# Patient Record
Sex: FEMALE | Race: WHITE | NOT HISPANIC OR LATINO | Employment: OTHER | URBAN - METROPOLITAN AREA
[De-identification: names, ages, dates, MRNs, and addresses within clinical notes are randomized per-mention and may not be internally consistent; named-entity substitution may affect disease eponyms.]

---

## 2020-06-01 ENCOUNTER — HOSPITAL ENCOUNTER (OUTPATIENT)
Facility: MEDICAL CENTER | Age: 60
End: 2020-06-01
Payer: MEDICARE

## 2020-06-01 ENCOUNTER — HOSPITAL ENCOUNTER (INPATIENT)
Facility: MEDICAL CENTER | Age: 60
LOS: 14 days | DRG: 871 | End: 2020-06-15
Attending: EMERGENCY MEDICINE | Admitting: HOSPITALIST
Payer: MEDICARE

## 2020-06-01 ENCOUNTER — APPOINTMENT (OUTPATIENT)
Dept: RADIOLOGY | Facility: MEDICAL CENTER | Age: 60
DRG: 871 | End: 2020-06-01
Attending: EMERGENCY MEDICINE
Payer: MEDICARE

## 2020-06-01 DIAGNOSIS — R65.21 SEPTIC SHOCK (HCC): ICD-10-CM

## 2020-06-01 DIAGNOSIS — N17.9 ACUTE RENAL FAILURE, UNSPECIFIED ACUTE RENAL FAILURE TYPE (HCC): ICD-10-CM

## 2020-06-01 DIAGNOSIS — A41.9 SEPTIC SHOCK (HCC): ICD-10-CM

## 2020-06-01 DIAGNOSIS — R19.7 DIARRHEA OF PRESUMED INFECTIOUS ORIGIN: ICD-10-CM

## 2020-06-01 DIAGNOSIS — E86.0 DEHYDRATION: ICD-10-CM

## 2020-06-01 PROBLEM — E87.20 METABOLIC ACIDOSIS: Status: ACTIVE | Noted: 2020-06-01

## 2020-06-01 PROBLEM — E87.1 HYPONATREMIA: Status: ACTIVE | Noted: 2020-06-01

## 2020-06-01 PROBLEM — N17.0 ACUTE RENAL FAILURE WITH TUBULAR NECROSIS (HCC): Status: ACTIVE | Noted: 2020-06-01

## 2020-06-01 PROBLEM — J47.9 BRONCHIECTASIS (HCC): Status: ACTIVE | Noted: 2020-06-01

## 2020-06-01 PROBLEM — N10 ACUTE PYELONEPHRITIS: Status: ACTIVE | Noted: 2020-06-01

## 2020-06-01 PROBLEM — E27.40 ADRENAL INSUFFICIENCY (HCC): Status: ACTIVE | Noted: 2020-06-01

## 2020-06-01 PROBLEM — E11.10 DIABETIC KETOACIDOSIS WITHOUT COMA ASSOCIATED WITH TYPE 2 DIABETES MELLITUS (HCC): Status: ACTIVE | Noted: 2020-06-01

## 2020-06-01 LAB
ALBUMIN SERPL BCP-MCNC: 2.5 G/DL (ref 3.2–4.9)
ALBUMIN SERPL BCP-MCNC: 2.7 G/DL (ref 3.2–4.9)
ALBUMIN/GLOB SERPL: 0.9 G/DL
ALBUMIN/GLOB SERPL: 0.9 G/DL
ALP SERPL-CCNC: 62 U/L (ref 30–99)
ALP SERPL-CCNC: 66 U/L (ref 30–99)
ALT SERPL-CCNC: 25 U/L (ref 2–50)
ALT SERPL-CCNC: 28 U/L (ref 2–50)
ANION GAP SERPL CALC-SCNC: 20 MMOL/L (ref 7–16)
ANION GAP SERPL CALC-SCNC: 20 MMOL/L (ref 7–16)
ANION GAP SERPL CALC-SCNC: 22 MMOL/L (ref 7–16)
ANION GAP SERPL CALC-SCNC: 23 MMOL/L (ref 7–16)
ANISOCYTOSIS BLD QL SMEAR: ABNORMAL
ANISOCYTOSIS BLD QL SMEAR: ABNORMAL
APPEARANCE UR: CLEAR
AST SERPL-CCNC: 32 U/L (ref 12–45)
AST SERPL-CCNC: 37 U/L (ref 12–45)
B-OH-BUTYR SERPL-MCNC: 1.94 MMOL/L (ref 0.02–0.27)
BACTERIA #/AREA URNS HPF: NEGATIVE /HPF
BASOPHILS # BLD AUTO: 0 % (ref 0–1.8)
BASOPHILS # BLD AUTO: 0.9 % (ref 0–1.8)
BASOPHILS # BLD: 0 K/UL (ref 0–0.12)
BASOPHILS # BLD: 0.18 K/UL (ref 0–0.12)
BILIRUB SERPL-MCNC: 0.2 MG/DL (ref 0.1–1.5)
BILIRUB SERPL-MCNC: 0.3 MG/DL (ref 0.1–1.5)
BILIRUB UR QL STRIP.AUTO: NEGATIVE
BLOOD CULTURE HOLD CXBCH: NORMAL
BUN SERPL-MCNC: 64 MG/DL (ref 8–22)
BUN SERPL-MCNC: 65 MG/DL (ref 8–22)
BUN SERPL-MCNC: 65 MG/DL (ref 8–22)
BUN SERPL-MCNC: 66 MG/DL (ref 8–22)
BURR CELLS BLD QL SMEAR: NORMAL
BURR CELLS BLD QL SMEAR: NORMAL
C DIFF DNA SPEC QL NAA+PROBE: NEGATIVE
C DIFF TOX GENS STL QL NAA+PROBE: NEGATIVE
CALCIUM SERPL-MCNC: 6.6 MG/DL (ref 8.5–10.5)
CALCIUM SERPL-MCNC: 6.7 MG/DL (ref 8.5–10.5)
CALCIUM SERPL-MCNC: 7.1 MG/DL (ref 8.5–10.5)
CALCIUM SERPL-MCNC: 7.3 MG/DL (ref 8.5–10.5)
CHLORIDE SERPL-SCNC: 93 MMOL/L (ref 96–112)
CHLORIDE SERPL-SCNC: 93 MMOL/L (ref 96–112)
CHLORIDE SERPL-SCNC: 95 MMOL/L (ref 96–112)
CHLORIDE SERPL-SCNC: 95 MMOL/L (ref 96–112)
CO2 SERPL-SCNC: 13 MMOL/L (ref 20–33)
CO2 SERPL-SCNC: 13 MMOL/L (ref 20–33)
CO2 SERPL-SCNC: 15 MMOL/L (ref 20–33)
CO2 SERPL-SCNC: 16 MMOL/L (ref 20–33)
COLOR UR: YELLOW
COVID ORDER STATUS COVID19: NORMAL
CREAT SERPL-MCNC: 5.14 MG/DL (ref 0.5–1.4)
CREAT SERPL-MCNC: 5.17 MG/DL (ref 0.5–1.4)
CREAT SERPL-MCNC: 5.35 MG/DL (ref 0.5–1.4)
CREAT SERPL-MCNC: 5.54 MG/DL (ref 0.5–1.4)
CREAT UR-MCNC: 135.6 MG/DL
DOHLE BOD BLD QL SMEAR: NORMAL
DOHLE BOD BLD QL SMEAR: NORMAL
EKG IMPRESSION: NORMAL
EOSINOPHIL # BLD AUTO: 0 K/UL (ref 0–0.51)
EOSINOPHIL # BLD AUTO: 0 K/UL (ref 0–0.51)
EOSINOPHIL NFR BLD: 0 % (ref 0–6.9)
EOSINOPHIL NFR BLD: 0 % (ref 0–6.9)
EOSINOPHIL URNS QL MICRO: ABNORMAL
EPI CELLS #/AREA URNS HPF: NEGATIVE /HPF
ERYTHROCYTE [DISTWIDTH] IN BLOOD BY AUTOMATED COUNT: 46.2 FL (ref 35.9–50)
ERYTHROCYTE [DISTWIDTH] IN BLOOD BY AUTOMATED COUNT: 47.4 FL (ref 35.9–50)
GIANT PLATELETS BLD QL SMEAR: NORMAL
GLOBULIN SER CALC-MCNC: 2.9 G/DL (ref 1.9–3.5)
GLOBULIN SER CALC-MCNC: 3.1 G/DL (ref 1.9–3.5)
GLUCOSE BLD-MCNC: 252 MG/DL (ref 65–99)
GLUCOSE BLD-MCNC: 311 MG/DL (ref 65–99)
GLUCOSE BLD-MCNC: 319 MG/DL (ref 65–99)
GLUCOSE BLD-MCNC: 349 MG/DL (ref 65–99)
GLUCOSE SERPL-MCNC: 171 MG/DL (ref 65–99)
GLUCOSE SERPL-MCNC: 365 MG/DL (ref 65–99)
GLUCOSE SERPL-MCNC: 370 MG/DL (ref 65–99)
GLUCOSE SERPL-MCNC: 415 MG/DL (ref 65–99)
GLUCOSE UR STRIP.AUTO-MCNC: NEGATIVE MG/DL
HCT VFR BLD AUTO: 26.6 % (ref 37–47)
HCT VFR BLD AUTO: 28.5 % (ref 37–47)
HGB BLD-MCNC: 8.4 G/DL (ref 12–16)
HGB BLD-MCNC: 9.1 G/DL (ref 12–16)
HYALINE CASTS #/AREA URNS LPF: ABNORMAL /LPF
KETONES UR STRIP.AUTO-MCNC: NEGATIVE MG/DL
LACTATE BLD-SCNC: 1.8 MMOL/L (ref 0.5–2)
LACTATE BLD-SCNC: 2.5 MMOL/L (ref 0.5–2)
LEUKOCYTE ESTERASE UR QL STRIP.AUTO: ABNORMAL
LYMPHOCYTES # BLD AUTO: 0.32 K/UL (ref 1–4.8)
LYMPHOCYTES # BLD AUTO: 0.33 K/UL (ref 1–4.8)
LYMPHOCYTES NFR BLD: 1.7 % (ref 22–41)
LYMPHOCYTES NFR BLD: 2.7 % (ref 22–41)
MACROCYTES BLD QL SMEAR: ABNORMAL
MAGNESIUM SERPL-MCNC: 1.1 MG/DL (ref 1.5–2.5)
MAGNESIUM SERPL-MCNC: 1.4 MG/DL (ref 1.5–2.5)
MAGNESIUM SERPL-MCNC: 2.2 MG/DL (ref 1.5–2.5)
MANUAL DIFF BLD: NORMAL
MANUAL DIFF BLD: NORMAL
MCH RBC QN AUTO: 26.8 PG (ref 27–33)
MCH RBC QN AUTO: 27 PG (ref 27–33)
MCHC RBC AUTO-ENTMCNC: 31.6 G/DL (ref 33.6–35)
MCHC RBC AUTO-ENTMCNC: 31.9 G/DL (ref 33.6–35)
MCV RBC AUTO: 84.1 FL (ref 81.4–97.8)
MCV RBC AUTO: 85.5 FL (ref 81.4–97.8)
MICRO URNS: ABNORMAL
MICROCYTES BLD QL SMEAR: ABNORMAL
MONOCYTES # BLD AUTO: 0.18 K/UL (ref 0–0.85)
MONOCYTES # BLD AUTO: 0.53 K/UL (ref 0–0.85)
MONOCYTES NFR BLD AUTO: 0.9 % (ref 0–13.4)
MONOCYTES NFR BLD AUTO: 4.4 % (ref 0–13.4)
MORPHOLOGY BLD-IMP: NORMAL
MORPHOLOGY BLD-IMP: NORMAL
NEUTROPHILS # BLD AUTO: 11.16 K/UL (ref 2–7.15)
NEUTROPHILS # BLD AUTO: 19.03 K/UL (ref 2–7.15)
NEUTROPHILS NFR BLD: 91.2 % (ref 44–72)
NEUTROPHILS NFR BLD: 95.7 % (ref 44–72)
NEUTS BAND NFR BLD MANUAL: 0.9 % (ref 0–10)
NEUTS BAND NFR BLD MANUAL: 1.8 % (ref 0–10)
NITRITE UR QL STRIP.AUTO: NEGATIVE
NRBC # BLD AUTO: 0 K/UL
NRBC # BLD AUTO: 0 K/UL
NRBC BLD-RTO: 0 /100 WBC
NRBC BLD-RTO: 0 /100 WBC
OSMOLALITY SERPL: 300 MOSM/KG H2O (ref 278–298)
OVALOCYTES BLD QL SMEAR: NORMAL
OVALOCYTES BLD QL SMEAR: NORMAL
PH UR STRIP.AUTO: 5 [PH] (ref 5–8)
PHOSPHATE SERPL-MCNC: 4.7 MG/DL (ref 2.5–4.5)
PHOSPHATE SERPL-MCNC: 5.5 MG/DL (ref 2.5–4.5)
PHOSPHATE SERPL-MCNC: 5.8 MG/DL (ref 2.5–4.5)
PLATELET # BLD AUTO: 135 K/UL (ref 164–446)
PLATELET # BLD AUTO: 188 K/UL (ref 164–446)
PLATELET BLD QL SMEAR: NORMAL
PLATELET BLD QL SMEAR: NORMAL
PMV BLD AUTO: 11.2 FL (ref 9–12.9)
PMV BLD AUTO: 11.7 FL (ref 9–12.9)
POIKILOCYTOSIS BLD QL SMEAR: NORMAL
POIKILOCYTOSIS BLD QL SMEAR: NORMAL
POLYCHROMASIA BLD QL SMEAR: NORMAL
POLYCHROMASIA BLD QL SMEAR: NORMAL
POTASSIUM SERPL-SCNC: 3.3 MMOL/L (ref 3.6–5.5)
POTASSIUM SERPL-SCNC: 3.7 MMOL/L (ref 3.6–5.5)
POTASSIUM SERPL-SCNC: 3.7 MMOL/L (ref 3.6–5.5)
POTASSIUM SERPL-SCNC: 3.8 MMOL/L (ref 3.6–5.5)
PROT SERPL-MCNC: 5.4 G/DL (ref 6–8.2)
PROT SERPL-MCNC: 5.8 G/DL (ref 6–8.2)
PROT UR QL STRIP: 100 MG/DL
RBC # BLD AUTO: 3.11 M/UL (ref 4.2–5.4)
RBC # BLD AUTO: 3.39 M/UL (ref 4.2–5.4)
RBC # URNS HPF: ABNORMAL /HPF
RBC BLD AUTO: PRESENT
RBC BLD AUTO: PRESENT
RBC UR QL AUTO: ABNORMAL
SARS-COV-2 RNA RESP QL NAA+PROBE: NOTDETECTED
SODIUM SERPL-SCNC: 126 MMOL/L (ref 135–145)
SODIUM SERPL-SCNC: 130 MMOL/L (ref 135–145)
SODIUM SERPL-SCNC: 131 MMOL/L (ref 135–145)
SODIUM SERPL-SCNC: 131 MMOL/L (ref 135–145)
SODIUM UR-SCNC: 43 MMOL/L
SP GR UR STRIP.AUTO: 1.02
SPECIMEN SOURCE: NORMAL
TOXIC GRANULES BLD QL SMEAR: SLIGHT
TOXIC GRANULES BLD QL SMEAR: SLIGHT
TROPONIN T SERPL-MCNC: 44 NG/L (ref 6–19)
TSH SERPL DL<=0.005 MIU/L-ACNC: 1.79 UIU/ML (ref 0.38–5.33)
UROBILINOGEN UR STRIP.AUTO-MCNC: 0.2 MG/DL
WBC # BLD AUTO: 12 K/UL (ref 4.8–10.8)
WBC # BLD AUTO: 19.7 K/UL (ref 4.8–10.8)
WBC #/AREA URNS HPF: ABNORMAL /HPF
WBC STL QL MICRO: ABNORMAL

## 2020-06-01 PROCEDURE — U0004 COV-19 TEST NON-CDC HGH THRU: HCPCS

## 2020-06-01 PROCEDURE — 83735 ASSAY OF MAGNESIUM: CPT

## 2020-06-01 PROCEDURE — 84484 ASSAY OF TROPONIN QUANT: CPT

## 2020-06-01 PROCEDURE — A9270 NON-COVERED ITEM OR SERVICE: HCPCS | Performed by: HOSPITALIST

## 2020-06-01 PROCEDURE — 85007 BL SMEAR W/DIFF WBC COUNT: CPT

## 2020-06-01 PROCEDURE — 85027 COMPLETE CBC AUTOMATED: CPT

## 2020-06-01 PROCEDURE — A9270 NON-COVERED ITEM OR SERVICE: HCPCS | Performed by: INTERNAL MEDICINE

## 2020-06-01 PROCEDURE — 770022 HCHG ROOM/CARE - ICU (200)

## 2020-06-01 PROCEDURE — 700111 HCHG RX REV CODE 636 W/ 250 OVERRIDE (IP): Performed by: HOSPITALIST

## 2020-06-01 PROCEDURE — 84443 ASSAY THYROID STIM HORMONE: CPT

## 2020-06-01 PROCEDURE — 83930 ASSAY OF BLOOD OSMOLALITY: CPT

## 2020-06-01 PROCEDURE — 700105 HCHG RX REV CODE 258: Performed by: HOSPITALIST

## 2020-06-01 PROCEDURE — 99291 CRITICAL CARE FIRST HOUR: CPT | Performed by: INTERNAL MEDICINE

## 2020-06-01 PROCEDURE — 82010 KETONE BODYS QUAN: CPT

## 2020-06-01 PROCEDURE — 99358 PROLONG SERVICE W/O CONTACT: CPT | Performed by: HOSPITALIST

## 2020-06-01 PROCEDURE — 96366 THER/PROPH/DIAG IV INF ADDON: CPT

## 2020-06-01 PROCEDURE — 96365 THER/PROPH/DIAG IV INF INIT: CPT

## 2020-06-01 PROCEDURE — 83605 ASSAY OF LACTIC ACID: CPT | Mod: 91

## 2020-06-01 PROCEDURE — 84100 ASSAY OF PHOSPHORUS: CPT | Mod: 91

## 2020-06-01 PROCEDURE — 80053 COMPREHEN METABOLIC PANEL: CPT

## 2020-06-01 PROCEDURE — 99291 CRITICAL CARE FIRST HOUR: CPT | Performed by: HOSPITALIST

## 2020-06-01 PROCEDURE — 71250 CT THORAX DX C-: CPT

## 2020-06-01 PROCEDURE — A4357 BEDSIDE DRAINAGE BAG: HCPCS | Performed by: EMERGENCY MEDICINE

## 2020-06-01 PROCEDURE — 700102 HCHG RX REV CODE 250 W/ 637 OVERRIDE(OP): Performed by: HOSPITALIST

## 2020-06-01 PROCEDURE — 99223 1ST HOSP IP/OBS HIGH 75: CPT | Performed by: INTERNAL MEDICINE

## 2020-06-01 PROCEDURE — 87045 FECES CULTURE AEROBIC BACT: CPT

## 2020-06-01 PROCEDURE — 700111 HCHG RX REV CODE 636 W/ 250 OVERRIDE (IP): Performed by: INTERNAL MEDICINE

## 2020-06-01 PROCEDURE — 99291 CRITICAL CARE FIRST HOUR: CPT

## 2020-06-01 PROCEDURE — 96375 TX/PRO/DX INJ NEW DRUG ADDON: CPT

## 2020-06-01 PROCEDURE — 87086 URINE CULTURE/COLONY COUNT: CPT

## 2020-06-01 PROCEDURE — 81001 URINALYSIS AUTO W/SCOPE: CPT

## 2020-06-01 PROCEDURE — 80048 BASIC METABOLIC PNL TOTAL CA: CPT | Mod: 91

## 2020-06-01 PROCEDURE — 87798 DETECT AGENT NOS DNA AMP: CPT

## 2020-06-01 PROCEDURE — 82570 ASSAY OF URINE CREATININE: CPT

## 2020-06-01 PROCEDURE — 96372 THER/PROPH/DIAG INJ SC/IM: CPT

## 2020-06-01 PROCEDURE — 89055 LEUKOCYTE ASSESSMENT FECAL: CPT

## 2020-06-01 PROCEDURE — 96367 TX/PROPH/DG ADDL SEQ IV INF: CPT

## 2020-06-01 PROCEDURE — 93005 ELECTROCARDIOGRAM TRACING: CPT | Performed by: EMERGENCY MEDICINE

## 2020-06-01 PROCEDURE — 700102 HCHG RX REV CODE 250 W/ 637 OVERRIDE(OP): Performed by: INTERNAL MEDICINE

## 2020-06-01 PROCEDURE — 96368 THER/DIAG CONCURRENT INF: CPT

## 2020-06-01 PROCEDURE — 84300 ASSAY OF URINE SODIUM: CPT

## 2020-06-01 PROCEDURE — 82962 GLUCOSE BLOOD TEST: CPT | Mod: 91

## 2020-06-01 PROCEDURE — C9803 HOPD COVID-19 SPEC COLLECT: HCPCS | Performed by: HOSPITALIST

## 2020-06-01 PROCEDURE — 89190 NASAL SMEAR FOR EOSINOPHILS: CPT

## 2020-06-01 PROCEDURE — 700101 HCHG RX REV CODE 250: Performed by: INTERNAL MEDICINE

## 2020-06-01 PROCEDURE — 700101 HCHG RX REV CODE 250: Performed by: EMERGENCY MEDICINE

## 2020-06-01 PROCEDURE — 87046 STOOL CULTR AEROBIC BACT EA: CPT

## 2020-06-01 PROCEDURE — 87899 AGENT NOS ASSAY W/OPTIC: CPT

## 2020-06-01 PROCEDURE — 87493 C DIFF AMPLIFIED PROBE: CPT

## 2020-06-01 RX ORDER — SODIUM CHLORIDE 9 MG/ML
2000 INJECTION, SOLUTION INTRAVENOUS ONCE
Status: DISCONTINUED | OUTPATIENT
Start: 2020-06-01 | End: 2020-06-01

## 2020-06-01 RX ORDER — MAGNESIUM SULFATE HEPTAHYDRATE 40 MG/ML
2 INJECTION, SOLUTION INTRAVENOUS
Status: DISCONTINUED | OUTPATIENT
Start: 2020-06-01 | End: 2020-06-01

## 2020-06-01 RX ORDER — ALBUTEROL SULFATE 90 UG/1
2 AEROSOL, METERED RESPIRATORY (INHALATION) EVERY 4 HOURS PRN
Status: DISCONTINUED | OUTPATIENT
Start: 2020-06-01 | End: 2020-06-15 | Stop reason: HOSPADM

## 2020-06-01 RX ORDER — LATANOPROST 50 UG/ML
1 SOLUTION/ DROPS OPHTHALMIC
COMMUNITY

## 2020-06-01 RX ORDER — LOVASTATIN 20 MG/1
20 TABLET ORAL DAILY
Status: ON HOLD | COMMUNITY
End: 2020-07-01

## 2020-06-01 RX ORDER — FLUTICASONE PROPIONATE 50 MCG
2 SPRAY, SUSPENSION (ML) NASAL EVERY EVENING
COMMUNITY

## 2020-06-01 RX ORDER — MAGNESIUM SULFATE HEPTAHYDRATE 40 MG/ML
4 INJECTION, SOLUTION INTRAVENOUS
Status: DISCONTINUED | OUTPATIENT
Start: 2020-06-01 | End: 2020-06-01

## 2020-06-01 RX ORDER — ONDANSETRON 4 MG/1
4 TABLET, ORALLY DISINTEGRATING ORAL EVERY 4 HOURS PRN
Status: DISCONTINUED | OUTPATIENT
Start: 2020-06-01 | End: 2020-06-15 | Stop reason: HOSPADM

## 2020-06-01 RX ORDER — MAGNESIUM SULFATE 1 G/100ML
1 INJECTION INTRAVENOUS ONCE
Status: ACTIVE | OUTPATIENT
Start: 2020-06-01 | End: 2020-06-02

## 2020-06-01 RX ORDER — SODIUM CHLORIDE, SODIUM LACTATE, POTASSIUM CHLORIDE, CALCIUM CHLORIDE 600; 310; 30; 20 MG/100ML; MG/100ML; MG/100ML; MG/100ML
INJECTION, SOLUTION INTRAVENOUS CONTINUOUS
Status: DISCONTINUED | OUTPATIENT
Start: 2020-06-01 | End: 2020-06-01

## 2020-06-01 RX ORDER — PHENOL 1.4 %
10 AEROSOL, SPRAY (ML) MUCOUS MEMBRANE
COMMUNITY

## 2020-06-01 RX ORDER — HYDROCODONE BITARTRATE AND ACETAMINOPHEN 5; 325 MG/1; MG/1
1 TABLET ORAL EVERY 6 HOURS PRN
COMMUNITY
Start: 2020-04-20

## 2020-06-01 RX ORDER — ONDANSETRON 2 MG/ML
4 INJECTION INTRAMUSCULAR; INTRAVENOUS EVERY 4 HOURS PRN
Status: DISCONTINUED | OUTPATIENT
Start: 2020-06-01 | End: 2020-06-15 | Stop reason: HOSPADM

## 2020-06-01 RX ORDER — DEXTROSE MONOHYDRATE 25 G/50ML
25-50 INJECTION, SOLUTION INTRAVENOUS PRN
Status: DISCONTINUED | OUTPATIENT
Start: 2020-06-01 | End: 2020-06-01

## 2020-06-01 RX ORDER — PREDNISONE 5 MG/1
5 TABLET ORAL DAILY
COMMUNITY

## 2020-06-01 RX ORDER — DEXTROSE, SODIUM CHLORIDE, SODIUM LACTATE, POTASSIUM CHLORIDE, AND CALCIUM CHLORIDE 5; .6; .31; .03; .02 G/100ML; G/100ML; G/100ML; G/100ML; G/100ML
INJECTION, SOLUTION INTRAVENOUS CONTINUOUS
Status: DISCONTINUED | OUTPATIENT
Start: 2020-06-01 | End: 2020-06-02

## 2020-06-01 RX ORDER — MAGNESIUM SULFATE HEPTAHYDRATE 40 MG/ML
2 INJECTION, SOLUTION INTRAVENOUS ONCE
Status: COMPLETED | OUTPATIENT
Start: 2020-06-01 | End: 2020-06-01

## 2020-06-01 RX ORDER — NOREPINEPHRINE BITARTRATE 0.03 MG/ML
0-30 INJECTION, SOLUTION INTRAVENOUS CONTINUOUS
Status: DISCONTINUED | OUTPATIENT
Start: 2020-06-01 | End: 2020-06-03

## 2020-06-01 RX ORDER — TIMOLOL MALEATE 5 MG/ML
1 SOLUTION/ DROPS OPHTHALMIC DAILY
Status: DISCONTINUED | OUTPATIENT
Start: 2020-06-02 | End: 2020-06-15 | Stop reason: HOSPADM

## 2020-06-01 RX ORDER — DEXTROSE AND SODIUM CHLORIDE 10; .45 G/100ML; G/100ML
INJECTION, SOLUTION INTRAVENOUS CONTINUOUS
Status: DISCONTINUED | OUTPATIENT
Start: 2020-06-01 | End: 2020-06-02

## 2020-06-01 RX ORDER — ACETAMINOPHEN 325 MG/1
650 TABLET ORAL EVERY 6 HOURS PRN
Status: DISCONTINUED | OUTPATIENT
Start: 2020-06-01 | End: 2020-06-15 | Stop reason: HOSPADM

## 2020-06-01 RX ORDER — TIZANIDINE 4 MG/1
4 TABLET ORAL EVERY EVENING
COMMUNITY
Start: 2020-03-18

## 2020-06-01 RX ORDER — DEXTROSE MONOHYDRATE 25 G/50ML
50 INJECTION, SOLUTION INTRAVENOUS
Status: DISCONTINUED | OUTPATIENT
Start: 2020-06-01 | End: 2020-06-01

## 2020-06-01 RX ORDER — PROMETHAZINE HYDROCHLORIDE 25 MG/1
12.5-25 TABLET ORAL EVERY 4 HOURS PRN
Status: DISCONTINUED | OUTPATIENT
Start: 2020-06-01 | End: 2020-06-15 | Stop reason: HOSPADM

## 2020-06-01 RX ORDER — SODIUM CHLORIDE, SODIUM LACTATE, POTASSIUM CHLORIDE, AND CALCIUM CHLORIDE .6; .31; .03; .02 G/100ML; G/100ML; G/100ML; G/100ML
2000 INJECTION, SOLUTION INTRAVENOUS ONCE
Status: DISCONTINUED | OUTPATIENT
Start: 2020-06-01 | End: 2020-06-01

## 2020-06-01 RX ORDER — PROCHLORPERAZINE EDISYLATE 5 MG/ML
5-10 INJECTION INTRAMUSCULAR; INTRAVENOUS EVERY 4 HOURS PRN
Status: DISCONTINUED | OUTPATIENT
Start: 2020-06-01 | End: 2020-06-15 | Stop reason: HOSPADM

## 2020-06-01 RX ORDER — LATANOPROST 50 UG/ML
1 SOLUTION/ DROPS OPHTHALMIC
Status: DISCONTINUED | OUTPATIENT
Start: 2020-06-01 | End: 2020-06-15 | Stop reason: HOSPADM

## 2020-06-01 RX ORDER — PANTOPRAZOLE SODIUM 40 MG/1
40 TABLET, DELAYED RELEASE ORAL
Status: ON HOLD | COMMUNITY
End: 2024-01-24

## 2020-06-01 RX ORDER — LISINOPRIL 5 MG/1
2.5 TABLET ORAL 2 TIMES DAILY
COMMUNITY

## 2020-06-01 RX ORDER — POTASSIUM CHLORIDE 7.45 MG/ML
10 INJECTION INTRAVENOUS
Status: COMPLETED | OUTPATIENT
Start: 2020-06-01 | End: 2020-06-01

## 2020-06-01 RX ORDER — LEVOFLOXACIN 5 MG/ML
500 INJECTION, SOLUTION INTRAVENOUS EVERY 24 HOURS
Status: DISCONTINUED | OUTPATIENT
Start: 2020-06-01 | End: 2020-06-01

## 2020-06-01 RX ORDER — ESCITALOPRAM OXALATE 20 MG/1
20 TABLET ORAL DAILY
COMMUNITY

## 2020-06-01 RX ORDER — TIMOLOL MALEATE 5 MG/ML
1 SOLUTION/ DROPS OPHTHALMIC EVERY MORNING
COMMUNITY
Start: 2020-04-14

## 2020-06-01 RX ORDER — AMOXICILLIN AND CLAVULANATE POTASSIUM 500; 125 MG/1; MG/1
1 TABLET, FILM COATED ORAL 3 TIMES DAILY
Status: ON HOLD | COMMUNITY
Start: 2020-05-02 | End: 2020-06-15

## 2020-06-01 RX ORDER — PROMETHAZINE HYDROCHLORIDE 25 MG/1
12.5-25 SUPPOSITORY RECTAL EVERY 4 HOURS PRN
Status: DISCONTINUED | OUTPATIENT
Start: 2020-06-01 | End: 2020-06-15 | Stop reason: HOSPADM

## 2020-06-01 RX ADMIN — SODIUM CHLORIDE, POTASSIUM CHLORIDE, SODIUM LACTATE AND CALCIUM CHLORIDE: 600; 310; 30; 20 INJECTION, SOLUTION INTRAVENOUS at 16:41

## 2020-06-01 RX ADMIN — SODIUM BICARBONATE: 84 INJECTION, SOLUTION INTRAVENOUS at 13:56

## 2020-06-01 RX ADMIN — CALCIUM GLUCONATE 1 G: 98 INJECTION, SOLUTION INTRAVENOUS at 18:02

## 2020-06-01 RX ADMIN — VANCOMYCIN HYDROCHLORIDE 500 MG: KIT ORAL at 17:42

## 2020-06-01 RX ADMIN — VANCOMYCIN HYDROCHLORIDE 500 MG: KIT ORAL at 23:48

## 2020-06-01 RX ADMIN — METRONIDAZOLE 500 MG: 500 INJECTION, SOLUTION INTRAVENOUS at 22:33

## 2020-06-01 RX ADMIN — LATANOPROST 1 DROP: 50 SOLUTION OPHTHALMIC at 20:58

## 2020-06-01 RX ADMIN — NOREPINEPHRINE BITARTRATE 6 MCG/MIN: 1 INJECTION INTRAVENOUS at 11:10

## 2020-06-01 RX ADMIN — SODIUM CHLORIDE 2.5 UNITS/HR: 9 INJECTION, SOLUTION INTRAVENOUS at 16:40

## 2020-06-01 RX ADMIN — ACETAMINOPHEN 650 MG: 325 TABLET, FILM COATED ORAL at 19:35

## 2020-06-01 RX ADMIN — POTASSIUM CHLORIDE 10 MEQ: 7.46 INJECTION, SOLUTION INTRAVENOUS at 20:53

## 2020-06-01 RX ADMIN — METRONIDAZOLE 500 MG: 500 INJECTION, SOLUTION INTRAVENOUS at 12:01

## 2020-06-01 RX ADMIN — INSULIN HUMAN 10 UNITS: 100 INJECTION, SOLUTION PARENTERAL at 14:04

## 2020-06-01 RX ADMIN — SODIUM CHLORIDE, SODIUM LACTATE, POTASSIUM CHLORIDE, CALCIUM CHLORIDE AND DEXTROSE MONOHYDRATE: 5; 600; 310; 30; 20 INJECTION, SOLUTION INTRAVENOUS at 19:41

## 2020-06-01 RX ADMIN — DEXTROSE AND SODIUM CHLORIDE: 10; .45 INJECTION, SOLUTION INTRAVENOUS at 22:41

## 2020-06-01 RX ADMIN — HYDROCORTISONE SODIUM SUCCINATE 50 MG: 100 INJECTION, POWDER, FOR SOLUTION INTRAMUSCULAR; INTRAVENOUS at 23:48

## 2020-06-01 RX ADMIN — CEFTRIAXONE SODIUM 1 G: 1 INJECTION, POWDER, FOR SOLUTION INTRAMUSCULAR; INTRAVENOUS at 14:52

## 2020-06-01 RX ADMIN — MAGNESIUM SULFATE IN WATER 2 G: 40 INJECTION, SOLUTION INTRAVENOUS at 17:31

## 2020-06-01 RX ADMIN — HYDROCORTISONE SODIUM SUCCINATE 50 MG: 100 INJECTION, POWDER, FOR SOLUTION INTRAMUSCULAR; INTRAVENOUS at 13:53

## 2020-06-01 RX ADMIN — NOREPINEPHRINE BITARTRATE 6 MCG/MIN: 1 INJECTION INTRAVENOUS at 12:02

## 2020-06-01 RX ADMIN — HYDROCORTISONE SODIUM SUCCINATE 50 MG: 100 INJECTION, POWDER, FOR SOLUTION INTRAMUSCULAR; INTRAVENOUS at 16:33

## 2020-06-01 RX ADMIN — POTASSIUM CHLORIDE 10 MEQ: 7.46 INJECTION, SOLUTION INTRAVENOUS at 22:28

## 2020-06-01 ASSESSMENT — PATIENT HEALTH QUESTIONNAIRE - PHQ9
1. LITTLE INTEREST OR PLEASURE IN DOING THINGS: NOT AT ALL
SUM OF ALL RESPONSES TO PHQ9 QUESTIONS 1 AND 2: 0
2. FEELING DOWN, DEPRESSED, IRRITABLE, OR HOPELESS: NOT AT ALL

## 2020-06-01 ASSESSMENT — ENCOUNTER SYMPTOMS
PND: 0
SORE THROAT: 0
FALLS: 0
HEMOPTYSIS: 0
ORTHOPNEA: 0
DEPRESSION: 0
BRUISES/BLEEDS EASILY: 0
NECK PAIN: 0
CLAUDICATION: 0
CONSTIPATION: 0
HALLUCINATIONS: 0
FEVER: 0
SHORTNESS OF BREATH: 0
SINUS PAIN: 0
STRIDOR: 0
HEARTBURN: 0
HEADACHES: 0
COUGH: 0
VOMITING: 1
WEAKNESS: 0
PALPITATIONS: 0
ABDOMINAL PAIN: 1
TINGLING: 0
DIZZINESS: 0
BLOOD IN STOOL: 0
SPUTUM PRODUCTION: 0
BACK PAIN: 0
EYE PAIN: 0
BLURRED VISION: 0
PHOTOPHOBIA: 0
DIARRHEA: 1
FLANK PAIN: 0
TREMORS: 0
DOUBLE VISION: 0
CHILLS: 1
SENSORY CHANGE: 0
NAUSEA: 1
MYALGIAS: 0
FOCAL WEAKNESS: 0
POLYDIPSIA: 0
DIAPHORESIS: 0
WHEEZING: 0

## 2020-06-01 ASSESSMENT — LIFESTYLE VARIABLES: SUBSTANCE_ABUSE: 0

## 2020-06-01 ASSESSMENT — FIBROSIS 4 INDEX: FIB4 SCORE: 2.19

## 2020-06-01 NOTE — ED NOTES
As per Theresa, pharmacist, hold on giving Ceftriaxone at this time. Page put out to Dr. Ruiz regarding antibiotic treatment.

## 2020-06-01 NOTE — ED PROVIDER NOTES
ED Provider Note    CHIEF COMPLAINT  Chief Complaint   Patient presents with   • N/V     x 3 days   • Diarrhea       HPI  Savita Mcclain is a 60 y.o. female who presents as a transfer from [facility] after being found to have renal failure, septic shock and diarrhea.  She states that for the last 3 days she has had nausea vomiting and diarrhea.  States that recently she is been taking Augmentin for a urinary tract infection.  She then started having diarrhea that had some mucus and blood in it.  She also had nausea and vomiting.  Over the last 3 days she has not eaten or drank much in the way of food or fluids.  She has some generalized abdominal pain.  She denies any fevers or chills cough or cold symptoms chest pains or shortness of breath.  The patient does have a history of rheumatoid arthritis for which she is on chronic steroids but she has not kept that medication down on the last couple of days.  She is also diabetic.  Upon arrival to [facility] she was found to be hypotensive.  She was treated with cortisol IV fluids and then finally Levophed.  Her labs showed her to have new onset kidney failure.  She was transferred here for higher level of care.    REVIEW OF SYSTEMS  HEENT:  No ear pain, congestion or sore throat   EYES: no discharge redness or vision changes  CARDIAC: no chest pain, palpitations    PULMONARY: no dyspnea, cough or congestion   GI: Positive nausea vomiting and diarrhea  : no dysuria, back pain or hematuria   Neuro: Generalized weakness, numbness aphasia or headache  Musculoskeletal: no swelling deformity or pain no joint swelling  Endocrine: no fevers, sweating, weight loss   SKIN: no rash, erythema or contusions     See history of present illness all other systems are negative    PAST MEDICAL HISTORY  Past Medical History:   Diagnosis Date   • Diabetes    • Psychiatric disorder     depression   • Rheumatoid arthritis(714.0)        FAMILY HISTORY  No family history on file.    SOCIAL  HISTORY  Social History     Socioeconomic History   • Marital status:      Spouse name: Not on file   • Number of children: Not on file   • Years of education: Not on file   • Highest education level: Not on file   Occupational History   • Not on file   Social Needs   • Financial resource strain: Not on file   • Food insecurity     Worry: Not on file     Inability: Not on file   • Transportation needs     Medical: Not on file     Non-medical: Not on file   Tobacco Use   • Smoking status: Never Smoker   • Smokeless tobacco: Never Used   Substance and Sexual Activity   • Alcohol use: Yes   • Drug use: Never   • Sexual activity: Not on file   Lifestyle   • Physical activity     Days per week: Not on file     Minutes per session: Not on file   • Stress: Not on file   Relationships   • Social connections     Talks on phone: Not on file     Gets together: Not on file     Attends Taoist service: Not on file     Active member of club or organization: Not on file     Attends meetings of clubs or organizations: Not on file     Relationship status: Not on file   • Intimate partner violence     Fear of current or ex partner: Not on file     Emotionally abused: Not on file     Physically abused: Not on file     Forced sexual activity: Not on file   Other Topics Concern   • Not on file   Social History Narrative   • Not on file       SURGICAL HISTORY  Past Surgical History:   Procedure Laterality Date   • OTHER ORTHOPEDIC SURGERY      amputation of left toes       CURRENT MEDICATIONS  Home Medications     Reviewed by Ben Venegas (Pharmacy Tech) on 06/01/20 at 1214  Med List Status: Complete   Medication Last Dose Status   amoxicillin-clavulanate (AUGMENTIN) 500-125 MG Tab COMPLETED Active   escitalopram (LEXAPRO) 20 MG tablet UNK Active   fluticasone (FLONASE) 50 MCG/ACT nasal spray UNK Active   HYDROcodone-acetaminophen (NORCO) 5-325 MG Tab per tablet UNK Active   latanoprost (XALATAN) 0.005 % Solution UNK  Active   lisinopril (PRINIVIL) 5 MG Tab UNK Active   lovastatin (MEVACOR) 20 MG Tab UNK Active   Melatonin 5 MG Tab UNK Active   metformin (GLUCOPHAGE) 1000 MG tablet UNK Active   pantoprazole (PROTONIX) 40 MG Tablet Delayed Response UNK Active   predniSONE (DELTASONE) 10 MG Tab UNK Active   timolol (TIMOPTIC) 0.5 % Solution UNK Active   tizanidine (ZANAFLEX) 4 MG Tab UNK Active                ALLERGIES  Allergies   Allergen Reactions   • Cephalexin Unspecified and Vomiting     n/v  n/v         PHYSICAL EXAM  VITAL SIGNS: /62   Pulse 74   Temp 37.2 °C (99 °F) (Temporal)   Resp (!) 23   Wt 51.7 kg (114 lb)   SpO2 100%   BMI 19.57 kg/m²   Constitutional: Well developed, Well nourished, No acute distress, Non-toxic appearance.   HEENT: Normocephalic, Atraumatic,  external ears normal, pharynx pink,  Mucous  Membranes dry, No rhinorrhea or mucosal edema  Eyes: PERRL, EOMI, Conjunctiva normal, No discharge.   Neck: Normal range of motion, No tenderness, Supple, No stridor.   Lymphatic: No lymphadenopathy    Cardiovascular: Regular Rate and Rhythm, No murmurs,  rubs, or gallops.   Thorax & Lungs: Lungs clear to auscultation bilaterally, No respiratory distress, No wheezes, rhales or rhonchi, No chest wall tenderness.   Abdomen: Bowel sounds normal, Soft, non tender, non distended,  No pulsatile masses., no rebound guarding or peritoneal signs.   Skin: Warm, Dry, No erythema, No rash,   Back:  No CVA tenderness,  No spinal tenderness, bony crepitance step offs or instability.   Extremities: Equal, intact distal pulses, No cyanosis, clubbing or edema,  No tenderness.   Musculoskeletal: Good range of motion in all major joints. No tenderness to palpation or major deformities noted.   Neurologic: Alert & oriented x 3,  No focal deficits noted.   Psychiatric: Affect normal, Judgment normal, Mood normal.      RADIOLOGY/PROCEDURES  CT-CHEST,ABDOMEN,PELVIS W/O   Final Result         1.  Asymmetric right perinephric  fat stranding suspicious for infection/pyelonephritis. Correlate with urinalysis. No significant hydronephrosis. No obstructing stone is seen.   2.  Hepatomegaly and hepatic steatosis.   3.  Rectosigmoid colon is decompressed, limiting evaluation for wall thickening. Correlate for evidence of colitis.   4.  Right lower lobe bronchiectasis and peribronchial opacities likely postinflammatory with atelectasis/scarring. Correlate clinically for evidence of infection.   5.  Atherosclerosis.                     COURSE & MEDICAL DECISION MAKING  Pertinent Labs & Imaging studies reviewed. (See chart for details)  Differential diagnosis: Septic shock, renal failure, C. difficile colitis, pyelonephritis    Results for orders placed or performed during the hospital encounter of 06/01/20   CBC WITH DIFFERENTIAL   Result Value Ref Range    WBC 19.7 (H) 4.8 - 10.8 K/uL    RBC 3.39 (L) 4.20 - 5.40 M/uL    Hemoglobin 9.1 (L) 12.0 - 16.0 g/dL    Hematocrit 28.5 (L) 37.0 - 47.0 %    MCV 84.1 81.4 - 97.8 fL    MCH 26.8 (L) 27.0 - 33.0 pg    MCHC 31.9 (L) 33.6 - 35.0 g/dL    RDW 46.2 35.9 - 50.0 fL    Platelet Count 188 164 - 446 K/uL    MPV 11.7 9.0 - 12.9 fL    Neutrophils-Polys 95.70 (H) 44.00 - 72.00 %    Lymphocytes 1.70 (L) 22.00 - 41.00 %    Monocytes 0.90 0.00 - 13.40 %    Eosinophils 0.00 0.00 - 6.90 %    Basophils 0.90 0.00 - 1.80 %    Nucleated RBC 0.00 /100 WBC    Neutrophils (Absolute) 19.03 (H) 2.00 - 7.15 K/uL    Lymphs (Absolute) 0.33 (L) 1.00 - 4.80 K/uL    Monos (Absolute) 0.18 0.00 - 0.85 K/uL    Eos (Absolute) 0.00 0.00 - 0.51 K/uL    Baso (Absolute) 0.18 (H) 0.00 - 0.12 K/uL    NRBC (Absolute) 0.00 K/uL    Anisocytosis 1+    Comp Metabolic Panel   Result Value Ref Range    Sodium 130 (L) 135 - 145 mmol/L    Potassium 3.7 3.6 - 5.5 mmol/L    Chloride 93 (L) 96 - 112 mmol/L    Co2 15 (L) 20 - 33 mmol/L    Anion Gap 22.0 (H) 7.0 - 16.0    Glucose 365 (H) 65 - 99 mg/dL    Bun 64 (H) 8 - 22 mg/dL    Creatinine 5.54 (HH)  0.50 - 1.40 mg/dL    Calcium 7.1 (L) 8.5 - 10.5 mg/dL    AST(SGOT) 32 12 - 45 U/L    ALT(SGPT) 25 2 - 50 U/L    Alkaline Phosphatase 66 30 - 99 U/L    Total Bilirubin 0.3 0.1 - 1.5 mg/dL    Albumin 2.7 (L) 3.2 - 4.9 g/dL    Total Protein 5.8 (L) 6.0 - 8.2 g/dL    Globulin 3.1 1.9 - 3.5 g/dL    A-G Ratio 0.9 g/dL   TROPONIN   Result Value Ref Range    Troponin T 44 (H) 6 - 19 ng/L   PHOSPHORUS   Result Value Ref Range    Phosphorus 5.5 (H) 2.5 - 4.5 mg/dL   MAGNESIUM   Result Value Ref Range    Magnesium 1.1 (L) 1.5 - 2.5 mg/dL   Urine Sodium Random   Result Value Ref Range    Sodium, Urine -per volume 43 mmol/L   Urine Creatinine Random   Result Value Ref Range    Creatinine, Random Urine 135.60 mg/dL   Blood Culture,Hold   Result Value Ref Range    Blood Culture Hold Collected    ESTIMATED GFR   Result Value Ref Range    GFR If African American 10 (A) >60 mL/min/1.73 m 2    GFR If Non  8 (A) >60 mL/min/1.73 m 2   DIFFERENTIAL MANUAL   Result Value Ref Range    Bands-Stabs 0.90 0.00 - 10.00 %    Manual Diff Status PERFORMED    PERIPHERAL SMEAR REVIEW   Result Value Ref Range    Peripheral Smear Review see below    PLATELET ESTIMATE   Result Value Ref Range    Plt Estimation Normal    MORPHOLOGY   Result Value Ref Range    RBC Morphology Present     Giant Platelets 1+     Polychromia 1+     Poikilocytosis 2+     Ovalocytes 1+     Echinocytes 2+     Toxic Gran Slight     Dohle Bodies Few    BETA-HYDROXYBUTYRIC ACID   Result Value Ref Range    beta-Hydroxybutyric Acid 1.94 (H) 0.02 - 0.27 mmol/L   COVID/SARS CoV-2    Specimen: Nasopharyngeal; Respirate   Result Value Ref Range    COVID Order Status Received    SARS-CoV-2, PCR (In-House)   Result Value Ref Range    SARS-CoV-2 Source NP Swab    EKG   Result Value Ref Range    Report       [facility]    Test Date:  2020-06-01  Pt Name:    CAROLYNE SELLERS                Department: ER  MRN:        [id number]                       Room:       [facility]  Gender:     Female                       Technician: [id number]  :        1960                   Requested By:HEIDY BHARDWAJ  Order #:    [id number]                    Reading MD: HEIDY BHARDWAJ MD    Measurements  Intervals                                Axis  Rate:       72                           P:          77  MN:         131                          QRS:        83  QRSD:       84                           T:          71  QT:         428  QTc:        469    Interpretive Statements  SINUS RHYTHM  NONSPECIFIC T ABNORMALITIES, ANTERIOR LEADS  Compared to ECG 2013 22:02:35  Right ventricular hypertrophy no longer present  T-wave abnormality still present  Electronically Signed On 2020 12:10:24 PDT by HEIDY BHARDWAJ MD          HYDRATION: Based on the patient's presentation of Acute [place] Injury the patient was given IV fluids. IV Hydration was used because oral hydration was not adequate alone. Upon recheck following hydration, the patient was improved.    Reviewed the patient's lab work from [facility] which showed a potassium of 3.6 BUN of 10 creatinine 6.3 glucose level was 233.  ESR was 64.  Calcium level 10.1.  Sodium was 129.  White  blood cell count is 14.5.    Spoke with nephrology Dr. Tarango who will consult on the patient's case.  I spoke with the intensivist Dr. Nina who will participate in the patient's admission to the intensive care unit because she is on pressures.    I also spoke with Dr. Ruiz who will admit the patient for further care.      Critical Care  Due to the real possibility of a deterioration of this patient's condition required the highest level of my preparedness for sudden emergent intervention. I provided critical care services which included medication orders, frequent reevaluations of the patient's condition and response to treatment, ordering and reviewing test results and discussing the case with various consultants. The critical care time  associated with the care of the patient was 45 minutes. Review chart for interventions. This time is exclusive of any other billable procedures.     FINAL IMPRESSION  1. Diarrhea of presumed infectious origin    2. Dehydration    3. Acute renal failure, unspecified acute renal failure type (HCC)    4. Septic shock (HCC)           PLAN/DISPOSITION  Admitted in serious condition          Electronically signed by: Bárbara Khoury M.D., 6/1/2020 11:05 AM

## 2020-06-01 NOTE — H&P
Hospital Medicine History & Physical Note    Date of Service  6/1/2020    Primary Care Physician  Pcp Unknown    Code Status  Full Code    Chief Complaint  Chief Complaint   Patient presents with   • N/V     x 3 days   • Diarrhea       History of Presenting Illness  60 y.o. female who presented 6/1/2020 with past medical history of rheumatoid arthritis on chronic prednisone therapy, type 2 diabetes on metformin, CKD stage III who comes into the emergency room with complaints of nausea, vomiting and diarrhea for the past 4 days.  Associated with an epigastric abdominal pain.  The pain radiates to the lower abdomen and feels like a burning sensation.  The pain is nonexertional, non-positional.  Patient states that she has not gone to the bathroom to urinate in 4 days.  She would have a bowel movement every 30 minutes.  She denies any blood in her stools.  Patient denies any fever, cough, shortness of breath, chest pain.    Patient came from outlying facility from [facility] that I have reviewed the records which includes  Blood pressure 86/50, respiratory rate 20, saturating 90% on 2 L oxygen, temperature 98.4  Sodium 129, potassium 3.6, chloride 89, CO2 21, AST 46, ALT 30, BUN 67, glucose 399, creatinine 6.4, calcium 8, bili 1.5, anion gap 22, lactic acid 2.5, lipase 163, , CK-MB 1.1, troponin 0.062, WBC 14.5, hemoglobin 10.9, platelets 175, d-dimer 3 8378, TSH 8.67  Patient was started on Unasyn and Levophed    Patient arrived to the hospital hypotensive  EKG interpreted by me found normal sinus rhythm, T wave inversions in anterior leads    Review of Systems  Review of Systems   Constitutional: Positive for chills. Negative for diaphoresis, fever and malaise/fatigue.   HENT: Negative for congestion, ear discharge, ear pain, hearing loss, nosebleeds, sinus pain, sore throat and tinnitus.    Eyes: Negative for blurred vision, double vision, photophobia and pain.   Respiratory: Negative for cough,  hemoptysis, sputum production, shortness of breath, wheezing and stridor.    Cardiovascular: Negative for chest pain, palpitations, orthopnea, claudication, leg swelling and PND.   Gastrointestinal: Positive for abdominal pain, diarrhea, nausea and vomiting. Negative for blood in stool, constipation, heartburn and melena.   Genitourinary: Negative for dysuria, flank pain, frequency, hematuria and urgency.   Musculoskeletal: Negative for back pain, falls, joint pain, myalgias and neck pain.   Skin: Negative for itching and rash.   Neurological: Negative for dizziness, tingling, tremors, weakness and headaches.   Endo/Heme/Allergies: Negative for environmental allergies and polydipsia. Does not bruise/bleed easily.   Psychiatric/Behavioral: Negative for depression, hallucinations, substance abuse and suicidal ideas.       Past Medical History   has a past medical history of Diabetes, Psychiatric disorder, and Rheumatoid arthritis(714.0).    Surgical History   has a past surgical history that includes other orthopedic surgery.     Family History  Family history reviewed and not pertinent    Social History   reports that she has never smoked. She has never used smokeless tobacco. She reports current alcohol use. She reports that she does not use drugs.    Allergies  Allergies   Allergen Reactions   • Cephalexin Unspecified and Vomiting     n/v  n/v         Medications  Prior to Admission Medications   Prescriptions Last Dose Informant Patient Reported? Taking?   HYDROcodone-acetaminophen (NORCO) 5-325 MG Tab per tablet UNK at UNK Patient Yes Yes   Sig: Take 1 Tab by mouth 3 times a day as needed.   Melatonin 5 MG Tab UNK at UNK Patient Yes No   Sig: Take 5 mg by mouth every bedtime.   amoxicillin-clavulanate (AUGMENTIN) 500-125 MG Tab COMPLETED at COMPLETED Patient Yes Yes   Sig: Take 1 Tab by mouth 3 times a day. 13 day course   escitalopram (LEXAPRO) 20 MG tablet UNK at UNK Patient Yes Yes   Sig: Take 20 mg by mouth  every day.   fluticasone (FLONASE) 50 MCG/ACT nasal spray UNK at UNK Patient Yes No   Sig: Spray 1 Spray in nose every day.   latanoprost (XALATAN) 0.005 % Solution UNK at UNK Patient Yes No   Sig: Place 1 Drop in both eyes every bedtime.   lisinopril (PRINIVIL) 5 MG Tab UNK at UNK Patient Yes No   Sig: Take 5 mg by mouth every day.   lovastatin (MEVACOR) 20 MG Tab UNK at UNK Patient Yes No   Sig: Take 20 mg by mouth every day.   metformin (GLUCOPHAGE) 1000 MG tablet UNK at UNK Patient Yes No   Sig: Take 1,000 mg by mouth 2 Times a Day.   pantoprazole (PROTONIX) 40 MG Tablet Delayed Response UNK at UNK Patient Yes No   Sig: Take 40 mg by mouth every day.   predniSONE (DELTASONE) 10 MG Tab UNK at UNK Patient Yes Yes   Sig: Take 10 mg by mouth every day.   timolol (TIMOPTIC) 0.5 % Solution UNK at UNK Patient Yes Yes   Sig: instill 1 drop into both eyes every morning   tizanidine (ZANAFLEX) 4 MG Tab UNK at UNK Patient Yes Yes   Sig: Take 4 mg by mouth 2 times a day as needed.      Facility-Administered Medications: None       Physical Exam  Temp:  [36.4 °C (97.6 °F)-37.2 °C (99 °F)] 36.4 °C (97.6 °F)  Pulse:  [67-78] 71  Resp:  [15-33] 26  BP: ()/(52-77) 96/59  SpO2:  [98 %-100 %] 100 %    Physical Exam  Vitals signs and nursing note reviewed.   Constitutional:       General: She is not in acute distress.     Appearance: Normal appearance. She is not ill-appearing, toxic-appearing or diaphoretic.   HENT:      Head: Normocephalic and atraumatic.      Nose: No congestion or rhinorrhea.      Mouth/Throat:      Pharynx: No oropharyngeal exudate or posterior oropharyngeal erythema.   Eyes:      General: No scleral icterus.  Neck:      Musculoskeletal: No neck rigidity or muscular tenderness.      Vascular: No carotid bruit.   Cardiovascular:      Rate and Rhythm: Normal rate and regular rhythm.      Pulses: Normal pulses.      Heart sounds: Normal heart sounds. No murmur. No friction rub. No gallop.    Pulmonary:       Effort: Pulmonary effort is normal. No respiratory distress.      Breath sounds: Normal breath sounds. No stridor. No wheezing or rhonchi.   Abdominal:      General: Abdomen is flat. There is no distension.      Palpations: There is no mass.      Tenderness: There is no abdominal tenderness. There is no left CVA tenderness, guarding or rebound.      Hernia: No hernia is present.   Musculoskeletal: Normal range of motion.         General: No swelling.      Right lower leg: No edema.      Left lower leg: No edema.   Lymphadenopathy:      Cervical: No cervical adenopathy.   Skin:     General: Skin is warm and dry.      Capillary Refill: Capillary refill takes more than 3 seconds.      Coloration: Skin is not jaundiced or pale.      Findings: No bruising or erythema.   Neurological:      Mental Status: She is alert.         Laboratory:  Recent Labs     06/01/20  1108 06/01/20  1620   WBC 19.7* 12.0*   RBC 3.39* 3.11*   HEMOGLOBIN 9.1* 8.4*   HEMATOCRIT 28.5* 26.6*   MCV 84.1 85.5   MCH 26.8* 27.0   MCHC 31.9* 31.6*   RDW 46.2 47.4   PLATELETCT 188 135*   MPV 11.7 11.2     Recent Labs     06/01/20  1108 06/01/20  1449   SODIUM 130* 126*   POTASSIUM 3.7 3.8   CHLORIDE 93* 93*   CO2 15* 13*   GLUCOSE 365* 415*   BUN 64* 66*   CREATININE 5.54* 5.14*   CALCIUM 7.1* 6.6*     Recent Labs     06/01/20  1108 06/01/20  1449   ALTSGPT 25 28   ASTSGOT 32 37   ALKPHOSPHAT 66 62   TBILIRUBIN 0.3 0.2   GLUCOSE 365* 415*         No results for input(s): NTPROBNP in the last 72 hours.      Recent Labs     06/01/20  1108   TROPONINT 44*       Imaging:  CT-CHEST,ABDOMEN,PELVIS W/O   Final Result         1.  Asymmetric right perinephric fat stranding suspicious for infection/pyelonephritis. Correlate with urinalysis. No significant hydronephrosis. No obstructing stone is seen.   2.  Hepatomegaly and hepatic steatosis.   3.  Rectosigmoid colon is decompressed, limiting evaluation for wall thickening. Correlate for evidence of colitis.   4.   Right lower lobe bronchiectasis and peribronchial opacities likely postinflammatory with atelectasis/scarring. Correlate clinically for evidence of infection.   5.  Atherosclerosis.                     Assessment/Plan:    * Acute pyelonephritis  Assessment & Plan  Patient states that she stopped producing urine 4 days ago, there is no sign obstruction on CT scan, she does have evidence of hematuria on urinalysis  CT scan shows evidence of pyelonephritis without evidence of abscess  F/u urine cultures  Started pt on ceftriaxone        Diabetic ketoacidosis without coma associated with type 2 diabetes mellitus (HCC)  Assessment & Plan  With renal failure, elevated but hydroxybutyrate  Patient has been started on IV fluids and IV insulin per DKA protocol  Every hour Accu-Cheks with titration of insulin drip  Monitor BMP every 4 hours  Switch to subcutaneous insulin once anion gap is closed  Monitor potassium and phosphorus which will be replaced aggressively  Replace potassium if below 5.5  Hemoglobin A1c ordered   Diabetes education ordered  Transfer to ICU      Acute renal failure with tubular necrosis (HCC)  Assessment & Plan  Likely prerenal-combination of sepsis, dehydration from diarrhea  IV fluid hydration with lactated Ringer's  Monitor BMP and assess response  Avoid IV contrast/nephrotoxins/NSAIDs  Dose adjust meds for decreased GFR  strict ins and outs        Metabolic acidosis  Assessment & Plan  Secondary to DKA and renal failure  Sodium bicarb started by nephrology    Adrenal insufficiency ([facility])  Assessment & Plan  Patient is on chronic prednisone therapy for rheumatoid arthritis.  She presents with shock.  She is alert and oriented.  IV hydrocortisone has been ordered.    Diarrhea  Assessment & Plan  Rule out c diff- start empiric treatment- vancomycin and flagyl   contact precautions   Stool cultures, norwalk virus have been ordered    Septic shock ([facility])  Assessment & Plan  This is Septic shock Present  on admission  SIRS criteria identified on my evaluation include: Tachycardia, with heart rate greater than 90 BPM and Leukocytosis, with WBC greater than 12,000  Source is pyelonephritis, possible C. difficile  Presentation includes: Severe sepsis present and persistent hypotension after 30 ml/kg completed.   Despite appropriate fluid resuscitation with crystalloid given per sepsis guidelines, the patient remains hypotensive with systolic blood pressure less than 90 or MAP less than 65  Hemodynamic support with additional fluids and IV levophed as needed to maintain a SBP of 90 or MAP of 65  IV antibiotics as appropriate for source of sepsis  Reassessment: I have reassessed the patient's hemodynamic status      Hyponatremia  Assessment & Plan  Secondary to hyperglycemia   Continue to trend q4hrs    Bronchiectasis (HCC)  Assessment & Plan  Chest CT findings - with no complains of cough or dyspnea   Patient had a previous pneumonia  Albuterol prn      Patient remains critically ill in the ICU with life threatening DKA, septic shock, renal failiure requiring management of insulin drip that is titrated base on serial lab value results to prevent renal collapse.  Patient is on IV Levophed to maintain a map above 65.  Total of 40 minutes of critical care time spent with patient, discussed on rounds with RN, pharmacy, and RT. This time is exclusive of any procedures performed and does not overlap with other physician's time.    I spent a total of 35 minutes of non face to face time performing additional research, reviewing medical records from transferring facility, discussing plan of care with other healthcare providers. Start time: 12 00 pm. End time: 1:20 pm

## 2020-06-01 NOTE — CONSULTS
Critical Care Consultation    Date of consult: 6/1/2020    Referring Physician  Bárbara Khoury M.D.    Reason for Consultation  shock    History of Presenting Illness  60 y.o. female with past medical history of diabetes, hypertension, hyperlipidemia, stage III chronic kidney disease, rheumatoid arthritis on daily prednisone who presented 6/1/2020 as a transfer from [facility] where she presented today for dizziness and epigastric pain.  She has had watery diarrhea for the past 4 days following a course of Augmentin for urinary tract infection, she reports an associated epigastric burning which radiates to bilateral lower quadrants and is not improved or worsened by anything.  She states she has had watery, nonbloody bowel movements approximately every 30 minutes, she has noted additional nausea and non-bloody emesis.  At [facility] she was evaluated and found to have a creatinine of 6.4, glucose 309 9, CO2 21, lipase 163, , troponin 0.062.  She was given a dose of Unasyn and started on levophed, no imaging was obtained.  On arrival to emergency department she was noted to have continued hypotension, has been given an additional 1 L bolus (total of 3 L).  CT scan of her abdomen has been obtained which shows likely pyelonephritis with possible rectosigmoid wall thickening and right lower lobe bronchiectasis.    Code Status  Full Code    Review of Systems  Review of Systems   Constitutional: Positive for chills and malaise/fatigue. Negative for fever.   Eyes: Negative for blurred vision.   Respiratory: Negative for cough, sputum production, shortness of breath and stridor.    Cardiovascular: Negative for chest pain.   Gastrointestinal: Positive for abdominal pain, diarrhea, nausea and vomiting.   Genitourinary: Positive for dysuria.   Musculoskeletal: Negative for myalgias.   Skin: Negative for rash.   Neurological: Negative for dizziness, sensory change and focal weakness.       Past Medical  History   has a past medical history of Diabetes, Psychiatric disorder, and Rheumatoid arthritis(714.0).    Surgical History   has a past surgical history that includes other orthopedic surgery.    Family History  family history is not on file.    Social History   reports that she has never smoked. She has never used smokeless tobacco. She reports current alcohol use. She reports that she does not use drugs.    Medications  Home Medications    **Home medications have not yet been reviewed for this encounter**       Current Facility-Administered Medications   Medication Dose Route Frequency Provider Last Rate Last Dose   • Pharmacy Consult Request  1 Each Other PHARMACY TO DOSE Bárbara Khoury M.D.       • norepinephrine (Levophed) infusion 8 mg/250 mL (premix)  0-30 mcg/min Intravenous Continuous Bárbara Khoury M.D. 11.3 mL/hr at 06/01/20 1110 6 mcg/min at 06/01/20 1110   • metroNIDAZOLE (FLAGYL) IVPB 500 mg  500 mg Intravenous Once Bárbara Khoury M.D.         Current Outpatient Medications   Medication Sig Dispense Refill   • amoxicillin-clavulanate (AUGMENTIN) 875-125 MG TABS Take 1 Tab by mouth every 12 hours. 10 Tab 0   • hydrocortisone (ANUSOL-HC) 25 MG SUPP Insert 1 Suppository in rectum every 12 hours. 20 Suppository 0   • predniSONE (DELTASONE) 10 MG TABS Take 1 Tab by mouth every day. Take with food 10 Tab 0   • thiamine (THIAMINE) 100 MG tablet Take 1 Tab by mouth every day. 10 Each 0   • albuterol (VENTOLIN OR PROVENTIL) 108 (90 BASE) MCG/ACT AERS Inhale 2 Puffs by mouth every four hours as needed for Shortness of Breath. 1 Inhaler 0   • escitalopram (LEXAPRO) 10 MG TABS Take 10 mg by mouth every day.           Allergies  Allergies   Allergen Reactions   • Cephalexin Unspecified and Vomiting     n/v  n/v         Vital Signs last 24 hours  Temp:  [37.2 °C (99 °F)] 37.2 °C (99 °F)  Pulse:  [74] 74  Resp:  [18-23] 23  BP: (112-119)/(58) 112/58  SpO2:  [99 %-100 %] 100 %    Physical Exam  Physical  Exam  Vitals signs and nursing note reviewed.   Constitutional:       Appearance: She is ill-appearing.   HENT:      Head: Normocephalic and atraumatic.      Right Ear: External ear normal.      Left Ear: External ear normal.      Nose: Nose normal.      Mouth/Throat:      Mouth: Mucous membranes are dry.      Pharynx: Oropharynx is clear.   Eyes:      Extraocular Movements: Extraocular movements intact.      Conjunctiva/sclera: Conjunctivae normal.   Neck:      Musculoskeletal: Normal range of motion and neck supple.   Cardiovascular:      Rate and Rhythm: Normal rate and regular rhythm.      Pulses: Normal pulses.   Pulmonary:      Effort: Pulmonary effort is normal.      Breath sounds: No rhonchi or rales.   Abdominal:      Tenderness: There is abdominal tenderness (epigastric). There is no guarding or rebound.      Hernia: No hernia is present.   Musculoskeletal: Normal range of motion.         General: No deformity or signs of injury.   Skin:     General: Skin is warm and dry.      Capillary Refill: Capillary refill takes less than 2 seconds.      Coloration: Skin is not pale.      Findings: No erythema.   Neurological:      General: No focal deficit present.      Mental Status: She is alert and oriented to person, place, and time.      Cranial Nerves: No cranial nerve deficit.      Sensory: No sensory deficit.      Motor: No weakness.   Psychiatric:         Mood and Affect: Mood normal.         Behavior: Behavior normal.         Fluids  No intake or output data in the 24 hours ending 20 1135    Laboratory  Recent Results (from the past 48 hour(s))   EKG    Collection Time: 20 10:54 AM   Result Value Ref Range    Report       [facility] Emergency Dept.    Test Date:  2020  Pt Name:    CAROLYNE SELLERS                Department: ER  MRN:        [id number]                      Room:       [facility]  Gender:     Female                       Technician: [id number]  :        [id number]                    Requested By:HEIDY BHARDWAJ  Order #:    [id number]                    Reading MD:    Measurements  Intervals                                Axis  Rate:       72                           P:          77  WY:         131                          QRS:        83  QRSD:       84                           T:          71  QT:         428  QTc:        469    Interpretive Statements  SINUS RHYTHM  NONSPECIFIC T ABNORMALITIES, ANTERIOR LEADS  Compared to ECG 02/11/2013 22:02:35  Right ventricular hypertrophy no longer present  T-wave abnormality still present         Imaging  CT-CHEST,ABDOMEN,PELVIS W/O   Final Result         1.  Asymmetric right perinephric fat stranding suspicious for infection/pyelonephritis. Correlate with urinalysis. No significant hydronephrosis. No obstructing stone is seen.   2.  Hepatomegaly and hepatic steatosis.   3.  Rectosigmoid colon is decompressed, limiting evaluation for wall thickening. Correlate for evidence of colitis.   4.  Right lower lobe bronchiectasis and peribronchial opacities likely postinflammatory with atelectasis/scarring. Correlate clinically for evidence of infection.   5.  Atherosclerosis.                   Assessment/Plan  * Acute pyelonephritis- (present on admission)  Assessment & Plan  CT concerning for pyelonephritis  Follow-up urine cultures  Started on ceftriaxone    Diabetic ketoacidosis without coma associated with type 2 diabetes mellitus (HCC)- (present on admission)  Assessment & Plan  Mildly elevated hydroxybutyric acid with hyperglycemia and CO2 of 15  cardiac monitoring  optimize intravascular volume with additional IVF boluses  DKA protocol with insulin drip at 0.05 units/kg/hr  Every hour Accu-Cheks, every 4 hour BMP, mag, phos  Goal Magnesium: >2, Phosphorus: 2, K >4  Will transition to sliding scale insulin when anion gap <12, CO2 > 17    Acute renal failure with tubular necrosis (HCC)- (present on admission)  Assessment & Plan  Acute on chronic  kidney disease likely due to dehydration  IVF  Renal dose meds, avoid nephrotoxins  Strict I/Os  Follow renal function  Nephrology consulted in ER    Metabolic acidosis- (present on admission)  Assessment & Plan  Due to uremia and ketosis  Bicarbonate infusion initiated    Adrenal insufficiency (HCC)- (present on admission)  Assessment & Plan  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 Difficile  C. difficile PCR toxin ordered  PO Vanco and IV Flagyl -discontinue if PCR negative    Septic shock (HCC)- (present on admission)  Assessment & Plan  This is Septic shock Present on admission  SIRS criteria identified on my evaluation include: Tachycardia, with heart rate greater than 90 BPM, Tachypnea, with respirations greater than 20 per minute and Leukocytosis, with WBC greater than 12,000  Source is urinary versus GI  Presentation includes: Severe sepsis present and persistent hypotension after 30 ml/kg completed.   Despite appropriate fluid resuscitation with crystalloid given per sepsis guidelines, the patient remains hypotensive with systolic blood pressure less than 90 or MAP less than 65  Hemodynamic support with additional fluids and IV vasopressors as needed to maintain a SBP of 90 or MAP of 65  IV antibiotics as appropriate for source of sepsis  Reassessment: I have reassessed the patient's hemodynamic status    Hyponatremia- (present on admission)  Assessment & Plan  Pseudohyponatremia, corrected is 136  Monitor while correction of acidosis    Bronchiectasis (HCC)- (present on admission)  Assessment & Plan  no respiratory symptoms at this time   Noted on CT scan      Discussed patient condition and risk of morbidity and/or mortality with RN, RT, Pharmacy, Code status disscussed, Patient and ERP.      The patient remains critically ill.  Critical care time =  37 minutes in directly providing and coordinating critical care and extensive data review.  No time overlap and excludes procedures.

## 2020-06-01 NOTE — ED TRIAGE NOTES
Pt transferred from [facility] for N/V/D x 3 days. Pt noted to have renal injury with hypotension. Pt received 2L NS prior to arrival, and currently on levophed running at 6 mcg/min. Pt currently A&O x 4 and denies any chest pain.

## 2020-06-01 NOTE — ED NOTES
Med rec completed per[facility]   Allergies reviewed    Pt recently completed a 13 day course of Augmentin     Per pt's mom pt has not taken her meds in a few days

## 2020-06-01 NOTE — ED NOTES
Keri from Lab called with critical result of calcium 6.6 and creatine 5.14 at 1600. Critical lab result read back to Keri.   Dr. Ruiz notified of critical lab result at 1600.  Critical lab result read back by Dr. Ruiz.

## 2020-06-02 LAB
ALBUMIN SERPL BCP-MCNC: 2.4 G/DL (ref 3.2–4.9)
ALBUMIN/GLOB SERPL: 0.8 G/DL
ALP SERPL-CCNC: 63 U/L (ref 30–99)
ALT SERPL-CCNC: 49 U/L (ref 2–50)
ANION GAP SERPL CALC-SCNC: 17 MMOL/L (ref 7–16)
ANION GAP SERPL CALC-SCNC: 17 MMOL/L (ref 7–16)
ANION GAP SERPL CALC-SCNC: 19 MMOL/L (ref 7–16)
ANION GAP SERPL CALC-SCNC: 19 MMOL/L (ref 7–16)
ANION GAP SERPL CALC-SCNC: 20 MMOL/L (ref 7–16)
ANISOCYTOSIS BLD QL SMEAR: ABNORMAL
AST SERPL-CCNC: 83 U/L (ref 12–45)
B-OH-BUTYR SERPL-MCNC: <0.2 MMOL/L (ref 0.02–0.27)
BASOPHILS # BLD AUTO: 0 % (ref 0–1.8)
BASOPHILS # BLD: 0 K/UL (ref 0–0.12)
BILIRUB SERPL-MCNC: <0.2 MG/DL (ref 0.1–1.5)
BUN SERPL-MCNC: 54 MG/DL (ref 8–22)
BUN SERPL-MCNC: 57 MG/DL (ref 8–22)
BUN SERPL-MCNC: 58 MG/DL (ref 8–22)
BUN SERPL-MCNC: 63 MG/DL (ref 8–22)
BUN SERPL-MCNC: 64 MG/DL (ref 8–22)
BURR CELLS BLD QL SMEAR: NORMAL
C DIFF DNA SPEC QL NAA+PROBE: NEGATIVE
C DIFF TOX GENS STL QL NAA+PROBE: NEGATIVE
CA-I SERPL-SCNC: 1 MMOL/L (ref 1.1–1.3)
CALCIUM SERPL-MCNC: 6.4 MG/DL (ref 8.5–10.5)
CALCIUM SERPL-MCNC: 7 MG/DL (ref 8.5–10.5)
CALCIUM SERPL-MCNC: 7.1 MG/DL (ref 8.5–10.5)
CALCIUM SERPL-MCNC: 7.2 MG/DL (ref 8.5–10.5)
CALCIUM SERPL-MCNC: 7.5 MG/DL (ref 8.5–10.5)
CHLORIDE SERPL-SCNC: 101 MMOL/L (ref 96–112)
CHLORIDE SERPL-SCNC: 95 MMOL/L (ref 96–112)
CHLORIDE SERPL-SCNC: 99 MMOL/L (ref 96–112)
CO2 SERPL-SCNC: 14 MMOL/L (ref 20–33)
CO2 SERPL-SCNC: 14 MMOL/L (ref 20–33)
CO2 SERPL-SCNC: 15 MMOL/L (ref 20–33)
CO2 SERPL-SCNC: 15 MMOL/L (ref 20–33)
CO2 SERPL-SCNC: 16 MMOL/L (ref 20–33)
CORTIS SERPL-MCNC: 84.8 UG/DL (ref 0–23)
CREAT SERPL-MCNC: 4.17 MG/DL (ref 0.5–1.4)
CREAT SERPL-MCNC: 4.19 MG/DL (ref 0.5–1.4)
CREAT SERPL-MCNC: 4.39 MG/DL (ref 0.5–1.4)
CREAT SERPL-MCNC: 4.73 MG/DL (ref 0.5–1.4)
CREAT SERPL-MCNC: 4.95 MG/DL (ref 0.5–1.4)
E COLI SXT1+2 STL IA: NORMAL
EOSINOPHIL # BLD AUTO: 0 K/UL (ref 0–0.51)
EOSINOPHIL NFR BLD: 0 % (ref 0–6.9)
ERYTHROCYTE [DISTWIDTH] IN BLOOD BY AUTOMATED COUNT: 47.1 FL (ref 35.9–50)
GLOBULIN SER CALC-MCNC: 2.9 G/DL (ref 1.9–3.5)
GLUCOSE BLD-MCNC: 107 MG/DL (ref 65–99)
GLUCOSE BLD-MCNC: 113 MG/DL (ref 65–99)
GLUCOSE BLD-MCNC: 119 MG/DL (ref 65–99)
GLUCOSE BLD-MCNC: 143 MG/DL (ref 65–99)
GLUCOSE BLD-MCNC: 145 MG/DL (ref 65–99)
GLUCOSE BLD-MCNC: 146 MG/DL (ref 65–99)
GLUCOSE BLD-MCNC: 151 MG/DL (ref 65–99)
GLUCOSE BLD-MCNC: 151 MG/DL (ref 65–99)
GLUCOSE BLD-MCNC: 160 MG/DL (ref 65–99)
GLUCOSE BLD-MCNC: 164 MG/DL (ref 65–99)
GLUCOSE BLD-MCNC: 164 MG/DL (ref 65–99)
GLUCOSE BLD-MCNC: 167 MG/DL (ref 65–99)
GLUCOSE BLD-MCNC: 174 MG/DL (ref 65–99)
GLUCOSE BLD-MCNC: 174 MG/DL (ref 65–99)
GLUCOSE BLD-MCNC: 179 MG/DL (ref 65–99)
GLUCOSE BLD-MCNC: 181 MG/DL (ref 65–99)
GLUCOSE BLD-MCNC: 182 MG/DL (ref 65–99)
GLUCOSE BLD-MCNC: 185 MG/DL (ref 65–99)
GLUCOSE BLD-MCNC: 198 MG/DL (ref 65–99)
GLUCOSE BLD-MCNC: 208 MG/DL (ref 65–99)
GLUCOSE BLD-MCNC: 213 MG/DL (ref 65–99)
GLUCOSE BLD-MCNC: 226 MG/DL (ref 65–99)
GLUCOSE BLD-MCNC: 97 MG/DL (ref 65–99)
GLUCOSE SERPL-MCNC: 119 MG/DL (ref 65–99)
GLUCOSE SERPL-MCNC: 151 MG/DL (ref 65–99)
GLUCOSE SERPL-MCNC: 166 MG/DL (ref 65–99)
GLUCOSE SERPL-MCNC: 166 MG/DL (ref 65–99)
GLUCOSE SERPL-MCNC: 235 MG/DL (ref 65–99)
HCT VFR BLD AUTO: 26.8 % (ref 37–47)
HGB BLD-MCNC: 8.4 G/DL (ref 12–16)
LACTATE BLD-SCNC: 2.7 MMOL/L (ref 0.5–2)
LYMPHOCYTES # BLD AUTO: 0.68 K/UL (ref 1–4.8)
LYMPHOCYTES NFR BLD: 5.2 % (ref 22–41)
MAGNESIUM SERPL-MCNC: 2 MG/DL (ref 1.5–2.5)
MAGNESIUM SERPL-MCNC: 2.2 MG/DL (ref 1.5–2.5)
MANUAL DIFF BLD: NORMAL
MCH RBC QN AUTO: 27 PG (ref 27–33)
MCHC RBC AUTO-ENTMCNC: 31.3 G/DL (ref 33.6–35)
MCV RBC AUTO: 86.2 FL (ref 81.4–97.8)
MICROCYTES BLD QL SMEAR: ABNORMAL
MONOCYTES # BLD AUTO: 0.34 K/UL (ref 0–0.85)
MONOCYTES NFR BLD AUTO: 2.6 % (ref 0–13.4)
MORPHOLOGY BLD-IMP: NORMAL
NEUTROPHILS # BLD AUTO: 11.99 K/UL (ref 2–7.15)
NEUTROPHILS NFR BLD: 85.2 % (ref 44–72)
NEUTS BAND NFR BLD MANUAL: 7 % (ref 0–10)
NRBC # BLD AUTO: 0 K/UL
NRBC BLD-RTO: 0 /100 WBC
PHOSPHATE SERPL-MCNC: 4.1 MG/DL (ref 2.5–4.5)
PHOSPHATE SERPL-MCNC: 4.4 MG/DL (ref 2.5–4.5)
PLATELET # BLD AUTO: 141 K/UL (ref 164–446)
PLATELET BLD QL SMEAR: NORMAL
PMV BLD AUTO: 12 FL (ref 9–12.9)
POIKILOCYTOSIS BLD QL SMEAR: NORMAL
POTASSIUM SERPL-SCNC: 3.5 MMOL/L (ref 3.6–5.5)
POTASSIUM SERPL-SCNC: 3.6 MMOL/L (ref 3.6–5.5)
POTASSIUM SERPL-SCNC: 4 MMOL/L (ref 3.6–5.5)
POTASSIUM SERPL-SCNC: 4.4 MMOL/L (ref 3.6–5.5)
POTASSIUM SERPL-SCNC: 4.4 MMOL/L (ref 3.6–5.5)
PROT SERPL-MCNC: 5.3 G/DL (ref 6–8.2)
PTH-INTACT SERPL-MCNC: 438 PG/ML (ref 14–72)
RBC # BLD AUTO: 3.11 M/UL (ref 4.2–5.4)
RBC BLD AUTO: PRESENT
SIGNIFICANT IND 70042: NORMAL
SITE SITE: NORMAL
SODIUM SERPL-SCNC: 130 MMOL/L (ref 135–145)
SODIUM SERPL-SCNC: 131 MMOL/L (ref 135–145)
SODIUM SERPL-SCNC: 132 MMOL/L (ref 135–145)
SODIUM SERPL-SCNC: 132 MMOL/L (ref 135–145)
SODIUM SERPL-SCNC: 134 MMOL/L (ref 135–145)
SOURCE SOURCE: NORMAL
WBC # BLD AUTO: 13 K/UL (ref 4.8–10.8)

## 2020-06-02 PROCEDURE — 83735 ASSAY OF MAGNESIUM: CPT | Mod: 91

## 2020-06-02 PROCEDURE — 700102 HCHG RX REV CODE 250 W/ 637 OVERRIDE(OP): Performed by: HOSPITALIST

## 2020-06-02 PROCEDURE — 87040 BLOOD CULTURE FOR BACTERIA: CPT | Mod: 91

## 2020-06-02 PROCEDURE — 83970 ASSAY OF PARATHORMONE: CPT

## 2020-06-02 PROCEDURE — 85007 BL SMEAR W/DIFF WBC COUNT: CPT

## 2020-06-02 PROCEDURE — 700105 HCHG RX REV CODE 258: Performed by: HOSPITALIST

## 2020-06-02 PROCEDURE — 700111 HCHG RX REV CODE 636 W/ 250 OVERRIDE (IP): Performed by: INTERNAL MEDICINE

## 2020-06-02 PROCEDURE — 82962 GLUCOSE BLOOD TEST: CPT | Mod: 91

## 2020-06-02 PROCEDURE — 99233 SBSQ HOSP IP/OBS HIGH 50: CPT | Performed by: PSYCHIATRY & NEUROLOGY

## 2020-06-02 PROCEDURE — 700101 HCHG RX REV CODE 250: Performed by: INTERNAL MEDICINE

## 2020-06-02 PROCEDURE — 80053 COMPREHEN METABOLIC PANEL: CPT

## 2020-06-02 PROCEDURE — 770022 HCHG ROOM/CARE - ICU (200)

## 2020-06-02 PROCEDURE — 87186 SC STD MICRODIL/AGAR DIL: CPT

## 2020-06-02 PROCEDURE — 82330 ASSAY OF CALCIUM: CPT

## 2020-06-02 PROCEDURE — 700102 HCHG RX REV CODE 250 W/ 637 OVERRIDE(OP): Performed by: PSYCHIATRY & NEUROLOGY

## 2020-06-02 PROCEDURE — 700111 HCHG RX REV CODE 636 W/ 250 OVERRIDE (IP): Performed by: PSYCHIATRY & NEUROLOGY

## 2020-06-02 PROCEDURE — A9270 NON-COVERED ITEM OR SERVICE: HCPCS | Performed by: PSYCHIATRY & NEUROLOGY

## 2020-06-02 PROCEDURE — 82533 TOTAL CORTISOL: CPT

## 2020-06-02 PROCEDURE — 85027 COMPLETE CBC AUTOMATED: CPT

## 2020-06-02 PROCEDURE — 99233 SBSQ HOSP IP/OBS HIGH 50: CPT | Performed by: INTERNAL MEDICINE

## 2020-06-02 PROCEDURE — 82010 KETONE BODYS QUAN: CPT

## 2020-06-02 PROCEDURE — A9270 NON-COVERED ITEM OR SERVICE: HCPCS | Performed by: INTERNAL MEDICINE

## 2020-06-02 PROCEDURE — 80048 BASIC METABOLIC PNL TOTAL CA: CPT | Mod: 91

## 2020-06-02 PROCEDURE — 700111 HCHG RX REV CODE 636 W/ 250 OVERRIDE (IP): Performed by: HOSPITALIST

## 2020-06-02 PROCEDURE — 700105 HCHG RX REV CODE 258: Performed by: PSYCHIATRY & NEUROLOGY

## 2020-06-02 PROCEDURE — 700101 HCHG RX REV CODE 250: Performed by: HOSPITALIST

## 2020-06-02 PROCEDURE — 700102 HCHG RX REV CODE 250 W/ 637 OVERRIDE(OP): Performed by: INTERNAL MEDICINE

## 2020-06-02 PROCEDURE — A9270 NON-COVERED ITEM OR SERVICE: HCPCS | Performed by: HOSPITALIST

## 2020-06-02 PROCEDURE — 87150 DNA/RNA AMPLIFIED PROBE: CPT

## 2020-06-02 PROCEDURE — 87077 CULTURE AEROBIC IDENTIFY: CPT | Mod: 91

## 2020-06-02 PROCEDURE — 84100 ASSAY OF PHOSPHORUS: CPT | Mod: 91

## 2020-06-02 PROCEDURE — 87493 C DIFF AMPLIFIED PROBE: CPT

## 2020-06-02 RX ORDER — SODIUM CHLORIDE 9 MG/ML
2000 INJECTION, SOLUTION INTRAVENOUS ONCE
Status: DISCONTINUED | OUTPATIENT
Start: 2020-06-02 | End: 2020-06-02

## 2020-06-02 RX ORDER — POTASSIUM CHLORIDE 7.45 MG/ML
10 INJECTION INTRAVENOUS
Status: COMPLETED | OUTPATIENT
Start: 2020-06-02 | End: 2020-06-02

## 2020-06-02 RX ORDER — LOPERAMIDE HYDROCHLORIDE 2 MG/1
2 CAPSULE ORAL 4 TIMES DAILY PRN
Status: DISCONTINUED | OUTPATIENT
Start: 2020-06-02 | End: 2020-06-15 | Stop reason: HOSPADM

## 2020-06-02 RX ORDER — DEXTROSE MONOHYDRATE 25 G/50ML
50 INJECTION, SOLUTION INTRAVENOUS
Status: DISCONTINUED | OUTPATIENT
Start: 2020-06-02 | End: 2020-06-02

## 2020-06-02 RX ORDER — SODIUM CHLORIDE, SODIUM LACTATE, POTASSIUM CHLORIDE, CALCIUM CHLORIDE 600; 310; 30; 20 MG/100ML; MG/100ML; MG/100ML; MG/100ML
INJECTION, SOLUTION INTRAVENOUS CONTINUOUS
Status: DISCONTINUED | OUTPATIENT
Start: 2020-06-02 | End: 2020-06-02

## 2020-06-02 RX ORDER — MAGNESIUM SULFATE HEPTAHYDRATE 40 MG/ML
2 INJECTION, SOLUTION INTRAVENOUS
Status: DISCONTINUED | OUTPATIENT
Start: 2020-06-02 | End: 2020-06-02

## 2020-06-02 RX ORDER — MAGNESIUM SULFATE HEPTAHYDRATE 40 MG/ML
4 INJECTION, SOLUTION INTRAVENOUS
Status: DISCONTINUED | OUTPATIENT
Start: 2020-06-02 | End: 2020-06-15 | Stop reason: HOSPADM

## 2020-06-02 RX ORDER — DEXTROSE AND SODIUM CHLORIDE 10; .45 G/100ML; G/100ML
INJECTION, SOLUTION INTRAVENOUS CONTINUOUS
Status: DISCONTINUED | OUTPATIENT
Start: 2020-06-02 | End: 2020-06-03

## 2020-06-02 RX ORDER — HEPARIN SODIUM 5000 [USP'U]/ML
5000 INJECTION, SOLUTION INTRAVENOUS; SUBCUTANEOUS EVERY 8 HOURS
Status: DISCONTINUED | OUTPATIENT
Start: 2020-06-02 | End: 2020-06-15 | Stop reason: HOSPADM

## 2020-06-02 RX ORDER — OXYCODONE HYDROCHLORIDE 5 MG/1
5 TABLET ORAL EVERY 6 HOURS PRN
Status: DISCONTINUED | OUTPATIENT
Start: 2020-06-02 | End: 2020-06-15 | Stop reason: HOSPADM

## 2020-06-02 RX ORDER — DEXTROSE, SODIUM CHLORIDE, SODIUM LACTATE, POTASSIUM CHLORIDE, AND CALCIUM CHLORIDE 5; .6; .31; .03; .02 G/100ML; G/100ML; G/100ML; G/100ML; G/100ML
INJECTION, SOLUTION INTRAVENOUS CONTINUOUS
Status: DISCONTINUED | OUTPATIENT
Start: 2020-06-02 | End: 2020-06-02

## 2020-06-02 RX ORDER — POTASSIUM CHLORIDE 7.45 MG/ML
10 INJECTION INTRAVENOUS
Status: COMPLETED | OUTPATIENT
Start: 2020-06-02 | End: 2020-06-03

## 2020-06-02 RX ORDER — SODIUM CHLORIDE, SODIUM LACTATE, POTASSIUM CHLORIDE, AND CALCIUM CHLORIDE .6; .31; .03; .02 G/100ML; G/100ML; G/100ML; G/100ML
2000 INJECTION, SOLUTION INTRAVENOUS ONCE
Status: DISCONTINUED | OUTPATIENT
Start: 2020-06-02 | End: 2020-06-02

## 2020-06-02 RX ORDER — INSULIN GLARGINE 100 [IU]/ML
10 INJECTION, SOLUTION SUBCUTANEOUS
Status: DISCONTINUED | OUTPATIENT
Start: 2020-06-02 | End: 2020-06-02

## 2020-06-02 RX ORDER — OMEPRAZOLE 20 MG/1
20 CAPSULE, DELAYED RELEASE ORAL DAILY
Status: DISCONTINUED | OUTPATIENT
Start: 2020-06-02 | End: 2020-06-06

## 2020-06-02 RX ADMIN — LATANOPROST 1 DROP: 50 SOLUTION OPHTHALMIC at 21:01

## 2020-06-02 RX ADMIN — POTASSIUM CHLORIDE 10 MEQ: 7.46 INJECTION, SOLUTION INTRAVENOUS at 17:43

## 2020-06-02 RX ADMIN — VANCOMYCIN HYDROCHLORIDE 1400 MG: 500 INJECTION, POWDER, LYOPHILIZED, FOR SOLUTION INTRAVENOUS at 12:36

## 2020-06-02 RX ADMIN — SODIUM BICARBONATE: 84 INJECTION, SOLUTION INTRAVENOUS at 15:05

## 2020-06-02 RX ADMIN — POTASSIUM CHLORIDE 10 MEQ: 7.46 INJECTION, SOLUTION INTRAVENOUS at 01:11

## 2020-06-02 RX ADMIN — SODIUM CHLORIDE 3 UNITS/HR: 9 INJECTION, SOLUTION INTRAVENOUS at 17:00

## 2020-06-02 RX ADMIN — HYDROCORTISONE SODIUM SUCCINATE 50 MG: 100 INJECTION, POWDER, FOR SOLUTION INTRAMUSCULAR; INTRAVENOUS at 11:34

## 2020-06-02 RX ADMIN — POTASSIUM CHLORIDE 10 MEQ: 7.46 INJECTION, SOLUTION INTRAVENOUS at 06:36

## 2020-06-02 RX ADMIN — CALCIUM GLUCONATE 1 G: 98 INJECTION, SOLUTION INTRAVENOUS at 15:53

## 2020-06-02 RX ADMIN — HYDROCORTISONE SODIUM SUCCINATE 50 MG: 100 INJECTION, POWDER, FOR SOLUTION INTRAMUSCULAR; INTRAVENOUS at 05:35

## 2020-06-02 RX ADMIN — POTASSIUM CHLORIDE 10 MEQ: 7.46 INJECTION, SOLUTION INTRAVENOUS at 10:05

## 2020-06-02 RX ADMIN — ACETAMINOPHEN 650 MG: 325 TABLET, FILM COATED ORAL at 02:42

## 2020-06-02 RX ADMIN — POTASSIUM CHLORIDE 10 MEQ: 7.46 INJECTION, SOLUTION INTRAVENOUS at 02:43

## 2020-06-02 RX ADMIN — HEPARIN SODIUM 5000 UNITS: 5000 INJECTION, SOLUTION INTRAVENOUS; SUBCUTANEOUS at 22:08

## 2020-06-02 RX ADMIN — HYDROCORTISONE SODIUM SUCCINATE 50 MG: 100 INJECTION, POWDER, FOR SOLUTION INTRAMUSCULAR; INTRAVENOUS at 17:43

## 2020-06-02 RX ADMIN — METRONIDAZOLE 500 MG: 500 INJECTION, SOLUTION INTRAVENOUS at 06:12

## 2020-06-02 RX ADMIN — DEXTROSE AND SODIUM CHLORIDE: 10; .45 INJECTION, SOLUTION INTRAVENOUS at 06:35

## 2020-06-02 RX ADMIN — DEXTROSE AND SODIUM CHLORIDE: 10; .45 INJECTION, SOLUTION INTRAVENOUS at 23:28

## 2020-06-02 RX ADMIN — HEPARIN SODIUM 5000 UNITS: 5000 INJECTION, SOLUTION INTRAVENOUS; SUBCUTANEOUS at 14:04

## 2020-06-02 RX ADMIN — POTASSIUM CHLORIDE 10 MEQ: 7.46 INJECTION, SOLUTION INTRAVENOUS at 05:34

## 2020-06-02 RX ADMIN — POTASSIUM CHLORIDE 10 MEQ: 7.46 INJECTION, SOLUTION INTRAVENOUS at 23:30

## 2020-06-02 RX ADMIN — TIMOLOL MALEATE 1 DROP: 5 SOLUTION/ DROPS OPHTHALMIC at 05:36

## 2020-06-02 RX ADMIN — SODIUM CHLORIDE, SODIUM LACTATE, POTASSIUM CHLORIDE, CALCIUM CHLORIDE AND DEXTROSE MONOHYDRATE: 5; 600; 310; 30; 20 INJECTION, SOLUTION INTRAVENOUS at 17:00

## 2020-06-02 RX ADMIN — OXYCODONE 5 MG: 5 TABLET ORAL at 13:59

## 2020-06-02 RX ADMIN — OMEPRAZOLE 20 MG: 20 CAPSULE, DELAYED RELEASE ORAL at 17:42

## 2020-06-02 RX ADMIN — LOPERAMIDE HYDROCHLORIDE 2 MG: 2 CAPSULE ORAL at 17:42

## 2020-06-02 RX ADMIN — POTASSIUM CHLORIDE 10 MEQ: 7.46 INJECTION, SOLUTION INTRAVENOUS at 19:07

## 2020-06-02 RX ADMIN — CEFTRIAXONE SODIUM 1 G: 1 INJECTION, POWDER, FOR SOLUTION INTRAMUSCULAR; INTRAVENOUS at 05:36

## 2020-06-02 RX ADMIN — METRONIDAZOLE 500 MG: 500 INJECTION, SOLUTION INTRAVENOUS at 14:04

## 2020-06-02 RX ADMIN — OXYCODONE 5 MG: 5 TABLET ORAL at 20:10

## 2020-06-02 RX ADMIN — POTASSIUM CHLORIDE 10 MEQ: 7.46 INJECTION, SOLUTION INTRAVENOUS at 11:10

## 2020-06-02 RX ADMIN — SODIUM CHLORIDE 4.5 UNITS/HR: 9 INJECTION, SOLUTION INTRAVENOUS at 07:02

## 2020-06-02 RX ADMIN — INSULIN GLARGINE 10 UNITS: 100 INJECTION, SOLUTION SUBCUTANEOUS at 11:31

## 2020-06-02 RX ADMIN — POTASSIUM CHLORIDE 10 MEQ: 7.46 INJECTION, SOLUTION INTRAVENOUS at 22:08

## 2020-06-02 RX ADMIN — VANCOMYCIN HYDROCHLORIDE 500 MG: KIT ORAL at 11:35

## 2020-06-02 RX ADMIN — VANCOMYCIN HYDROCHLORIDE 500 MG: KIT ORAL at 05:36

## 2020-06-02 ASSESSMENT — COPD QUESTIONNAIRES
HAVE YOU SMOKED AT LEAST 100 CIGARETTES IN YOUR ENTIRE LIFE: YES
DO YOU EVER COUGH UP ANY MUCUS OR PHLEGM?: NO/ONLY WITH OCCASIONAL COLDS OR INFECTIONS
DURING THE PAST 4 WEEKS HOW MUCH DID YOU FEEL SHORT OF BREATH: NONE/LITTLE OF THE TIME
COPD SCREENING SCORE: 3

## 2020-06-02 ASSESSMENT — ENCOUNTER SYMPTOMS
VOMITING: 0
PALPITATIONS: 0
ABDOMINAL PAIN: 1
VOMITING: 1
ORTHOPNEA: 0
WHEEZING: 0
DIARRHEA: 1
FOCAL WEAKNESS: 0
EYES NEGATIVE: 1
FEVER: 0
SHORTNESS OF BREATH: 0
PSYCHIATRIC NEGATIVE: 1
SINUS PAIN: 0
NAUSEA: 0
COUGH: 0
MUSCULOSKELETAL NEGATIVE: 1
FLANK PAIN: 0
CHILLS: 0
NEUROLOGICAL NEGATIVE: 1
NAUSEA: 1
SORE THROAT: 0
HEMOPTYSIS: 0
WEIGHT LOSS: 0
RESPIRATORY NEGATIVE: 1

## 2020-06-02 ASSESSMENT — LIFESTYLE VARIABLES: EVER_SMOKED: YES

## 2020-06-02 ASSESSMENT — PATIENT HEALTH QUESTIONNAIRE - PHQ9
SUM OF ALL RESPONSES TO PHQ9 QUESTIONS 1 AND 2: 0
2. FEELING DOWN, DEPRESSED, IRRITABLE, OR HOPELESS: NOT AT ALL
1. LITTLE INTEREST OR PLEASURE IN DOING THINGS: NOT AT ALL

## 2020-06-02 NOTE — PROGRESS NOTES
"Pharmacy Kinetics 60 y.o. female on vancomycin day # 1 6/2/2020    Vancomycin New Start  Provider specified end date: TBD    Indication for Treatment: Staph Bacteremia    Pertinent history per medical record: Admitted on 6/1/2020 from [facility] for sepsis, RAF, and DKA.  CT-chest/abdomen/pelvis concerning for pyelonephritis.  Patient also presented with diarrhea, CT reported possibility of colitis.  The patient was empirically started on vancomycin PO and metronidazole for r/o Cdiff colitis along with ceftriaxone for pyelonephritis.  Preliminary blood cultures from [facility] x 2 positive for GPC in clusters.  Vancomycin IV empirically added to antibiotic regimen with repeat PBC ordered at [facility].  Follow for Echo as well as indicated.    Other antibiotics: ceftriaxone 1 gram iv q24h, vancomycin 500 mg PO q6h, metronidazole 500 mg iv q8h    Allergies: Cephalexin     List concerns for renal function: RAF, SIRS with lactic acidosis, recent pressor support, low albumin    Pertinent cultures to date:   6/1 Cdiff by PCR: negative > repeat sample sent 6/2 due to previous sample being mucus    [facility]:  6/1 blood-peripheral x 2:  GPC in clusters x 2 (aerobic and anaerobic bottles in both sets)    MRSA nares swab if pneumonia is a concern: n-a    Recent Labs     06/01/20  1108 06/01/20  1620 06/02/20  0343   WBC 19.7* 12.0* 13.0*   NEUTSPOLYS 95.70* 91.20* 85.20*   BANDSSTABS 0.90 1.80 7.00     Recent Labs     06/01/20  1108 06/01/20  1449 06/01/20  1620 06/01/20  2103 06/01/20  2355 06/02/20  0343 06/02/20  0806   BUN 64* 66* 65* 65* 64* 63* 58*   CREATININE 5.54* 5.14* 5.35* 5.17* 4.95* 4.73* 4.19*   ALBUMIN 2.7* 2.5*  --   --   --  2.4*  --        Intake/Output Summary (Last 24 hours) at 6/2/2020 1429  Last data filed at 6/2/2020 1200  Gross per 24 hour   Intake 4729.33 ml   Output 1000 ml   Net 3729.33 ml      /79   Pulse 67   Temp 36.4 °C (97.5 °F) (Temporal)   Resp (!) 27   Ht 1.626 m (5' 4\")   Wt 54.5 kg (120 lb " 2.4 oz)   SpO2 100%  Temp (24hrs), Av.3 °C (97.4 °F), Min:36.2 °C (97.2 °F), Max:36.4 °C (97.6 °F)      A/P   1. Vancomycin dose change: vancomycin 1400 mg iv x 1  2. Next vancomycin level: 6/3 at 0800  3. Goal trough: 16-20 mcg/mL  4. Assessment: The patient has multiple renal function concerns and risk factors for accumulation.  UOP has improved throughout the day.    5. Plan:  Will initiate pulse dosing.  Ordered a loading dose followed by an 18-hr level to assess distribution and clearance.    Luanne Bravo, Pharm.D., BCPS

## 2020-06-02 NOTE — CARE PLAN
Problem: Bowel/Gastric:  Goal: Normal bowel function is maintained or improved  Note: Patient is r/o C-diff. Test running in lab. Vanco given per orders.      Problem: Skin Integrity  Goal: Risk for impaired skin integrity will decrease  Note: Patient turns self from side to side. Barrier cream applied to patient.

## 2020-06-02 NOTE — ASSESSMENT & PLAN NOTE
Gap closed BHB negative transitioned to subq insulin and SSI  cardiac monitoring  optimize intravascular volume with IVF  Follow lytes replete prn  Goal Magnesium: >2, Phosphorus: 2, K >4

## 2020-06-02 NOTE — PROGRESS NOTES
Nephrology Daily Progress Note    Date of Service  6/2/2020    Chief Complaint  60 y.o. female admitted 6/1/2020 with gastroenteritis, volume depletion, RAF, metabolic acidosis, DKA    Interval Problem Update  6/2 -still with diarrhea,   Nausea/vomiting better  Non oliguric  Creat level improving  On DKA protocol    Review of Systems  Review of Systems   Constitutional: Positive for malaise/fatigue. Negative for chills, fever and weight loss.   HENT: Negative for congestion, hearing loss, nosebleeds, sinus pain and sore throat.    Eyes: Negative.    Respiratory: Negative for cough, hemoptysis, shortness of breath and wheezing.    Cardiovascular: Negative for chest pain, palpitations, orthopnea and leg swelling.   Gastrointestinal: Positive for abdominal pain, diarrhea, nausea and vomiting.   Genitourinary: Negative for flank pain.        Araujo catheter   Musculoskeletal: Negative.    Skin: Negative.    Neurological: Negative.    All other systems reviewed and are negative.       Physical Exam  Temp:  [36.2 °C (97.2 °F)-37.2 °C (99 °F)] 36.2 °C (97.2 °F)  Pulse:  [61-78] 63  Resp:  [15-49] 23  BP: ()/(52-86) 127/66  SpO2:  [95 %-100 %] 100 %    Physical Exam  Constitutional:       General: She is not in acute distress.     Appearance: Normal appearance. She is well-developed and normal weight. She is not diaphoretic.   HENT:      Head: Normocephalic and atraumatic.      Nose: Nose normal.      Mouth/Throat:      Mouth: Mucous membranes are moist.      Pharynx: Oropharynx is clear.   Eyes:      General: No scleral icterus.     Extraocular Movements: Extraocular movements intact.      Conjunctiva/sclera: Conjunctivae normal.      Pupils: Pupils are equal, round, and reactive to light.   Neck:      Musculoskeletal: Normal range of motion and neck supple.   Cardiovascular:      Rate and Rhythm: Normal rate and regular rhythm.      Pulses: Normal pulses.      Heart sounds: Normal heart sounds. No friction rub. No  gallop.    Pulmonary:      Effort: Pulmonary effort is normal. No respiratory distress.      Breath sounds: Normal breath sounds. No wheezing, rhonchi or rales.   Abdominal:      General: Bowel sounds are normal. There is distension.      Palpations: Abdomen is soft. There is no mass.      Tenderness: There is abdominal tenderness. There is no right CVA tenderness, left CVA tenderness or guarding.   Musculoskeletal: Normal range of motion.         General: No swelling.      Right lower leg: No edema.      Left lower leg: No edema.   Skin:     General: Skin is warm.      Coloration: Skin is not pale.      Findings: No erythema or rash.   Neurological:      General: No focal deficit present.      Mental Status: She is alert and oriented to person, place, and time.      Cranial Nerves: No cranial nerve deficit.      Coordination: Coordination normal.   Psychiatric:         Mood and Affect: Mood normal.         Behavior: Behavior normal.         Thought Content: Thought content normal.         Judgment: Judgment normal.         Fluids    Intake/Output Summary (Last 24 hours) at 6/2/2020 1034  Last data filed at 6/2/2020 0700  Gross per 24 hour   Intake 3770.13 ml   Output 1200 ml   Net 2570.13 ml       Laboratory  Recent Labs     06/01/20  1108 06/01/20  1620 06/02/20  0343   WBC 19.7* 12.0* 13.0*   RBC 3.39* 3.11* 3.11*   HEMOGLOBIN 9.1* 8.4* 8.4*   HEMATOCRIT 28.5* 26.6* 26.8*   MCV 84.1 85.5 86.2   MCH 26.8* 27.0 27.0   MCHC 31.9* 31.6* 31.3*   RDW 46.2 47.4 47.1   PLATELETCT 188 135* 141*   MPV 11.7 11.2 12.0     Recent Labs     06/01/20  2355 06/02/20  0343 06/02/20  0806   SODIUM 134* 130* 131*   POTASSIUM 3.6 3.5* 4.4   CHLORIDE 99 95* 99   CO2 15* 16* 15*   GLUCOSE 166* 151* 119*   BUN 64* 63* 58*   CREATININE 4.95* 4.73* 4.19*   CALCIUM 7.0* 7.2* 6.4*         No results for input(s): NTPROBNP in the last 72 hours.        Imaging  reviewed    Assessment/Plan  1.RAF due to volume depletion =improving with IVF -to  monitor  2.Hypotension -on pressor  3.Electrolytes: hyponatremia: mild -to montior  4.Anemia:low Hb level stable  5.metabolic acidosis -continue bicarb gtt  6.Volume:hypovolemic -continue IVF    Recs: no need for emergent dialysis             Daily labs             Continue IVF             Avoid nephrotoxic agents             All meds to renal doses             Will follow

## 2020-06-02 NOTE — ASSESSMENT & PLAN NOTE
Likely prerenal-combination of sepsis, dehydration from diarrhea, likely component of acute tubular necrosis  Patient likely have underlying baseline chronic kidney disease considering her longstanding poorly controlled diabetes.  Stop IV fluids on 6/5  Cr 5.5 on admit and remains high at 2.8 though improving  Nephrology consulted   Follow urine output.  Check a BMP every other day for now.  Slowly improving.

## 2020-06-02 NOTE — ASSESSMENT & PLAN NOTE
This is Septic shock Present on admission  SIRS criteria identified on my evaluation include: Tachycardia, with heart rate greater than 90 BPM and Leukocytosis, with WBC greater than 12,000  Source is pyelonephritis with MRSA bacteremia  Presentation includes: Severe sepsis present and persistent hypotension after 30 ml/kg completed.   Despite appropriate fluid resuscitation with crystalloid given per sepsis guidelines, the patient remained hypotensive and required IV levophed needed to maintain a SBP of 90 or MAP of 65  Shock resolved.  6/10: Awaiting on VERONICA results to decide on 4 versus 6 weeks of IV antibiotics with placing the suitable access.  Called echo reading room and they will finalize the VERONICA report.  6/11: VERONICA was negative for any endocarditis or vegetations.  Midline has been ordered.  6/12: End date for antibiotics is 7/18/2020 per ID.

## 2020-06-02 NOTE — PROGRESS NOTES
Patient arrived to RICU 107 from ED. Patient connected to monitor and O2 by RN at bedside. VSS - Levo gtt currently off. Labs collected as ordered. Insulin gtt started; sodium bicarb infusing as ordered. Will give due medications on arrival to unit from pharmacy.  Patient oriented to room and call light. Education regarding need for ICU stay provided. Patient acknowledges. Assessment completed and documented per flowsheet. Will continue to monitor patient closely.

## 2020-06-02 NOTE — ASSESSMENT & PLAN NOTE
With renal failure, elevated but hydroxybutyrate  She was admitted to the ICU with an insulin drip and IV fluids  She had been transitioned to long-acting glargine and sliding scale subcutaneous insulin on 6/3--her glucose went down to 57 thus glargine 10 units stopped on 6/4  Hemoglobin A1c 11.2  Diabetes education ordered  She had been on metformin at home which was stopped due to renal failure  Started Januvia 25 mg daily on 6/7 and sliding scale and blood sugars have been in the 130's.

## 2020-06-02 NOTE — ASSESSMENT & PLAN NOTE
11/29/19 0140   C-SSRS (Frequent Screen)   2. Have you actually had any thoughts of killing yourself? No  (asleep)   Suicide Evaluation Negative screen= no ideation, behaviors or history         C-SSRS (Frequent Screen)   2. Have you actually had any thoughts of killing yourself? Asleep   6. Have you done anything, started to do anything, or prepared to do anything to end your life?        Nursing Suicide Assessment Note - Inpatient     Current assessment:     Current C-SSRS score: (P) Negative screen= no ideation, behaviors or history      Protective Factors / Reason for Living:       Interventions:   Â· Implemented the Safety / Recovery Plan     Other Interventions Implemented:  Â· Pt sleeping, maintain current plan of care no respiratory symptoms at this time   Noted on CT scan

## 2020-06-02 NOTE — ASSESSMENT & PLAN NOTE
Patient is on chronic prednisone therapy for rheumatoid arthritis.  She presented with shock thus was started on IV hydrocortisone  Now transitioned back to home dose of prednisone 10 mg daily.   General

## 2020-06-02 NOTE — PROGRESS NOTES
Patient's blood sugar was 97. Dr Ba notified, new orders to shut off insulin drip at this time and will transition patient off DKA protocol. Pharmacy notified, will transition patient off protocol.

## 2020-06-02 NOTE — PROGRESS NOTES
Critical Care Progress Note    Date of admission  6/1/2020    Chief Complaint  60 y.o. female admitted 6/1/2020 with shock    Hospital Course    60 y.o. female with past medical history of diabetes, hypertension, hyperlipidemia, stage III chronic kidney disease, rheumatoid arthritis on daily prednisone who presented 6/1/2020 as a transfer from [facility] where she presented today for dizziness and epigastric pain.  She has had watery diarrhea for the past 4 days following a course of Augmentin for urinary tract infection, she reports an associated epigastric burning which radiates to bilateral lower quadrants and is not improved or worsened by anything.  She states she has had watery, nonbloody bowel movements approximately every 30 minutes, she has noted additional nausea and non-bloody emesis.  At [facility] she was evaluated and found to have a creatinine of 6.4, glucose 309 9, CO2 21, lipase 163, , troponin 0.062.  She was given a dose of Unasyn and started on levophed, no imaging was obtained.  On arrival to emergency department she was noted to have continued hypotension, has been given an additional 1 L bolus (total of 3 L).  CT scan of her abdomen has been obtained which shows likely pyelonephritis with possible rectosigmoid wall thickening and right lower lobe bronchiectasis.      Interval Problem Update  Reviewed last 24 hour events:  - BHB normal transitioned to subq insulin   - Neuro: GCs 15   - HR: 60s   - SBP: 110-120   - GI: Advance diet as tolerated diabetic diet   - UOP: 1L/24hr   - Araujo: yes   - Tm: 37.2   - Lines: PIVs   - PPx: GI not indicated, DVT heparin   - Insulin gtt      Review of Systems  Review of Systems   Constitutional: Positive for malaise/fatigue. Negative for chills and fever.   HENT: Negative.    Eyes: Negative.    Respiratory: Negative.    Cardiovascular: Negative for chest pain.   Gastrointestinal: Positive for abdominal pain and diarrhea. Negative for nausea  and vomiting.   Genitourinary: Negative.    Musculoskeletal: Negative.    Skin: Negative.    Neurological: Negative for focal weakness.   Psychiatric/Behavioral: Negative.         Vital Signs for last 24 hours   Temp:  [36.2 °C (97.2 °F)-37.2 °C (99 °F)] 36.2 °C (97.2 °F)  Pulse:  [61-78] 65  Resp:  [15-49] 16  BP: ()/(52-86) 121/74  SpO2:  [95 %-100 %] 99 %    Hemodynamic parameters for last 24 hours       Respiratory Information for the last 24 hours       Physical Exam   Physical Exam  Constitutional:       General: She is not in acute distress.     Appearance: She is ill-appearing.   HENT:      Head: Normocephalic and atraumatic.      Right Ear: External ear normal.      Left Ear: External ear normal.      Nose: Nose normal.      Mouth/Throat:      Mouth: Mucous membranes are dry.      Pharynx: Oropharynx is clear. No oropharyngeal exudate.   Eyes:      Extraocular Movements: Extraocular movements intact.      Conjunctiva/sclera: Conjunctivae normal.      Pupils: Pupils are equal, round, and reactive to light.   Cardiovascular:      Rate and Rhythm: Normal rate and regular rhythm.      Pulses: Normal pulses.   Pulmonary:      Effort: Pulmonary effort is normal. No respiratory distress.   Abdominal:      General: Abdomen is flat. Bowel sounds are normal.      Palpations: Abdomen is soft. There is no mass.      Tenderness: There is no guarding or rebound.      Hernia: No hernia is present.      Comments: epigastric pain that is mildy tender to palpation   Musculoskeletal:         General: No swelling.   Skin:     General: Skin is warm and dry.      Capillary Refill: Capillary refill takes less than 2 seconds.   Neurological:      General: No focal deficit present.      Mental Status: She is alert and oriented to person, place, and time. Mental status is at baseline.      Cranial Nerves: No cranial nerve deficit.      Motor: No weakness.   Psychiatric:         Mood and Affect: Mood normal.         Behavior:  Behavior normal.         Medications  Current Facility-Administered Medications   Medication Dose Route Frequency Provider Last Rate Last Dose   • potassium chloride (KCL) ivpb 10 mEq  10 mEq Intravenous Q HOUR Chuck Quevedo M.D. 100 mL/hr at 06/02/20 0636 10 mEq at 06/02/20 0636   • Pharmacy Consult Request  1 Each Other PHARMACY TO DOSE Bárbara Khoury M.D.       • norepinephrine (Levophed) infusion 8 mg/250 mL (premix)  0-30 mcg/min Intravenous Continuous Bárbara Khoury M.D. 7.5 mL/hr at 06/01/20 1224 4 mcg/min at 06/01/20 1224   • acetaminophen (TYLENOL) tablet 650 mg  650 mg Oral Q6HRS PRN Oni Ruiz M.D.   650 mg at 06/02/20 0242   • ondansetron (ZOFRAN) syringe/vial injection 4 mg  4 mg Intravenous Q4HRS PRN Oni Ruiz M.D.       • ondansetron (ZOFRAN ODT) dispertab 4 mg  4 mg Oral Q4HRS PRN Oni Ruiz M.D.       • promethazine (PHENERGAN) tablet 12.5-25 mg  12.5-25 mg Oral Q4HRS PRN Oni Ruiz M.D.       • promethazine (PHENERGAN) suppository 12.5-25 mg  12.5-25 mg Rectal Q4HRS PRN Oni Ruiz M.D.       • prochlorperazine (COMPAZINE) injection 5-10 mg  5-10 mg Intravenous Q4HRS PRN Oni Ruiz M.D.       • cefTRIAXone (ROCEPHIN) 1 g in  mL IVPB  1 g Intravenous Q24HRS Oni Ruiz M.D.   Stopped at 06/02/20 0606   • sodium bicarbonate 8.4 % 75 mEq in 1/2 NS 1,000 mL infusion   Intravenous Continuous Oni Ruiz M.D. 42 mL/hr at 06/01/20 1640     • hydrocortisone sodium succinate PF (SOLU-CORTEF) 100 MG injection 50 mg  50 mg Intravenous Q6HRS Oni Ruiz M.D.   50 mg at 06/02/20 0535   • latanoprost (XALATAN) 0.005 % ophthalmic solution 1 Drop  1 Drop Both Eyes QHS Oni Ruiz M.D.   1 Drop at 06/01/20 2058   • timolol (TIMOPTIC) 0.5 % ophthalmic solution 1 Drop  1 Drop Both Eyes DAILY Oni Ruiz M.D.   1 Drop at 06/02/20 0536   • D10%-0.45% NaCl infusion   Intravenous Continuous Oni Ruiz M.D. 125 mL/hr at 06/02/20 0635     • MD ALERT-PHARMACY TO CONSULT FOR DKA  MONITORING 1 Each  1 Each Other PRN Oni Ruiz M.D.       • potassium phosphates 30 mmol in  mL ivpb  30 mmol Intravenous Once PRN Oni Ruiz M.D.        Or   • sodium phosphate 30 mmol in 1/2  mL ivpb  30 mmol Intravenous Once PRN Oni Ruiz M.D.       • Adult DKA potassium(K+) replacement scale  1 Each Intravenous Q4HRS Oni Ruiz M.D.   1 Each at 06/02/20 0400   • vancomycin 50 mg/mL oral soln 500 mg  500 mg Oral Q6HRS Raghav Nina Jr., D.O.   500 mg at 06/02/20 0536    And   • metroNIDAZOLE (FLAGYL) IVPB 500 mg  500 mg Intravenous Q8HRS Raghav Nina Jr., D.O.   Stopped at 06/02/20 0712   • insulin regular human (HUMULIN/NOVOLIN R) 62.5 Units in  mL Infusion for DKA  2.5 Units/hr Intravenous Continuous Oni Ruiz M.D. 18 mL/hr at 06/02/20 0627 4.5 Units/hr at 06/02/20 0627   • albuterol inhaler 2 Puff  2 Puff Inhalation Q4HRS PRN Oni Ruiz M.D.       • Magnesium Sulfate in D5W IVPB premix 1 g  1 g Intravenous Once Oni Ruiz M.D.   Stopped at 06/01/20 2315    Followed by   • Magnesium Sulfate in D5W IVPB premix 1 g  1 g Intravenous Once Oni Ruiz M.D.   Stopped at 06/01/20 2315       Fluids    Intake/Output Summary (Last 24 hours) at 6/2/2020 0700  Last data filed at 6/2/2020 0700  Gross per 24 hour   Intake 3545.13 ml   Output 1200 ml   Net 2345.13 ml       Laboratory          Recent Labs     06/01/20  1449 06/01/20  1620 06/01/20  2103 06/01/20  2355 06/02/20  0343   SODIUM 126* 131* 131* 134* 130*   POTASSIUM 3.8 3.7 3.3* 3.6 3.5*   CHLORIDE 93* 95* 95* 99 95*   CO2 13* 13* 16* 15* 16*   BUN 66* 65* 65* 64* 63*   CREATININE 5.14* 5.35* 5.17* 4.95* 4.73*   MAGNESIUM  --  1.4* 2.2  --  2.2   PHOSPHORUS 5.8*  --  4.7*  --  4.4   CALCIUM 6.6* 6.7* 7.3* 7.0* 7.2*     Recent Labs     06/01/20  1108 06/01/20  1449  06/01/20  2103 06/01/20  1895 06/02/20  0313   ALTSGPT 25 28  --   --   --  49   ASTSGOT 32 37  --   --   --  83*   ALKPHOSPHAT 66 62  --   --   --  63    TBILIRUBIN 0.3 0.2  --   --   --  <0.2   GLUCOSE 365* 415*   < > 171* 166* 151*    < > = values in this interval not displayed.     Recent Labs     06/01/20  1108 06/01/20  1449 06/01/20  1620 06/02/20  0343   WBC 19.7*  --  12.0* 13.0*   NEUTSPOLYS 95.70*  --  91.20* 85.20*   LYMPHOCYTES 1.70*  --  2.70* 5.20*   MONOCYTES 0.90  --  4.40 2.60   EOSINOPHILS 0.00  --  0.00 0.00   BASOPHILS 0.90  --  0.00 0.00   ASTSGOT 32 37  --  83*   ALTSGPT 25 28  --  49   ALKPHOSPHAT 66 62  --  63   TBILIRUBIN 0.3 0.2  --  <0.2     Recent Labs     06/01/20  1108 06/01/20  1620 06/02/20  0343   RBC 3.39* 3.11* 3.11*   HEMOGLOBIN 9.1* 8.4* 8.4*   HEMATOCRIT 28.5* 26.6* 26.8*   PLATELETCT 188 135* 141*       Imaging  X-Ray:  I have personally reviewed the images and compared with prior images.  EKG:  I have personally reviewed the images and compared with prior images.  CT:    Reviewed    Assessment/Plan  * Acute pyelonephritis- (present on admission)  Assessment & Plan  CT concerning for pyelonephritis  Follow-up urine cultures  Started on ceftriaxone    Diabetic ketoacidosis without coma associated with type 2 diabetes mellitus (HCC)- (present on admission)  Assessment & Plan  Mildly elevated hydroxybutyric acid with hyperglycemia and CO2 of 15  cardiac monitoring  optimize intravascular volume with additional IVF boluses  DKA protocol with insulin drip at 0.05 units/kg/hr  Every hour Accu-Cheks, every 4 hour BMP, mag, phos  Goal Magnesium: >2, Phosphorus: 2, K >4  Will transition to sliding scale insulin when anion gap <12, CO2 > 17    Acute renal failure with tubular necrosis (HCC)- (present on admission)  Assessment & Plan  Acute on chronic kidney disease likely due to dehydration  IVF  Renal dose meds, avoid nephrotoxins  Strict I/Os  Follow renal function  Nephrology consulted in ER    Metabolic acidosis- (present on admission)  Assessment & Plan  Due to uremia and ketosis  Bicarbonate infusion initiated    Adrenal  insufficiency (HCC)- (present on admission)  Assessment & Plan  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 Difficile  C. difficile PCR toxin ordered  PO Vanco and IV Flagyl -discontinue if PCR negative    Septic shock (HCC)- (present on admission)  Assessment & Plan  This is Septic shock Present on admission  SIRS criteria identified on my evaluation include: Tachycardia, with heart rate greater than 90 BPM, Tachypnea, with respirations greater than 20 per minute and Leukocytosis, with WBC greater than 12,000  Source is urinary versus GI  Presentation includes: Severe sepsis present and persistent hypotension after 30 ml/kg completed.   Despite appropriate fluid resuscitation with crystalloid given per sepsis guidelines, the patient remains hypotensive with systolic blood pressure less than 90 or MAP less than 65  Hemodynamic support with additional fluids and IV vasopressors as needed to maintain a SBP of 90 or MAP of 65  IV antibiotics as appropriate for source of sepsis  Reassessment: I have reassessed the patient's hemodynamic status    Hyponatremia- (present on admission)  Assessment & Plan  Pseudohyponatremia, corrected is 136  Monitor while correction of acidosis    Bronchiectasis (HCC)- (present on admission)  Assessment & Plan  no respiratory symptoms at this time   Noted on CT scan         VTE:  Heparin  Ulcer: Not Indicated  Lines: None    I have performed a physical exam and reviewed and updated ROS and Plan today (6/2/2020). In review of yesterday's note (6/1/2020), there are no changes except as documented above.     Discussed patient condition and risk of morbidity and/or mortality with RN, RT, Pharmacy, Code status disscussed, Charge nurse / hot rounds, Patient and nephrology

## 2020-06-02 NOTE — ASSESSMENT & PLAN NOTE
Presumably from Staph Aureus  Urine culture was not sent from [place] nor from here until after antibiotics were given

## 2020-06-02 NOTE — ASSESSMENT & PLAN NOTE
This is Septic shock Present on admission  SIRS criteria identified on my evaluation include: Tachycardia, with heart rate greater than 90 BPM, Tachypnea, with respirations greater than 20 per minute and Leukocytosis, with WBC greater than 12,000  Source is urinary versus GI  Presentation includes: Severe sepsis present and persistent hypotension after 30 ml/kg completed.   Despite appropriate fluid resuscitation with crystalloid given per sepsis guidelines, the patient remains hypotensive with systolic blood pressure less than 90 or MAP less than 65  Hemodynamic support with additional fluids and IV vasopressors as needed to maintain a SBP of 90 or MAP of 65  IV antibiotics as appropriate for source of sepsis  Reassessment: I have reassessed the patient's hemodynamic status    Resolved; likely 2/2 Urinary and severe volume depletion  Blood cx positive for ? Staph at OSH continue vanc until speciation returns  Continue ceftriaxone x 7 days for presumed pyelonephritis

## 2020-06-02 NOTE — ASSESSMENT & PLAN NOTE
Chest CT findings - with no complains of cough or dyspnea   Patient had a previous pneumonia  Albuterol prn

## 2020-06-02 NOTE — CONSULTS
DATE OF SERVICE:  06/01/2020    NEPHROLOGY CONSULTATION    CONSULT AT THE REQUEST OF:  Bárbara Khoury MD    REASON FOR CONSULTATION:  Evaluate patient with acute kidney injury, metabolic   acidosis.    HISTORY OF PRESENT ILLNESS:  The patient is a 60-year-old female with no known   past medical history for kidney disease.  Patient was transferred from   [facility] where she presented with septic shock, diarrhea,   hypovolemia and acute kidney injury.  Patient states that she developed   nausea, vomiting, diarrhea 3 days ago.  Recently, she was taking Augmentin for   urinary tract infection, does not recall any sick contact.  Upon evaluation   at [facility], her creatinine level was 6.4, improved with volume resuscitation to   5.54, also with metabolic acidosis with CO2 of 15.  Currently, patient still   complains of abdominal pain, nausea.  No vomiting, no diarrhea.  With poor   urinary output, placed Araujo catheter.  CAT scan revealed bilateral   perinephric fat stranding, possibly due to recent infection, no significant   hydronephrosis.    REVIEW OF SYSTEMS:  GENERAL:  Positive for fatigue, poor p.o. intake, poor appetite, low energy   level.  No fever or chills.  HENT:  No congestion, no sore throat, no nosebleeds.  EYES:  No double or blurry vision, no eye pain.  NECK:  No pain, no stiffness.  RESPIRATORY:  No dyspnea, no cough, no wheezes, no hemoptysis.  CARDIOVASCULAR:  No chest pain, palpitations, no edema.  GASTROINTESTINAL:  Positive for nausea, vomiting, diarrhea.  GENITOURINARY:  No dysuria, no flank pain or hematuria.  Poor urine output.  NEUROLOGICAL:  No focal weakness, no speech changes, no headache, no   dizziness.  MUSCULOSKELETAL:  No pain or joint swelling, no back pain.    All other systems reviewed, all negative.    PAST MEDICAL HISTORY:  Diabetes mellitus type 2, depression, rheumatoid   arthritis.    FAMILY HISTORY:  No history of kidney disease.    PAST SURGICAL HISTORY:  Amputation  of left toe.    SOCIAL HISTORY:  Patient is  and lives in [place].  No tobacco or   alcohol use.    ALLERGIES:  ALLERGIC TO CEPHALEXIN.    OUTPATIENT MEDICATIONS:  Reviewed.    PHYSICAL EXAMINATION:  VITAL SIGNS:  Blood pressure 110/62, pulse 74, temperature 37.2 Celsius.  GENERAL APPEARANCE:  Well-developed, well-nourished female in no acute   distress.  HEENT:  Normocephalic, atraumatic.  Pupils equal, round, reactive to light.    Extraocular movement intact.  Anicteric sclerae.  Nares patent.  Oropharynx,   slightly dry mucosa, no erythema or exudate.  NECK:  Supple, no lymphadenopathy, no thyromegaly appreciated.  LUNGS:  Clear to auscultation bilaterally.  No rales, wheezes, no rhonchi.  HEART:  Regular rate.  No rub or gallop.  ABDOMEN:  Soft.  Slightly distended, mild tenderness to palpation in   epigastric area.  No palpable masses, no hepatosplenomegaly.  SKIN:  Warm, dry.  No erythema, or rash, no ulcerations.  EXTREMITIES:  No cyanosis, no clubbing, no edema.  NEUROLOGIC:  Alert and oriented x3, no focal deficit.  Cranial nerves II-XII   grossly intact.    LABORATORY RESULTS:  White blood count 19.7, hemoglobin level 9.1.  Sodium   130, potassium 3.7, CO2 of 15, BUN 64, creatinine 5.5, lactic acid 1.8.  Urine   spot sodium 43.  Urinalysis:  Large blood, positive for protein, white blood   count 5200, red blood count 10-20.    DIAGNOSTIC DATA:  CT of abdomen, no hydronephrosis.    ASSESSMENT AND PLAN:  The patient is a 60-year-old female with history of   diabetes mellitus type 2, on metformin, admitted to the hospital with severe   nausea, vomiting, diarrhea, volume depletion resulting in acute kidney injury,   metabolic acidosis.  1.  Acute kidney injury, already improving with volume resuscitation.    Continue IV fluids, continue to monitor closely.  2.  Electrolytes, noticed mild hyponatremia, to monitor.  3.  Metabolic acidosis.  Continue bicarbonate drip.  4.  Volume.  Continue IV fluids.  5.   Anemia.   Drop in hemoglobin level, to monitor.  6.  Recent urinary tract infection.  To complete urine culture, antibiotic to   adjust to renal doses.    RECOMMENDATIONS:  1.  Continue bicarbonate drip, continue IV fluids, to complete urine culture,   blood culture.  2.  Adjust antibiotic and all medications to renal doses.  3.  Provide low-sodium diet.  4.  Prognosis is guarded.  5.  We will follow up patient closely.    Thank you for the consult.       ____________________________________     MD VICTORINO BUCKLEY / YULIET    DD:  06/01/2020 17:10:19  DT:  06/01/2020 22:01:30    D#:  [id number]  Job#:  [id number]

## 2020-06-02 NOTE — ASSESSMENT & PLAN NOTE
Acute on chronic kidney disease likely due to dehydration  IVF  Renal dose meds, avoid nephrotoxins  Strict I/Os  Follow renal function  Nephrology following

## 2020-06-03 LAB
ANION GAP SERPL CALC-SCNC: 13 MMOL/L (ref 7–16)
ANION GAP SERPL CALC-SCNC: 14 MMOL/L (ref 7–16)
B-OH-BUTYR SERPL-MCNC: <0.2 MMOL/L (ref 0.02–0.27)
BUN SERPL-MCNC: 53 MG/DL (ref 8–22)
BUN SERPL-MCNC: 53 MG/DL (ref 8–22)
CALCIUM SERPL-MCNC: 7.5 MG/DL (ref 8.5–10.5)
CALCIUM SERPL-MCNC: 7.8 MG/DL (ref 8.5–10.5)
CHLORIDE SERPL-SCNC: 100 MMOL/L (ref 96–112)
CHLORIDE SERPL-SCNC: 104 MMOL/L (ref 96–112)
CO2 SERPL-SCNC: 15 MMOL/L (ref 20–33)
CO2 SERPL-SCNC: 16 MMOL/L (ref 20–33)
CREAT SERPL-MCNC: 4.11 MG/DL (ref 0.5–1.4)
CREAT SERPL-MCNC: 4.14 MG/DL (ref 0.5–1.4)
FERRITIN SERPL-MCNC: 442 NG/ML (ref 10–291)
GLUCOSE BLD-MCNC: 106 MG/DL (ref 65–99)
GLUCOSE BLD-MCNC: 113 MG/DL (ref 65–99)
GLUCOSE BLD-MCNC: 113 MG/DL (ref 65–99)
GLUCOSE BLD-MCNC: 115 MG/DL (ref 65–99)
GLUCOSE BLD-MCNC: 116 MG/DL (ref 65–99)
GLUCOSE BLD-MCNC: 125 MG/DL (ref 65–99)
GLUCOSE BLD-MCNC: 128 MG/DL (ref 65–99)
GLUCOSE BLD-MCNC: 130 MG/DL (ref 65–99)
GLUCOSE BLD-MCNC: 139 MG/DL (ref 65–99)
GLUCOSE BLD-MCNC: 156 MG/DL (ref 65–99)
GLUCOSE BLD-MCNC: 158 MG/DL (ref 65–99)
GLUCOSE BLD-MCNC: 163 MG/DL (ref 65–99)
GLUCOSE BLD-MCNC: 163 MG/DL (ref 65–99)
GLUCOSE BLD-MCNC: 88 MG/DL (ref 65–99)
GLUCOSE BLD-MCNC: 95 MG/DL (ref 65–99)
GLUCOSE SERPL-MCNC: 103 MG/DL (ref 65–99)
GLUCOSE SERPL-MCNC: 120 MG/DL (ref 65–99)
IRON SATN MFR SERPL: 10 % (ref 15–55)
IRON SERPL-MCNC: 19 UG/DL (ref 40–170)
MAGNESIUM SERPL-MCNC: 1.9 MG/DL (ref 1.5–2.5)
PHOSPHATE SERPL-MCNC: 4.4 MG/DL (ref 2.5–4.5)
POTASSIUM SERPL-SCNC: 4.4 MMOL/L (ref 3.6–5.5)
POTASSIUM SERPL-SCNC: 4.6 MMOL/L (ref 3.6–5.5)
SODIUM SERPL-SCNC: 128 MMOL/L (ref 135–145)
SODIUM SERPL-SCNC: 134 MMOL/L (ref 135–145)
TIBC SERPL-MCNC: 193 UG/DL (ref 250–450)
UIBC SERPL-MCNC: 174 UG/DL (ref 110–370)
VANCOMYCIN SERPL-MCNC: 15.6 UG/ML

## 2020-06-03 PROCEDURE — 83735 ASSAY OF MAGNESIUM: CPT

## 2020-06-03 PROCEDURE — 700102 HCHG RX REV CODE 250 W/ 637 OVERRIDE(OP): Performed by: HOSPITALIST

## 2020-06-03 PROCEDURE — 700111 HCHG RX REV CODE 636 W/ 250 OVERRIDE (IP): Performed by: HOSPITALIST

## 2020-06-03 PROCEDURE — 770021 HCHG ROOM/CARE - ISO PRIVATE

## 2020-06-03 PROCEDURE — 770001 HCHG ROOM/CARE - MED/SURG/GYN PRIV*

## 2020-06-03 PROCEDURE — 700102 HCHG RX REV CODE 250 W/ 637 OVERRIDE(OP): Performed by: PSYCHIATRY & NEUROLOGY

## 2020-06-03 PROCEDURE — 700105 HCHG RX REV CODE 258: Performed by: HOSPITALIST

## 2020-06-03 PROCEDURE — A9270 NON-COVERED ITEM OR SERVICE: HCPCS | Performed by: HOSPITALIST

## 2020-06-03 PROCEDURE — 82010 KETONE BODYS QUAN: CPT

## 2020-06-03 PROCEDURE — 84100 ASSAY OF PHOSPHORUS: CPT

## 2020-06-03 PROCEDURE — 99233 SBSQ HOSP IP/OBS HIGH 50: CPT | Performed by: INTERNAL MEDICINE

## 2020-06-03 PROCEDURE — 700111 HCHG RX REV CODE 636 W/ 250 OVERRIDE (IP): Performed by: INTERNAL MEDICINE

## 2020-06-03 PROCEDURE — 83540 ASSAY OF IRON: CPT

## 2020-06-03 PROCEDURE — A9270 NON-COVERED ITEM OR SERVICE: HCPCS | Performed by: PSYCHIATRY & NEUROLOGY

## 2020-06-03 PROCEDURE — 80202 ASSAY OF VANCOMYCIN: CPT

## 2020-06-03 PROCEDURE — 700111 HCHG RX REV CODE 636 W/ 250 OVERRIDE (IP): Performed by: PSYCHIATRY & NEUROLOGY

## 2020-06-03 PROCEDURE — 99233 SBSQ HOSP IP/OBS HIGH 50: CPT | Performed by: HOSPITALIST

## 2020-06-03 PROCEDURE — 99233 SBSQ HOSP IP/OBS HIGH 50: CPT | Performed by: PSYCHIATRY & NEUROLOGY

## 2020-06-03 PROCEDURE — 83550 IRON BINDING TEST: CPT

## 2020-06-03 PROCEDURE — 82728 ASSAY OF FERRITIN: CPT

## 2020-06-03 PROCEDURE — 82962 GLUCOSE BLOOD TEST: CPT | Mod: 91

## 2020-06-03 PROCEDURE — 700105 HCHG RX REV CODE 258: Performed by: PSYCHIATRY & NEUROLOGY

## 2020-06-03 PROCEDURE — 80048 BASIC METABOLIC PNL TOTAL CA: CPT

## 2020-06-03 RX ORDER — PREDNISONE 10 MG/1
10 TABLET ORAL DAILY
Status: DISCONTINUED | OUTPATIENT
Start: 2020-06-03 | End: 2020-06-15 | Stop reason: HOSPADM

## 2020-06-03 RX ORDER — DEXTROSE MONOHYDRATE 25 G/50ML
50 INJECTION, SOLUTION INTRAVENOUS
Status: DISCONTINUED | OUTPATIENT
Start: 2020-06-03 | End: 2020-06-15 | Stop reason: HOSPADM

## 2020-06-03 RX ORDER — SODIUM CHLORIDE, SODIUM LACTATE, POTASSIUM CHLORIDE, CALCIUM CHLORIDE 600; 310; 30; 20 MG/100ML; MG/100ML; MG/100ML; MG/100ML
INJECTION, SOLUTION INTRAVENOUS CONTINUOUS
Status: DISCONTINUED | OUTPATIENT
Start: 2020-06-03 | End: 2020-06-05

## 2020-06-03 RX ORDER — INSULIN GLARGINE 100 [IU]/ML
10 INJECTION, SOLUTION SUBCUTANEOUS
Status: DISCONTINUED | OUTPATIENT
Start: 2020-06-03 | End: 2020-06-04

## 2020-06-03 RX ORDER — POTASSIUM CHLORIDE 7.45 MG/ML
10 INJECTION INTRAVENOUS ONCE
Status: COMPLETED | OUTPATIENT
Start: 2020-06-03 | End: 2020-06-03

## 2020-06-03 RX ORDER — POTASSIUM CHLORIDE 7.45 MG/ML
10 INJECTION INTRAVENOUS
Status: COMPLETED | OUTPATIENT
Start: 2020-06-03 | End: 2020-06-03

## 2020-06-03 RX ADMIN — POTASSIUM CHLORIDE 10 MEQ: 7.46 INJECTION, SOLUTION INTRAVENOUS at 06:12

## 2020-06-03 RX ADMIN — INSULIN GLARGINE 10 UNITS: 100 INJECTION, SOLUTION SUBCUTANEOUS at 09:07

## 2020-06-03 RX ADMIN — POTASSIUM CHLORIDE 10 MEQ: 7.46 INJECTION, SOLUTION INTRAVENOUS at 07:54

## 2020-06-03 RX ADMIN — HYDROCORTISONE SODIUM SUCCINATE 50 MG: 100 INJECTION, POWDER, FOR SOLUTION INTRAMUSCULAR; INTRAVENOUS at 13:21

## 2020-06-03 RX ADMIN — LATANOPROST 1 DROP: 50 SOLUTION OPHTHALMIC at 20:57

## 2020-06-03 RX ADMIN — OXYCODONE 5 MG: 5 TABLET ORAL at 14:56

## 2020-06-03 RX ADMIN — HEPARIN SODIUM 5000 UNITS: 5000 INJECTION, SOLUTION INTRAVENOUS; SUBCUTANEOUS at 20:57

## 2020-06-03 RX ADMIN — ACETAMINOPHEN 650 MG: 325 TABLET, FILM COATED ORAL at 13:21

## 2020-06-03 RX ADMIN — ONDANSETRON HYDROCHLORIDE 4 MG: 2 SOLUTION INTRAMUSCULAR; INTRAVENOUS at 19:32

## 2020-06-03 RX ADMIN — POTASSIUM CHLORIDE 10 MEQ: 7.46 INJECTION, SOLUTION INTRAVENOUS at 02:25

## 2020-06-03 RX ADMIN — ONDANSETRON HYDROCHLORIDE 4 MG: 2 SOLUTION INTRAMUSCULAR; INTRAVENOUS at 14:50

## 2020-06-03 RX ADMIN — OXYCODONE 5 MG: 5 TABLET ORAL at 20:57

## 2020-06-03 RX ADMIN — SODIUM CHLORIDE, POTASSIUM CHLORIDE, SODIUM LACTATE AND CALCIUM CHLORIDE: 600; 310; 30; 20 INJECTION, SOLUTION INTRAVENOUS at 09:50

## 2020-06-03 RX ADMIN — HYDROCORTISONE SODIUM SUCCINATE 50 MG: 100 INJECTION, POWDER, FOR SOLUTION INTRAMUSCULAR; INTRAVENOUS at 00:05

## 2020-06-03 RX ADMIN — OXYCODONE 5 MG: 5 TABLET ORAL at 08:53

## 2020-06-03 RX ADMIN — ACETAMINOPHEN 650 MG: 325 TABLET, FILM COATED ORAL at 19:32

## 2020-06-03 RX ADMIN — CEFTRIAXONE SODIUM 1 G: 1 INJECTION, POWDER, FOR SOLUTION INTRAMUSCULAR; INTRAVENOUS at 05:22

## 2020-06-03 RX ADMIN — OMEPRAZOLE 20 MG: 20 CAPSULE, DELAYED RELEASE ORAL at 05:19

## 2020-06-03 RX ADMIN — VANCOMYCIN HYDROCHLORIDE 800 MG: 500 INJECTION, POWDER, LYOPHILIZED, FOR SOLUTION INTRAVENOUS at 11:58

## 2020-06-03 RX ADMIN — TIMOLOL MALEATE 1 DROP: 5 SOLUTION/ DROPS OPHTHALMIC at 06:03

## 2020-06-03 RX ADMIN — HYDROCORTISONE SODIUM SUCCINATE 50 MG: 100 INJECTION, POWDER, FOR SOLUTION INTRAMUSCULAR; INTRAVENOUS at 05:18

## 2020-06-03 RX ADMIN — OXYCODONE 5 MG: 5 TABLET ORAL at 02:28

## 2020-06-03 RX ADMIN — HEPARIN SODIUM 5000 UNITS: 5000 INJECTION, SOLUTION INTRAVENOUS; SUBCUTANEOUS at 13:21

## 2020-06-03 RX ADMIN — HEPARIN SODIUM 5000 UNITS: 5000 INJECTION, SOLUTION INTRAVENOUS; SUBCUTANEOUS at 05:18

## 2020-06-03 RX ADMIN — INSULIN HUMAN 2 UNITS: 100 INJECTION, SOLUTION PARENTERAL at 16:48

## 2020-06-03 ASSESSMENT — LIFESTYLE VARIABLES
AVERAGE NUMBER OF DAYS PER WEEK YOU HAVE A DRINK CONTAINING ALCOHOL: 0
HOW MANY TIMES IN THE PAST YEAR HAVE YOU HAD 5 OR MORE DRINKS IN A DAY: 0
EVER FELT BAD OR GUILTY ABOUT YOUR DRINKING: NO
EVER_SMOKED: NEVER
CONSUMPTION TOTAL: NEGATIVE
ON A TYPICAL DAY WHEN YOU DRINK ALCOHOL HOW MANY DRINKS DO YOU HAVE: 0
HAVE YOU EVER FELT YOU SHOULD CUT DOWN ON YOUR DRINKING: NO
DOES PATIENT WANT TO STOP DRINKING: NO
EVER HAD A DRINK FIRST THING IN THE MORNING TO STEADY YOUR NERVES TO GET RID OF A HANGOVER: NO
TOTAL SCORE: 0
TOTAL SCORE: 0
HAVE PEOPLE ANNOYED YOU BY CRITICIZING YOUR DRINKING: NO
TOTAL SCORE: 0

## 2020-06-03 ASSESSMENT — ENCOUNTER SYMPTOMS
WHEEZING: 0
FEVER: 0
ABDOMINAL PAIN: 1
PSYCHIATRIC NEGATIVE: 1
MUSCULOSKELETAL NEGATIVE: 1
DIARRHEA: 1
PALPITATIONS: 0
SHORTNESS OF BREATH: 0
RESPIRATORY NEGATIVE: 1
DIARRHEA: 0
WEIGHT LOSS: 0
EYES NEGATIVE: 1
SINUS PAIN: 0
VOMITING: 0
ORTHOPNEA: 0
CHILLS: 0
FOCAL WEAKNESS: 0
HEMOPTYSIS: 0
NAUSEA: 0
COUGH: 0
FLANK PAIN: 0
ABDOMINAL PAIN: 0
WHEEZING: 1

## 2020-06-03 ASSESSMENT — COGNITIVE AND FUNCTIONAL STATUS - GENERAL
DAILY ACTIVITIY SCORE: 21
CLIMB 3 TO 5 STEPS WITH RAILING: A LITTLE
MOBILITY SCORE: 20
PERSONAL GROOMING: A LITTLE
STANDING UP FROM CHAIR USING ARMS: A LITTLE
MOVING FROM LYING ON BACK TO SITTING ON SIDE OF FLAT BED: A LITTLE
HELP NEEDED FOR BATHING: A LITTLE
SUGGESTED CMS G CODE MODIFIER DAILY ACTIVITY: CJ
TOILETING: A LITTLE
SUGGESTED CMS G CODE MODIFIER MOBILITY: CJ
WALKING IN HOSPITAL ROOM: A LITTLE

## 2020-06-03 NOTE — PROGRESS NOTES
Nephrology Daily Progress Note    Date of Service  6/3/2020    Chief Complaint  60 y.o. female admitted 6/1/2020 with gastroenteritis, volume depletion, RAF, metabolic acidosis, DKA    Interval Problem Update  6/2 -still with diarrhea,   Nausea/vomiting better  Non oliguric  Creat level improving  On DKA protocol  6/3 feels much better  No N/V/D  Good UOP  Review of Systems  Review of Systems   Constitutional: Positive for malaise/fatigue. Negative for chills, fever and weight loss.   HENT: Negative for congestion, hearing loss and sinus pain.    Eyes: Negative.    Respiratory: Negative for cough, hemoptysis, shortness of breath and wheezing.    Cardiovascular: Negative for chest pain, palpitations, orthopnea and leg swelling.   Gastrointestinal: Negative for abdominal pain, diarrhea, nausea and vomiting.   Genitourinary: Negative for flank pain.        Araujo catheter   Skin: Negative.    All other systems reviewed and are negative.       Physical Exam  Temp:  [35.9 °C (96.6 °F)-36.7 °C (98.1 °F)] 35.9 °C (96.6 °F)  Pulse:  [64-81] 76  Resp:  [12-43] 17  BP: ()/(65-89) 141/81  SpO2:  [95 %-99 %] 99 %    Physical Exam  Vitals signs and nursing note reviewed.   Constitutional:       General: She is not in acute distress.     Appearance: Normal appearance. She is well-developed. She is not diaphoretic.   HENT:      Head: Normocephalic and atraumatic.      Nose: Nose normal.      Mouth/Throat:      Mouth: Mucous membranes are moist.      Pharynx: Oropharynx is clear.   Eyes:      General: No scleral icterus.     Extraocular Movements: Extraocular movements intact.      Conjunctiva/sclera: Conjunctivae normal.      Pupils: Pupils are equal, round, and reactive to light.   Neck:      Musculoskeletal: Normal range of motion and neck supple.   Cardiovascular:      Rate and Rhythm: Normal rate and regular rhythm.      Pulses: Normal pulses.      Heart sounds: Normal heart sounds. No friction rub. No gallop.     Pulmonary:      Effort: Pulmonary effort is normal. No respiratory distress.      Breath sounds: Normal breath sounds. No wheezing, rhonchi or rales.   Abdominal:      General: Bowel sounds are normal. There is distension.      Palpations: Abdomen is soft. There is no mass.      Tenderness: There is no abdominal tenderness. There is no right CVA tenderness, left CVA tenderness or guarding.   Musculoskeletal:         General: No swelling.      Right lower leg: No edema.      Left lower leg: No edema.   Skin:     General: Skin is warm.      Coloration: Skin is not jaundiced.      Findings: No erythema or rash.   Neurological:      General: No focal deficit present.      Mental Status: She is alert and oriented to person, place, and time.      Cranial Nerves: No cranial nerve deficit.      Coordination: Coordination normal.   Psychiatric:         Mood and Affect: Mood normal.         Behavior: Behavior normal.         Thought Content: Thought content normal.         Judgment: Judgment normal.         Fluids    Intake/Output Summary (Last 24 hours) at 6/3/2020 1453  Last data filed at 6/3/2020 1000  Gross per 24 hour   Intake 4776.53 ml   Output 1600 ml   Net 3176.53 ml       Laboratory  Recent Labs     06/01/20  1108 06/01/20  1620 06/02/20  0343   WBC 19.7* 12.0* 13.0*   RBC 3.39* 3.11* 3.11*   HEMOGLOBIN 9.1* 8.4* 8.4*   HEMATOCRIT 28.5* 26.6* 26.8*   MCV 84.1 85.5 86.2   MCH 26.8* 27.0 27.0   MCHC 31.9* 31.6* 31.3*   RDW 46.2 47.4 47.1   PLATELETCT 188 135* 141*   MPV 11.7 11.2 12.0     Recent Labs     06/02/20  2100 06/03/20  0100 06/03/20  0505   SODIUM 132* 134* 128*   POTASSIUM 4.0 4.6 4.4   CHLORIDE 101 104 100   CO2 14* 16* 15*   GLUCOSE 166* 120* 103*   BUN 54* 53* 53*   CREATININE 4.17* 4.11* 4.14*   CALCIUM 7.5* 7.5* 7.8*         No results for input(s): NTPROBNP in the last 72 hours.        Imaging  reviewed    Assessment/Plan  1.RAF due to volume depletion - improved UOP, continue IVF -to  monitor  2.Hypotension -low BP well controlled now  3.Electrolytes: hyponatremia: worse -to montior  4.Anemia:low Hb level stable  5.metabolic acidosis -continue bicarb gtt  6.Volume:hypovolemic -continue IVF    Recs: no need for emergent dialysis             Daily labs             Continue IVF             Avoid nephrotoxic agents             All meds to renal doses             Will follow

## 2020-06-03 NOTE — PROGRESS NOTES
Pharmacy Kinetics 60 y.o. female on vancomycin day # 2  6/3/2020    Currently on Vancomycin Pulse Dosing:  Vanco 1400 mg iv x 1 at 12:36 on 6/2  Provider specified end date: TBD    Indication for Treatment: staph bacteremia    Pertinent history per medical record: Admitted on 6/1/2020 from [facility] for sepsis, RAF, and DKA.  CT-chest/abdomen/pelvis concerning for pyelonephritis.  Patient also presented with diarrhea, CT reported possibility of colitis.  The patient was empirically started on vancomycin PO and metronidazole for r/o Cdiff colitis along with ceftriaxone for pyelonephritis.  Preliminary blood cultures from [facility] x 2 positive for GPC in clusters.  Vancomycin IV empirically added to antibiotic regimen with repeat PBC ordered at [facility].  Follow for Echo as well as indicated.     Other antibiotics: ceftriaxone 1 gram iv q24h    Allergies: Cephalexin     List concerns for renal function: RAF (SCr 4.17), low albumin, recent pressor need, DM    Pertinent cultures to date:   Blood cultures x 2 from [facility] = Staph aureus (await sens.)   6/1/20: C Diff by PCR = Negative  6/2/20: PBC x 2 = NGTD      MRSA nares swab if pneumonia is a concern (ordered/positive/negative/n-a): n/a    Recent Labs     06/01/20  1108 06/01/20  1620 06/02/20  0343   WBC 19.7* 12.0* 13.0*   NEUTSPOLYS 95.70* 91.20* 85.20*   BANDSSTABS 0.90 1.80 7.00     Recent Labs     06/01/20  1108 06/01/20  1449  06/02/20  0343 06/02/20  0806 06/02/20  1540 06/02/20  2100 06/03/20  0100 06/03/20  0505   BUN 64* 66*   < > 63* 58* 57* 54* 53* 53*   CREATININE 5.54* 5.14*   < > 4.73* 4.19* 4.39* 4.17* 4.11* 4.14*   ALBUMIN 2.7* 2.5*  --  2.4*  --   --   --   --   --     < > = values in this interval not displayed.     Recent Labs     06/03/20  0930   VANCORANDOM 15.6       Intake/Output Summary (Last 24 hours) at 6/3/2020 1236  Last data filed at 6/3/2020 1000  Gross per 24 hour   Intake 4395.9 ml   Output 1600 ml   Net 2795.9 ml      BP  "123/80   Pulse 72   Temp 36.2 °C (97.1 °F) (Temporal)   Resp (!) 23   Ht 1.626 m (5' 4\")   Wt 54.5 kg (120 lb 2.4 oz)   SpO2 98%  Temp (24hrs), Av.4 °C (97.5 °F), Min:36.2 °C (97.1 °F), Max:36.7 °C (98.1 °F)      A/P   1. Vancomycin dose change: Keep pulse dosing  2. Next vancomycin level: was this morning = 15.6 mcg/ml  3. Goal trough: 16-20 mcg/ml  4. Comments: Vanco level today in low end goal range, will give a vanco 15 mg/kg level (800 mg) iv x 1 dose. Will check a vanco level 24 hrs after today's dose. Faxed blood culture results from [facility] show x 2 peripheral blood cultures with staph aureus (presumed MRSA is stated on micro report). Await definitive results. Continue vanco for now.     Jose Alvarez, PharmD    "

## 2020-06-03 NOTE — PROGRESS NOTES
Critical Care Progress Note    Date of admission  6/1/2020    Chief Complaint  60 y.o. female admitted 6/1/2020 with shock    Hospital Course    60 y.o. female with past medical history of diabetes, hypertension, hyperlipidemia, stage III chronic kidney disease, rheumatoid arthritis on daily prednisone who presented 6/1/2020 as a transfer from [facility] where she presented today for dizziness and epigastric pain.  She has had watery diarrhea for the past 4 days following a course of Augmentin for urinary tract infection, she reports an associated epigastric burning which radiates to bilateral lower quadrants and is not improved or worsened by anything.  She states she has had watery, nonbloody bowel movements approximately every 30 minutes, she has noted additional nausea and non-bloody emesis.  At [facility] she was evaluated and found to have a creatinine of 6.4, glucose 309 9, CO2 21, lipase 163, , troponin 0.062.  She was given a dose of Unasyn and started on levophed, no imaging was obtained.  On arrival to emergency department she was noted to have continued hypotension, has been given an additional 1 L bolus (total of 3 L).  CT scan of her abdomen has been obtained which shows likely pyelonephritis with possible rectosigmoid wall thickening and right lower lobe bronchiectasis.      Interval Problem Update  Reviewed last 24 hour events:  - BHB normal transitioned to subq insulin   - Neuro: GCs 15   - HR: 60-70s   - SBP: 90-140s   - GI: Advance diet as tolerated diabetic diet   - UOP: 1.8L/24hr   - Araujo: yes   - Tm: 36.7   - Lines: PIVs   - PPx: GI not indicated, DVT heparin   - Gap closed   - Na 128   - MRSA pos Blood cx OSH on Vanc   - Antimicrobials rocephin and vancomycin   - Ween hydrocort        Review of Systems  Review of Systems   Constitutional: Positive for malaise/fatigue. Negative for chills and fever.   HENT: Negative.    Eyes: Negative.    Respiratory: Negative.     Cardiovascular: Negative for chest pain.   Gastrointestinal: Positive for abdominal pain and diarrhea. Negative for nausea and vomiting.   Genitourinary: Negative.    Musculoskeletal: Negative.    Skin: Negative.    Neurological: Negative for focal weakness.   Psychiatric/Behavioral: Negative.         Vital Signs for last 24 hours   Temp:  [36.2 °C (97.1 °F)-36.7 °C (98.1 °F)] 36.3 °C (97.3 °F)  Pulse:  [63-81] 76  Resp:  [12-33] 27  BP: ()/(65-91) 142/84  SpO2:  [95 %-100 %] 97 %    Hemodynamic parameters for last 24 hours       Respiratory Information for the last 24 hours       Physical Exam   Physical Exam  Constitutional:       General: She is not in acute distress.     Appearance: She is ill-appearing.   HENT:      Head: Normocephalic and atraumatic.      Right Ear: External ear normal.      Left Ear: External ear normal.      Nose: Nose normal.      Mouth/Throat:      Mouth: Mucous membranes are moist.      Pharynx: Oropharynx is clear. No oropharyngeal exudate.   Eyes:      Extraocular Movements: Extraocular movements intact.      Conjunctiva/sclera: Conjunctivae normal.      Pupils: Pupils are equal, round, and reactive to light.   Neck:      Musculoskeletal: Normal range of motion.   Cardiovascular:      Rate and Rhythm: Normal rate and regular rhythm.      Pulses: Normal pulses.   Pulmonary:      Effort: Pulmonary effort is normal. No respiratory distress.   Abdominal:      General: Abdomen is flat. Bowel sounds are normal.      Palpations: Abdomen is soft. There is no mass.      Tenderness: There is no guarding or rebound.      Hernia: No hernia is present.      Comments: epigastric pain that is mildy tender to palpation   Musculoskeletal:         General: No swelling.   Skin:     General: Skin is warm and dry.      Capillary Refill: Capillary refill takes less than 2 seconds.   Neurological:      General: No focal deficit present.      Mental Status: She is alert and oriented to person, place,  and time. Mental status is at baseline.      Cranial Nerves: No cranial nerve deficit.      Motor: No weakness.   Psychiatric:         Mood and Affect: Mood normal.         Behavior: Behavior normal.         Medications  Current Facility-Administered Medications   Medication Dose Route Frequency Provider Last Rate Last Dose   • potassium chloride (KCL) ivpb 10 mEq  10 mEq Intravenous Q HOUR Chuck Quevedo M.D. 100 mL/hr at 06/03/20 0612 10 mEq at 06/03/20 0612   • heparin injection 5,000 Units  5,000 Units Subcutaneous Q8HRS Enrrique Ba M.D.   5,000 Units at 06/03/20 0518   • MD Alert...Vancomycin per Pharmacy   Other PHARMACY TO DOSE Enrrique Ba M.D.       • oxyCODONE immediate-release (ROXICODONE) tablet 5 mg  5 mg Oral Q6HRS PRN Enrrique Ba M.D.   5 mg at 06/03/20 0228   • loperamide (IMODIUM) capsule 2 mg  2 mg Oral 4X/DAY PRN Enrrique Ba M.D.   2 mg at 06/02/20 1742   • omeprazole (PRILOSEC) capsule 20 mg  20 mg Oral DAILY Enrrique Ba M.D.   20 mg at 06/03/20 0519   • D10%-0.45% NaCl infusion   Intravenous Continuous Enrrique Ba M.D. 125 mL/hr at 06/02/20 2328     • MD ALERT-PHARMACY TO CONSULT FOR DKA MONITORING 1 Each  1 Each Other PRN Enrrique Ba M.D.       • magnesium sulfate IVPB premix 4 g  4 g Intravenous Once PRN Enrrique Ba M.D.       • potassium phosphates 30 mmol in  mL ivpb  30 mmol Intravenous Once PRN Enrrique Ba M.D.        Or   • sodium phosphate 30 mmol in 1/2  mL ivpb  30 mmol Intravenous Once PRN Enrrique Ba M.D.       • Adult DKA potassium(K+) replacement scale  1 Each Intravenous Q4HRS Enrrique Ba M.D.   1 Each at 06/03/20 0600   • insulin regular human (HUMULIN/NOVOLIN R) 62.5 Units in  mL Infusion for DKA  3 Units/hr Intravenous Continuous Enrrique Ba M.D.   Stopped at 06/03/20 0605   • norepinephrine (Levophed) infusion 8 mg/250 mL (premix)  0-30 mcg/min Intravenous Continuous Bárbara Khoury M.D. 7.5 mL/hr at 06/01/20  1224 4 mcg/min at 06/01/20 1224   • acetaminophen (TYLENOL) tablet 650 mg  650 mg Oral Q6HRS PRN Oni Ruiz M.D.   650 mg at 06/02/20 0242   • ondansetron (ZOFRAN) syringe/vial injection 4 mg  4 mg Intravenous Q4HRS PRN Oni Ruiz M.D.       • ondansetron (ZOFRAN ODT) dispertab 4 mg  4 mg Oral Q4HRS PRN Oni Ruiz M.D.       • promethazine (PHENERGAN) tablet 12.5-25 mg  12.5-25 mg Oral Q4HRS PRN Oni Ruiz M.D.       • promethazine (PHENERGAN) suppository 12.5-25 mg  12.5-25 mg Rectal Q4HRS PRN Oni Ruiz M.D.       • prochlorperazine (COMPAZINE) injection 5-10 mg  5-10 mg Intravenous Q4HRS PRN Oni Ruiz M.D.       • cefTRIAXone (ROCEPHIN) 1 g in  mL IVPB  1 g Intravenous Q24HRS Oni Ruiz M.D.   Stopped at 06/03/20 0552   • sodium bicarbonate 8.4 % 75 mEq in 1/2 NS 1,000 mL infusion   Intravenous Continuous Oni Ruiz M.D. 42 mL/hr at 06/02/20 1505     • hydrocortisone sodium succinate PF (SOLU-CORTEF) 100 MG injection 50 mg  50 mg Intravenous Q6HRS Oni Ruiz M.D.   50 mg at 06/03/20 0518   • latanoprost (XALATAN) 0.005 % ophthalmic solution 1 Drop  1 Drop Both Eyes QHS Oni Ruiz M.D.   1 Drop at 06/02/20 2101   • timolol (TIMOPTIC) 0.5 % ophthalmic solution 1 Drop  1 Drop Both Eyes DAILY Oni Ruiz M.D.   1 Drop at 06/03/20 0603   • albuterol inhaler 2 Puff  2 Puff Inhalation Q4HRS PRN Oni Ruiz M.D.           Fluids    Intake/Output Summary (Last 24 hours) at 6/3/2020 0700  Last data filed at 6/3/2020 0600  Gross per 24 hour   Intake 4907.77 ml   Output 1600 ml   Net 3307.77 ml       Laboratory          Recent Labs     06/02/20  0343  06/02/20  1540 06/02/20  2100 06/03/20  0100 06/03/20  0505   SODIUM 130*   < > 132* 132* 134* 128*   POTASSIUM 3.5*   < > 4.4 4.0 4.6 4.4   CHLORIDE 95*   < > 99 101 104 100   CO2 16*   < > 14* 14* 16* 15*   BUN 63*   < > 57* 54* 53* 53*   CREATININE 4.73*   < > 4.39* 4.17* 4.11* 4.14*   MAGNESIUM 2.2  --  2.0  --  1.9  --    PHOSPHORUS  4.4  --  4.1  --  4.4  --    CALCIUM 7.2*   < > 7.1* 7.5* 7.5* 7.8*    < > = values in this interval not displayed.     Recent Labs     06/01/20 1108 06/01/20  1449  06/02/20  0343  06/02/20  2100 06/03/20  0100 06/03/20  0505   ALTSGPT 25 28  --  49  --   --   --   --    ASTSGOT 32 37  --  83*  --   --   --   --    ALKPHOSPHAT 66 62  --  63  --   --   --   --    TBILIRUBIN 0.3 0.2  --  <0.2  --   --   --   --    GLUCOSE 365* 415*   < > 151*   < > 166* 120* 103*    < > = values in this interval not displayed.     Recent Labs     06/01/20 1108 06/01/20 1449 06/01/20 1620 06/02/20  0343   WBC 19.7*  --  12.0* 13.0*   NEUTSPOLYS 95.70*  --  91.20* 85.20*   LYMPHOCYTES 1.70*  --  2.70* 5.20*   MONOCYTES 0.90  --  4.40 2.60   EOSINOPHILS 0.00  --  0.00 0.00   BASOPHILS 0.90  --  0.00 0.00   ASTSGOT 32 37  --  83*   ALTSGPT 25 28  --  49   ALKPHOSPHAT 66 62  --  63   TBILIRUBIN 0.3 0.2  --  <0.2     Recent Labs     06/01/20 1108 06/01/20  1620 06/02/20  0343 06/03/20  0100   RBC 3.39* 3.11* 3.11*  --    HEMOGLOBIN 9.1* 8.4* 8.4*  --    HEMATOCRIT 28.5* 26.6* 26.8*  --    PLATELETCT 188 135* 141*  --    IRON  --   --   --  19*   FERRITIN  --   --   --  442.0*   TOTIRONBC  --   --   --  193*       Imaging  X-Ray:  I have personally reviewed the images and compared with prior images.  EKG:  I have personally reviewed the images and compared with prior images.  CT:    Reviewed    Assessment/Plan  * Acute pyelonephritis- (present on admission)  Assessment & Plan  CT concerning for pyelonephritis  Cont ceftriaxone    Diabetic ketoacidosis without coma associated with type 2 diabetes mellitus (HCC)- (present on admission)  Assessment & Plan  Gap closed BHB negative transitioned to subq insulin and SSI  cardiac monitoring  optimize intravascular volume with IVF  Follow lytes replete prn  Goal Magnesium: >2, Phosphorus: 2, K >4      Acute renal failure with tubular necrosis (HCC)- (present on admission)  Assessment &  Plan  Acute on chronic kidney disease likely due to dehydration  IVF  Renal dose meds, avoid nephrotoxins  Strict I/Os  Follow renal function  Nephrology following    Metabolic acidosis- (present on admission)  Assessment & Plan  Improved   Likely due to uremia and ketosis      Adrenal insufficiency (HCC)- (present on admission)  Assessment & Plan  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   Despite appropriate fluid resuscitation with crystalloid given per sepsis guidelines, the patient remains hypotensive with systolic blood pressure less than 90 or MAP less than 65  Hemodynamic support with additional fluids and IV vasopressors as needed to maintain a SBP of 90 or MAP of 65  IV antibiotics as appropriate for source of sepsis  Reassessment: I have reassessed the patient's hemodynamic status    Resolved; likely 2/2 Urinary and severe volume depletion  Blood cx positive for ? Staph at OSH continue vanc until speciation returns  Continue ceftriaxone x 7 days for presumed pyelonephritis    Hyponatremia- (present on admission)  Assessment & Plan  Pseudohyponatremia, corrected is 136  Monitor while correction of acidosis    Bronchiectasis (HCC)- (present on admission)  Assessment & Plan  no respiratory symptoms at this time   Noted on CT scan       VTE:  Heparin  Ulcer: Not Indicated  Lines: None    I have performed a physical exam and reviewed and  updated ROS and Plan today (6/3/2020). In review of yesterday's note (6/2/2020), there are no changes except as documented above.     Discussed patient condition and risk of morbidity and/or mortality with Hospitalist, RN, RT, Pharmacy, Code status disscussed, Charge nurse / hot rounds, Patient and nephrology

## 2020-06-03 NOTE — PROGRESS NOTES
Facility form [facility] in [place], [place] called with + blood culture results this AM for preliminary presumptive MRSA. Final results will be out tomorrow AM.

## 2020-06-03 NOTE — CARE PLAN
Problem: Communication  Goal: The ability to communicate needs accurately and effectively will improve  Outcome: PROGRESSING AS EXPECTED  Intervention: Educate patient and significant other/support system about the plan of care, procedures, treatments, medications and allow for questions  Flowsheets (Taken 6/2/2020 2155)  Pt & Family Have Been Educated on Methods Available to Report Concerns Related to Care, Treatment, Services, and Patient Safety Issues: Yes  Note: Verbalized understanding     Problem: Safety  Goal: Will remain free from falls  Outcome: PROGRESSING AS EXPECTED  Intervention: Implement fall precautions  Flowsheets  Taken 6/2/2020 2155  Bed Alarm: Yes - Alarm On  Taken 6/2/2020 2000  Environmental Precautions:   Treaded Slipper Socks on Patient   Personal Belongings, Wastebasket, Call Bell etc. in Easy Reach   Report Given to Other Health Care Providers Regarding Fall Risk   Bed in Low Position   Communication Sign for Patients & Families   Mobility Assessed & Appropriate Sign Placed     Problem: Pain Management  Goal: Pain level will decrease to patient's comfort goal  Outcome: PROGRESSING SLOWER THAN EXPECTED  Intervention: Follow pain managment plan developed in collaboration with patient and Interdisciplinary Team  Note: PRN meds Q4. Pain 7/10

## 2020-06-03 NOTE — PROGRESS NOTES
Hospital Medicine Daily Progress Note    Date of Service  6/3/2020    Chief Complaint  60 y.o. female admitted 6/1/2020 with diarrhea.    Hospital Course   Ms. Mcclain is a 60 y.o. female who presented 6/1/2020 with past medical history of rheumatoid arthritis on chronic prednisone therapy, type 2 diabetes on metformin, CKD stage III who comes into the emergency room with complaints of nausea, vomiting and diarrhea for the past 4 days.  Associated with an epigastric abdominal pain.  The pain radiates to the lower abdomen and feels like a burning sensation.  The pain is nonexertional, non-positional.  Patient states that she has not gone to the bathroom to urinate in 4 days.  She would have a bowel movement every 30 minutes.  She denies any blood in her stools.  Patient denies any fever, cough, shortness of breath, chest pain.   Patient came from outlying facility from [facility] includes  Blood pressure 86/50, respiratory rate 20, saturating 90% on 2 L oxygen, temperature 98.4  Sodium 129, potassium 3.6, chloride 89, CO2 21, AST 46, ALT 30, BUN 67, glucose 399, creatinine 6.4, calcium 8, bili 1.5, anion gap 22, lactic acid 2.5, lipase 163, , CK-MB 1.1, troponin 0.062, WBC 14.5, hemoglobin 10.9, platelets 175, d-dimer 3 8378, TSH 8.67  Patient was started on Unasyn and Levophed with ICU admission        Interval Problem Update  6/3: patient seen and evaluated in the ICU. The lab from [facility] faxed over a preliminary + blood culture revealing gram + cocci in clusters. She has been started on broad spectrum antibiotics. She transitioned off the insulin drip to sliding scale.     Consultants/Specialty  Critical care. I discussed with Dr. Ba.   Nephrology   Code Status  Full     Disposition  med    Review of Systems  Review of Systems   Constitutional: Negative for chills and fever.        She feels generally weak   Respiratory: Positive for wheezing. Negative for cough and shortness of breath.     Cardiovascular: Negative for chest pain and leg swelling.   Gastrointestinal: Positive for diarrhea. Negative for nausea and vomiting.   All other systems reviewed and are negative.       Physical Exam  Temp:  [36.2 °C (97.1 °F)-36.7 °C (98.1 °F)] 36.2 °C (97.1 °F)  Pulse:  [64-81] 72  Resp:  [12-43] 23  BP: ()/(65-91) 123/80  SpO2:  [95 %-100 %] 98 %    Physical Exam  Vitals signs and nursing note reviewed.   Constitutional:       Comments: thin   HENT:      Mouth/Throat:      Mouth: Mucous membranes are dry.      Pharynx: Oropharynx is clear.   Eyes:      General: No scleral icterus.     Conjunctiva/sclera: Conjunctivae normal.   Neck:      Musculoskeletal: Normal range of motion and neck supple.   Cardiovascular:      Rate and Rhythm: Normal rate and regular rhythm.      Heart sounds: No murmur.   Pulmonary:      Effort: Pulmonary effort is normal.      Breath sounds: Wheezing present. No rales.   Abdominal:      General: Bowel sounds are normal. There is distension.      Palpations: Abdomen is soft.      Tenderness: There is no abdominal tenderness.   Musculoskeletal:         General: No swelling.      Right lower leg: No edema.      Left lower leg: No edema.   Skin:     General: Skin is dry.      Coloration: Skin is pale.   Neurological:      General: No focal deficit present.      Mental Status: She is alert and oriented to person, place, and time.   Psychiatric:         Behavior: Behavior normal.         Fluids    Intake/Output Summary (Last 24 hours) at 6/3/2020 1252  Last data filed at 6/3/2020 1000  Gross per 24 hour   Intake 4395.9 ml   Output 1600 ml   Net 2795.9 ml       Laboratory  Recent Labs     06/01/20  1108 06/01/20  1620 06/02/20  0343   WBC 19.7* 12.0* 13.0*   RBC 3.39* 3.11* 3.11*   HEMOGLOBIN 9.1* 8.4* 8.4*   HEMATOCRIT 28.5* 26.6* 26.8*   MCV 84.1 85.5 86.2   MCH 26.8* 27.0 27.0   MCHC 31.9* 31.6* 31.3*   RDW 46.2 47.4 47.1   PLATELETCT 188 135* 141*   MPV 11.7 11.2 12.0     Recent  Labs     06/02/20  2100 06/03/20  0100 06/03/20  0505   SODIUM 132* 134* 128*   POTASSIUM 4.0 4.6 4.4   CHLORIDE 101 104 100   CO2 14* 16* 15*   GLUCOSE 166* 120* 103*   BUN 54* 53* 53*   CREATININE 4.17* 4.11* 4.14*   CALCIUM 7.5* 7.5* 7.8*                   Imaging  CT-CHEST,ABDOMEN,PELVIS W/O   Final Result         1.  Asymmetric right perinephric fat stranding suspicious for infection/pyelonephritis. Correlate with urinalysis. No significant hydronephrosis. No obstructing stone is seen.   2.  Hepatomegaly and hepatic steatosis.   3.  Rectosigmoid colon is decompressed, limiting evaluation for wall thickening. Correlate for evidence of colitis.   4.  Right lower lobe bronchiectasis and peribronchial opacities likely postinflammatory with atelectasis/scarring. Correlate clinically for evidence of infection.   5.  Atherosclerosis.                    Assessment/Plan  * Septic shock (HCC)- (present on admission)  Assessment & Plan  This is Septic shock Present on admission  SIRS criteria identified on my evaluation include: Tachycardia, with heart rate greater than 90 BPM and Leukocytosis, with WBC greater than 12,000  Source is pyelonephritis, possible C. difficile  Presentation includes: Severe sepsis present and persistent hypotension after 30 ml/kg completed.   Despite appropriate fluid resuscitation with crystalloid given per sepsis guidelines, the patient remained hypotensive and required IV levophed as needed to maintain a SBP of 90 or MAP of 65        Diabetic ketoacidosis without coma associated with type 2 diabetes mellitus (HCC)- (present on admission)  Assessment & Plan  With renal failure, elevated but hydroxybutyrate  She was admitted to the ICU with an insulin drip and IV fluids  Patient has been started on IV fluids and IV insulin per DKA protocol  She has been transitioned to long-acting glargine and sliding scale subcutaneous insulin on 6/3  Monitor potassium and phosphorus which will be replaced  aggressively  Hemoglobin A1c ordered   Diabetes education ordered  She had been on metformin at home      Acute renal failure with tubular necrosis (HCC)- (present on admission)  Assessment & Plan  Likely prerenal-combination of sepsis, dehydration from diarrhea  IV fluids given  Cr 5.5 on admit and remains high at 4.1  Nephrology consulted   Follow urine output.  Check a BMP in the morning      Metabolic acidosis- (present on admission)  Assessment & Plan  Secondary to DKA and renal failure  Sodium bicarb started by nephrology    Adrenal insufficiency (HCC)- (present on admission)  Assessment & Plan  Patient is on chronic prednisone therapy for rheumatoid arthritis.  She presents with shock.  She is alert and oriented.  IV hydrocortisone has been ordered and will transition back to home dose of prednisone 10 mg daily.    Diarrhea- (present on admission)  Assessment & Plan  C diff negative    Hyponatremia- (present on admission)  Assessment & Plan  Secondary to hyperglycemia       Bronchiectasis (HCC)- (present on admission)  Assessment & Plan  Chest CT findings - with no complains of cough or dyspnea   Patient had a previous pneumonia  Albuterol prn    Acute pyelonephritis- (present on admission)  Assessment & Plan  Patient states that she stopped producing urine 4 days ago, there is no sign obstruction on CT scan, she does have evidence of hematuria on urinalysis  CT scan shows evidence of pyelonephritis without evidence of abscess  One BC is + from [facility] for possible Staph thus IV rocephin and vancomycin             VTE prophylaxis: heparin

## 2020-06-04 ENCOUNTER — APPOINTMENT (OUTPATIENT)
Dept: CARDIOLOGY | Facility: MEDICAL CENTER | Age: 60
DRG: 871 | End: 2020-06-04
Attending: HOSPITALIST
Payer: MEDICARE

## 2020-06-04 PROBLEM — B95.61 STAPHYLOCOCCUS AUREUS BACTEREMIA: Status: ACTIVE | Noted: 2020-06-04

## 2020-06-04 PROBLEM — R78.81 STAPHYLOCOCCUS AUREUS BACTEREMIA: Status: ACTIVE | Noted: 2020-06-04

## 2020-06-04 LAB
ANION GAP SERPL CALC-SCNC: 12 MMOL/L (ref 7–16)
BACTERIA STL CULT: NORMAL
BASOPHILS # BLD AUTO: 0.1 % (ref 0–1.8)
BASOPHILS # BLD: 0.01 K/UL (ref 0–0.12)
BUN SERPL-MCNC: 54 MG/DL (ref 8–22)
CALCIUM SERPL-MCNC: 8.4 MG/DL (ref 8.5–10.5)
CHLORIDE SERPL-SCNC: 108 MMOL/L (ref 96–112)
CO2 SERPL-SCNC: 15 MMOL/L (ref 20–33)
CREAT SERPL-MCNC: 3.77 MG/DL (ref 0.5–1.4)
E COLI SXT1+2 STL IA: NORMAL
EOSINOPHIL # BLD AUTO: 0 K/UL (ref 0–0.51)
EOSINOPHIL NFR BLD: 0 % (ref 0–6.9)
ERYTHROCYTE [DISTWIDTH] IN BLOOD BY AUTOMATED COUNT: 49.3 FL (ref 35.9–50)
GLUCOSE BLD-MCNC: 118 MG/DL (ref 65–99)
GLUCOSE BLD-MCNC: 140 MG/DL (ref 65–99)
GLUCOSE BLD-MCNC: 147 MG/DL (ref 65–99)
GLUCOSE BLD-MCNC: 147 MG/DL (ref 65–99)
GLUCOSE BLD-MCNC: 57 MG/DL (ref 65–99)
GLUCOSE BLD-MCNC: 57 MG/DL (ref 65–99)
GLUCOSE BLD-MCNC: 62 MG/DL (ref 65–99)
GLUCOSE SERPL-MCNC: 79 MG/DL (ref 65–99)
HCT VFR BLD AUTO: 27.8 % (ref 37–47)
HGB BLD-MCNC: 8.5 G/DL (ref 12–16)
IMM GRANULOCYTES # BLD AUTO: 0.09 K/UL (ref 0–0.11)
IMM GRANULOCYTES NFR BLD AUTO: 1.3 % (ref 0–0.9)
LV EJECT FRACT MOD 2C 99903: 58.66
LV EJECT FRACT MOD 4C 99902: 53.88
LV EJECT FRACT MOD BP 99901: 54.12
LYMPHOCYTES # BLD AUTO: 1.03 K/UL (ref 1–4.8)
LYMPHOCYTES NFR BLD: 14.8 % (ref 22–41)
MCH RBC QN AUTO: 26.8 PG (ref 27–33)
MCHC RBC AUTO-ENTMCNC: 30.6 G/DL (ref 33.6–35)
MCV RBC AUTO: 87.7 FL (ref 81.4–97.8)
MONOCYTES # BLD AUTO: 0.66 K/UL (ref 0–0.85)
MONOCYTES NFR BLD AUTO: 9.5 % (ref 0–13.4)
NEUTROPHILS # BLD AUTO: 5.17 K/UL (ref 2–7.15)
NEUTROPHILS NFR BLD: 74.3 % (ref 44–72)
NRBC # BLD AUTO: 0.02 K/UL
NRBC BLD-RTO: 0.3 /100 WBC
PLATELET # BLD AUTO: 127 K/UL (ref 164–446)
PMV BLD AUTO: 11.6 FL (ref 9–12.9)
POTASSIUM SERPL-SCNC: 4.5 MMOL/L (ref 3.6–5.5)
RBC # BLD AUTO: 3.17 M/UL (ref 4.2–5.4)
SIGNIFICANT IND 70042: NORMAL
SITE SITE: NORMAL
SODIUM SERPL-SCNC: 135 MMOL/L (ref 135–145)
SOURCE SOURCE: NORMAL
VANCOMYCIN SERPL-MCNC: 22.3 UG/ML
WBC # BLD AUTO: 7 K/UL (ref 4.8–10.8)

## 2020-06-04 PROCEDURE — 700102 HCHG RX REV CODE 250 W/ 637 OVERRIDE(OP): Performed by: PSYCHIATRY & NEUROLOGY

## 2020-06-04 PROCEDURE — 93306 TTE W/DOPPLER COMPLETE: CPT | Mod: 26 | Performed by: INTERNAL MEDICINE

## 2020-06-04 PROCEDURE — 82962 GLUCOSE BLOOD TEST: CPT

## 2020-06-04 PROCEDURE — 700111 HCHG RX REV CODE 636 W/ 250 OVERRIDE (IP): Performed by: PSYCHIATRY & NEUROLOGY

## 2020-06-04 PROCEDURE — 700111 HCHG RX REV CODE 636 W/ 250 OVERRIDE (IP): Performed by: HOSPITALIST

## 2020-06-04 PROCEDURE — A9270 NON-COVERED ITEM OR SERVICE: HCPCS | Performed by: HOSPITALIST

## 2020-06-04 PROCEDURE — 85025 COMPLETE CBC W/AUTO DIFF WBC: CPT

## 2020-06-04 PROCEDURE — 99233 SBSQ HOSP IP/OBS HIGH 50: CPT | Performed by: INTERNAL MEDICINE

## 2020-06-04 PROCEDURE — A9270 NON-COVERED ITEM OR SERVICE: HCPCS | Performed by: PSYCHIATRY & NEUROLOGY

## 2020-06-04 PROCEDURE — 700102 HCHG RX REV CODE 250 W/ 637 OVERRIDE(OP): Performed by: INTERNAL MEDICINE

## 2020-06-04 PROCEDURE — 700105 HCHG RX REV CODE 258: Performed by: PSYCHIATRY & NEUROLOGY

## 2020-06-04 PROCEDURE — 93306 TTE W/DOPPLER COMPLETE: CPT

## 2020-06-04 PROCEDURE — 80202 ASSAY OF VANCOMYCIN: CPT

## 2020-06-04 PROCEDURE — 99223 1ST HOSP IP/OBS HIGH 75: CPT | Performed by: INTERNAL MEDICINE

## 2020-06-04 PROCEDURE — 700102 HCHG RX REV CODE 250 W/ 637 OVERRIDE(OP): Performed by: HOSPITALIST

## 2020-06-04 PROCEDURE — 99233 SBSQ HOSP IP/OBS HIGH 50: CPT | Performed by: HOSPITALIST

## 2020-06-04 PROCEDURE — 770021 HCHG ROOM/CARE - ISO PRIVATE

## 2020-06-04 PROCEDURE — A9270 NON-COVERED ITEM OR SERVICE: HCPCS | Performed by: INTERNAL MEDICINE

## 2020-06-04 PROCEDURE — 80048 BASIC METABOLIC PNL TOTAL CA: CPT

## 2020-06-04 PROCEDURE — 700105 HCHG RX REV CODE 258: Performed by: HOSPITALIST

## 2020-06-04 RX ORDER — RIFAMPIN 300 MG/1
300 CAPSULE ORAL 2 TIMES DAILY
Status: DISCONTINUED | OUTPATIENT
Start: 2020-06-04 | End: 2020-06-15 | Stop reason: HOSPADM

## 2020-06-04 RX ADMIN — LATANOPROST 1 DROP: 50 SOLUTION OPHTHALMIC at 19:36

## 2020-06-04 RX ADMIN — VANCOMYCIN HYDROCHLORIDE 800 MG: 500 INJECTION, POWDER, LYOPHILIZED, FOR SOLUTION INTRAVENOUS at 19:36

## 2020-06-04 RX ADMIN — SODIUM CHLORIDE, POTASSIUM CHLORIDE, SODIUM LACTATE AND CALCIUM CHLORIDE: 600; 310; 30; 20 INJECTION, SOLUTION INTRAVENOUS at 11:14

## 2020-06-04 RX ADMIN — HEPARIN SODIUM 5000 UNITS: 5000 INJECTION, SOLUTION INTRAVENOUS; SUBCUTANEOUS at 20:30

## 2020-06-04 RX ADMIN — OXYCODONE 5 MG: 5 TABLET ORAL at 04:14

## 2020-06-04 RX ADMIN — RIFAMPIN 300 MG: 300 CAPSULE ORAL at 17:18

## 2020-06-04 RX ADMIN — ACETAMINOPHEN 650 MG: 325 TABLET, FILM COATED ORAL at 22:54

## 2020-06-04 RX ADMIN — OXYCODONE 5 MG: 5 TABLET ORAL at 11:16

## 2020-06-04 RX ADMIN — OMEPRAZOLE 20 MG: 20 CAPSULE, DELAYED RELEASE ORAL at 05:48

## 2020-06-04 RX ADMIN — ACETAMINOPHEN 650 MG: 325 TABLET, FILM COATED ORAL at 16:01

## 2020-06-04 RX ADMIN — OXYCODONE 5 MG: 5 TABLET ORAL at 22:54

## 2020-06-04 RX ADMIN — ACETAMINOPHEN 650 MG: 325 TABLET, FILM COATED ORAL at 09:37

## 2020-06-04 RX ADMIN — HEPARIN SODIUM 5000 UNITS: 5000 INJECTION, SOLUTION INTRAVENOUS; SUBCUTANEOUS at 15:14

## 2020-06-04 RX ADMIN — RIFAMPIN 300 MG: 300 CAPSULE ORAL at 11:15

## 2020-06-04 RX ADMIN — TIMOLOL MALEATE 1 DROP: 5 SOLUTION/ DROPS OPHTHALMIC at 05:48

## 2020-06-04 RX ADMIN — PREDNISONE 10 MG: 10 TABLET ORAL at 05:48

## 2020-06-04 RX ADMIN — HEPARIN SODIUM 5000 UNITS: 5000 INJECTION, SOLUTION INTRAVENOUS; SUBCUTANEOUS at 05:47

## 2020-06-04 RX ADMIN — CEFTRIAXONE SODIUM 1 G: 1 INJECTION, POWDER, FOR SOLUTION INTRAMUSCULAR; INTRAVENOUS at 05:48

## 2020-06-04 RX ADMIN — OXYCODONE 5 MG: 5 TABLET ORAL at 17:18

## 2020-06-04 ASSESSMENT — ENCOUNTER SYMPTOMS
NERVOUS/ANXIOUS: 0
DIARRHEA: 0
CHILLS: 0
ABDOMINAL PAIN: 0
BACK PAIN: 1
SHORTNESS OF BREATH: 0
FOCAL WEAKNESS: 0
FLANK PAIN: 0
NAUSEA: 1
SPUTUM PRODUCTION: 0
COUGH: 0
ABDOMINAL PAIN: 1
NAUSEA: 0
BLURRED VISION: 0
MYALGIAS: 0
COUGH: 1
EYES NEGATIVE: 1
SINUS PAIN: 0
VOMITING: 0
WEIGHT LOSS: 0
WHEEZING: 0
ORTHOPNEA: 0
NECK PAIN: 0
PALPITATIONS: 0
SHORTNESS OF BREATH: 1
FEVER: 0
HEMOPTYSIS: 0
DOUBLE VISION: 0
DIARRHEA: 1

## 2020-06-04 ASSESSMENT — FIBROSIS 4 INDEX: FIB4 SCORE: 4.96

## 2020-06-04 NOTE — PROGRESS NOTES
Pharmacy Kinetics 60 y.o. female on vancomycin day # 3  6/4/2020    Currently on Vancomycin Pulse Dosing:  Vanco 1400 mg iv x 1 at 12:36 on 6/2  Vanco 800 mg iv x 1 at 11:58 on 6/3    Provider specified end date: TBD     Indication for Treatment: MRSA bacteremia     Pertinent history per medical record: Admitted on 6/1/2020 from [facility] for sepsis, RAF, and DKA.  CT-chest/abdomen/pelvis concerning for pyelonephritis.  Patient also presented with diarrhea, CT reported possibility of colitis.  The patient was empirically started on vancomycin PO and metronidazole for r/o Cdiff colitis along with ceftriaxone for pyelonephritis.  Preliminary blood cultures from [facility] x 2 positive for GPC in clusters.  Vancomycin IV empirically added to antibiotic regimen with repeat PBC ordered at [facility].  Follow for Echo as well as indicated.The pt has hardware - pins in her right foot, a plate in her left ankle and replaced knuckle in her right hand. ID is consulting.    Other antibiotics: Rifampin 300 mg po BID    Allergies: Cephalexin     List concerns for renal function: RAF (SCr 3.77), low albumin, recent pressor need, DM    Pertinent cultures to date:   Blood cultures x 2 from [facility] = MRSA  6/1/20: C Diff by PCR = Negative  6/2/20: PBC x 2 = MRSA    MRSA nares swab if pneumonia is a concern (ordered/positive/negative/n-a): n/a    Recent Labs     06/01/20  1620 06/02/20  0343 06/04/20  0420   WBC 12.0* 13.0* 7.0   NEUTSPOLYS 91.20* 85.20* 74.30*   BANDSSTABS 1.80 7.00  --      Recent Labs     06/01/20  1449  06/02/20  0343  06/02/20  1540 06/02/20  2100 06/03/20  0100 06/03/20  0505 06/04/20  0420   BUN 66*   < > 63*   < > 57* 54* 53* 53* 54*   CREATININE 5.14*   < > 4.73*   < > 4.39* 4.17* 4.11* 4.14* 3.77*   ALBUMIN 2.5*  --  2.4*  --   --   --   --   --   --     < > = values in this interval not displayed.     Recent Labs     06/03/20  0930 06/04/20  1242   [facility]       Intake/Output Summary  "(Last 24 hours) at 2020 1409  Last data filed at 2020 1200  Gross per 24 hour   Intake 3418 ml   Output 800 ml   Net 2618 ml      /78   Pulse 72   Temp 36.2 °C (97.2 °F) (Temporal)   Resp (!) 23   Ht 1.626 m (5' 4\")   Wt 66.4 kg (146 lb 6.2 oz)   SpO2 95%  Temp (24hrs), Av.2 °C (97.2 °F), Min:36.1 °C (96.9 °F), Max:36.5 °C (97.7 °F)      A/P   1. Vancomycin dose change: keep pulse dosing  2. Next vancomycin level: was today = 22.3 mcg/ml  3. Goal trough: 16-20 mcg/ml  4. Comments: Vanco level drawn ~24 hrs after yesterday's dose and is slightly supratherapeutic. Will dose again when the vanco level has decreased into the goal range later this evening with another vanco 15 mg/kg dose (800 mg). ID now consulting. Ceftriaxone stopped and rifampin started. RAF slowly improving, per Nephro IVF to continue, no dialysis needed at this point.    Jose Alvarez, PharmD    "

## 2020-06-04 NOTE — PROGRESS NOTES
Nephrology Daily Progress Note    Date of Service  6/4/2020    Chief Complaint  60 y.o. female admitted 6/1/2020 with gastroenteritis, volume depletion, RAF, metabolic acidosis, DKA    Interval Problem Update  6/2 -still with diarrhea,   Nausea/vomiting better  Non oliguric  Creat level improving  On DKA protocol  6/3 feels much better  No N/V/D  Good UOP  6/4  -still poor appetite, some nausea  Diarrhea improved  Good UOP  Creat level improving    Review of Systems  Review of Systems   Constitutional: Negative for chills, fever, malaise/fatigue and weight loss.   HENT: Negative for congestion, hearing loss and sinus pain.    Eyes: Negative.    Respiratory: Negative for cough, hemoptysis, shortness of breath and wheezing.    Cardiovascular: Negative for chest pain, palpitations, orthopnea and leg swelling.   Gastrointestinal: Positive for nausea. Negative for abdominal pain, diarrhea and vomiting.   Genitourinary: Negative for dysuria, flank pain, frequency, hematuria and urgency.   Skin: Negative.    All other systems reviewed and are negative.       Physical Exam  Temp:  [36.1 °C (96.9 °F)-36.5 °C (97.7 °F)] 36.2 °C (97.2 °F)  Pulse:  [72-84] 72  Resp:  [23] 23  BP: (132-160)/() 145/78  SpO2:  [95 %-97 %] 95 %    Physical Exam  Vitals signs and nursing note reviewed.   Constitutional:       General: She is not in acute distress.     Appearance: Normal appearance. She is well-developed. She is not diaphoretic.   HENT:      Head: Normocephalic and atraumatic.      Nose: Nose normal.      Mouth/Throat:      Mouth: Mucous membranes are moist.      Pharynx: Oropharynx is clear.   Eyes:      Pupils: Pupils are equal, round, and reactive to light.   Neck:      Musculoskeletal: Normal range of motion and neck supple.   Cardiovascular:      Rate and Rhythm: Normal rate and regular rhythm.      Pulses: Normal pulses.      Heart sounds: Normal heart sounds. No friction rub. No gallop.    Pulmonary:      Effort:  Pulmonary effort is normal. No respiratory distress.      Breath sounds: Normal breath sounds. No wheezing, rhonchi or rales.   Abdominal:      General: Bowel sounds are normal. There is distension.      Palpations: Abdomen is soft. There is no mass.      Tenderness: There is no abdominal tenderness.   Musculoskeletal:      Right lower leg: No edema.      Left lower leg: No edema.   Skin:     General: Skin is warm.      Coloration: Skin is not jaundiced.      Findings: No erythema or rash.   Neurological:      General: No focal deficit present.      Mental Status: She is alert and oriented to person, place, and time.      Cranial Nerves: No cranial nerve deficit.      Coordination: Coordination normal.   Psychiatric:         Mood and Affect: Mood normal.         Behavior: Behavior normal.         Thought Content: Thought content normal.         Judgment: Judgment normal.         Fluids    Intake/Output Summary (Last 24 hours) at 6/4/2020 1234  Last data filed at 6/4/2020 1200  Gross per 24 hour   Intake 3668 ml   Output 800 ml   Net 2868 ml       Laboratory  Recent Labs     06/01/20  1620 06/02/20  0343 06/04/20  0420   WBC 12.0* 13.0* 7.0   RBC 3.11* 3.11* 3.17*   HEMOGLOBIN 8.4* 8.4* 8.5*   HEMATOCRIT 26.6* 26.8* 27.8*   MCV 85.5 86.2 87.7   MCH 27.0 27.0 26.8*   MCHC 31.6* 31.3* 30.6*   RDW 47.4 47.1 49.3   PLATELETCT 135* 141* 127*   MPV 11.2 12.0 11.6     Recent Labs     06/03/20  0100 06/03/20  0505 06/04/20  0420   SODIUM 134* 128* 135   POTASSIUM 4.6 4.4 4.5   CHLORIDE 104 100 108   CO2 16* 15* 15*   GLUCOSE 120* 103* 79   BUN 53* 53* 54*   CREATININE 4.11* 4.14* 3.77*   CALCIUM 7.5* 7.8* 8.4*               Imaging  reviewed    Assessment/Plan  1.RAF due to volume depletion - improving, good UOP, continue IVF -to monitor  2.HTN; BP well controlled  3.Electrolytes: hyponatremia: improved -to montior  4.Anemia:low Hb level stable  5.metabolic acidosis -continue bicarb gtt  6.Volume:hypovolemic -continue  IVF    Recs: no need for emergent dialysis             Daily labs             Continue IVF             Avoid nephrotoxic agents             All meds to renal doses             Will follow

## 2020-06-04 NOTE — CONSULTS
Consults   INFECTIOUS DISEASES INPATIENT CONSULT NOTE     Date of Service: 6/4/2020    Consult Requested By: Luis Neville M.D.    Reason for Consultation: MRSA bacteremia     History of Present Illness:   Savita Mcclain is a 60 y.o.  admitted 6/1/2020. Pt has a past medical history of RA on chronic prednisone 10 mg daily, DMT2 , CKD stage III.  She reports abdominal pain on and off again since December and also history of frequent UTIs.  She is most recently treated with amoxicillin but been off antibiotics for approximately 1 week prior to her admission. She came into the ER at [facility] complaining of epigastric abdominal pain described as burning as well as nausea vomiting and diarrhea x4 days.  She described the diarrhea as watery stools every 30 minutes.  Notes she was hypotensive, requiring oxygen support with lab irregularities including leukocytosis to 14.5, hyponatremia, elevated creatinine, elevated glucose and elevated lactic acid.  She was started on Unasyn and Levophed and transferred to [facility] for higher level of care.  The patient has hardware - pins in her right foot, a plate in her left ankle and replaced knuckle in her right hand    Hospital Course:   She has been afebrile, initially hypotensive and now improved.  Leukocytosis to 19.7 on admit.  Acute kidney injury on arrival.    Stool cultures were obtained which are pending in part but negative for Shiga toxin 1 and 2.  C. difficile was tested on 6/1 and 6/2 and negative on both.  Blood cultures were obtained which are positive for MRSA.  She was started on vancomycin Flagyl and ceftriaxone.  Flagyl is been stopped and she was  continued on ceftriaxone and vancomycin.     Today she reports ongoing abdominal pain and watery diarrhea.  Her nausea and vomiting have improved.  She denies any new headaches, neck, back or joint pain.       Review Of Systems:  Review of Systems   Constitutional: Positive for malaise/fatigue. Negative for chills  and fever.   HENT: Negative for hearing loss.    Eyes: Negative for blurred vision and double vision.   Respiratory: Positive for cough and shortness of breath. Negative for sputum production.    Cardiovascular: Negative for chest pain and leg swelling.   Gastrointestinal: Positive for abdominal pain and diarrhea. Negative for nausea and vomiting.   Genitourinary: Positive for dysuria and hematuria.   Musculoskeletal: Positive for back pain. Negative for joint pain, myalgias and neck pain.   Skin: Negative for rash.   Neurological: Negative for focal weakness.   Psychiatric/Behavioral: The patient is not nervous/anxious.          PMH:   Past Medical History:   Diagnosis Date   • Diabetes    • Psychiatric disorder     depression   • Rheumatoid arthritis(714.0)        PSH:  Past Surgical History:   Procedure Laterality Date   • OTHER ORTHOPEDIC SURGERY      amputation of left toes       FAMILY HX:  No pertinent family history    SOCIAL HX:  Social History     Socioeconomic History   • Marital status:      Spouse name: Not on file   • Number of children: Not on file   • Years of education: Not on file   • Highest education level: Not on file   Occupational History   • Not on file   Social Needs   • Financial resource strain: Not on file   • Food insecurity     Worry: Not on file     Inability: Not on file   • Transportation needs     Medical: Not on file     Non-medical: Not on file   Tobacco Use   • Smoking status: Never Smoker   • Smokeless tobacco: Never Used   Substance and Sexual Activity   • Alcohol use: Yes   • Drug use: Never   • Sexual activity: Not on file   Lifestyle   • Physical activity     Days per week: Not on file     Minutes per session: Not on file   • Stress: Not on file   Relationships   • Social connections     Talks on phone: Not on file     Gets together: Not on file     Attends Zoroastrian service: Not on file     Active member of club or organization: Not on file     Attends meetings of  clubs or organizations: Not on file     Relationship status: Not on file   • Intimate partner violence     Fear of current or ex partner: Not on file     Emotionally abused: Not on file     Physically abused: Not on file     Forced sexual activity: Not on file   Other Topics Concern   • Not on file   Social History Narrative   • Not on file     Social History     Tobacco Use   Smoking Status Never Smoker   Smokeless Tobacco Never Used     Social History     Substance and Sexual Activity   Alcohol Use Yes       Allergies/Intolerances:  Allergies   Allergen Reactions   • Cephalexin Unspecified and Vomiting     n/v  n/v         History reviewed with the patient     Other Current Medications:    Current Facility-Administered Medications:   •  insulin glargine (LANTUS) injection 10 Units, 10 Units, Subcutaneous, QAM INSULIN, Enrrique Ba M.D., Stopped at 06/04/20 0600  •  insulin regular (HUMULIN R) injection 2-9 Units, 2-9 Units, Subcutaneous, 4X/DAY ACHS, Stopped at 06/03/20 2100 **AND** POC Blood Glucose, , , Q AC AND BEDTIME(S) **AND** NOTIFY MD and PharmD, , , Once **AND** glucose 4 g chewable tablet 16 g, 16 g, Oral, Q15 MIN PRN **AND** dextrose 50% (D50W) injection 50 mL, 50 mL, Intravenous, Q15 MIN PRN, Enrrique Ba M.D.  •  lactated ringers infusion, , Intravenous, Continuous, Enrrique Ba M.D., Last Rate: 83 mL/hr at 06/03/20 0950  •  cefTRIAXone (ROCEPHIN) 1 g in  mL IVPB, 1 g, Intravenous, Q24HRS, Enrrique Ba M.D., Stopped at 06/04/20 0618  •  predniSONE (DELTASONE) tablet 10 mg, 10 mg, Oral, DAILY, Luis Neville M.D., 10 mg at 06/04/20 0548  •  heparin injection 5,000 Units, 5,000 Units, Subcutaneous, Q8HRS, Enrrique Ba M.D., 5,000 Units at 06/04/20 0547  •  MD Alert...Vancomycin per Pharmacy, , Other, PHARMACY TO DOSE, Enrrique Ba M.D.  •  oxyCODONE immediate-release (ROXICODONE) tablet 5 mg, 5 mg, Oral, Q6HRS PRN, Enrrique Ba M.D., 5 mg at 06/04/20 0414  •  loperamide  "(IMODIUM) capsule 2 mg, 2 mg, Oral, 4X/DAY PRN, Enrrique Ba M.D., 2 mg at 20 1742  •  omeprazole (PRILOSEC) capsule 20 mg, 20 mg, Oral, DAILY, Enrrique Ba M.D., 20 mg at 20 0548  •  [DISCONTINUED] magnesium sulfate IVPB premix 2 g, 2 g, Intravenous, Once PRN **OR** magnesium sulfate IVPB premix 4 g, 4 g, Intravenous, Once PRN, Enrrique Ba M.D.  •  potassium phosphates 30 mmol in  mL ivpb, 30 mmol, Intravenous, Once PRN **OR** sodium phosphate 30 mmol in 1/2  mL ivpb, 30 mmol, Intravenous, Once PRN, Enrrique Ba M.D.  •  acetaminophen (TYLENOL) tablet 650 mg, 650 mg, Oral, Q6HRS PRN, Oni Ruiz M.D., 650 mg at 20  •  ondansetron (ZOFRAN) syringe/vial injection 4 mg, 4 mg, Intravenous, Q4HRS PRN, Oni Ruiz M.D., 4 mg at 20  •  ondansetron (ZOFRAN ODT) dispertab 4 mg, 4 mg, Oral, Q4HRS PRN, Oni Ruiz M.D.  •  promethazine (PHENERGAN) tablet 12.5-25 mg, 12.5-25 mg, Oral, Q4HRS PRN, Oni Ruiz M.D.  •  promethazine (PHENERGAN) suppository 12.5-25 mg, 12.5-25 mg, Rectal, Q4HRS PRN, Oni Ruiz M.D.  •  prochlorperazine (COMPAZINE) injection 5-10 mg, 5-10 mg, Intravenous, Q4HRS PRN, Oni Ruiz M.D.  •  latanoprost (XALATAN) 0.005 % ophthalmic solution 1 Drop, 1 Drop, Both Eyes, QHS, Oni Ruiz M.D., 1 Drop at 20  •  timolol (TIMOPTIC) 0.5 % ophthalmic solution 1 Drop, 1 Drop, Both Eyes, DAILY, Oni Ruiz M.D., 1 Drop at 20 0548  •  albuterol inhaler 2 Puff, 2 Puff, Inhalation, Q4HRS PRN, Oni Ruiz M.D.  [unfilled]    Most Recent Vital Signs:  /77   Pulse 84   Temp 36.1 °C (96.9 °F) (Temporal)   Resp (!) 23   Ht 1.626 m (5' 4\") Comment: obtained from previous chart  Wt 66.4 kg (146 lb 6.2 oz)   SpO2 96%   BMI 25.13 kg/m²   Temp  Av.4 °C (97.5 °F)  Min: 35.9 °C (96.6 °F)  Max: 37.2 °C (99 °F)    Physical Exam:  Physical Exam   Constitutional: She is oriented to person, place, and time and " well-developed, well-nourished, and in no distress. No distress.   HENT:   Head: Normocephalic and atraumatic.   Right Ear: External ear normal.   Left Ear: External ear normal.   Nose: Nose normal.   Eyes: Pupils are equal, round, and reactive to light. Conjunctivae and EOM are normal.   Neck: Normal range of motion. Neck supple.   Cardiovascular: Normal rate, regular rhythm and normal heart sounds.   Pulmonary/Chest: Effort normal.   Faint crackles on right and decreased breath sounds in bases bilaterally   Abdominal: Soft. Bowel sounds are normal. She exhibits no distension. There is abdominal tenderness. There is no rebound and no guarding.   Musculoskeletal:         General: Deformity present. No edema.      Comments: Partial amputation of left foot.  No spinal point tenderness.    Neurological: She is alert and oriented to person, place, and time.   Skin: Skin is warm and dry. She is not diaphoretic.   Psychiatric: Affect and judgment normal.           Pertinent Lab Results:  Recent Labs     06/01/20  1620 06/02/20  0343 06/04/20  0420   WBC 12.0* 13.0* 7.0      Recent Labs     06/01/20  1108 06/01/20  1620 06/02/20  0343 06/04/20  0420   HEMOGLOBIN 9.1* 8.4* 8.4* 8.5*   HEMATOCRIT 28.5* 26.6* 26.8* 27.8*   MCV 84.1 85.5 86.2 87.7   MCH 26.8* 27.0 27.0 26.8*   MACROCYTOSIS  --  1+  --   --    ANISOCYTOSIS 1+ 1+ 1+  --    PLATELETCT 188 135* 141* 127*         Recent Labs     06/03/20  0100 06/03/20  0505 06/04/20  0420   SODIUM 134* 128* 135   POTASSIUM 4.6 4.4 4.5   CHLORIDE 104 100 108   CO2 16* 15* 15*   CREATININE 4.11* 4.14* 3.77*        Recent Labs     06/01/20  1108 06/01/20  1449 06/02/20  0343   ALBUMIN 2.7* 2.5* 2.4*        Pertinent Micro:  Results     Procedure Component Value Units Date/Time    BLOOD CULTURE [[id number]]  (Abnormal) Collected:  06/02/20 6020    Order Status:  Completed Specimen:  Blood from Peripheral Updated:  06/03/20 1841     Significant Indicator POS     Source BLD     Site  "PERIPHERAL     Culture Result Growth detected by Bactec instrument. 06/03/2020  18:41  Gram Stain: Gram positive cocci: Possible Staphylococcus sp.      Narrative:       Special Contact [id number] DAKSHA GRANT  Per Hospital Policy: Only change Specimen Src: to \"Line\" if  specified by physician order.  Left AC    BLOOD CULTURE [[id number]]  (Abnormal) Collected:  06/02/20 1542    Order Status:  Completed Specimen:  Blood from Peripheral Updated:  06/03/20 1733     Significant Indicator POS     Source BLD     Site PERIPHERAL     Culture Result Growth detected by Bactec instrument. 06/03/2020  17:30  Gram Stain: Gram positive cocci: Possible Staphylococcus sp.  Methicillin Resistant Staphylococcus aureus (MRSA)  detected by PCR.  Susceptibility to follow.      Narrative:       CALL  [facility] tel. [phone],  CALLED  [facility] tel. [phone] 06/03/2020, 17:33, RB PERF. RESULTS CALLED  TO:[phone] LM  Special Contact [id number] DAKSHA GRANT  Per Hospital Policy: Only change Specimen Src: to \"Line\" if  specified by physician order.  Right Wrist    EHEC(Siga Toxin)Detection [[id number]] Collected:  06/01/20 1440    Order Status:  Completed Specimen:  Stool Updated:  06/02/20 1620     Significant Indicator NEG     Source STL     Site STOOL     EHEC Negative for Shiga Toxin 1 and 2.    Narrative:       Special Contact Isolation  FENa = (UrineNa / SerumNA) / (UrineCr / SerumCr) *100  Special Contact Isolation  Is this test for diagnosis or screening?->Diagnosis of ill  patient  Special Contact Isolation    CULTURE STOOL [[id number]] Collected:  06/01/20 1440    Order Status:  Completed Specimen:  Stool Updated:  06/02/20 1619     Significant Indicator NEG     Source STL     Site STOOL     Culture Result Culture in progress.    NOTE:    Stool cultures are screened for Shiga Toxins 1 and 2,    Salmonella, Shigella, Campylobacter, Aeromonas,    Plesiomonas, and Vibrio.       EHEC Negative for Shiga Toxin 1 and 2.    Narrative: "       Special Contact Isolation  FENa = (UrineNa / SerumNA) / (UrineCr / SerumCr) *100  Special Contact Isolation  Is this test for diagnosis or screening?->Diagnosis of ill  patient  Special Contact Isolation    C Diff by PCR rflx Toxin [[id number]] Collected:  06/02/20 0739    Order Status:  Completed Specimen:  Stool Updated:  06/02/20 1507     C Diff by PCR Negative     Comment: C. difficile NOT detected by PCR.  Treatment not indicated per guidelines.  Repeat testing not indicated within 7 days.          027-NAP1-BI Presumptive Negative     Comment: Presumptive 027/NAP1/BI target DNA sequences are NOT DETECTED.       Narrative:       Special Contact [id number] DAKSHA GRANT  Does this patient have risk factors for C-diff?->Yes  C-Diff Risk Factors->a serious underlying illness    C Diff by PCR rflx Toxin [[id number]] Collected:  06/01/20 1440    Order Status:  Completed Specimen:  Stool Updated:  06/01/20 2240     C Diff by PCR Negative     Comment: C. difficile NOT detected by PCR.  Treatment not indicated per guidelines.  Repeat testing not indicated within 7 days.          027-NAP1-BI Presumptive Negative     Comment: Presumptive 027/NAP1/BI target DNA sequences are NOT DETECTED.       Narrative:       Special Contact Isolation  Does this patient have risk factors for C-diff?->Yes  C-Diff Risk Factors->antibiotic exposure  Has patient taken stool softeners or laxatives in the last 5  days?->No    URINE CULTURE(NEW) [[id number]] Collected:  06/01/20 1440    Order Status:  Completed Updated:  06/01/20 1541    Narrative:       Special Contact Isolation  Does this patient have risk factors for C-diff?->Yes  C-Diff Risk Factors->antibiotic exposure  Has patient taken stool softeners or laxatives in the last 5  days?->No    URINALYSIS CULTURE, IF INDICATED [[id number]]  (Abnormal) Collected:  06/01/20 1220    Order Status:  Completed Specimen:  Urine Updated:  06/01/20 1412     Color Yellow     Character Clear      Specific Gravity 1.025     Ph 5.0     Glucose Negative mg/dL      Ketones Negative mg/dL      Protein 100 mg/dL      Bilirubin Negative     Urobilinogen, Urine 0.2     Nitrite Negative     Leukocyte Esterase Small     Occult Blood Large     Micro Urine Req Microscopic    Narrative:       Special Contact Isolation  Does this patient have risk factors for C-diff?->Yes  C-Diff Risk Factors->antibiotic exposure  Has patient taken stool softeners or laxatives in the last 5  days?->No    SARS-CoV-2, PCR (In-House) [[id number]] Collected:  06/01/20 1240    Order Status:  Completed Updated:  06/01/20 1357     SARS-CoV-2 Source NP Swab     SARS-CoV-2 by PCR NotDetected     Comment: [facility] providers: PLEASE REFER TO DE-ESCALATION AND RETESTING PROTOCOL  on [email or web address]  **The [email or web address] GeneXpert Xpress SARS-CoV-2 Test has been made available for  use under the Emergency Use Authorization (EUA) only.         Narrative:       Special Contact Isolation  FENa = (UrineNa / SerumNA) / (UrineCr / SerumCr) *100  Special Contact Isolation  Is this test for diagnosis or screening?->Diagnosis of ill  patient    COVID/SARS CoV-2 [[id number]] Collected:  06/01/20 1240    Order Status:  Completed Specimen:  Respirate from Nasopharyngeal Updated:  06/01/20 1255     COVID Order Status Received    Narrative:       Special Contact Isolation  FENa = (UrineNa / SerumNA) / (UrineCr / SerumCr) *100  Special Contact Isolation  Is this test for diagnosis or screening?->Diagnosis of ill  patient    Blood Culture,Hold [[id number]] Collected:  06/01/20 1108    Order Status:  Completed Updated:  06/01/20 1200     Blood Culture Hold Collected        Blood Culture Hold   Date Value Ref Range Status   06/01/2020 Collected  Final        Studies:  Ct-chest,abdomen,pelvis W/o    Result Date: 6/1/2020 6/1/2020 11:18 AM HISTORY/REASON FOR EXAM:  Sepsis Hypotension, renal injury, nausea vomiting diarrhea TECHNIQUE/EXAM DESCRIPTION: CT scan of the chest, abdomen  and pelvis without contrast was performed. Noncontrast helical scanning of the chest, abdomen and pelvis was obtained from the lung apices through the pubic symphysis. Low dose optimization technique was utilized for this CT exam including automated exposure control and adjustment of the mA and/or kV according to patient size. COMPARISON:  None. FINDINGS: CHEST: Neck Base:  Normal. Lungs/Pleura: Right lower lobe bronchiectasis with peribronchial opacities which likely is atelectasis or scarring. Correlate clinically for infection. Mild dependent changes/atelectasis in the left lower lobe.  3 mm groundglass nodule in the right midlung on image 58 series 301. Calcified granuloma left upper lobe. No pneumothorax or pleural effusion. Cardiovascular Structures:  Heart size is normal. No pericardial effusion. Coronary artery stents and/or prominent calcification. Aorta is normal in caliber without aneurysm or dissection. Central pulmonary arteries are normal in caliber. Mediastinum/ lymph nodes: No lymphadenopathy. Small hiatal hernia. ABDOMEN/PELVIS Liver:  Diffuse hypoattenuation of the liver suggesting fatty infiltration. Liver is enlarged. Gallbladder: Normal Biliary tract: Nondilated. Pancreas: Normal. Spleen: Normal. Adrenals: Normal. Kidneys and Collecting Systems:  Bilateral perinephric fat stranding, asymmetrically more prominent on the right. This is concerning for infection given the history. Correlate clinically and with urinalysis. No significant hydronephrosis. No obstructing stone is seen. Gastrointestinal tract:  Possible rectosigmoid wall thickening, suboptimally evaluated without contrast and due to decompression. The appendix is normal. Peritoneum: No free air or free fluid. Reproductive organs:  Normal. Bladder:  Normal. Vessels:  Moderately dense calcified plaque. Lymph  Nodes:  No lymphadenopathy. Soft tissues/wall: Within normal limits. Bones:  No acute or aggressive abnormality. Ossific density  adjacent to the inferior right scapula probably related to old trauma.     1.  Asymmetric right perinephric fat stranding suspicious for infection/pyelonephritis. Correlate with urinalysis. No significant hydronephrosis. No obstructing stone is seen. 2.  Hepatomegaly and hepatic steatosis. 3.  Rectosigmoid colon is decompressed, limiting evaluation for wall thickening. Correlate for evidence of colitis. 4.  Right lower lobe bronchiectasis and peribronchial opacities likely postinflammatory with atelectasis/scarring. Correlate clinically for evidence of infection. 5.  Atherosclerosis.       ASSESSMENT/PLAN:     60 y.o.  admitted 6/1/2020. Pt has a past medical history of RA on chronic prednisone 10 mg daily, DMT2 , CKD stage III.  She reports abdominal pain on and off again since December and also history of frequent UTIs.  She is most recently treated with amoxicillin but been off antibiotics for approximately 1 week prior to her admission. Presented to ER at [facility] c/o epigastric abdominal pain described as burning as well as nausea vomiting and diarrhea x4 days.  She was hypotensive, requiring oxygen support with lab irregularities including leukocytosis to 14.5, hyponatremia, elevated creatinine, elevated glucose and elevated lactic acid.  She was started on Unasyn and Levophed and transferred to [facility] for higher level of care.  The patient has hardware - pins in her right foot, a plate in her left ankle and replaced knuckle in her right hand    Hospital Course:   She has been afebrile, initially hypotensive and now improved.  Leukocytosis to 19.7 on admit.  Acute kidney injury on arrival.  Stool cultures were obtained which are pending in part but negative for Shiga toxin 1 and 2.  C. difficile was tested on 6/1 and 6/2 and negative on both.  Blood cultures were obtained which are positive for MRSA.  She was started on vancomycin Flagyl and ceftriaxone.  Flagyl is been stopped and she was  continued on  ceftriaxone and vancomycin.     Problem List     Septic and hypovolemic shock, secondary to MRSA bacteremia and profuse diarrhea - improved   MRSA bacteremia  -Blood cultures from [facility] 6/1 are 2/2+ for MRSA per microbiology lab there, no urine culture was performed  -Blood cultures on 6/2 2/2+ for MRSA  -TTE pending  Leukocytosis-improved  UTI/Pyelonephritis-she endorses dysuria with pain, burning and hematuria prior to admission  -UA suggestive of infection, no urine culture  -CT on 6/1 with right-sided fat stranding suspicious for infection/Pyelo   Abdominal pain, epigastric- ongoing   Nausea and vomiting  Diarrhea  -C. difficile tested negative x2  -Stool cultures pending, Shiga toxin 1 and 2  Acute on chronic kidney disease -improving   Bronchiectasis, noted on CT in right lower lobe as well as atelectasis, calcified granuloma in left upper lobe groundglass nodule on right  Diabetes, uncontrolled   Diabetic ketoacidosis on presentation- improving   Rheumatoid arthritis  Immunosuppression,  secondary to above on chronic prednisone 10 mg daily  Thrombocytopenia-ongoing, appears to be chronic  Anemia-ongoing  Antibiotic allergies: Cephalexin reported as vomiting    Plan     --- Continue vancomycin  --- Will stop ceftriaxone as no clear indication and unlikely she has 2 separate infections   --- We will start rifampin due to hardware. HOWEVER, she is also on chronic steroids and this reaction could potentially cause some decrease in steroid.  Monitor clinically.    --- Agree with TTE-pending  --- Continue to monitor for any new neck joint or back pain image appropriately  --- Monitor labs, particularly platelets in the setting of vancomycin and LFTs as starting rifampin  --- If she spikes any fever please obtain a blood culture and also urine culture        Plan of care discussed with ALCON Neville M.D.. Will continue to follow    Kaci Xavier M.D.

## 2020-06-04 NOTE — PROGRESS NOTES
Hospital Medicine Daily Progress Note    Date of Service  6/4/2020    Chief Complaint  60 y.o. female admitted 6/1/2020 with diarrhea.    Hospital Course   Ms. Mcclain is a 60 y.o. female who presented 6/1/2020 with past medical history of rheumatoid arthritis on chronic prednisone therapy, type 2 diabetes on metformin, CKD stage III who comes into the emergency room with complaints of nausea, vomiting and diarrhea for the past 4 days.  Associated with an epigastric abdominal pain.  The pain radiates to the lower abdomen and feels like a burning sensation.  The pain is nonexertional, non-positional.  Patient states that she has not gone to the bathroom to urinate in 4 days.  She would have a bowel movement every 30 minutes.  She denies any blood in her stools.  Patient denies any fever, cough, shortness of breath, chest pain.   Patient came from outlying facility from [facility] includes  Blood pressure 86/50, respiratory rate 20, saturating 90% on 2 L oxygen, temperature 98.4  Sodium 129, potassium 3.6, chloride 89, CO2 21, AST 46, ALT 30, BUN 67, glucose 399, creatinine 6.4, calcium 8, bili 1.5, anion gap 22, lactic acid 2.5, lipase 163, , CK-MB 1.1, troponin 0.062, WBC 14.5, hemoglobin 10.9, platelets 175, d-dimer 3 8378, TSH 8.67  Patient was started on Unasyn and Levophed with ICU admission        Interval Problem Update  6/3: patient seen and evaluated in the ICU. The lab from [facility] faxed over a preliminary + blood culture revealing gram + cocci in clusters. She has been started on broad spectrum antibiotics. She transitioned off the insulin drip to sliding scale.   6/4: Ms. Mcclain was evaluated and examined in the ICU. Her blood cultures have come back + for MRSA thus she has been placed under precautions. I have consulted Dr. Xavier, ID. She remains on IV fluids and her Cr is down to 3.7. glucose went down to 57 this morning.   Consultants/Specialty  Critical care.   Nephrology   Infectious  disease.   Code Status  Full     Disposition  med    Review of Systems  Review of Systems   Constitutional: Negative for chills and fever.        She feels generally weak   Respiratory: Negative for cough, shortness of breath and wheezing.    Cardiovascular: Negative for chest pain and leg swelling.   Gastrointestinal: Positive for abdominal pain and diarrhea. Negative for nausea and vomiting.   All other systems reviewed and are negative.       Physical Exam  Temp:  [35.9 °C (96.6 °F)-36.5 °C (97.7 °F)] 36.1 °C (96.9 °F)  Pulse:  [72-84] 84  Resp:  [16-23] 23  BP: (123-160)/() 132/77  SpO2:  [96 %-99 %] 96 %    Physical Exam  Vitals signs and nursing note reviewed.   Constitutional:       Appearance: She is not toxic-appearing.      Comments: thin   HENT:      Mouth/Throat:      Mouth: Mucous membranes are dry.      Pharynx: Oropharynx is clear.   Eyes:      General: No scleral icterus.     Conjunctiva/sclera: Conjunctivae normal.   Neck:      Musculoskeletal: Normal range of motion and neck supple.   Cardiovascular:      Rate and Rhythm: Normal rate and regular rhythm.      Heart sounds: No murmur.   Pulmonary:      Effort: Pulmonary effort is normal.      Breath sounds: No wheezing or rales.   Abdominal:      General: Bowel sounds are normal. There is no distension.      Palpations: Abdomen is soft.      Tenderness: There is abdominal tenderness.      Comments: Mild diffuse tenderness without rebound   Musculoskeletal:         General: No swelling.      Right lower leg: No edema.      Left lower leg: No edema.   Skin:     General: Skin is dry.      Coloration: Skin is pale.   Neurological:      General: No focal deficit present.      Mental Status: She is alert and oriented to person, place, and time.   Psychiatric:      Comments: Flat affect, cooperative with exam         Fluids    Intake/Output Summary (Last 24 hours) at 6/4/2020 0721  Last data filed at 6/4/2020 0600  Gross per 24 hour   Intake 4397.33  ml   Output 800 ml   Net 3597.33 ml       Laboratory  Recent Labs     06/01/20  1620 06/02/20  0343 06/04/20  0420   WBC 12.0* 13.0* 7.0   RBC 3.11* 3.11* 3.17*   HEMOGLOBIN 8.4* 8.4* 8.5*   HEMATOCRIT 26.6* 26.8* 27.8*   MCV 85.5 86.2 87.7   MCH 27.0 27.0 26.8*   MCHC 31.6* 31.3* 30.6*   RDW 47.4 47.1 49.3   PLATELETCT 135* 141* 127*   MPV 11.2 12.0 11.6     Recent Labs     06/03/20  0100 06/03/20  0505 06/04/20  0420   SODIUM 134* 128* 135   POTASSIUM 4.6 4.4 4.5   CHLORIDE 104 100 108   CO2 16* 15* 15*   GLUCOSE 120* 103* 79   BUN 53* 53* 54*   CREATININE 4.11* 4.14* 3.77*   CALCIUM 7.5* 7.8* 8.4*                   Imaging  CT-CHEST,ABDOMEN,PELVIS W/O   Final Result         1.  Asymmetric right perinephric fat stranding suspicious for infection/pyelonephritis. Correlate with urinalysis. No significant hydronephrosis. No obstructing stone is seen.   2.  Hepatomegaly and hepatic steatosis.   3.  Rectosigmoid colon is decompressed, limiting evaluation for wall thickening. Correlate for evidence of colitis.   4.  Right lower lobe bronchiectasis and peribronchial opacities likely postinflammatory with atelectasis/scarring. Correlate clinically for evidence of infection.   5.  Atherosclerosis.               EC-ECHOCARDIOGRAM COMPLETE W/O CONT    (Results Pending)        Assessment/Plan  * Staphylococcus aureus bacteremia- (present on admission)  Assessment & Plan  Blood cultures + for Staph Aureus bacteremia  IV vancomycin  Echocardiogram ordered to evaluate for vegetations.  Infectious disease consultation.    Diabetic ketoacidosis without coma associated with type 2 diabetes mellitus (HCC)- (present on admission)  Assessment & Plan  With renal failure, elevated but hydroxybutyrate  She was admitted to the ICU with an insulin drip and IV fluids  Patient has been started on IV fluids and IV insulin per DKA protocol  She has been transitioned to long-acting glargine and sliding scale subcutaneous insulin on 6/3--her  glucose went down to 57 thus glargine 10 units stopped on 6/4  Hemoglobin A1c ordered and remains pending  Diabetes education ordered  She had been on metformin at home      Acute renal failure with tubular necrosis (HCC)- (present on admission)  Assessment & Plan  Likely prerenal-combination of sepsis, dehydration from diarrhea  IV fluids given  Cr 5.5 on admit and remains high at 3.7  Nephrology consulted   Follow urine output.  Check a BMP in the morning      Metabolic acidosis- (present on admission)  Assessment & Plan  Secondary to DKA and renal failure  Sodium bicarb started by nephrology    Adrenal insufficiency (HCC)- (present on admission)  Assessment & Plan  Patient is on chronic prednisone therapy for rheumatoid arthritis.  She presents with shock.  She is alert and oriented.  IV hydrocortisone has been ordered and will transition back to home dose of prednisone 10 mg daily.    Diarrhea- (present on admission)  Assessment & Plan  C diff negative    Septic shock (HCC)- (present on admission)  Assessment & Plan  This is Septic shock Present on admission  SIRS criteria identified on my evaluation include: Tachycardia, with heart rate greater than 90 BPM and Leukocytosis, with WBC greater than 12,000  Source is pyelonephritis, possible C. difficile  Presentation includes: Severe sepsis present and persistent hypotension after 30 ml/kg completed.   Despite appropriate fluid resuscitation with crystalloid given per sepsis guidelines, the patient remained hypotensive and required IV levophed as needed to maintain a SBP of 90 or MAP of 65        Hyponatremia- (present on admission)  Assessment & Plan  Secondary to hyperglycemia       Bronchiectasis (HCC)- (present on admission)  Assessment & Plan  Chest CT findings - with no complains of cough or dyspnea   Patient had a previous pneumonia  Albuterol prn    Acute pyelonephritis- (present on admission)  Assessment & Plan  Patient states that she stopped producing  urine 4 days ago, there is no sign obstruction on CT scan, she does have evidence of hematuria on urinalysis  CT scan shows evidence of pyelonephritis without evidence of abscess  One BC is + from [facility] for possible Staph thus IV rocephin and vancomycin             VTE prophylaxis: heparin

## 2020-06-04 NOTE — ASSESSMENT & PLAN NOTE
Blood cultures + for Staph Aureus bacteremia prior to transfer and again on 6/2  Repeat blood cultures on 6/6  Source is presumably pyelonephritis  IV Daptomycin and rifampin  VERONICA on 6/5 which was negative for vegetations  Infectious disease consulted  Management as above.

## 2020-06-05 ENCOUNTER — APPOINTMENT (OUTPATIENT)
Dept: CARDIOLOGY | Facility: MEDICAL CENTER | Age: 60
DRG: 871 | End: 2020-06-05
Attending: NURSE PRACTITIONER
Payer: MEDICARE

## 2020-06-05 PROBLEM — I27.20 PULMONARY HYPERTENSION (HCC): Status: ACTIVE | Noted: 2020-06-05

## 2020-06-05 LAB
ANION GAP SERPL CALC-SCNC: 10 MMOL/L (ref 7–16)
BACTERIA BLD CULT: ABNORMAL
BUN SERPL-MCNC: 51 MG/DL (ref 8–22)
CALCIUM SERPL-MCNC: 8.8 MG/DL (ref 8.5–10.5)
CHLORIDE SERPL-SCNC: 103 MMOL/L (ref 96–112)
CO2 SERPL-SCNC: 17 MMOL/L (ref 20–33)
CREAT SERPL-MCNC: 3.43 MG/DL (ref 0.5–1.4)
GLUCOSE BLD-MCNC: 104 MG/DL (ref 65–99)
GLUCOSE BLD-MCNC: 128 MG/DL (ref 65–99)
GLUCOSE BLD-MCNC: 85 MG/DL (ref 65–99)
GLUCOSE SERPL-MCNC: 83 MG/DL (ref 65–99)
POTASSIUM SERPL-SCNC: 3.9 MMOL/L (ref 3.6–5.5)
SIGNIFICANT IND 70042: ABNORMAL
SIGNIFICANT IND 70042: ABNORMAL
SITE SITE: ABNORMAL
SITE SITE: ABNORMAL
SODIUM SERPL-SCNC: 130 MMOL/L (ref 135–145)
SOURCE SOURCE: ABNORMAL
SOURCE SOURCE: ABNORMAL

## 2020-06-05 PROCEDURE — 700111 HCHG RX REV CODE 636 W/ 250 OVERRIDE (IP): Performed by: HOSPITALIST

## 2020-06-05 PROCEDURE — A9270 NON-COVERED ITEM OR SERVICE: HCPCS | Performed by: HOSPITALIST

## 2020-06-05 PROCEDURE — 99233 SBSQ HOSP IP/OBS HIGH 50: CPT | Performed by: INTERNAL MEDICINE

## 2020-06-05 PROCEDURE — 82962 GLUCOSE BLOOD TEST: CPT | Mod: 91

## 2020-06-05 PROCEDURE — 700111 HCHG RX REV CODE 636 W/ 250 OVERRIDE (IP): Performed by: INTERNAL MEDICINE

## 2020-06-05 PROCEDURE — 99156 MOD SED OTH PHYS/QHP 5/>YRS: CPT | Performed by: PSYCHIATRY & NEUROLOGY

## 2020-06-05 PROCEDURE — B246ZZ4 ULTRASONOGRAPHY OF RIGHT AND LEFT HEART, TRANSESOPHAGEAL: ICD-10-PCS | Performed by: INTERNAL MEDICINE

## 2020-06-05 PROCEDURE — 80048 BASIC METABOLIC PNL TOTAL CA: CPT

## 2020-06-05 PROCEDURE — 770021 HCHG ROOM/CARE - ISO PRIVATE

## 2020-06-05 PROCEDURE — 700102 HCHG RX REV CODE 250 W/ 637 OVERRIDE(OP): Performed by: HOSPITALIST

## 2020-06-05 PROCEDURE — 93312 ECHO TRANSESOPHAGEAL: CPT

## 2020-06-05 PROCEDURE — 700105 HCHG RX REV CODE 258: Performed by: INTERNAL MEDICINE

## 2020-06-05 PROCEDURE — A9270 NON-COVERED ITEM OR SERVICE: HCPCS | Performed by: INTERNAL MEDICINE

## 2020-06-05 PROCEDURE — 99233 SBSQ HOSP IP/OBS HIGH 50: CPT | Performed by: HOSPITALIST

## 2020-06-05 PROCEDURE — 700102 HCHG RX REV CODE 250 W/ 637 OVERRIDE(OP): Performed by: INTERNAL MEDICINE

## 2020-06-05 PROCEDURE — 700102 HCHG RX REV CODE 250 W/ 637 OVERRIDE(OP): Performed by: PSYCHIATRY & NEUROLOGY

## 2020-06-05 PROCEDURE — 93312 ECHO TRANSESOPHAGEAL: CPT | Mod: 26 | Performed by: INTERNAL MEDICINE

## 2020-06-05 PROCEDURE — A9270 NON-COVERED ITEM OR SERVICE: HCPCS | Performed by: PSYCHIATRY & NEUROLOGY

## 2020-06-05 PROCEDURE — 700111 HCHG RX REV CODE 636 W/ 250 OVERRIDE (IP): Performed by: PSYCHIATRY & NEUROLOGY

## 2020-06-05 RX ORDER — PROPOFOL 10 MG/ML
200 INJECTION, EMULSION INTRAVENOUS ONCE
Status: COMPLETED | OUTPATIENT
Start: 2020-06-05 | End: 2020-06-05

## 2020-06-05 RX ADMIN — RIFAMPIN 300 MG: 300 CAPSULE ORAL at 05:24

## 2020-06-05 RX ADMIN — OXYCODONE 5 MG: 5 TABLET ORAL at 05:24

## 2020-06-05 RX ADMIN — PREDNISONE 10 MG: 10 TABLET ORAL at 05:24

## 2020-06-05 RX ADMIN — ACETAMINOPHEN 650 MG: 325 TABLET, FILM COATED ORAL at 21:09

## 2020-06-05 RX ADMIN — ACETAMINOPHEN 650 MG: 325 TABLET, FILM COATED ORAL at 05:24

## 2020-06-05 RX ADMIN — RIFAMPIN 300 MG: 300 CAPSULE ORAL at 17:55

## 2020-06-05 RX ADMIN — OMEPRAZOLE 20 MG: 20 CAPSULE, DELAYED RELEASE ORAL at 05:24

## 2020-06-05 RX ADMIN — DAPTOMYCIN 530 MG: 350 INJECTION, POWDER, LYOPHILIZED, FOR SOLUTION INTRAVENOUS at 10:38

## 2020-06-05 RX ADMIN — OXYCODONE 5 MG: 5 TABLET ORAL at 11:38

## 2020-06-05 RX ADMIN — ONDANSETRON HYDROCHLORIDE 4 MG: 2 SOLUTION INTRAMUSCULAR; INTRAVENOUS at 14:52

## 2020-06-05 RX ADMIN — LATANOPROST 1 DROP: 50 SOLUTION OPHTHALMIC at 21:09

## 2020-06-05 RX ADMIN — HEPARIN SODIUM 5000 UNITS: 5000 INJECTION, SOLUTION INTRAVENOUS; SUBCUTANEOUS at 05:24

## 2020-06-05 RX ADMIN — PROPOFOL 110 MG: 10 INJECTION, EMULSION INTRAVENOUS at 15:50

## 2020-06-05 RX ADMIN — HEPARIN SODIUM 5000 UNITS: 5000 INJECTION, SOLUTION INTRAVENOUS; SUBCUTANEOUS at 21:09

## 2020-06-05 RX ADMIN — OXYCODONE 5 MG: 5 TABLET ORAL at 17:55

## 2020-06-05 RX ADMIN — ACETAMINOPHEN 650 MG: 325 TABLET, FILM COATED ORAL at 11:38

## 2020-06-05 RX ADMIN — HEPARIN SODIUM 5000 UNITS: 5000 INJECTION, SOLUTION INTRAVENOUS; SUBCUTANEOUS at 14:52

## 2020-06-05 ASSESSMENT — ENCOUNTER SYMPTOMS
FEVER: 0
EYES NEGATIVE: 1
DIARRHEA: 1
CONSTIPATION: 0
WHEEZING: 0
SHORTNESS OF BREATH: 0
CHILLS: 0
BACK PAIN: 1
FLANK PAIN: 0
WEIGHT LOSS: 0
ABDOMINAL PAIN: 0
COUGH: 0
HEMOPTYSIS: 0
ABDOMINAL PAIN: 1
PALPITATIONS: 0
ORTHOPNEA: 0
NAUSEA: 0
VOMITING: 0
SINUS PAIN: 0
MYALGIAS: 1
DIARRHEA: 0

## 2020-06-05 NOTE — ASSESSMENT & PLAN NOTE
Echocardiogram reveals a RVSP 53 mm Hg thus she is at risk of volume overload  Judicious diuresis considering acute kidney injury.

## 2020-06-05 NOTE — CARE PLAN
Bed in low and locked position. All alarm set appropriately, on and running.     Turn q2h to prevent skin breakdown.  Skin assessed.      Will continue to monitor pain throughout this shift. PRN meds to be given if needed

## 2020-06-05 NOTE — PROGRESS NOTES
Hospital Medicine Daily Progress Note    Date of Service  6/5/2020    Chief Complaint  60 y.o. female admitted 6/1/2020 with diarrhea.    Hospital Course   Ms. Mcclain is a 60 y.o. female who presented 6/1/2020 with past medical history of rheumatoid arthritis on chronic prednisone therapy, type 2 diabetes on metformin, CKD stage III who comes into the emergency room with complaints of nausea, vomiting and diarrhea for the past 4 days.  Associated with an epigastric abdominal pain.  The pain radiates to the lower abdomen and feels like a burning sensation.  The pain is nonexertional, non-positional.  Patient states that she has not gone to the bathroom to urinate in 4 days.  She would have a bowel movement every 30 minutes.  She denies any blood in her stools.  Patient denies any fever, cough, shortness of breath, chest pain.   Patient came from outlying facility from [facility] includes  Blood pressure 86/50, respiratory rate 20, saturating 90% on 2 L oxygen, temperature 98.4  Sodium 129, potassium 3.6, chloride 89, CO2 21, AST 46, ALT 30, BUN 67, glucose 399, creatinine 6.4, calcium 8, bili 1.5, anion gap 22, lactic acid 2.5, lipase 163, , CK-MB 1.1, troponin 0.062, WBC 14.5, hemoglobin 10.9, platelets 175, d-dimer 3 8378, TSH 8.67  Patient was started on Unasyn and Levophed with ICU admission        Interval Problem Update  6/3: patient seen and evaluated in the ICU. The lab from [facility] faxed over a preliminary + blood culture revealing gram + cocci in clusters. She has been started on broad spectrum antibiotics. She transitioned off the insulin drip to sliding scale.   6/4: Ms. Mcclain was evaluated and examined in the ICU. Her blood cultures have come back + for MRSA thus she has been placed under precautions. I have consulted Dr. Xavier, ID. She remains on IV fluids and her Cr is down to 3.7. glucose went down to 57 this morning.   6/5: patient seen and evaluated in the ICU. Reportedly there was not  a urine culture obtained in [place] and it was not done here either. An echocardiogram was done yesterday and did not reveal vegetations. I discussed with Dr. Navarro about a VERONICA and Dr. Ba about performing conscious sedation to eval for endocarditis.   Consultants/Specialty  Critical care.   Nephrology   Infectious disease. I discussed with Dr. Xavier today  Code Status  Full     Disposition  med    Review of Systems  Review of Systems   Constitutional: Negative for chills and fever.        Less weakness   Respiratory: Negative for cough and shortness of breath.    Cardiovascular: Negative for chest pain and leg swelling.   Gastrointestinal: Negative for abdominal pain, diarrhea, nausea and vomiting.   All other systems reviewed and are negative.       Physical Exam  Temp:  [36.2 °C (97.2 °F)-36.7 °C (98 °F)] 36.7 °C (98 °F)  Pulse:  [72-87] 87  Resp:  [16-20] 20  BP: (145-167)/(77-89) 150/77  SpO2:  [95 %-97 %] 97 %    Physical Exam  Vitals signs and nursing note reviewed.   Constitutional:       Appearance: She is not toxic-appearing.      Comments: Thin, chronically ill appearing   HENT:      Mouth/Throat:      Mouth: Mucous membranes are dry.      Pharynx: Oropharynx is clear.   Eyes:      General: No scleral icterus.     Conjunctiva/sclera: Conjunctivae normal.   Neck:      Musculoskeletal: Normal range of motion and neck supple.   Cardiovascular:      Rate and Rhythm: Normal rate and regular rhythm.      Heart sounds: No murmur.   Pulmonary:      Effort: Pulmonary effort is normal.      Breath sounds: No wheezing or rales.   Abdominal:      General: Bowel sounds are normal. There is no distension.      Palpations: Abdomen is soft.      Tenderness: There is no abdominal tenderness.   Musculoskeletal:         General: No swelling.      Right lower leg: No edema.      Left lower leg: No edema.      Comments: Right toes amputated   Skin:     General: Skin is dry.      Coloration: Skin is pale.   Neurological:       General: No focal deficit present.      Mental Status: She is alert and oriented to person, place, and time.   Psychiatric:         Behavior: Behavior normal.      Comments: She is in good spirits         Fluids    Intake/Output Summary (Last 24 hours) at 6/5/2020 0701  Last data filed at 6/5/2020 0400  Gross per 24 hour   Intake 2946 ml   Output 300 ml   Net 2646 ml       Laboratory  Recent Labs     06/04/20  0420   WBC 7.0   RBC 3.17*   HEMOGLOBIN 8.5*   HEMATOCRIT 27.8*   MCV 87.7   MCH 26.8*   MCHC 30.6*   RDW 49.3   PLATELETCT 127*   MPV 11.6     Recent Labs     06/03/20  0505 06/04/20  0420 06/05/20  0530   SODIUM 128* 135 130*   POTASSIUM 4.4 4.5 3.9   CHLORIDE 100 108 103   CO2 15* 15* 17*   GLUCOSE 103* 79 83   BUN 53* 54* 51*   CREATININE 4.14* 3.77* 3.43*   CALCIUM 7.8* 8.4* 8.8                   Imaging  EC-ECHOCARDIOGRAM COMPLETE W/O CONT   Final Result      CT-CHEST,ABDOMEN,PELVIS W/O   Final Result         1.  Asymmetric right perinephric fat stranding suspicious for infection/pyelonephritis. Correlate with urinalysis. No significant hydronephrosis. No obstructing stone is seen.   2.  Hepatomegaly and hepatic steatosis.   3.  Rectosigmoid colon is decompressed, limiting evaluation for wall thickening. Correlate for evidence of colitis.   4.  Right lower lobe bronchiectasis and peribronchial opacities likely postinflammatory with atelectasis/scarring. Correlate clinically for evidence of infection.   5.  Atherosclerosis.                    Assessment/Plan  * Staphylococcus aureus bacteremia- (present on admission)  Assessment & Plan  Blood cultures + for Staph Aureus bacteremia  Source is presumably pyelonephritis  IV Daptomycin and rifampin  Echocardiogram transthoracic negative for vegetations. Dr. Navarro consulted for VERONICA on 6/5  Infectious disease consulted    Diabetic ketoacidosis without coma associated with type 2 diabetes mellitus (HCC)- (present on admission)  Assessment & Plan  With renal  failure, elevated but hydroxybutyrate  She was admitted to the ICU with an insulin drip and IV fluids  Patient has been started on IV fluids and IV insulin per DKA protocol  She has been transitioned to long-acting glargine and sliding scale subcutaneous insulin on 6/3--her glucose went down to 57 thus glargine 10 units stopped on 6/4  Hemoglobin A1c ordered and remains pending  Diabetes education ordered  She had been on metformin at home      Acute renal failure with tubular necrosis (HCC)- (present on admission)  Assessment & Plan  Likely prerenal-combination of sepsis, dehydration from diarrhea, likely component of acute tubular necrosis  Stop IV fluids on 6/5  Cr 5.5 on admit and remains high at 3.4  Nephrology consulted   Follow urine output.  Check a BMP every other day for now.      Pulmonary hypertension (HCC)- (present on admission)  Assessment & Plan  Echocardiogram reveals a RVSP 53 mm Hg thus she is at risk of volume overload    Metabolic acidosis- (present on admission)  Assessment & Plan  Secondary to DKA and renal failure  Sodium bicarb started by nephrology    Adrenal insufficiency ([facility])- (present on admission)  Assessment & Plan  Patient is on chronic prednisone therapy for rheumatoid arthritis.  She presented with shock thus was started on IV hydrocortisone  Now transitioned back to home dose of prednisone 10 mg daily.    Diarrhea- (present on admission)  Assessment & Plan  C diff negative    Septic shock (HCC)- (present on admission)  Assessment & Plan  This is Septic shock Present on admission  SIRS criteria identified on my evaluation include: Tachycardia, with heart rate greater than 90 BPM and Leukocytosis, with WBC greater than 12,000  Source is pyelonephritis, possible C. difficile  Presentation includes: Severe sepsis present and persistent hypotension after 30 ml/kg completed.   Despite appropriate fluid resuscitation with crystalloid given per sepsis guidelines, the patient remained  hypotensive and required IV levophed as needed to maintain a SBP of 90 or MAP of 65        Hyponatremia- (present on admission)  Assessment & Plan  Secondary to hyperglycemia       Bronchiectasis (HCC)- (present on admission)  Assessment & Plan  Chest CT findings - with no complains of cough or dyspnea   Patient had a previous pneumonia  Albuterol prn    Acute pyelonephritis- (present on admission)  Assessment & Plan  Patient states that she stopped producing urine 4 days ago, there is no sign obstruction on CT scan, she does have evidence of hematuria on urinalysis  CT scan shows evidence of pyelonephritis without evidence of abscess  One BC is + from [facility] for possible Staph thus IV rocephin and vancomycin             VTE prophylaxis: heparin

## 2020-06-05 NOTE — PROGRESS NOTES
Nephrology Daily Progress Note    Date of Service  6/5/2020    Chief Complaint  60 y.o. female admitted 6/1/2020 with gastroenteritis, volume depletion, RAF, metabolic acidosis, DKA    Interval Problem Update  6/2 -still with diarrhea,   Nausea/vomiting better  Non oliguric  Creat level improving  On DKA protocol  6/3 feels much better  No N/V/D  Good UOP  6/4  -still poor appetite, some nausea  Diarrhea improved  Good UOP  Creat level improving  6/5 -doing better  No N/V/D  good UOP  Creat slowly improving  Acidosis improving  Review of Systems  Review of Systems   Constitutional: Negative for chills, fever, malaise/fatigue and weight loss.   HENT: Negative for congestion, hearing loss and sinus pain.    Eyes: Negative.    Respiratory: Negative for cough, hemoptysis, shortness of breath and wheezing.    Cardiovascular: Negative for chest pain, palpitations, orthopnea and leg swelling.   Gastrointestinal: Negative for abdominal pain, diarrhea, nausea and vomiting.   Genitourinary: Negative for dysuria, flank pain, frequency, hematuria and urgency.   Skin: Negative.    All other systems reviewed and are negative.       Physical Exam  Temp:  [36.3 °C (97.4 °F)-36.7 °C (98 °F)] 36.6 °C (97.8 °F)  Pulse:  [86-92] 92  Resp:  [16-20] 20  BP: (142-167)/(77-89) 142/82  SpO2:  [87 %-97 %] 93 %    Physical Exam  Constitutional:       General: She is not in acute distress.     Appearance: Normal appearance. She is well-developed. She is not diaphoretic.   HENT:      Head: Normocephalic and atraumatic.      Nose: Nose normal.      Mouth/Throat:      Mouth: Mucous membranes are moist.      Pharynx: Oropharynx is clear.   Eyes:      General: No scleral icterus.     Extraocular Movements: Extraocular movements intact.      Conjunctiva/sclera: Conjunctivae normal.      Pupils: Pupils are equal, round, and reactive to light.   Neck:      Musculoskeletal: Normal range of motion and neck supple.   Cardiovascular:      Rate and Rhythm:  Normal rate and regular rhythm.      Pulses: Normal pulses.      Heart sounds: Normal heart sounds. No friction rub. No gallop.    Pulmonary:      Effort: Pulmonary effort is normal.      Breath sounds: Normal breath sounds.   Abdominal:      General: Bowel sounds are normal. There is distension.      Palpations: Abdomen is soft. There is no mass.      Tenderness: There is no abdominal tenderness. There is no right CVA tenderness, left CVA tenderness or guarding.   Musculoskeletal:      Right lower leg: No edema.      Left lower leg: No edema.   Skin:     General: Skin is warm.      Coloration: Skin is not jaundiced.      Findings: No erythema.   Neurological:      General: No focal deficit present.      Mental Status: She is alert and oriented to person, place, and time.      Cranial Nerves: No cranial nerve deficit.      Coordination: Coordination normal.   Psychiatric:         Mood and Affect: Mood normal.         Behavior: Behavior normal.         Thought Content: Thought content normal.         Judgment: Judgment normal.         Fluids    Intake/Output Summary (Last 24 hours) at 6/5/2020 1141  Last data filed at 6/5/2020 0800  Gross per 24 hour   Intake 2992 ml   Output 650 ml   Net 2342 ml       Laboratory  Recent Labs     06/04/20  0420   WBC 7.0   RBC 3.17*   HEMOGLOBIN 8.5*   HEMATOCRIT 27.8*   MCV 87.7   MCH 26.8*   MCHC 30.6*   RDW 49.3   PLATELETCT 127*   MPV 11.6     Recent Labs     06/03/20  0505 06/04/20  0420 06/05/20  0530   SODIUM 128* 135 130*   POTASSIUM 4.4 4.5 3.9   CHLORIDE 100 108 103   CO2 15* 15* 17*   GLUCOSE 103* 79 83   BUN 53* 54* 51*   CREATININE 4.14* 3.77* 3.43*   CALCIUM 7.8* 8.4* 8.8               Imaging  reviewed    Assessment/Plan  1.RAF due to volume depletion - improving creat level, good UOP -to monitor  2.HTN; BP well controlled  3.Electrolytes: hyponatremia: stop IVF encourage solid food intake  4.Anemia:low Hb level stable  5.metabolic acidosis -improving  6.Volume: well  controlled -stop IVF    Recs: no need for emergent dialysis             Daily labs             D/C IVF             Avoid nephrotoxic agents             All meds to renal doses             encourage po intake             Will follow

## 2020-06-05 NOTE — PROGRESS NOTES
Infectious Disease Progress Note    Author: Kaci Xavier M.D. Date & Time of service: 2020  8:21 AM    Chief Complaint:  MRSA bacteremia     Interval History:    Review of Systems:  Review of Systems   Constitutional: Negative for chills, fever and malaise/fatigue.   Respiratory: Negative for cough and shortness of breath.    Gastrointestinal: Positive for abdominal pain and diarrhea. Negative for constipation, nausea and vomiting.   Musculoskeletal: Positive for back pain and myalgias.       Hemodynamics:  Temp (24hrs), Av.5 °C (97.7 °F), Min:36.3 °C (97.4 °F), Max:36.7 °C (98 °F)  Temperature: 36.6 °C (97.8 °F)  Pulse  Av.6  Min: 61  Max: 92   Blood Pressure: 142/82       Physical Exam:  Physical Exam  Constitutional:       Appearance: Normal appearance.   Cardiovascular:      Rate and Rhythm: Normal rate and regular rhythm.      Heart sounds: Normal heart sounds.   Pulmonary:      Effort: Pulmonary effort is normal.      Breath sounds: Normal breath sounds.   Abdominal:      General: Abdomen is flat. There is no distension.      Palpations: Abdomen is soft.      Tenderness: There is abdominal tenderness.   Musculoskeletal: Normal range of motion.      Right lower leg: No edema.      Left lower leg: No edema.   Skin:     General: Skin is warm and dry.   Neurological:      General: No focal deficit present.      Mental Status: She is alert and oriented to person, place, and time.   Psychiatric:         Mood and Affect: Mood normal.         Behavior: Behavior normal.         Meds:    Current Facility-Administered Medications:   •  riFAMPin  •  insulin regular **AND** POC Blood Glucose **AND** NOTIFY MD and PharmD **AND** glucose **AND** dextrose 50%  •  LR  •  predniSONE  •  heparin  •  MD Alert...Vancomycin per Pharmacy  •  oxyCODONE immediate-release  •  loperamide  •  omeprazole  •  [DISCONTINUED] magnesium sulfate **OR** magnesium sulfate  •  potassium phosphate ivpb **OR** sodium phosphate  30 mmol ivpb  •  acetaminophen  •  ondansetron  •  ondansetron  •  promethazine  •  promethazine  •  prochlorperazine  •  latanoprost  •  timolol  •  albuterol    Labs:  Recent Labs     06/04/20  0420   WBC 7.0   RBC 3.17*   HEMOGLOBIN 8.5*   HEMATOCRIT 27.8*   MCV 87.7   MCH 26.8*   RDW 49.3   PLATELETCT 127*   MPV 11.6   NEUTSPOLYS 74.30*   LYMPHOCYTES 14.80*   MONOCYTES 9.50   EOSINOPHILS 0.00   BASOPHILS 0.10     Recent Labs     06/03/20  0505 06/04/20  0420 06/05/20  0530   SODIUM 128* 135 130*   POTASSIUM 4.4 4.5 3.9   CHLORIDE 100 108 103   CO2 15* 15* 17*   GLUCOSE 103* 79 83   BUN 53* 54* 51*     Recent Labs     06/03/20  0505 06/04/20  0420 06/05/20  0530   CREATININE 4.14* 3.77* 3.43*       Imaging:  Ct-chest,abdomen,pelvis W/o    Result Date: 6/1/2020 6/1/2020 11:18 AM HISTORY/REASON FOR EXAM:  Sepsis Hypotension, renal injury, nausea vomiting diarrhea TECHNIQUE/EXAM DESCRIPTION: CT scan of the chest, abdomen and pelvis without contrast was performed. Noncontrast helical scanning of the chest, abdomen and pelvis was obtained from the lung apices through the pubic symphysis. Low dose optimization technique was utilized for this CT exam including automated exposure control and adjustment of the mA and/or kV according to patient size. COMPARISON:  None. FINDINGS: CHEST: Neck Base:  Normal. Lungs/Pleura: Right lower lobe bronchiectasis with peribronchial opacities which likely is atelectasis or scarring. Correlate clinically for infection. Mild dependent changes/atelectasis in the left lower lobe.  3 mm groundglass nodule in the right midlung on image 58 series 301. Calcified granuloma left upper lobe. No pneumothorax or pleural effusion. Cardiovascular Structures:  Heart size is normal. No pericardial effusion. Coronary artery stents and/or prominent calcification. Aorta is normal in caliber without aneurysm or dissection. Central pulmonary arteries are normal in caliber. Mediastinum/ lymph nodes: No  lymphadenopathy. Small hiatal hernia. ABDOMEN/PELVIS Liver:  Diffuse hypoattenuation of the liver suggesting fatty infiltration. Liver is enlarged. Gallbladder: Normal Biliary tract: Nondilated. Pancreas: Normal. Spleen: Normal. Adrenals: Normal. Kidneys and Collecting Systems:  Bilateral perinephric fat stranding, asymmetrically more prominent on the right. This is concerning for infection given the history. Correlate clinically and with urinalysis. No significant hydronephrosis. No obstructing stone is seen. Gastrointestinal tract:  Possible rectosigmoid wall thickening, suboptimally evaluated without contrast and due to decompression. The appendix is normal. Peritoneum: No free air or free fluid. Reproductive organs:  Normal. Bladder:  Normal. Vessels:  Moderately dense calcified plaque. Lymph  Nodes:  No lymphadenopathy. Soft tissues/wall: Within normal limits. Bones:  No acute or aggressive abnormality. Ossific density adjacent to the inferior right scapula probably related to old trauma.     1.  Asymmetric right perinephric fat stranding suspicious for infection/pyelonephritis. Correlate with urinalysis. No significant hydronephrosis. No obstructing stone is seen. 2.  Hepatomegaly and hepatic steatosis. 3.  Rectosigmoid colon is decompressed, limiting evaluation for wall thickening. Correlate for evidence of colitis. 4.  Right lower lobe bronchiectasis and peribronchial opacities likely postinflammatory with atelectasis/scarring. Correlate clinically for evidence of infection. 5.  Atherosclerosis.     Ec-echocardiogram Complete W/o Cont    Result Date: 6/4/2020  Transthoracic Echo Report Echocardiography Laboratory CONCLUSIONS Mildly reduced left ventricular systolic function. Left ventricular ejection fraction is visually estimated to be 45-50%. Grade II diastolic dysfunction. Dilated inferior vena cava without inspiratory collapse. Estimated right ventricular systolic pressure  is 53 mmHg. Normal right  ventricular size. Reduced right ventricular systolic function. Moderate to severe tricuspid regurgitation. CAROLYNE SELLERS Exam Date:         2020                    09:48 Exam Location:     Inpatient Priority:          Routine Ordering Physician:        ISIDRO DELATORRE Referring Physician:       [id number] Pope Sonographer:               Jeramy Paul RDCS,                            RVT Age:    60     Gender:    F MRN:    [id number] :    1960 BSA:    1.71   Ht (in):    64     Wt (lb):    146 Exam Type:     Complete Indications:     Cardiac Murmurs, Undiagnosed ICD Codes:       785.2 CPT Codes:       54053 BP:   132    /   77     HR:   75 Technical Quality:       Technically difficult study -                          adequate information is obtained MEASUREMENTS  (Male / Female) Normal Values 2D ECHO LV Diastolic Diameter PLAX        3.9 cm                4.2 - 5.9 / 3.9 - 5.3 cm LV Systolic Diameter PLAX         2.8 cm                2.1 - 4.0 cm IVS Diastolic Thickness           1.2 cm                LVPW Diastolic Thickness          1.2 cm                RV Diameter 4C                    3.1 cm                2.5 - 2.9 cm LVOT Diameter                     1.7 cm                RA Diameter                       4.5 cm                LV Ejection Fraction MOD BP       54.1 %                >= 55  % LV Ejection Fraction MOD 4C       53.9 %                LV Ejection Fraction MOD 2C       58.7 %                LA Volume Index                   38.5 cm3/m2           16 - 28 cm3/m2 DOPPLER AV Peak Velocity                  1.1 m/s               AV Peak Gradient                  4.8 mmHg              AV Mean Gradient                  2.5 mmHg              LVOT Peak Velocity                0.77 m/s              AV Area Cont Eq vti               1.5 cm2               Mitral E Point Velocity           0.73 m/s              Mitral E to A Ratio               1.3                   MV Pressure Half Time             50  ms                 MV Area PHT                       4.4 cm2               MV Deceleration Time              172 ms                MR Flow Convergence Radius        0.36 cm               MR ERO PISA                       0.042 cm2             MR Regurgitant Volume PISA        7.1 cm3               TR Peak Velocity                  257 cm/s              * Indicates values subject to auto-interpretation LV EF:        % FINDINGS Left Ventricle Normal left ventricular chamber size. Mild concentric left ventricular hypertrophy. Mildly reduced left ventricular systolic function. Left ventricular ejection fraction is visually estimated to be 45-50%. Abnormal septal motion consistent with right ventricular (RV) volume overload and/or elevated RV end-diastolic pressure. Grade II diastolic dysfunction. Right Ventricle Normal right ventricular size. Reduced right ventricular systolic function. Right Atrium Dilated right atrium. Dilated inferior vena cava without inspiratory collapse. Left Atrium Mildly dilated left atrium. Left atrial volume index is 39  mL/sq m. Mitral Valve Structurally normal mitral valve. No mitral stenosis. Mild mitral regurgitation. Aortic Valve Aortic sclerosis without stenosis. No aortic insufficiency. Tricuspid Valve Structurally normal tricuspid valve. No tricuspid stenosis. Moderate to severe tricuspid regurgitation. Right atrial pressure is estimated to be 15 mmHg. Estimated right ventricular systolic pressure  is 53 mmHg. Pulmonic Valve The pulmonic valve is not well visualized.  Mild pulmonic insufficiency. Pericardium Normal pericardium without effusion. Aorta Normal aortic root for body surface area. Ascending aorta diameter is 2.8 cm. Allyson Rivera MD (Electronically Signed) Final Date:     04 June 2020                 11:59      Micro:  Results     Procedure Component Value Units Date/Time    CULTURE STOOL [[id number]] Collected:  06/01/20 1440    Order Status:  Completed Specimen:  Stool  "Updated:  06/04/20 1236     Significant Indicator NEG     Source STL     Site STOOL     Culture Result No enteric pathogens isolated.  NOTE:  Stool cultures are screened for Shiga Toxins 1 and 2,  Salmonella, Shigella, Campylobacter, Aeromonas,  Plesiomonas, and Vibrio.       EHEC Negative for Shiga Toxin 1 and 2.    Narrative:       Special Contact Isolation  FENa = (UrineNa / SerumNA) / (UrineCr / SerumCr) *100  Special Contact Isolation  Is this test for diagnosis or screening?->Diagnosis of ill  patient  Special Contact Isolation    BLOOD CULTURE [[id number]]  (Abnormal) Collected:  06/02/20 1542    Order Status:  Completed Specimen:  Blood from Peripheral Updated:  06/04/20 1003     Significant Indicator POS     Source BLD     Site PERIPHERAL     Culture Result Growth detected by Bactec instrument. 06/03/2020  18:41      Staphylococcus aureus    Narrative:       CALL  Nieto  [facility] tel. [phone],  CALLED  [facility] tel. [phone] 06/03/2020, 18:57, RB PERF. RESULTS CALLED TO:  PHARMACY (left message x [phone])  Special Contact [id number] DAKSHA GRANT  Per Hospital Policy: Only change Specimen Src: to \"Line\" if  specified by physician order.  Left AC    BLOOD CULTURE [[id number]]  (Abnormal) Collected:  06/02/20 1542    Order Status:  Completed Specimen:  Blood from Peripheral Updated:  06/04/20 1001     Significant Indicator POS     Source BLD     Site PERIPHERAL     Culture Result Growth detected by Bactec instrument. 06/03/2020  17:30  Methicillin Resistant Staphylococcus aureus (MRSA)  detected by PCR.  Susceptibility to follow.        Staphylococcus aureus    Narrative:       CALL  Nieto  [facility] tel. [phone],  CALLED  [facility] tel. [phone] 06/03/2020, 17:33, RB PERF. RESULTS CALLED  TO:[phone]   Special Contact [id number] DAKSHA FLASH D  Per Hospital Policy: Only change Specimen Src: to \"Line\" if  specified by physician order.  Right Wrist    URINE CULTURE(NEW) [[id number]]     Order Status:  Canceled     " EHEC(Siga Toxin)Detection [[id number]] Collected:  06/01/20 1440    Order Status:  Completed Specimen:  Stool Updated:  06/02/20 1620     Significant Indicator NEG     Source STL     Site STOOL     EHEC Negative for Shiga Toxin 1 and 2.    Narrative:       Special Contact Isolation  FENa = (UrineNa / SerumNA) / (UrineCr / SerumCr) *100  Special Contact Isolation  Is this test for diagnosis or screening?->Diagnosis of ill  patient  Special Contact Isolation    C Diff by PCR rflx Toxin [[id number]] Collected:  06/02/20 0739    Order Status:  Completed Specimen:  Stool Updated:  06/02/20 1507     C Diff by PCR Negative     Comment: C. difficile NOT detected by PCR.  Treatment not indicated per guidelines.  Repeat testing not indicated within 7 days.          027-NAP1-BI Presumptive Negative     Comment: Presumptive 027/NAP1/BI target DNA sequences are NOT DETECTED.       Narrative:       Special Contact [id number] IFEOMAFREIDA FLASH RUDY  Does this patient have risk factors for C-diff?->Yes  C-Diff Risk Factors->a serious underlying illness    C Diff by PCR rflx Toxin [[id number]] Collected:  06/01/20 1440    Order Status:  Completed Specimen:  Stool Updated:  06/01/20 2240     C Diff by PCR Negative     Comment: C. difficile NOT detected by PCR.  Treatment not indicated per guidelines.  Repeat testing not indicated within 7 days.          027-NAP1-BI Presumptive Negative     Comment: Presumptive 027/NAP1/BI target DNA sequences are NOT DETECTED.       Narrative:       Special Contact Isolation  Does this patient have risk factors for C-diff?->Yes  C-Diff Risk Factors->antibiotic exposure  Has patient taken stool softeners or laxatives in the last 5  days?->No    URINE CULTURE(NEW) [[id number]] Collected:  06/01/20 1440    Order Status:  Canceled     URINALYSIS CULTURE, IF INDICATED [[id number]]  (Abnormal) Collected:  06/01/20 1220    Order Status:  Completed Specimen:  Urine Updated:  06/01/20 1412     Color Yellow      Character Clear     Specific Gravity 1.025     Ph 5.0     Glucose Negative mg/dL      Ketones Negative mg/dL      Protein 100 mg/dL      Bilirubin Negative     Urobilinogen, Urine 0.2     Nitrite Negative     Leukocyte Esterase Small     Occult Blood Large     Micro Urine Req Microscopic    Narrative:       Special Contact Isolation  Does this patient have risk factors for C-diff?->Yes  C-Diff Risk Factors->antibiotic exposure  Has patient taken stool softeners or laxatives in the last 5  days?->No    SARS-CoV-2, PCR (In-House) [[id number]] Collected:  06/01/20 1240    Order Status:  Completed Updated:  06/01/20 1357     SARS-CoV-2 Source NP Swab     SARS-CoV-2 by PCR NotDetected     Comment: [facility] providers: PLEASE REFER TO DE-ESCALATION AND RETESTING PROTOCOL  on [email or web address]  **The [employer] GeneXpert Xpress SARS-CoV-2 Test has been made available for  use under the Emergency Use Authorization (EUA) only.         Narrative:       Special Contact Isolation  FENa = (UrineNa / SerumNA) / (UrineCr / SerumCr) *100  Special Contact Isolation  Is this test for diagnosis or screening?->Diagnosis of ill  patient    COVID/SARS CoV-2 [[id number]] Collected:  06/01/20 1240    Order Status:  Completed Specimen:  Respirate from Nasopharyngeal Updated:  06/01/20 1255     COVID Order Status Received    Narrative:       Special Contact Isolation  FENa = (UrineNa / SerumNA) / (UrineCr / SerumCr) *100  Special Contact Isolation  Is this test for diagnosis or screening?->Diagnosis of ill  patient    Blood Culture,Hold [[id number]] Collected:  06/01/20 1108    Order Status:  Completed Updated:  06/01/20 1200     Blood Culture Hold Collected          Assessment:  Active Hospital Problems    Diagnosis   • *Staphylococcus aureus bacteremia [R78.81, B95.61]   • Acute renal failure with tubular necrosis (HCC) [N17.0]   • Diabetic ketoacidosis without coma associated with type 2 diabetes mellitus (HCC) [E11.10]   • Pulmonary hypertension  ([facility]) [I27.20]   • Acute pyelonephritis [N10]   • Bronchiectasis ([facility]) [J47.9]   • Hyponatremia [E87.1]   • Septic shock ([facility]) [A41.9, R65.21]   • Diarrhea [R19.7]   • Adrenal insufficiency ([facility]) [E27.40]   • Metabolic acidosis [E87.2]     Interval 24 hours:      AF, O2 2 l NC, stable   Labs reviewed, vanco rough 22.3   Imaging personally reviewed both images and report.   Studies reviewed  Micro reviewed  Events, transfer out of ICU, awaiting bed     Pt with improvement in nausea and diarrhea but still with abdominal pain.  She has some lumbar back pain which she states is chronic and unchanged.  No new joint pain. Pt antibiotics transition from vancomycin to daptomycin 8 mg q48 hours due to elevated Vanco trough and ongoing renal dysfunction.  Continued on rifampin 300 mg twice daily.        ASSESSMENT/PLAN:      60 y.o.  admitted 6/1/2020. Pt has a past medical history of RA on chronic prednisone 10 mg daily, DMT2 , CKD stage III.  She reports abdominal pain on and off again since December and also history of frequent UTIs.  She is most recently treated with amoxicillin but been off antibiotics for approximately 1 week prior to her admission. Presented to ER at [facility] c/o epigastric abdominal pain described as burning as well as nausea vomiting and diarrhea x4 days.  She was hypotensive, requiring oxygen support with lab irregularities including leukocytosis to 14.5, hyponatremia, elevated creatinine, elevated glucose and elevated lactic acid.  She was started on Unasyn and Levophed and transferred to [facility] for higher level of care.  The patient has hardware - pins in her right foot, a plate in her left ankle and replaced knuckle in her right hand     Hospital Course:   She has been afebrile, initially hypotensive and now improved.  Leukocytosis to 19.7 on admit.  Acute kidney injury on arrival.  Stool cultures were obtained which are pending in part but negative for Shiga toxin 1 and 2.  C. difficile  was tested on 6/1 and 6/2 and negative on both.  Blood cultures were obtained which are positive for MRSA.  She was started on vancomycin Flagyl and ceftriaxone.  Flagyl is been stopped and she was  continued on ceftriaxone and vancomycin.      Problem List      Septic and hypovolemic shock, secondary to MRSA bacteremia and profuse diarrhea - improved   MRSA bacteremia  -Blood cultures from [facility] 6/1 are 2/2+ for MRSA per microbiology lab there, no urine culture was performed  -Blood cultures on 6/2 2/2+ for MRSA  -TTE on 6/4-  negative for vegetations.  Moderate to severe tricuspid regurgitation, diastolic dysfunction  Leukocytosis-improved  UTI/Pyelonephritis-she endorses dysuria with pain, burning and hematuria prior to admission  -UA suggestive of infection, no urine culture  -CT on 6/1 with right-sided fat stranding suspicious for infection/Pyelo   Abdominal pain, epigastric- ongoing   Nausea and vomiting  Diarrhea  -C. difficile tested negative x2  -Stool cultures pending, Shiga toxin 1 and 2  Acute on chronic kidney disease -improving -nephrology following and per notes no need for acute dialysis  Bronchiectasis, noted on CT in right lower lobe as well as atelectasis, calcified granuloma in left upper lobe groundglass nodule on right  Diabetes, uncontrolled   Diabetic ketoacidosis on presentation- improving   Rheumatoid arthritis  Immunosuppression,  secondary to above on chronic prednisone 10 mg daily  Thrombocytopenia-ongoing, appears to be chronic  Anemia-ongoing  Antibiotic allergies: Cephalexin reported as vomiting  Grade 2 diastolic heart failure on TTE  Low back pain-chronic, ongoing      Plan      ---  Will stop vancomycin and transition to daptomycin due to RAF. Renal function has been improving despite vancomycin but vanco trough is elevated today so will  transition to daptomycin at 8 mg/kg q48 hours   --- Continue rifampin due to hardware. HOWEVER, she is also on chronic steroids and  this reaction could potentially cause some decrease in steroid.  Monitor clinically.    --- TTE negative but patient is immunocompromised due to chronic steroid therapy and she has blood cultures positive on admit so unknown duration-recommend VERONICA to rule out endocarditis and results this will also affect duration of antibiotics  --- F/up repeat blood cultures- pending   --- We will need multiple weeks of IV antibiotics, no PICC line until blood cultures are clear for 48 hours  --- Continue to monitor for any new neck joint or back pain image appropriately-very low threshold for imaging of lumbar spine  --- Monitor labs,  CBC and CMP for LFTs as on rifampin  --- CPK for baseline   --- If she spikes a new fever please obtain a urine culture         Plan of care discussed with Dr. Neville. Will continue to follow.

## 2020-06-05 NOTE — PROCEDURES
Procedures    Conscious Sedation Procedure Note    Indication: Procedural sedation for VERONICA in the setting of MRSA bacteremia    Consent: I have discussed with the patient and/or the patient representative the indication, alternatives, and the possible risks and/or complications of the planned procedure and the anesthesia methods. The patient and/or patient representative appear to understand and agree to proceed.    Physician Involvement: The attending physician was present and supervising this procedure.    Pre-Sedation Documentation and Exam: Vital signs have been reviewed (see flow sheet for vitals).  I have reviewed the patient's history and review of systems.    Airway Assessment: Mallampati Class II - (soft palate, fauces & uvula are visible)    Prior History of Anesthesia Complications: none    ASA Classification: Class 2 - A normal healthy patient with mild systemic disease    Sedation/ Anesthesia Plan: intravenous sedation    Medications Used: propofol 110mg intravenously    Monitoring and Safety: The patient was placed on a cardiac monitor and vital signs, pulse oximetry and level of consciousness were continuously evaluated throughout the procedure. The patient was closely monitored until recovery from the medications was complete and the patient had returned to baseline status. Respiratory therapy was on standby at all times during the procedure.    Post-Sedation Vital Signs: Vital signs were reviewed and were stable after the procedure (see flow sheet for vitals)            Post-Sedation Exam: Lungs: clear to auscultation bilaterally           Complications: none    Sedation Time: 15:55 to 16:10. 15 Total minutes    CPT: 76852

## 2020-06-05 NOTE — CONSULTS
Diabetes Education:  Patient with existing type 2 diabetes, no HbA1c on file.  Patient is currently in ICU contact precautions for MRSA, not seen today.  Per chart, she was treated with metformin 1000 mg BID.  She is currently on prednisone 10 mg and Lantus 10 units daily, FSBG , no regular insulin required.  RN, MARISAE will continue to follow next week.

## 2020-06-06 LAB
ANION GAP SERPL CALC-SCNC: 13 MMOL/L (ref 7–16)
BUN SERPL-MCNC: 48 MG/DL (ref 8–22)
CALCIUM SERPL-MCNC: 8.8 MG/DL (ref 8.5–10.5)
CHLORIDE SERPL-SCNC: 109 MMOL/L (ref 96–112)
CK SERPL-CCNC: 17 U/L (ref 0–154)
CO2 SERPL-SCNC: 18 MMOL/L (ref 20–33)
CREAT SERPL-MCNC: 3.22 MG/DL (ref 0.5–1.4)
EST. AVERAGE GLUCOSE BLD GHB EST-MCNC: 275 MG/DL
GLUCOSE BLD-MCNC: 122 MG/DL (ref 65–99)
GLUCOSE BLD-MCNC: 189 MG/DL (ref 65–99)
GLUCOSE BLD-MCNC: 200 MG/DL (ref 65–99)
GLUCOSE BLD-MCNC: 209 MG/DL (ref 65–99)
GLUCOSE SERPL-MCNC: 149 MG/DL (ref 65–99)
HBA1C MFR BLD: 11.2 % (ref 0–5.6)
POTASSIUM SERPL-SCNC: 4 MMOL/L (ref 3.6–5.5)
SODIUM SERPL-SCNC: 140 MMOL/L (ref 135–145)

## 2020-06-06 PROCEDURE — 36415 COLL VENOUS BLD VENIPUNCTURE: CPT

## 2020-06-06 PROCEDURE — A9270 NON-COVERED ITEM OR SERVICE: HCPCS | Performed by: INTERNAL MEDICINE

## 2020-06-06 PROCEDURE — A9270 NON-COVERED ITEM OR SERVICE: HCPCS | Performed by: HOSPITALIST

## 2020-06-06 PROCEDURE — 700111 HCHG RX REV CODE 636 W/ 250 OVERRIDE (IP): Performed by: PSYCHIATRY & NEUROLOGY

## 2020-06-06 PROCEDURE — 87040 BLOOD CULTURE FOR BACTERIA: CPT | Mod: 91

## 2020-06-06 PROCEDURE — 700102 HCHG RX REV CODE 250 W/ 637 OVERRIDE(OP): Performed by: HOSPITALIST

## 2020-06-06 PROCEDURE — 770021 HCHG ROOM/CARE - ISO PRIVATE

## 2020-06-06 PROCEDURE — A9270 NON-COVERED ITEM OR SERVICE: HCPCS | Performed by: PSYCHIATRY & NEUROLOGY

## 2020-06-06 PROCEDURE — 700102 HCHG RX REV CODE 250 W/ 637 OVERRIDE(OP): Performed by: INTERNAL MEDICINE

## 2020-06-06 PROCEDURE — 99233 SBSQ HOSP IP/OBS HIGH 50: CPT | Performed by: INTERNAL MEDICINE

## 2020-06-06 PROCEDURE — 99233 SBSQ HOSP IP/OBS HIGH 50: CPT | Performed by: HOSPITALIST

## 2020-06-06 PROCEDURE — 82550 ASSAY OF CK (CPK): CPT

## 2020-06-06 PROCEDURE — 80048 BASIC METABOLIC PNL TOTAL CA: CPT

## 2020-06-06 PROCEDURE — 82962 GLUCOSE BLOOD TEST: CPT | Mod: 91

## 2020-06-06 PROCEDURE — 700111 HCHG RX REV CODE 636 W/ 250 OVERRIDE (IP): Performed by: HOSPITALIST

## 2020-06-06 PROCEDURE — 83036 HEMOGLOBIN GLYCOSYLATED A1C: CPT

## 2020-06-06 PROCEDURE — 700102 HCHG RX REV CODE 250 W/ 637 OVERRIDE(OP): Performed by: PSYCHIATRY & NEUROLOGY

## 2020-06-06 RX ORDER — OMEPRAZOLE 20 MG/1
20 CAPSULE, DELAYED RELEASE ORAL 2 TIMES DAILY
Status: DISCONTINUED | OUTPATIENT
Start: 2020-06-06 | End: 2020-06-15 | Stop reason: HOSPADM

## 2020-06-06 RX ORDER — FLUTICASONE PROPIONATE 50 MCG
1 SPRAY, SUSPENSION (ML) NASAL DAILY
Status: DISCONTINUED | OUTPATIENT
Start: 2020-06-06 | End: 2020-06-15 | Stop reason: HOSPADM

## 2020-06-06 RX ADMIN — LATANOPROST 1 DROP: 50 SOLUTION OPHTHALMIC at 20:19

## 2020-06-06 RX ADMIN — ACETAMINOPHEN 650 MG: 325 TABLET, FILM COATED ORAL at 18:34

## 2020-06-06 RX ADMIN — OXYCODONE 5 MG: 5 TABLET ORAL at 05:57

## 2020-06-06 RX ADMIN — INSULIN HUMAN 3 UNITS: 100 INJECTION, SOLUTION PARENTERAL at 17:10

## 2020-06-06 RX ADMIN — INSULIN HUMAN 2 UNITS: 100 INJECTION, SOLUTION PARENTERAL at 12:01

## 2020-06-06 RX ADMIN — RIFAMPIN 300 MG: 300 CAPSULE ORAL at 05:57

## 2020-06-06 RX ADMIN — RIFAMPIN 300 MG: 300 CAPSULE ORAL at 17:11

## 2020-06-06 RX ADMIN — INSULIN HUMAN 2 UNITS: 100 INJECTION, SOLUTION PARENTERAL at 20:23

## 2020-06-06 RX ADMIN — HEPARIN SODIUM 5000 UNITS: 5000 INJECTION, SOLUTION INTRAVENOUS; SUBCUTANEOUS at 14:10

## 2020-06-06 RX ADMIN — OMEPRAZOLE 20 MG: 20 CAPSULE, DELAYED RELEASE ORAL at 21:19

## 2020-06-06 RX ADMIN — PREDNISONE 10 MG: 10 TABLET ORAL at 05:57

## 2020-06-06 RX ADMIN — TIMOLOL MALEATE 1 DROP: 5 SOLUTION/ DROPS OPHTHALMIC at 05:57

## 2020-06-06 RX ADMIN — HEPARIN SODIUM 5000 UNITS: 5000 INJECTION, SOLUTION INTRAVENOUS; SUBCUTANEOUS at 20:28

## 2020-06-06 RX ADMIN — OXYCODONE 5 MG: 5 TABLET ORAL at 11:58

## 2020-06-06 RX ADMIN — OMEPRAZOLE 20 MG: 20 CAPSULE, DELAYED RELEASE ORAL at 05:57

## 2020-06-06 RX ADMIN — OXYCODONE 5 MG: 5 TABLET ORAL at 18:34

## 2020-06-06 RX ADMIN — FLUTICASONE PROPIONATE 50 MCG: 50 SPRAY, METERED NASAL at 21:19

## 2020-06-06 RX ADMIN — ACETAMINOPHEN 650 MG: 325 TABLET, FILM COATED ORAL at 11:58

## 2020-06-06 RX ADMIN — HEPARIN SODIUM 5000 UNITS: 5000 INJECTION, SOLUTION INTRAVENOUS; SUBCUTANEOUS at 05:57

## 2020-06-06 ASSESSMENT — ENCOUNTER SYMPTOMS
FEVER: 0
COUGH: 0
DIARRHEA: 1
BACK PAIN: 1
SINUS PAIN: 0
VOMITING: 0
NAUSEA: 0
ROS GI COMMENTS: POOR APPETITE
MYALGIAS: 1
EYES NEGATIVE: 1
WHEEZING: 0
NECK PAIN: 0
MYALGIAS: 0
SHORTNESS OF BREATH: 0
DIZZINESS: 0
COUGH: 1
FLANK PAIN: 0
ORTHOPNEA: 0
BACK PAIN: 0
ABDOMINAL PAIN: 0
DIARRHEA: 0
ABDOMINAL PAIN: 1
PALPITATIONS: 0
WEIGHT LOSS: 0
CHILLS: 0
HEMOPTYSIS: 0

## 2020-06-06 NOTE — PROGRESS NOTES
Nephrology Daily Progress Note    Date of Service  6/6/2020    Chief Complaint  60 y.o. female admitted 6/1/2020 with gastroenteritis, volume depletion, RAF, metabolic acidosis, DKA, MRSA  bacteriemia    Interval Problem Update  6/2 -still with diarrhea,   Nausea/vomiting better  Non oliguric  Creat level improving  On DKA protocol  6/3 feels much better  No N/V/D  Good UOP  6/4  -still poor appetite, some nausea  Diarrhea improved  Good UOP  Creat level improving  6/5 -doing better  No N/V/D  good UOP  Creat slowly improving  Acidosis improving  6/6 no acute events  Po intake better  Mild abdominal pain/distention  Good UOP  Creat slowly improving  Acidosis improving  Review of Systems  Review of Systems   Constitutional: Negative for chills, fever, malaise/fatigue and weight loss.   HENT: Negative for congestion, hearing loss and sinus pain.    Eyes: Negative.    Respiratory: Negative for cough, hemoptysis, shortness of breath and wheezing.    Cardiovascular: Negative for chest pain, palpitations, orthopnea and leg swelling.   Gastrointestinal: Positive for abdominal pain. Negative for diarrhea, nausea and vomiting.   Genitourinary: Negative for dysuria, flank pain, frequency, hematuria and urgency.   Musculoskeletal: Negative for back pain, joint pain, myalgias and neck pain.   Skin: Negative.    All other systems reviewed and are negative.       Physical Exam  Temp:  [36.1 °C (97 °F)-36.7 °C (98 °F)] 36.7 °C (98 °F)  Pulse:  [72-96] 82  BP: (138-173)/(85-96) 154/90  SpO2:  [93 %-99 %] 94 %    Physical Exam  Vitals signs and nursing note reviewed.   Constitutional:       General: She is not in acute distress.     Appearance: Normal appearance. She is well-developed. She is not diaphoretic.   HENT:      Head: Normocephalic and atraumatic.      Nose: Nose normal.      Mouth/Throat:      Mouth: Mucous membranes are moist.      Pharynx: Oropharynx is clear.   Eyes:      General: No scleral icterus.     Extraocular  Movements: Extraocular movements intact.      Conjunctiva/sclera: Conjunctivae normal.      Pupils: Pupils are equal, round, and reactive to light.   Neck:      Musculoskeletal: Normal range of motion and neck supple.   Cardiovascular:      Rate and Rhythm: Normal rate and regular rhythm.      Pulses: Normal pulses.      Heart sounds: Normal heart sounds.   Pulmonary:      Effort: Pulmonary effort is normal. No respiratory distress.      Breath sounds: Normal breath sounds. No wheezing, rhonchi or rales.   Abdominal:      General: Bowel sounds are normal. There is distension.      Palpations: Abdomen is soft. There is no mass.      Tenderness: There is abdominal tenderness. There is no guarding.   Musculoskeletal:      Right lower leg: No edema.      Left lower leg: No edema.   Skin:     General: Skin is warm.      Coloration: Skin is not jaundiced.      Findings: No erythema or rash.   Neurological:      General: No focal deficit present.      Mental Status: She is alert and oriented to person, place, and time.      Cranial Nerves: No cranial nerve deficit.      Coordination: Coordination normal.   Psychiatric:         Mood and Affect: Mood normal.         Behavior: Behavior normal.         Thought Content: Thought content normal.         Judgment: Judgment normal.         Fluids    Intake/Output Summary (Last 24 hours) at 6/6/2020 1501  Last data filed at 6/6/2020 1100  Gross per 24 hour   Intake 100 ml   Output 2726 ml   Net -2626 ml       Laboratory  Recent Labs     06/04/20  0420   WBC 7.0   RBC 3.17*   HEMOGLOBIN 8.5*   HEMATOCRIT 27.8*   MCV 87.7   MCH 26.8*   MCHC 30.6*   RDW 49.3   PLATELETCT 127*   MPV 11.6     Recent Labs     06/04/20  0420 06/05/20  0530 06/06/20  0649   SODIUM 135 130* 140   POTASSIUM 4.5 3.9 4.0   CHLORIDE 108 103 109   CO2 15* 17* 18*   GLUCOSE 79 83 149*   BUN 54* 51* 48*   CREATININE 3.77* 3.43* 3.22*   CALCIUM 8.4* 8.8 8.8               Imaging  reviewed    Assessment/Plan  1.RAF  due to volume depletion - improving creat level, good UOP -to monitor  2.HTN; BP well controlled  3.Electrolytes: hyponatremia: normalized  4.Anemia:low Hb level stable  5.metabolic acidosis -improving  6.Volume: well controlled  -encourage po fluid intake    Recs: no need for emergent dialysis             Daily labs             Continue current treatment             Avoid nephrotoxic agents             All meds to renal doses             encourage po intake             Will follow

## 2020-06-06 NOTE — PROGRESS NOTES
Infectious Disease Progress Note    Author: Kaci Xavier M.D. Date & Time of service: 2020  8:17 AM    Chief Complaint:  MRSA bacteremia      Interval History:  66AF, vanco trough 22.3, transfer out of ICU, awaiting bed. Improvement in nausea and diarrhea but still with abdominal pain.  She has some lumbar back pain which she states is chronic and unchanged.  No new joint pain. Pt antibiotics transition from vancomycin to daptomycin 8 mg q48 hours due to elevated Vanco trough and ongoing renal dysfunction.  Continued on rifampin 300 mg twice daily.     Review of Systems:  Review of Systems   Constitutional: Positive for malaise/fatigue. Negative for chills and fever.   HENT: Positive for congestion.    Respiratory: Positive for cough. Negative for shortness of breath.    Gastrointestinal: Positive for abdominal pain and diarrhea. Negative for nausea and vomiting.   Musculoskeletal: Positive for back pain and myalgias.       Hemodynamics:  Temp (24hrs), Av.4 °C (97.6 °F), Min:36.1 °C (97 °F), Max:36.7 °C (98 °F)  Temperature: 36.7 °C (98 °F)  Pulse  Av.6  Min: 61  Max: 96   Blood Pressure: 154/90       Physical Exam:  Physical Exam  Constitutional:       Appearance: Normal appearance.   HENT:      Nose: Congestion present.   Cardiovascular:      Rate and Rhythm: Normal rate and regular rhythm.      Heart sounds: Normal heart sounds.   Pulmonary:      Effort: Pulmonary effort is normal.      Breath sounds: Normal breath sounds.   Abdominal:      General: Abdomen is flat. Bowel sounds are normal. There is no distension.      Palpations: Abdomen is soft.      Tenderness: There is abdominal tenderness.   Musculoskeletal:      Right lower leg: No edema.      Left lower leg: No edema.      Comments: Left foot partial amputation    Skin:     General: Skin is warm.   Neurological:      General: No focal deficit present.      Mental Status: She is alert and oriented to person, place, and time.    Psychiatric:         Mood and Affect: Mood normal.         Behavior: Behavior normal.         Meds:    Current Facility-Administered Medications:   •  DAPTOmycin  •  riFAMPin  •  insulin regular **AND** POC Blood Glucose **AND** NOTIFY MD and PharmD **AND** glucose **AND** dextrose 50%  •  predniSONE  •  heparin  •  oxyCODONE immediate-release  •  loperamide  •  omeprazole  •  [DISCONTINUED] magnesium sulfate **OR** magnesium sulfate  •  potassium phosphate ivpb **OR** sodium phosphate 30 mmol ivpb  •  acetaminophen  •  ondansetron  •  ondansetron  •  promethazine  •  promethazine  •  prochlorperazine  •  latanoprost  •  timolol  •  albuterol    Labs:  Recent Labs     06/04/20  0420   WBC 7.0   RBC 3.17*   HEMOGLOBIN 8.5*   HEMATOCRIT 27.8*   MCV 87.7   MCH 26.8*   RDW 49.3   PLATELETCT 127*   MPV 11.6   NEUTSPOLYS 74.30*   LYMPHOCYTES 14.80*   MONOCYTES 9.50   EOSINOPHILS 0.00   BASOPHILS 0.10     Recent Labs     06/04/20  0420 06/05/20  0530 06/06/20  0649   SODIUM 135 130* 140   POTASSIUM 4.5 3.9 4.0   CHLORIDE 108 103 109   CO2 15* 17* 18*   GLUCOSE 79 83 149*   BUN 54* 51* 48*   CPKTOTAL  --   --  17     Recent Labs     06/04/20  0420 06/05/20  0530 06/06/20  0649   CREATININE 3.77* 3.43* 3.22*       Imaging:  Ct-chest,abdomen,pelvis W/o    Result Date: 6/1/2020 6/1/2020 11:18 AM HISTORY/REASON FOR EXAM:  Sepsis Hypotension, renal injury, nausea vomiting diarrhea TECHNIQUE/EXAM DESCRIPTION: CT scan of the chest, abdomen and pelvis without contrast was performed. Noncontrast helical scanning of the chest, abdomen and pelvis was obtained from the lung apices through the pubic symphysis. Low dose optimization technique was utilized for this CT exam including automated exposure control and adjustment of the mA and/or kV according to patient size. COMPARISON:  None. FINDINGS: CHEST: Neck Base:  Normal. Lungs/Pleura: Right lower lobe bronchiectasis with peribronchial opacities which likely is atelectasis or  scarring. Correlate clinically for infection. Mild dependent changes/atelectasis in the left lower lobe.  3 mm groundglass nodule in the right midlung on image 58 series 301. Calcified granuloma left upper lobe. No pneumothorax or pleural effusion. Cardiovascular Structures:  Heart size is normal. No pericardial effusion. Coronary artery stents and/or prominent calcification. Aorta is normal in caliber without aneurysm or dissection. Central pulmonary arteries are normal in caliber. Mediastinum/ lymph nodes: No lymphadenopathy. Small hiatal hernia. ABDOMEN/PELVIS Liver:  Diffuse hypoattenuation of the liver suggesting fatty infiltration. Liver is enlarged. Gallbladder: Normal Biliary tract: Nondilated. Pancreas: Normal. Spleen: Normal. Adrenals: Normal. Kidneys and Collecting Systems:  Bilateral perinephric fat stranding, asymmetrically more prominent on the right. This is concerning for infection given the history. Correlate clinically and with urinalysis. No significant hydronephrosis. No obstructing stone is seen. Gastrointestinal tract:  Possible rectosigmoid wall thickening, suboptimally evaluated without contrast and due to decompression. The appendix is normal. Peritoneum: No free air or free fluid. Reproductive organs:  Normal. Bladder:  Normal. Vessels:  Moderately dense calcified plaque. Lymph  Nodes:  No lymphadenopathy. Soft tissues/wall: Within normal limits. Bones:  No acute or aggressive abnormality. Ossific density adjacent to the inferior right scapula probably related to old trauma.     1.  Asymmetric right perinephric fat stranding suspicious for infection/pyelonephritis. Correlate with urinalysis. No significant hydronephrosis. No obstructing stone is seen. 2.  Hepatomegaly and hepatic steatosis. 3.  Rectosigmoid colon is decompressed, limiting evaluation for wall thickening. Correlate for evidence of colitis. 4.  Right lower lobe bronchiectasis and peribronchial opacities likely postinflammatory  with atelectasis/scarring. Correlate clinically for evidence of infection. 5.  Atherosclerosis.     Ec-echocardiogram Complete W/o Cont    Result Date: 2020  Transthoracic Echo Report Echocardiography Laboratory CONCLUSIONS Mildly reduced left ventricular systolic function. Left ventricular ejection fraction is visually estimated to be 45-50%. Grade II diastolic dysfunction. Dilated inferior vena cava without inspiratory collapse. Estimated right ventricular systolic pressure  is 53 mmHg. Normal right ventricular size. Reduced right ventricular systolic function. Moderate to severe tricuspid regurgitation. CAROLYNE SELLERS Exam Date:         2020                    09:48 Exam Location:     Inpatient Priority:          Routine Ordering Physician:        ISIDRO DELATORRE Referring Physician:       [id number] Pope Sonographer:               Jeramy Paul RD,                            RVT Age:    60     Gender:    F MRN:    [id number] :    1960 BSA:    1.71   Ht (in):    64     Wt (lb):    146 Exam Type:     Complete Indications:     Cardiac Murmurs, Undiagnosed ICD Codes:       785.2 CPT Codes:       37771 BP:   132    /   77     HR:   75 Technical Quality:       Technically difficult study -                          adequate information is obtained MEASUREMENTS  (Male / Female) Normal Values 2D ECHO LV Diastolic Diameter PLAX        3.9 cm                4.2 - 5.9 / 3.9 - 5.3 cm LV Systolic Diameter PLAX         2.8 cm                2.1 - 4.0 cm IVS Diastolic Thickness           1.2 cm                LVPW Diastolic Thickness          1.2 cm                RV Diameter 4C                    3.1 cm                2.5 - 2.9 cm LVOT Diameter                     1.7 cm                RA Diameter                       4.5 cm                LV Ejection Fraction MOD BP       54.1 %                >= 55  % LV Ejection Fraction MOD 4C       53.9 %                LV Ejection Fraction MOD 2C       58.7 %                 LA Volume Index                   38.5 cm3/m2           16 - 28 cm3/m2 DOPPLER AV Peak Velocity                  1.1 m/s               AV Peak Gradient                  4.8 mmHg              AV Mean Gradient                  2.5 mmHg              LVOT Peak Velocity                0.77 m/s              AV Area Cont Eq vti               1.5 cm2               Mitral E Point Velocity           0.73 m/s              Mitral E to A Ratio               1.3                   MV Pressure Half Time             50 ms                 MV Area PHT                       4.4 cm2               MV Deceleration Time              172 ms                MR Flow Convergence Radius        0.36 cm               MR ERO PISA                       0.042 cm2             MR Regurgitant Volume PISA        7.1 cm3               TR Peak Velocity                  257 cm/s              * Indicates values subject to auto-interpretation LV EF:        % FINDINGS Left Ventricle Normal left ventricular chamber size. Mild concentric left ventricular hypertrophy. Mildly reduced left ventricular systolic function. Left ventricular ejection fraction is visually estimated to be 45-50%. Abnormal septal motion consistent with right ventricular (RV) volume overload and/or elevated RV end-diastolic pressure. Grade II diastolic dysfunction. Right Ventricle Normal right ventricular size. Reduced right ventricular systolic function. Right Atrium Dilated right atrium. Dilated inferior vena cava without inspiratory collapse. Left Atrium Mildly dilated left atrium. Left atrial volume index is 39  mL/sq m. Mitral Valve Structurally normal mitral valve. No mitral stenosis. Mild mitral regurgitation. Aortic Valve Aortic sclerosis without stenosis. No aortic insufficiency. Tricuspid Valve Structurally normal tricuspid valve. No tricuspid stenosis. Moderate to severe tricuspid regurgitation. Right atrial pressure is estimated to be 15 mmHg. Estimated right ventricular  "systolic pressure  is 53 mmHg. Pulmonic Valve The pulmonic valve is not well visualized.  Mild pulmonic insufficiency. Pericardium Normal pericardium without effusion. Aorta Normal aortic root for body surface area. Ascending aorta diameter is 2.8 cm. Allyson Rivera MD (Electronically Signed) Final Date:     04 June 2020                 11:59    Ec-rayna W/o Cont    Result Date: 6/5/2020  Results Will be Available after Interpretation by Cardiologist.      Micro:  Results     Procedure Component Value Units Date/Time    BLOOD CULTURE [[id number]]  (Abnormal) Collected:  06/02/20 1542    Order Status:  Completed Specimen:  Blood from Peripheral Updated:  06/05/20 0904     Significant Indicator POS     Source BLD     Site PERIPHERAL     Culture Result Growth detected by Bactec instrument. 06/03/2020  18:41      Methicillin Resistant Staphylococcus aureus  See previous culture for sensitivity report.      Narrative:       CALL  Nieto  [facility] tel. [phone],  CALLED  [facility] tel. [phone] 06/03/2020, 18:57, RB PERF. RESULTS CALLED TO:  PHARMACY (left message x [phone])  Special Contact [id number] CUARA FLASH D  Per Hospital Policy: Only change Specimen Src: to \"Line\" if  specified by physician order.  Left AC    BLOOD CULTURE [[id number]]  (Abnormal)  (Susceptibility) Collected:  06/02/20 1542    Order Status:  Completed Specimen:  Blood from Peripheral Updated:  06/05/20 0904     Significant Indicator POS     Source BLD     Site PERIPHERAL     Culture Result Growth detected by Bactec instrument. 06/03/2020  17:30  Methicillin Resistant Staphylococcus aureus (MRSA)  detected by PCR.        Methicillin Resistant Staphylococcus aureus    Narrative:       CALL  Nieto  [facility] tel. [phone],  CALLED  [facility] tel. [phone] 06/03/2020, 17:33, RB PERF. RESULTS CALLED  TO:[phone] LM  Special Contact [id number] CUARA FLASH D  Per Hospital Policy: Only change Specimen Src: to \"Line\" if  specified by physician order.  Right Wrist    " Susceptibility     Methicillin resistant staphylococcus aureus (1)     Antibiotic Interpretation Microscan Method Status    Azithromycin Resistant >4 mcg/mL SNOW Final    Clindamycin Sensitive <=0.5 mcg/mL SNOW Final    Cefazolin Resistant >16 mcg/mL SNOW Final    Ceftaroline Sensitive 1 mcg/mL SNOW Final    Daptomycin Sensitive <=1 mcg/mL SNOW Final    Ampicillin/sulbactam Resistant >16/8 mcg/mL SNOW Final    Erythromycin Resistant >4 mcg/mL SNOW Final    Vancomycin Sensitive 1 mcg/mL SNOW Final    Oxacillin Resistant >2 mcg/mL SNOW Final    Penicillin Resistant >8 mcg/mL SNOW Final    Trimeth/Sulfa Sensitive <=0.5/9.5 mcg/mL SNOW Final    Tetracycline Sensitive <=4 mcg/mL SNOW Final                   BLOOD CULTURE [[id number]]     Order Status:  No result Specimen:  Blood from Peripheral     BLOOD CULTURE [[id number]]     Order Status:  No result Specimen:  Blood from Peripheral     CULTURE STOOL [[id number]] Collected:  06/01/20 1440    Order Status:  Completed Specimen:  Stool Updated:  06/04/20 1236     Significant Indicator NEG     Source STL     Site STOOL     Culture Result No enteric pathogens isolated.  NOTE:  Stool cultures are screened for Shiga Toxins 1 and 2,  Salmonella, Shigella, Campylobacter, Aeromonas,  Plesiomonas, and Vibrio.       [facility] Negative for Shiga Toxin 1 and 2.    Narrative:       Special Contact Isolation  FENa = (UrineNa / SerumNA) / (UrineCr / SerumCr) *100  Special Contact Isolation  Is this test for diagnosis or screening?->Diagnosis of ill  patient  Special Contact Isolation    URINE CULTURE(NEW) [[id number]]     Order Status:  Canceled     [facility](Siga Toxin)Detection [[id number]] Collected:  06/01/20 1440    Order Status:  Completed Specimen:  Stool Updated:  06/02/20 1620     Significant Indicator NEG     Source STL     Site STOOL     [facility] Negative for Shiga Toxin 1 and 2.    Narrative:       Special Contact Isolation  FENa = (UrineNa / SerumNA) / (UrineCr / SerumCr) *100  Special Contact  Isolation  Is this test for diagnosis or screening?->Diagnosis of ill  patient  Special Contact Isolation    C Diff by PCR rflx Toxin [[id number]] Collected:  06/02/20 0739    Order Status:  Completed Specimen:  Stool Updated:  06/02/20 1507     C Diff by PCR Negative     Comment: C. difficile NOT detected by PCR.  Treatment not indicated per guidelines.  Repeat testing not indicated within 7 days.          027-NAP1-BI Presumptive Negative     Comment: Presumptive 027/NAP1/BI target DNA sequences are NOT DETECTED.       Narrative:       Special Contact [id number] DAKSHA GRANT  Does this patient have risk factors for C-diff?->Yes  C-Diff Risk Factors->a serious underlying illness    C Diff by PCR rflx Toxin [[id number]] Collected:  06/01/20 1440    Order Status:  Completed Specimen:  Stool Updated:  06/01/20 2240     C Diff by PCR Negative     Comment: C. difficile NOT detected by PCR.  Treatment not indicated per guidelines.  Repeat testing not indicated within 7 days.          027-NAP1-BI Presumptive Negative     Comment: Presumptive 027/NAP1/BI target DNA sequences are NOT DETECTED.       Narrative:       Special Contact Isolation  Does this patient have risk factors for C-diff?->Yes  C-Diff Risk Factors->antibiotic exposure  Has patient taken stool softeners or laxatives in the last 5  days?->No    URINE CULTURE(NEW) [[id number]] Collected:  06/01/20 1440    Order Status:  Canceled     URINALYSIS CULTURE, IF INDICATED [[id number]]  (Abnormal) Collected:  06/01/20 1220    Order Status:  Completed Specimen:  Urine Updated:  06/01/20 1412     Color Yellow     Character Clear     Specific Gravity 1.025     Ph 5.0     Glucose Negative mg/dL      Ketones Negative mg/dL      Protein 100 mg/dL      Bilirubin Negative     Urobilinogen, Urine 0.2     Nitrite Negative     Leukocyte Esterase Small     Occult Blood Large     Micro Urine Req Microscopic    Narrative:       Special Contact Isolation  Does this patient have  risk factors for C-diff?->Yes  C-Diff Risk Factors->antibiotic exposure  Has patient taken stool softeners or laxatives in the last 5  days?->No    SARS-CoV-2, PCR (In-House) [[id number]] Collected:  06/01/20 1240    Order Status:  Completed Updated:  06/01/20 1357     SARS-CoV-2 Source NP Swab     SARS-CoV-2 by PCR NotDetected     Comment: [facility] providers: PLEASE REFER TO DE-ESCALATION AND RETESTING PROTOCOL  on [email or web address]  **The [employer] GeneXpert Xpress SARS-CoV-2 Test has been made available for  use under the Emergency Use Authorization (EUA) only.         Narrative:       Special Contact Isolation  FENa = (UrineNa / SerumNA) / (UrineCr / SerumCr) *100  Special Contact Isolation  Is this test for diagnosis or screening?->Diagnosis of ill  patient    COVID/SARS CoV-2 [[id number]] Collected:  06/01/20 1240    Order Status:  Completed Specimen:  Respirate from Nasopharyngeal Updated:  06/01/20 1255     COVID Order Status Received    Narrative:       Special Contact Isolation  FENa = (UrineNa / SerumNA) / (UrineCr / SerumCr) *100  Special Contact Isolation  Is this test for diagnosis or screening?->Diagnosis of ill  patient    Blood Culture,Hold [[id number]] Collected:  06/01/20 1108    Order Status:  Completed Updated:  06/01/20 1200     Blood Culture Hold Collected          Assessment:  Active Hospital Problems    Diagnosis   • *Staphylococcus aureus bacteremia [R78.81, B95.61]   • Acute renal failure with tubular necrosis (HCC) [N17.0]   • Diabetic ketoacidosis without coma associated with type 2 diabetes mellitus (HCC) [E11.10]   • Pulmonary hypertension (HCC) [I27.20]   • Acute pyelonephritis [N10]   • Bronchiectasis (HCC) [J47.9]   • Hyponatremia [E87.1]   • Septic shock (HCC) [A41.9, R65.21]   • Diarrhea [R19.7]   • Adrenal insufficiency (HCC) [E27.40]   • Metabolic acidosis [E87.2]     Interval 24 hours:      AF, O2 RA  Labs reviewed  Imaging personally reviewed both images and report.   Studies  reviewed  Micro reviewed    Pt with ongoing chronic abdominal pain, some loose stools. She has chronic low back pain that she states in uncharged from her baseline. No new neck, back or joint pain. She is continued on daptomycin q48 hours based on renal function and rifampin.       ASSESSMENT/PLAN:      60 y.o.  admitted 6/1/2020. Pt has a past medical history of RA on chronic prednisone 10 mg daily, DMT2 , CKD stage III.  She reports abdominal pain on and off again since December and also history of frequent UTIs.  She is most recently treated with amoxicillin but been off antibiotics for approximately 1 week prior to her admission. Presented to ER at [facility] c/o epigastric abdominal pain described as burning as well as nausea vomiting and diarrhea x4 days.  She was hypotensive, requiring oxygen support with lab irregularities including leukocytosis to 14.5, hyponatremia, elevated creatinine, elevated glucose and elevated lactic acid.  She was started on Unasyn and Levophed and transferred to [facility] for higher level of care.  The patient has hardware - pins in her right foot, a plate in her left ankle and replaced knuckle in her right hand     Hospital Course:   She has been afebrile, initially hypotensive and now improved.  Leukocytosis to 19.7 on admit.  Acute kidney injury on arrival.  Stool cultures were obtained which are pending in part but negative for Shiga toxin 1 and 2.  C. difficile was tested on 6/1 and 6/2 and negative on both.  Blood cultures were obtained which are positive for MRSA.  She was started on vancomycin Flagyl and ceftriaxone.  Transitioned to daptomycin.      Problem List      Septic and hypovolemic shock, secondary to MRSA bacteremia and profuse diarrhea - improved   MRSA bacteremia- source may be urinary but no urine cx to confirm, no other obvious source,   -Blood cultures from [facility] 6/1 are 2/2+ for MRSA per microbiology lab there, no urine culture was  performed  -Blood cultures on 6/2 2/2+ for MRSA  -TTE on 6/4-  negative for vegetations.  Moderate to severe tricuspid regurgitation, diastolic dysfunction  Leukocytosis-improved  UTI/Pyelonephritis-she endorses dysuria with pain, burning and hematuria prior to admission  -UA suggestive of infection, no urine culture  -CT on 6/1 with right-sided fat stranding suspicious for infection/Pyelo   Abdominal pain, epigastric- ongoing - chronic   Diarrhea - improved but still ongoing   -C. difficile tested negative x2  -Stool cultures pending, Shiga toxin 1 and 2  Acute on chronic kidney disease -improving -nephrology following and per notes no need for acute dialysis  Bronchiectasis, noted on CT in right lower lobe as well as atelectasis, calcified granuloma in left upper lobe groundglass nodule on right  Diabetes, uncontrolled   Diabetic ketoacidosis on presentation- improving   Rheumatoid arthritis  Immunosuppression,  secondary to above on chronic prednisone 10 mg daily  Thrombocytopenia-ongoing, appears to be chronic  Anemia-ongoing  Antibiotic allergies: Cephalexin reported as vomiting  Grade 2 diastolic heart failure on TTE  Low back pain-chronic, ongoing      Plan      ---  Stopped vancomycin and transitioned to daptomycin due to RAF. Continue daptomycin at 8 mg/kg q48 hours, follow CPK 2x weekly due to renal dysfunction-  17 on 6/6   --- Continue rifampin due to hardware. HOWEVER, she is also on chronic steroids and this reaction could potentially cause some decrease in steroid.  Monitor clinically.    --- TTE negative but patient is immunocompromised due to chronic steroid therapy and she has blood cultures positive on admit so unknown duration-recommend VERONICA to rule out endocarditis and results this will also effect duration of antibiotics  --- F/up repeat blood cultures- ordered on 6/5 and not drawn   --- We will need multiple weeks of IV antibiotics, no PICC line until blood cultures are clear for 48  hours  --- Continue to monitor for any new neck joint or back pain image appropriately-very low threshold for imaging of lumbar spine- she says her pain is chronic and unchanged   --- Monitor labs,  CBC and CMP for LFTs as on rifampin  --- If she spikes a new fever please obtain a urine culture  --- Epigastric abdominal pain continues- may need GI evaluation          Plan of care discussed with Dr. Neville and ICU nurse.  Will continue to follow.

## 2020-06-06 NOTE — PROGRESS NOTES
[facility] Medicine Daily Progress Note    Date of Service  6/6/2020    Chief Complaint  60 y.o. female admitted 6/1/2020 with diarrhea.    Hospital Course   Ms. Mcclain is a 60 y.o. female who presented 6/1/2020 with past medical history of rheumatoid arthritis on chronic prednisone therapy, type 2 diabetes on metformin, CKD stage III who comes into the emergency room with complaints of nausea, vomiting and diarrhea for the past 4 days.  Associated with an epigastric abdominal pain.  The pain radiates to the lower abdomen and feels like a burning sensation.  The pain is nonexertional, non-positional.  Patient states that she has not gone to the bathroom to urinate in 4 days.  She would have a bowel movement every 30 minutes.  She denies any blood in her stools.  Patient denies any fever, cough, shortness of breath, chest pain.   Patient came from outlying facility from [facility] includes  Blood pressure 86/50, respiratory rate 20, saturating 90% on 2 L oxygen, temperature 98.4  Sodium 129, potassium 3.6, chloride 89, CO2 21, AST 46, ALT 30, BUN 67, glucose 399, creatinine 6.4, calcium 8, bili 1.5, anion gap 22, lactic acid 2.5, lipase 163, , CK-MB 1.1, troponin 0.062, WBC 14.5, hemoglobin 10.9, platelets 175, d-dimer 3 8378, TSH 8.67  Patient was started on Unasyn and Levophed with ICU admission        Interval Problem Update  6/3: patient seen and evaluated in the ICU. The lab from [place] faxed over a preliminary + blood culture revealing gram + cocci in clusters. She has been started on broad spectrum antibiotics. She transitioned off the insulin drip to sliding scale.   6/4: Ms. Mcclain was evaluated and examined in the ICU. Her blood cultures have come back + for MRSA thus she has been placed under precautions. I have consulted Dr. Xavier, ID. She remains on IV fluids and her Cr is down to 3.7. glucose went down to 57 this morning.   6/5: patient seen and evaluated in the ICU. Reportedly there was not  a urine culture obtained in [place] and it was not done here either. An echocardiogram was done yesterday and did not reveal vegetations. I discussed with Dr. Navarro about a VERONICA and Dr. Ba about performing conscious sedation to eval for endocarditis.   6/6: patient seen and evaluated in the ICU. She had a VERONICA yesterday. Cr is 3.22 today.  Consultants/Specialty  Critical care.   Nephrology   Infectious disease. I discussed with Dr. Xavier today  Code Status  Full     Disposition  med    Review of Systems  Review of Systems   Constitutional: Negative for chills and fever.   Respiratory: Negative for cough and shortness of breath.    Cardiovascular: Negative for chest pain and leg swelling.   Gastrointestinal: Positive for diarrhea. Negative for abdominal pain.        Poor appetite   Genitourinary: Negative for dysuria.   Musculoskeletal: Negative for joint pain.   Skin: Negative for itching and rash.   Neurological: Negative for dizziness.   All other systems reviewed and are negative.       Physical Exam  Temp:  [36.1 °C (97 °F)-36.7 °C (98 °F)] 36.7 °C (98 °F)  Pulse:  [72-96] 82  BP: (138-173)/(85-96) 154/90  SpO2:  [93 %-99 %] 94 %    Physical Exam  Vitals signs and nursing note reviewed.   Constitutional:       Appearance: She is not toxic-appearing.      Comments: Thin, chronically ill appearing   HENT:      Mouth/Throat:      Mouth: Mucous membranes are dry.      Pharynx: Oropharynx is clear.   Eyes:      General: No scleral icterus.     Conjunctiva/sclera: Conjunctivae normal.   Neck:      Musculoskeletal: Normal range of motion and neck supple.   Cardiovascular:      Rate and Rhythm: Normal rate and regular rhythm.      Heart sounds: No murmur.   Pulmonary:      Effort: Pulmonary effort is normal.      Breath sounds: No wheezing or rales.   Abdominal:      General: Bowel sounds are normal. There is no distension.      Palpations: Abdomen is soft.      Tenderness: There is no abdominal tenderness.    Musculoskeletal:         General: No swelling.      Right lower leg: No edema.      Left lower leg: No edema.      Comments: Right toes amputated   Skin:     General: Skin is dry.      Coloration: Skin is pale.   Neurological:      General: No focal deficit present.      Mental Status: She is alert and oriented to person, place, and time.   Psychiatric:         Behavior: Behavior normal.      Comments: She is in good spirits         Fluids    Intake/Output Summary (Last 24 hours) at 6/6/2020 0819  Last data filed at 6/6/2020 0600  Gross per 24 hour   Intake 643.67 ml   Output 2325 ml   Net -1681.33 ml       Laboratory  Recent Labs     06/04/20  0420   WBC 7.0   RBC 3.17*   HEMOGLOBIN 8.5*   HEMATOCRIT 27.8*   MCV 87.7   MCH 26.8*   MCHC 30.6*   RDW 49.3   PLATELETCT 127*   MPV 11.6     Recent Labs     06/04/20  0420 06/05/20  0530 06/06/20  0649   SODIUM 135 130* 140   POTASSIUM 4.5 3.9 4.0   CHLORIDE 108 103 109   CO2 15* 17* 18*   GLUCOSE 79 83 149*   BUN 54* 51* 48*   CREATININE 3.77* 3.43* 3.22*   CALCIUM 8.4* 8.8 8.8                   Imaging  EC-VERONICA W/O CONT         EC-ECHOCARDIOGRAM COMPLETE W/O CONT   Final Result      CT-CHEST,ABDOMEN,PELVIS W/O   Final Result         1.  Asymmetric right perinephric fat stranding suspicious for infection/pyelonephritis. Correlate with urinalysis. No significant hydronephrosis. No obstructing stone is seen.   2.  Hepatomegaly and hepatic steatosis.   3.  Rectosigmoid colon is decompressed, limiting evaluation for wall thickening. Correlate for evidence of colitis.   4.  Right lower lobe bronchiectasis and peribronchial opacities likely postinflammatory with atelectasis/scarring. Correlate clinically for evidence of infection.   5.  Atherosclerosis.                    Assessment/Plan  * Staphylococcus aureus bacteremia- (present on admission)  Assessment & Plan  Blood cultures + for Staph Aureus bacteremia prior to transfer and again on 6/2  Repeat blood cultures on  6/6  Source is presumably pyelonephritis  IV Daptomycin and rifampin  VERONICA on 6/5 which was negative for vegetations  Infectious disease consulted    Septic shock (HCC)- (present on admission)  Assessment & Plan  This is Septic shock Present on admission  SIRS criteria identified on my evaluation include: Tachycardia, with heart rate greater than 90 BPM and Leukocytosis, with WBC greater than 12,000  Source is pyelonephritis with MRSA bacteremia  Presentation includes: Severe sepsis present and persistent hypotension after 30 ml/kg completed.   Despite appropriate fluid resuscitation with crystalloid given per sepsis guidelines, the patient remained hypotensive and required IV levophed needed to maintain a SBP of 90 or MAP of 65        Diabetic ketoacidosis without coma associated with type 2 diabetes mellitus (HCC)- (present on admission)  Assessment & Plan  With renal failure, elevated but hydroxybutyrate  She was admitted to the ICU with an insulin drip and IV fluids  Patient has been started on IV fluids and IV insulin per DKA protocol  She has been transitioned to long-acting glargine and sliding scale subcutaneous insulin on 6/3--her glucose went down to 57 thus glargine 10 units stopped on 6/4  Hemoglobin A1c ordered on 6/6  Diabetes education ordered  She had been on metformin at home  Likely start long-acting insulin once she is tolerating an increase in her diet      Acute renal failure with tubular necrosis (HCC)- (present on admission)  Assessment & Plan  Likely prerenal-combination of sepsis, dehydration from diarrhea, likely component of acute tubular necrosis  Stop IV fluids on 6/5  Cr 5.5 on admit and remains high at 3.2  Nephrology consulted   Follow urine output.  Check a BMP every other day for now.      Pulmonary hypertension (HCC)- (present on admission)  Assessment & Plan  Echocardiogram reveals a RVSP 53 mm Hg thus she is at risk of volume overload    Metabolic acidosis- (present on  admission)  Assessment & Plan  Secondary to DKA and renal failure      Adrenal insufficiency (HCC)- (present on admission)  Assessment & Plan  Patient is on chronic prednisone therapy for rheumatoid arthritis.  She presented with shock thus was started on IV hydrocortisone  Now transitioned back to home dose of prednisone 10 mg daily.    Diarrhea- (present on admission)  Assessment & Plan  C diff negative    Hyponatremia- (present on admission)  Assessment & Plan  Secondary to hyperglycemia       Bronchiectasis (HCC)- (present on admission)  Assessment & Plan  Chest CT findings - with no complains of cough or dyspnea   Patient had a previous pneumonia  Albuterol prn    Acute pyelonephritis- (present on admission)  Assessment & Plan  Presumably from Staph Aureus             VTE prophylaxis: heparin

## 2020-06-07 LAB
ALBUMIN SERPL BCP-MCNC: 2.2 G/DL (ref 3.2–4.9)
ALBUMIN/GLOB SERPL: 0.7 G/DL
ALP SERPL-CCNC: 140 U/L (ref 30–99)
ALT SERPL-CCNC: 76 U/L (ref 2–50)
ANION GAP SERPL CALC-SCNC: 12 MMOL/L (ref 7–16)
AST SERPL-CCNC: 36 U/L (ref 12–45)
BILIRUB SERPL-MCNC: 0.5 MG/DL (ref 0.1–1.5)
BUN SERPL-MCNC: 44 MG/DL (ref 8–22)
CALCIUM SERPL-MCNC: 8.7 MG/DL (ref 8.5–10.5)
CHLORIDE SERPL-SCNC: 109 MMOL/L (ref 96–112)
CK SERPL-CCNC: 19 U/L (ref 0–154)
CO2 SERPL-SCNC: 18 MMOL/L (ref 20–33)
CREAT SERPL-MCNC: 2.85 MG/DL (ref 0.5–1.4)
GLOBULIN SER CALC-MCNC: 3.1 G/DL (ref 1.9–3.5)
GLUCOSE BLD-MCNC: 133 MG/DL (ref 65–99)
GLUCOSE BLD-MCNC: 135 MG/DL (ref 65–99)
GLUCOSE BLD-MCNC: 176 MG/DL (ref 65–99)
GLUCOSE BLD-MCNC: 179 MG/DL (ref 65–99)
GLUCOSE SERPL-MCNC: 171 MG/DL (ref 65–99)
POTASSIUM SERPL-SCNC: 3.8 MMOL/L (ref 3.6–5.5)
PROT SERPL-MCNC: 5.3 G/DL (ref 6–8.2)
SODIUM SERPL-SCNC: 139 MMOL/L (ref 135–145)

## 2020-06-07 PROCEDURE — 36415 COLL VENOUS BLD VENIPUNCTURE: CPT

## 2020-06-07 PROCEDURE — 770021 HCHG ROOM/CARE - ISO PRIVATE

## 2020-06-07 PROCEDURE — 700111 HCHG RX REV CODE 636 W/ 250 OVERRIDE (IP): Performed by: INTERNAL MEDICINE

## 2020-06-07 PROCEDURE — 700102 HCHG RX REV CODE 250 W/ 637 OVERRIDE(OP): Performed by: INTERNAL MEDICINE

## 2020-06-07 PROCEDURE — 99233 SBSQ HOSP IP/OBS HIGH 50: CPT | Performed by: INTERNAL MEDICINE

## 2020-06-07 PROCEDURE — 700102 HCHG RX REV CODE 250 W/ 637 OVERRIDE(OP): Performed by: PSYCHIATRY & NEUROLOGY

## 2020-06-07 PROCEDURE — 80053 COMPREHEN METABOLIC PANEL: CPT

## 2020-06-07 PROCEDURE — 700105 HCHG RX REV CODE 258: Performed by: INTERNAL MEDICINE

## 2020-06-07 PROCEDURE — 700102 HCHG RX REV CODE 250 W/ 637 OVERRIDE(OP): Performed by: HOSPITALIST

## 2020-06-07 PROCEDURE — A9270 NON-COVERED ITEM OR SERVICE: HCPCS | Performed by: PSYCHIATRY & NEUROLOGY

## 2020-06-07 PROCEDURE — A9270 NON-COVERED ITEM OR SERVICE: HCPCS | Performed by: HOSPITALIST

## 2020-06-07 PROCEDURE — 82550 ASSAY OF CK (CPK): CPT

## 2020-06-07 PROCEDURE — 82962 GLUCOSE BLOOD TEST: CPT

## 2020-06-07 PROCEDURE — 700111 HCHG RX REV CODE 636 W/ 250 OVERRIDE (IP): Performed by: HOSPITALIST

## 2020-06-07 PROCEDURE — A9270 NON-COVERED ITEM OR SERVICE: HCPCS | Performed by: INTERNAL MEDICINE

## 2020-06-07 PROCEDURE — 99233 SBSQ HOSP IP/OBS HIGH 50: CPT | Performed by: HOSPITALIST

## 2020-06-07 PROCEDURE — 700111 HCHG RX REV CODE 636 W/ 250 OVERRIDE (IP): Performed by: PSYCHIATRY & NEUROLOGY

## 2020-06-07 RX ADMIN — ACETAMINOPHEN 650 MG: 325 TABLET, FILM COATED ORAL at 14:20

## 2020-06-07 RX ADMIN — INSULIN HUMAN 2 UNITS: 100 INJECTION, SOLUTION PARENTERAL at 12:25

## 2020-06-07 RX ADMIN — OXYCODONE 5 MG: 5 TABLET ORAL at 17:47

## 2020-06-07 RX ADMIN — LATANOPROST 1 DROP: 50 SOLUTION OPHTHALMIC at 21:42

## 2020-06-07 RX ADMIN — PREDNISONE 10 MG: 10 TABLET ORAL at 06:44

## 2020-06-07 RX ADMIN — RIFAMPIN 300 MG: 300 CAPSULE ORAL at 06:44

## 2020-06-07 RX ADMIN — DAPTOMYCIN 530 MG: 350 INJECTION, POWDER, LYOPHILIZED, FOR SOLUTION INTRAVENOUS at 09:53

## 2020-06-07 RX ADMIN — INSULIN HUMAN 2 UNITS: 100 INJECTION, SOLUTION PARENTERAL at 08:00

## 2020-06-07 RX ADMIN — SITAGLIPTIN 25 MG: 25 TABLET, FILM COATED ORAL at 14:20

## 2020-06-07 RX ADMIN — OXYCODONE 5 MG: 5 TABLET ORAL at 11:19

## 2020-06-07 RX ADMIN — HEPARIN SODIUM 5000 UNITS: 5000 INJECTION, SOLUTION INTRAVENOUS; SUBCUTANEOUS at 21:43

## 2020-06-07 RX ADMIN — RIFAMPIN 300 MG: 300 CAPSULE ORAL at 17:47

## 2020-06-07 RX ADMIN — OXYCODONE 5 MG: 5 TABLET ORAL at 02:52

## 2020-06-07 RX ADMIN — HEPARIN SODIUM 5000 UNITS: 5000 INJECTION, SOLUTION INTRAVENOUS; SUBCUTANEOUS at 14:20

## 2020-06-07 RX ADMIN — TIMOLOL MALEATE 1 DROP: 5 SOLUTION/ DROPS OPHTHALMIC at 06:44

## 2020-06-07 RX ADMIN — OMEPRAZOLE 20 MG: 20 CAPSULE, DELAYED RELEASE ORAL at 17:47

## 2020-06-07 RX ADMIN — OMEPRAZOLE 20 MG: 20 CAPSULE, DELAYED RELEASE ORAL at 06:44

## 2020-06-07 RX ADMIN — HEPARIN SODIUM 5000 UNITS: 5000 INJECTION, SOLUTION INTRAVENOUS; SUBCUTANEOUS at 06:42

## 2020-06-07 ASSESSMENT — ENCOUNTER SYMPTOMS
BACK PAIN: 1
VOMITING: 0
COUGH: 0
NAUSEA: 0
WEIGHT LOSS: 0
PALPITATIONS: 0
ABDOMINAL PAIN: 1
ORTHOPNEA: 0
WHEEZING: 0
ROS GI COMMENTS: MODERATE APPETITE
ABDOMINAL PAIN: 0
DIARRHEA: 0
HEMOPTYSIS: 0
CONSTIPATION: 0
EYES NEGATIVE: 1
MYALGIAS: 0
NECK PAIN: 0
SHORTNESS OF BREATH: 0
FEVER: 0
SINUS PAIN: 0
CHILLS: 0

## 2020-06-07 NOTE — PROGRESS NOTES
Nephrology Daily Progress Note    Date of Service  6/7/2020    Chief Complaint  60 y.o. female admitted 6/1/2020 with gastroenteritis, volume depletion, RAF, metabolic acidosis, DKA, MRSA  bacteriemia    Interval Problem Update  6/2 -still with diarrhea,   Nausea/vomiting better  Non oliguric  Creat level improving  On DKA protocol  6/3 feels much better  No N/V/D  Good UOP  6/4  -still poor appetite, some nausea  Diarrhea improved  Good UOP  Creat level improving  6/5 -doing better  No N/V/D  good UOP  Creat slowly improving  Acidosis improving  6/6 no acute events  Po intake better  Mild abdominal pain/distention  Good UOP  Creat slowly improving  Acidosis improving  6/7 -doing well, no complaints  Transferred to regular floor  No acute events  Good UOP  Creat improving  Review of Systems  Review of Systems   Constitutional: Negative for chills, fever, malaise/fatigue and weight loss.   HENT: Negative for congestion, hearing loss and sinus pain.    Eyes: Negative.    Respiratory: Negative for cough, hemoptysis, shortness of breath and wheezing.    Cardiovascular: Negative for chest pain, palpitations, orthopnea and leg swelling.   Gastrointestinal: Positive for abdominal pain. Negative for diarrhea, nausea and vomiting.   Genitourinary: Negative for dysuria, frequency, hematuria and urgency.   Skin: Negative.    All other systems reviewed and are negative.       Physical Exam  Temp:  [36.4 °C (97.5 °F)-36.9 °C (98.5 °F)] 36.4 °C (97.5 °F)  Pulse:  [70-91] 70  Resp:  [17-18] 17  BP: (150-155)/(76-89) 152/80  SpO2:  [92 %-96 %] 95 %    Physical Exam  Vitals signs and nursing note reviewed.   Constitutional:       General: She is not in acute distress.     Appearance: Normal appearance. She is well-developed. She is not diaphoretic.   HENT:      Head: Normocephalic and atraumatic.      Nose: Nose normal.      Mouth/Throat:      Mouth: Mucous membranes are moist.      Pharynx: Oropharynx is clear.   Eyes:       General: No scleral icterus.     Extraocular Movements: Extraocular movements intact.      Conjunctiva/sclera: Conjunctivae normal.      Pupils: Pupils are equal, round, and reactive to light.   Neck:      Musculoskeletal: Normal range of motion and neck supple.   Cardiovascular:      Rate and Rhythm: Normal rate and regular rhythm.      Pulses: Normal pulses.      Heart sounds: Normal heart sounds. No friction rub. No gallop.    Pulmonary:      Effort: Pulmonary effort is normal. No respiratory distress.      Breath sounds: Normal breath sounds. No wheezing, rhonchi or rales.   Abdominal:      General: Bowel sounds are normal. There is no distension.      Palpations: Abdomen is soft. There is no mass.      Tenderness: There is abdominal tenderness. There is no right CVA tenderness, left CVA tenderness or guarding.   Musculoskeletal:         General: No swelling.      Right lower leg: No edema.      Left lower leg: No edema.   Skin:     General: Skin is warm.      Coloration: Skin is not jaundiced.      Findings: No erythema or rash.   Neurological:      General: No focal deficit present.      Mental Status: She is alert and oriented to person, place, and time.      Cranial Nerves: No cranial nerve deficit.      Coordination: Coordination normal.   Psychiatric:         Mood and Affect: Mood normal.         Behavior: Behavior normal.         Thought Content: Thought content normal.         Judgment: Judgment normal.         Fluids    Intake/Output Summary (Last 24 hours) at 6/7/2020 1256  Last data filed at 6/7/2020 1118  Gross per 24 hour   Intake 1000 ml   Output --   Net 1000 ml       Laboratory      Recent Labs     06/05/20  0530 06/06/20  0649 06/07/20  0225   SODIUM 130* 140 139   POTASSIUM 3.9 4.0 3.8   CHLORIDE 103 109 109   CO2 17* 18* 18*   GLUCOSE 83 149* 171*   BUN 51* 48* 44*   CREATININE 3.43* 3.22* 2.85*   CALCIUM 8.8 8.8 8.7               Imaging  reviewed    Assessment/Plan  1.RAF due to volume  depletion - improving creat level, good UOP -to monitor  2.HTN; BP well controlled  3.Electrolytes: hyponatremia: normalized, electrolytes well controlled  4.Anemia:low Hb level stable  5.metabolic acidosis -improving  6.Volume: well controlled      Recs: no need for emergent dialysis             Daily labs             Continue current treatment             Avoid nephrotoxic agents             All meds to renal doses             encourage po intake             Will follow

## 2020-06-07 NOTE — PROGRESS NOTES
Pt transferred from R107 to S531 via transport tech in wheelchair. Pt left with all belongings (including cell phone and ), medications and chart. Report to Luke MOELLER. All questions answered.

## 2020-06-07 NOTE — CARE PLAN
Problem: Infection  Goal: Will remain free from infection  Outcome: PROGRESSING AS EXPECTED     Problem: Bowel/Gastric:  Goal: Normal bowel function is maintained or improved  Outcome: PROGRESSING AS EXPECTED  Goal: Will not experience complications related to bowel motility  Outcome: PROGRESSING AS EXPECTED     Problem: Knowledge Deficit  Goal: Knowledge of disease process/condition, treatment plan, diagnostic tests, and medications will improve  Outcome: PROGRESSING AS EXPECTED  Goal: Knowledge of the prescribed therapeutic regimen will improve  Outcome: PROGRESSING AS EXPECTED     Problem: Fluid Volume:  Goal: Will maintain balanced intake and output  Outcome: PROGRESSING AS EXPECTED     Problem: Pain Management  Goal: Pain level will decrease to patient's comfort goal  Outcome: PROGRESSING AS EXPECTED     Problem: Skin Integrity  Goal: Risk for impaired skin integrity will decrease  Outcome: PROGRESSING AS EXPECTED     Problem: Respiratory:  Goal: Respiratory status will improve  Outcome: PROGRESSING AS EXPECTED     Problem: Mobility  Goal: Risk for activity intolerance will decrease  Outcome: PROGRESSING AS EXPECTED

## 2020-06-07 NOTE — PROGRESS NOTES
2 RN Skin Check    2 RN skin check complete with Anjelica MENDOZA RN   Pt transfer from [facility].   Devices in place: PIV in L forearm  Skin assessed under devices: yes  Confirmed pressure ulcers found on: NA  New potential pressure ulcers noted on NA . Wound consult placed NA  Pts left foot has all toes amputated.   The following interventions in place: Pt on redistribution mattress, pt can turn self from side to side, pt ambulatory.

## 2020-06-07 NOTE — CARE PLAN
Problem: Communication  Goal: The ability to communicate needs accurately and effectively will improve  Outcome: MET  Note: Pt educated on use of call light and white board for communication, pt verbalizes understanding of white board communication and times of rounding.            Problem: Pain Management  Goal: Pain level will decrease to patient's comfort goal  Note: Pain assessed Q2h. Pain medication given per orders, see MAR.

## 2020-06-07 NOTE — PROGRESS NOTES
[facility] Medicine Daily Progress Note    Date of Service  6/7/2020    Chief Complaint  60 y.o. female admitted 6/1/2020 with diarrhea.    Hospital Course   Ms. Mcclain is a 60 y.o. female who presented 6/1/2020 with past medical history of rheumatoid arthritis on chronic prednisone therapy, type 2 diabetes on metformin, CKD stage III who comes into the emergency room with complaints of nausea, vomiting and diarrhea for the past 4 days.  Associated with an epigastric abdominal pain.  The pain radiates to the lower abdomen and feels like a burning sensation.  The pain is nonexertional, non-positional.  Patient states that she has not gone to the bathroom to urinate in 4 days.  She would have a bowel movement every 30 minutes.  She denies any blood in her stools.  Patient denies any fever, cough, shortness of breath, chest pain.   Patient came from outlying facility from [facility] includes  Blood pressure 86/50, respiratory rate 20, saturating 90% on 2 L oxygen, temperature 98.4  Sodium 129, potassium 3.6, chloride 89, CO2 21, AST 46, ALT 30, BUN 67, glucose 399, creatinine 6.4, calcium 8, bili 1.5, anion gap 22, lactic acid 2.5, lipase 163, , CK-MB 1.1, troponin 0.062, WBC 14.5, hemoglobin 10.9, platelets 175, d-dimer 3 8378, TSH 8.67  Patient was started on Unasyn and Levophed with ICU admission        Interval Problem Update  6/3: patient seen and evaluated in the ICU. The lab from [place] faxed over a preliminary + blood culture revealing gram + cocci in clusters. She has been started on broad spectrum antibiotics. She transitioned off the insulin drip to sliding scale.   6/4: Ms. Mcclain was evaluated and examined in the ICU. Her blood cultures have come back + for MRSA thus she has been placed under precautions. I have consulted Dr. Xavier, ID. She remains on IV fluids and her Cr is down to 3.7. glucose went down to 57 this morning.   6/5: patient seen and evaluated in the ICU. Reportedly there was not  a urine culture obtained in [place] and it was not done here either. An echocardiogram was done yesterday and did not reveal vegetations. I discussed with Dr. Navarro about a VERONICA and Dr. Ba about performing conscious sedation to eval for endocarditis.   6/6: patient seen and evaluated in the ICU. She had a VERONICA yesterday. Cr is 3.22 today.  6/7: Ms. Mcclain was evaluated and examined on the medical floor. Her glucose remains a bit high today thus Januvia will be initiated. Her Cr is 2.8 today. We discussed a PICC and 4-6 weeks of IV antibiotics as an outpatient or in a rehab facility.   Consultants/Specialty  Critical care.   Nephrology   Infectious disease. I discussed with Dr. Xavier today  Code Status  Full     Disposition  med    Review of Systems  Review of Systems   Constitutional: Negative for chills, fever and malaise/fatigue.   Respiratory: Negative for cough and shortness of breath.    Cardiovascular: Negative for chest pain and leg swelling.   Gastrointestinal: Negative for abdominal pain and diarrhea.        Moderate appetite   Genitourinary: Negative for dysuria.   Musculoskeletal: Negative for joint pain.   Neurological:        No problems with her balance   All other systems reviewed and are negative.       Physical Exam  Temp:  [36.6 °C (97.8 °F)-36.9 °C (98.5 °F)] 36.6 °C (97.8 °F)  Pulse:  [75-91] 91  Resp:  [17-18] 18  BP: (150-155)/(76-90) 155/89  SpO2:  [92 %-96 %] 93 %    Physical Exam  Vitals signs and nursing note reviewed.   Constitutional:       Appearance: Normal appearance. She is not toxic-appearing.   HENT:      Mouth/Throat:      Mouth: Mucous membranes are dry.      Pharynx: Oropharynx is clear.   Eyes:      General: No scleral icterus.     Conjunctiva/sclera: Conjunctivae normal.   Neck:      Musculoskeletal: Normal range of motion and neck supple.   Cardiovascular:      Rate and Rhythm: Normal rate and regular rhythm.      Heart sounds: No murmur.   Pulmonary:      Effort: Pulmonary  effort is normal.      Breath sounds: No wheezing or rales.   Abdominal:      General: There is no distension.      Palpations: Abdomen is soft.      Tenderness: There is no abdominal tenderness.   Musculoskeletal:         General: No swelling.      Right lower leg: No edema.      Left lower leg: No edema.      Comments: Right toes amputated   Skin:     General: Skin is warm and dry.      Findings: No rash.   Neurological:      General: No focal deficit present.      Mental Status: She is alert and oriented to person, place, and time. Mental status is at baseline.   Psychiatric:         Mood and Affect: Mood normal.         Behavior: Behavior normal.         Fluids    Intake/Output Summary (Last 24 hours) at 6/7/2020 0704  Last data filed at 6/6/2020 1100  Gross per 24 hour   Intake --   Output 1001 ml   Net -1001 ml       Laboratory      Recent Labs     06/05/20  0530 06/06/20  0649 06/07/20  0225   SODIUM 130* 140 139   POTASSIUM 3.9 4.0 3.8   CHLORIDE 103 109 109   CO2 17* 18* 18*   GLUCOSE 83 149* 171*   BUN 51* 48* 44*   CREATININE 3.43* 3.22* 2.85*   CALCIUM 8.8 8.8 8.7                   Imaging  EC-VERONICA W/O CONT         EC-ECHOCARDIOGRAM COMPLETE W/O CONT   Final Result      CT-CHEST,ABDOMEN,PELVIS W/O   Final Result         1.  Asymmetric right perinephric fat stranding suspicious for infection/pyelonephritis. Correlate with urinalysis. No significant hydronephrosis. No obstructing stone is seen.   2.  Hepatomegaly and hepatic steatosis.   3.  Rectosigmoid colon is decompressed, limiting evaluation for wall thickening. Correlate for evidence of colitis.   4.  Right lower lobe bronchiectasis and peribronchial opacities likely postinflammatory with atelectasis/scarring. Correlate clinically for evidence of infection.   5.  Atherosclerosis.                    Assessment/Plan  * Staphylococcus aureus bacteremia- (present on admission)  Assessment & Plan  Blood cultures + for Staph Aureus bacteremia prior to  transfer and again on 6/2  Repeat blood cultures on 6/6  Source is presumably pyelonephritis  IV Daptomycin and rifampin  VERONICA on 6/5 which was negative for vegetations  Infectious disease consulted  PICC once blood cultures negative x 2    Septic shock (HCC)- (present on admission)  Assessment & Plan  This is Septic shock Present on admission  SIRS criteria identified on my evaluation include: Tachycardia, with heart rate greater than 90 BPM and Leukocytosis, with WBC greater than 12,000  Source is pyelonephritis with MRSA bacteremia  Presentation includes: Severe sepsis present and persistent hypotension after 30 ml/kg completed.   Despite appropriate fluid resuscitation with crystalloid given per sepsis guidelines, the patient remained hypotensive and required IV levophed needed to maintain a SBP of 90 or MAP of 65        Diabetic ketoacidosis without coma associated with type 2 diabetes mellitus (HCC)- (present on admission)  Assessment & Plan  With renal failure, elevated but hydroxybutyrate  She was admitted to the ICU with an insulin drip and IV fluids  She had been transitioned to long-acting glargine and sliding scale subcutaneous insulin on 6/3--her glucose went down to 57 thus glargine 10 units stopped on 6/4  Hemoglobin A1c 11.2  Diabetes education ordered  She had been on metformin at home  Start Januvia 25 mg daily on 6/7 and sliding scale. She likely will require low-dose glargine perhaps 7 units daily      Acute renal failure with tubular necrosis (HCC)- (present on admission)  Assessment & Plan  Likely prerenal-combination of sepsis, dehydration from diarrhea, likely component of acute tubular necrosis  Stop IV fluids on 6/5  Cr 5.5 on admit and remains high at 2.8 though improving  Nephrology consulted   Follow urine output.  Check a BMP every other day for now.      Pulmonary hypertension (HCC)- (present on admission)  Assessment & Plan  Echocardiogram reveals a RVSP 53 mm Hg thus she is at risk of  volume overload    Metabolic acidosis- (present on admission)  Assessment & Plan  Secondary to DKA and renal failure      Adrenal insufficiency (HCC)- (present on admission)  Assessment & Plan  Patient is on chronic prednisone therapy for rheumatoid arthritis.  She presented with shock thus was started on IV hydrocortisone  Now transitioned back to home dose of prednisone 10 mg daily.    Diarrhea- (present on admission)  Assessment & Plan  C diff negative    Hyponatremia- (present on admission)  Assessment & Plan  Secondary to hyperglycemia       Bronchiectasis (HCC)- (present on admission)  Assessment & Plan  Chest CT findings - with no complains of cough or dyspnea   Patient had a previous pneumonia  Albuterol prn    Acute pyelonephritis- (present on admission)  Assessment & Plan  Presumably from Staph Aureus             VTE prophylaxis: heparin

## 2020-06-07 NOTE — PROGRESS NOTES
Pt is A&Ox4.  No family is at bedside. VSS.  C/O 6/10 back pain.  Will give pain meds per orders when due, gave pt ice packs.  SBA up to BR.  Pt updated on POC, needs met and questions answered.  Calls appropriately.  Call light within reach and working properly.

## 2020-06-07 NOTE — PROGRESS NOTES
Infectious Disease Progress Note    Author: Kaci Xavier M.D. Date & Time of service: 2020  9:12 AM       Chief Complaint:  MRSA bacteremia      Interval History:  F, vanco trough 22.3, transfer out of ICU, awaiting bed. Improvement in nausea and diarrhea but still with abdominal pain.  She has some lumbar back pain which she states is chronic and unchanged.  No new joint pain. Pt antibiotics transition from vancomycin to daptomycin 8 mg q48 hours due to elevated Vanco trough and ongoing renal dysfunction.  Continued on rifampin 300 mg twice daily.  6 AF, O2 RA, ongoing chronic abdominal pain, some loose stools. She has chronic low back pain that she states in uncharged from her baseline. No new neck, back or joint pain. She is continued on daptomycin q48 hours based on renal function and rifampin.      Review of Systems:  Review of Systems   Constitutional: Positive for malaise/fatigue. Negative for chills and fever.   Respiratory: Negative for cough and shortness of breath.    Gastrointestinal: Positive for abdominal pain. Negative for constipation, diarrhea, nausea and vomiting.   Musculoskeletal: Positive for back pain. Negative for joint pain, myalgias and neck pain.       Hemodynamics:  Temp (24hrs), Av.7 °C (98 °F), Min:36.4 °C (97.5 °F), Max:36.9 °C (98.5 °F)  Temperature: 36.4 °C (97.5 °F)  Pulse  Av.8  Min: 61  Max: 96   Blood Pressure: 152/80       Physical Exam:  Physical Exam  Constitutional:       Appearance: Normal appearance.   Cardiovascular:      Rate and Rhythm: Normal rate and regular rhythm.      Heart sounds: Normal heart sounds.   Pulmonary:      Effort: Pulmonary effort is normal.      Breath sounds: Normal breath sounds.   Abdominal:      General: Abdomen is flat. Bowel sounds are normal. There is no distension.      Palpations: Abdomen is soft.      Tenderness: There is abdominal tenderness. There is no guarding.   Musculoskeletal:      Right lower leg: No edema.       Left lower leg: No edema.      Comments: No spinal point tenderness   Neurological:      General: No focal deficit present.      Mental Status: She is oriented to person, place, and time.   Psychiatric:         Mood and Affect: Mood normal.         Behavior: Behavior normal.         Meds:    Current Facility-Administered Medications:   •  fluticasone  •  omeprazole  •  DAPTOmycin  •  riFAMPin  •  insulin regular **AND** POC Blood Glucose **AND** NOTIFY MD and PharmD **AND** glucose **AND** dextrose 50%  •  predniSONE  •  heparin  •  oxyCODONE immediate-release  •  loperamide  •  [DISCONTINUED] magnesium sulfate **OR** magnesium sulfate  •  potassium phosphate ivpb **OR** sodium phosphate 30 mmol ivpb  •  acetaminophen  •  ondansetron  •  ondansetron  •  promethazine  •  promethazine  •  prochlorperazine  •  latanoprost  •  timolol  •  albuterol    Labs:  No results for input(s): WBC, RBC, HEMOGLOBIN, HEMATOCRIT, MCV, MCH, RDW, PLATELETCT, MPV, NEUTSPOLYS, LYMPHOCYTES, MONOCYTES, EOSINOPHILS, BASOPHILS, RBCMORPHOLO in the last 72 hours.  Recent Labs     06/05/20  0530 06/06/20  0649 06/07/20  0225   SODIUM 130* 140 139   POTASSIUM 3.9 4.0 3.8   CHLORIDE 103 109 109   CO2 17* 18* 18*   GLUCOSE 83 149* 171*   BUN 51* 48* 44*   CPKTOTAL  --  17 19     Recent Labs     06/05/20  0530 06/06/20  0649 06/07/20  0225   ALBUMIN  --   --  2.2*   TBILIRUBIN  --   --  0.5   ALKPHOSPHAT  --   --  140*   TOTPROTEIN  --   --  5.3*   ALTSGPT  --   --  76*   ASTSGOT  --   --  36   CREATININE 3.43* 3.22* 2.85*       Imaging:  Ct-chest,abdomen,pelvis W/o    Result Date: 6/1/2020 6/1/2020 11:18 AM HISTORY/REASON FOR EXAM:  Sepsis Hypotension, renal injury, nausea vomiting diarrhea TECHNIQUE/EXAM DESCRIPTION: CT scan of the chest, abdomen and pelvis without contrast was performed. Noncontrast helical scanning of the chest, abdomen and pelvis was obtained from the lung apices through the pubic symphysis. Low dose optimization  technique was utilized for this CT exam including automated exposure control and adjustment of the mA and/or kV according to patient size. COMPARISON:  None. FINDINGS: CHEST: Neck Base:  Normal. Lungs/Pleura: Right lower lobe bronchiectasis with peribronchial opacities which likely is atelectasis or scarring. Correlate clinically for infection. Mild dependent changes/atelectasis in the left lower lobe.  3 mm groundglass nodule in the right midlung on image 58 series 301. Calcified granuloma left upper lobe. No pneumothorax or pleural effusion. Cardiovascular Structures:  Heart size is normal. No pericardial effusion. Coronary artery stents and/or prominent calcification. Aorta is normal in caliber without aneurysm or dissection. Central pulmonary arteries are normal in caliber. Mediastinum/ lymph nodes: No lymphadenopathy. Small hiatal hernia. ABDOMEN/PELVIS Liver:  Diffuse hypoattenuation of the liver suggesting fatty infiltration. Liver is enlarged. Gallbladder: Normal Biliary tract: Nondilated. Pancreas: Normal. Spleen: Normal. Adrenals: Normal. Kidneys and Collecting Systems:  Bilateral perinephric fat stranding, asymmetrically more prominent on the right. This is concerning for infection given the history. Correlate clinically and with urinalysis. No significant hydronephrosis. No obstructing stone is seen. Gastrointestinal tract:  Possible rectosigmoid wall thickening, suboptimally evaluated without contrast and due to decompression. The appendix is normal. Peritoneum: No free air or free fluid. Reproductive organs:  Normal. Bladder:  Normal. Vessels:  Moderately dense calcified plaque. Lymph  Nodes:  No lymphadenopathy. Soft tissues/wall: Within normal limits. Bones:  No acute or aggressive abnormality. Ossific density adjacent to the inferior right scapula probably related to old trauma.     1.  Asymmetric right perinephric fat stranding suspicious for infection/pyelonephritis. Correlate with urinalysis. No  significant hydronephrosis. No obstructing stone is seen. 2.  Hepatomegaly and hepatic steatosis. 3.  Rectosigmoid colon is decompressed, limiting evaluation for wall thickening. Correlate for evidence of colitis. 4.  Right lower lobe bronchiectasis and peribronchial opacities likely postinflammatory with atelectasis/scarring. Correlate clinically for evidence of infection. 5.  Atherosclerosis.     Ec-echocardiogram Complete W/o Cont    Result Date: 2020  Transthoracic Echo Report Echocardiography Laboratory CONCLUSIONS Mildly reduced left ventricular systolic function. Left ventricular ejection fraction is visually estimated to be 45-50%. Grade II diastolic dysfunction. Dilated inferior vena cava without inspiratory collapse. Estimated right ventricular systolic pressure  is 53 mmHg. Normal right ventricular size. Reduced right ventricular systolic function. Moderate to severe tricuspid regurgitation. CAROLYNE SELLERS Exam Date:         2020                    09:48 Exam Location:     Inpatient Priority:          Routine Ordering Physician:        ISIDRO DELATORRE Referring Physician:       [id number] Pope Sonographer:               Jeramy Paul RDCS,                            RVT Age:    60     Gender:    F MRN:    [id number] :    1960 BSA:    1.71   Ht (in):    64     Wt (lb):    146 Exam Type:     Complete Indications:     Cardiac Murmurs, Undiagnosed ICD Codes:       785.2 CPT Codes:       12182 BP:   132    /   77     HR:   75 Technical Quality:       Technically difficult study -                          adequate information is obtained MEASUREMENTS  (Male / Female) Normal Values 2D ECHO LV Diastolic Diameter PLAX        3.9 cm                4.2 - 5.9 / 3.9 - 5.3 cm LV Systolic Diameter PLAX         2.8 cm                2.1 - 4.0 cm IVS Diastolic Thickness           1.2 cm                LVPW Diastolic Thickness          1.2 cm                RV Diameter 4C                    3.1 cm                 2.5 - 2.9 cm LVOT Diameter                     1.7 cm                RA Diameter                       4.5 cm                LV Ejection Fraction MOD BP       54.1 %                >= 55  % LV Ejection Fraction MOD 4C       53.9 %                LV Ejection Fraction MOD 2C       58.7 %                LA Volume Index                   38.5 cm3/m2           16 - 28 cm3/m2 DOPPLER AV Peak Velocity                  1.1 m/s               AV Peak Gradient                  4.8 mmHg              AV Mean Gradient                  2.5 mmHg              LVOT Peak Velocity                0.77 m/s              AV Area Cont Eq vti               1.5 cm2               Mitral E Point Velocity           0.73 m/s              Mitral E to A Ratio               1.3                   MV Pressure Half Time             50 ms                 MV Area PHT                       4.4 cm2               MV Deceleration Time              172 ms                MR Flow Convergence Radius        0.36 cm               MR ERO PISA                       0.042 cm2             MR Regurgitant Volume PISA        7.1 cm3               TR Peak Velocity                  257 cm/s              * Indicates values subject to auto-interpretation LV EF:        % FINDINGS Left Ventricle Normal left ventricular chamber size. Mild concentric left ventricular hypertrophy. Mildly reduced left ventricular systolic function. Left ventricular ejection fraction is visually estimated to be 45-50%. Abnormal septal motion consistent with right ventricular (RV) volume overload and/or elevated RV end-diastolic pressure. Grade II diastolic dysfunction. Right Ventricle Normal right ventricular size. Reduced right ventricular systolic function. Right Atrium Dilated right atrium. Dilated inferior vena cava without inspiratory collapse. Left Atrium Mildly dilated left atrium. Left atrial volume index is 39  mL/sq m. Mitral Valve Structurally normal mitral valve. No mitral stenosis.  Mild mitral regurgitation. Aortic Valve Aortic sclerosis without stenosis. No aortic insufficiency. Tricuspid Valve Structurally normal tricuspid valve. No tricuspid stenosis. Moderate to severe tricuspid regurgitation. Right atrial pressure is estimated to be 15 mmHg. Estimated right ventricular systolic pressure  is 53 mmHg. Pulmonic Valve The pulmonic valve is not well visualized.  Mild pulmonic insufficiency. Pericardium Normal pericardium without effusion. Aorta Normal aortic root for body surface area. Ascending aorta diameter is 2.8 cm. Allyson Rivera MD (Electronically Signed) Final Date:     04 June 2020                 11:59    Ec-rayna W/o Cont    Result Date: 6/5/2020  Results Will be Available after Interpretation by Cardiologist.      Micro:  Results     Procedure Component Value Units Date/Time    Blood Culture [[id number]] Collected:  06/06/20 1325    Order Status:  Completed Specimen:  Blood Updated:  06/07/20 0712     Significant Indicator NEG     Source BLD     Site Peripheral     Culture Result No Growth  Note: Blood cultures are incubated for 5 days and  are monitored continuously.Positive blood cultures  are called to the RN and reported as soon as  they are identified.      Narrative:       Left Hand    Blood Culture [[id number]] Collected:  06/06/20 1325    Order Status:  Completed Specimen:  Blood Updated:  06/07/20 0712     Significant Indicator NEG     Source BLD     Site Peripheral     Culture Result No Growth  Note: Blood cultures are incubated for 5 days and  are monitored continuously.Positive blood cultures  are called to the RN and reported as soon as  they are identified.      Narrative:       Right Forearm/Arm    BLOOD CULTURE [[id number]]     Order Status:  Canceled Specimen:  Blood from Peripheral     BLOOD CULTURE [[id number]]     Order Status:  Canceled Specimen:  Blood from Peripheral     BLOOD CULTURE [[id number]]  (Abnormal) Collected:  06/02/20 1542    Order Status:  Completed  "Specimen:  Blood from Peripheral Updated:  06/05/20 0904     Significant Indicator POS     Source BLD     Site PERIPHERAL     Culture Result Growth detected by Bactec instrument. 06/03/2020  18:41      Methicillin Resistant Staphylococcus aureus  See previous culture for sensitivity report.      Narrative:       CALL  Nieto  [facility] tel. [phone],  CALLED  [facility] tel. [phone] 06/03/2020, 18:57, RB PERF. RESULTS CALLED TO:  PHARMACY (left message x [phone])  Special Contact [id number] DAKSHA FLASH D  Per Hospital Policy: Only change Specimen Src: to \"Line\" if  specified by physician order.  Left AC    BLOOD CULTURE [[id number]]  (Abnormal)  (Susceptibility) Collected:  06/02/20 1542    Order Status:  Completed Specimen:  Blood from Peripheral Updated:  06/05/20 0904     Significant Indicator POS     Source BLD     Site PERIPHERAL     Culture Result Growth detected by Bactec instrument. 06/03/2020  17:30  Methicillin Resistant Staphylococcus aureus (MRSA)  detected by PCR.        Methicillin Resistant Staphylococcus aureus    Narrative:       CALL  Nieto  [facility] tel. [phone],  CALLED  [facility] tel. [phone] 06/03/2020, 17:33, RB PERF. RESULTS CALLED  TO:[phone]   Special Contact [id number] IFEOMAFREIDA VIDALES RUDY  Per Hospital Policy: Only change Specimen Src: to \"Line\" if  specified by physician order.  Right Wrist    Susceptibility     Methicillin resistant staphylococcus aureus (1)     Antibiotic Interpretation Microscan Method Status    Azithromycin Resistant >4 mcg/mL SNOW Final    Clindamycin Sensitive <=0.5 mcg/mL SNOW Final    Cefazolin Resistant >16 mcg/mL SNOW Final    Ceftaroline Sensitive 1 mcg/mL SNOW Final    Daptomycin Sensitive <=1 mcg/mL SNOW Final    Ampicillin/sulbactam Resistant >16/8 mcg/mL SNOW Final    Erythromycin Resistant >4 mcg/mL SNOW Final    Vancomycin Sensitive 1 mcg/mL SNOW Final    Oxacillin Resistant >2 mcg/mL SNOW Final    Penicillin Resistant >8 mcg/mL SNOW Final    Trimeth/Sulfa Sensitive <=0.5/9.5 " mcg/mL SNOW Final    Tetracycline Sensitive <=4 mcg/mL SNOW Final                   BLOOD CULTURE [[id number]]     Order Status:  No result Specimen:  Blood from Peripheral     BLOOD CULTURE [[id number]]     Order Status:  No result Specimen:  Blood from Peripheral     CULTURE STOOL [[id number]] Collected:  06/01/20 1440    Order Status:  Completed Specimen:  Stool Updated:  06/04/20 1236     Significant Indicator NEG     Source STL     Site STOOL     Culture Result No enteric pathogens isolated.  NOTE:  Stool cultures are screened for Shiga Toxins 1 and 2,  Salmonella, Shigella, Campylobacter, Aeromonas,  Plesiomonas, and Vibrio.       [facility] Negative for Shiga Toxin 1 and 2.    Narrative:       Special Contact Isolation  FENa = (UrineNa / SerumNA) / (UrineCr / SerumCr) *100  Special Contact Isolation  Is this test for diagnosis or screening?->Diagnosis of ill  patient  Special Contact Isolation    URINE CULTURE(NEW) [[id number]]     Order Status:  Canceled     [facility](Siga Toxin)Detection [[id number]] Collected:  06/01/20 1440    Order Status:  Completed Specimen:  Stool Updated:  06/02/20 1620     Significant Indicator NEG     Source STL     Site STOOL     [facility] Negative for Shiga Toxin 1 and 2.    Narrative:       Special Contact Isolation  FENa = (UrineNa / SerumNA) / (UrineCr / SerumCr) *100  Special Contact Isolation  Is this test for diagnosis or screening?->Diagnosis of ill  patient  Special Contact Isolation    C Diff by PCR rflx Toxin [[id number]] Collected:  06/02/20 0739    Order Status:  Completed Specimen:  Stool Updated:  06/02/20 1507     C Diff by PCR Negative     Comment: C. difficile NOT detected by PCR.  Treatment not indicated per guidelines.  Repeat testing not indicated within 7 days.          027-NAP1-BI Presumptive Negative     Comment: Presumptive 027/NAP1/BI target DNA sequences are NOT DETECTED.       Narrative:       Special Contact [id number] DAKSHA GRANT  Does this patient have risk factors  for C-diff?->Yes  C-Diff Risk Factors->a serious underlying illness    C Diff by PCR rflx Toxin [[id number]] Collected:  06/01/20 1440    Order Status:  Completed Specimen:  Stool Updated:  06/01/20 2240     C Diff by PCR Negative     Comment: C. difficile NOT detected by PCR.  Treatment not indicated per guidelines.  Repeat testing not indicated within 7 days.          027-NAP1-BI Presumptive Negative     Comment: Presumptive 027/NAP1/BI target DNA sequences are NOT DETECTED.       Narrative:       Special Contact Isolation  Does this patient have risk factors for C-diff?->Yes  C-Diff Risk Factors->antibiotic exposure  Has patient taken stool softeners or laxatives in the last 5  days?->No    URINE CULTURE(NEW) [[id number]] Collected:  06/01/20 1440    Order Status:  Canceled     URINALYSIS CULTURE, IF INDICATED [[id number]]  (Abnormal) Collected:  06/01/20 1220    Order Status:  Completed Specimen:  Urine Updated:  06/01/20 1412     Color Yellow     Character Clear     Specific Gravity 1.025     Ph 5.0     Glucose Negative mg/dL      Ketones Negative mg/dL      Protein 100 mg/dL      Bilirubin Negative     Urobilinogen, Urine 0.2     Nitrite Negative     Leukocyte Esterase Small     Occult Blood Large     Micro Urine Req Microscopic    Narrative:       Special Contact Isolation  Does this patient have risk factors for C-diff?->Yes  C-Diff Risk Factors->antibiotic exposure  Has patient taken stool softeners or laxatives in the last 5  days?->No    SARS-CoV-2, PCR (In-House) [[id number]] Collected:  06/01/20 1240    Order Status:  Completed Updated:  06/01/20 1357     SARS-CoV-2 Source NP Swab     SARS-CoV-2 by PCR NotDetected     Comment: [facility] providers: PLEASE REFER TO DE-ESCALATION AND RETESTING PROTOCOL  on [email or web address]  **The [employer] GeneXpert Xpress SARS-CoV-2 Test has been made available for  use under the Emergency Use Authorization (EUA) only.         Narrative:       Special Contact Isolation  FENa =  (UrineNa / SerumNA) / (UrineCr / SerumCr) *100  Special Contact Isolation  Is this test for diagnosis or screening?->Diagnosis of ill  patient    COVID/SARS CoV-2 [[id number]] Collected:  06/01/20 1240    Order Status:  Completed Specimen:  Respirate from Nasopharyngeal Updated:  06/01/20 1255     COVID Order Status Received    Narrative:       Special Contact Isolation  FENa = (UrineNa / SerumNA) / (UrineCr / SerumCr) *100  Special Contact Isolation  Is this test for diagnosis or screening?->Diagnosis of ill  patient    Blood Culture,Hold [[id number]] Collected:  06/01/20 1108    Order Status:  Completed Updated:  06/01/20 1200     Blood Culture Hold Collected          Assessment:  Active Hospital Problems    Diagnosis   • *Staphylococcus aureus bacteremia [R78.81, B95.61]   • Diabetic ketoacidosis without coma associated with type 2 diabetes mellitus (HCC) [E11.10]   • Septic shock (HCC) [A41.9, R65.21]   • Acute renal failure with tubular necrosis (HCC) [N17.0]   • Pulmonary hypertension (HCC) [I27.20]   • Acute pyelonephritis [N10]   • Bronchiectasis (HCC) [J47.9]   • Hyponatremia [E87.1]   • Diarrhea [R19.7]   • Adrenal insufficiency (HCC) [E27.40]   • Metabolic acidosis [E87.2]      Interval 24 hours:      AF, O2 RA  Labs reviewed  Imaging personally reviewed both images and report.  Studies reviewed  Micro reviewed  VERONICA yesterday and awaiting report     Patient with ongoing epigastric abdominal pain.  No new neck joint or back pain.  Patient is continued on daptomycin and rifampin.        ASSESSMENT/PLAN:      60 y.o.  admitted 6/1/2020. Pt has a past medical history of RA on chronic prednisone 10 mg daily, DMT2 , CKD stage III.  She reports abdominal pain on and off again since December and also history of frequent UTIs.  She is most recently treated with amoxicillin but been off antibiotics for approximately 1 week prior to her admission. Presented to ER at [facility] c/o epigastric abdominal pain  described as burning as well as nausea vomiting and diarrhea x4 days.  She was hypotensive, requiring oxygen support with lab irregularities including leukocytosis to 14.5, hyponatremia, elevated creatinine, elevated glucose and elevated lactic acid.  She was started on Unasyn and Levophed and transferred to [facility] for higher level of care.  The patient has hardware - pins in her right foot, a plate in her left ankle and replaced knuckle in her right hand     Hospital Course:   She has been afebrile, initially hypotensive and now improved.  Leukocytosis to 19.7 on admit.  Acute kidney injury on arrival.  Stool cultures were obtained which are pending in part but negative for Shiga toxin 1 and 2.  C. difficile was tested on 6/1 and 6/2 and negative on both.  Blood cultures were obtained which are positive for MRSA.  She was started on vancomycin Flagyl and ceftriaxone.  Transitioned to daptomycin.      Problem List      Septic and hypovolemic shock, secondary to MRSA bacteremia and profuse diarrhea - improved   MRSA bacteremia- source may be urinary but no urine cx to confirm, no other obvious source,   -Blood cultures from [facility] 6/1 are 2/2+ for MRSA per microbiology lab there, no urine culture was performed  -Blood cultures on 6/2 2/2+ for MRSA  -Blood cultures on 6/6- NGTD   -TTE on 6/4-  negative for vegetations.  Moderate to severe tricuspid regurgitation, diastolic dysfunction  Leukocytosis-improved  UTI/Pyelonephritis-she endorses dysuria with pain, burning and hematuria prior to admission  -UA suggestive of infection, no urine culture  -CT on 6/1 with right-sided fat stranding suspicious for infection/Pyelo   Abdominal pain, epigastric- ongoing - chronic   Diarrhea - improved but still ongoing   -C. difficile tested negative x2  -Stool cultures pending, Shiga toxin 1 and 2  Acute on chronic kidney disease -improving -nephrology following and per notes no need for acute  dialysis  Bronchiectasis, noted on CT in right lower lobe as well as atelectasis, calcified granuloma in left upper lobe groundglass nodule on right  Diabetes, uncontrolled   Diabetic ketoacidosis on presentation- improving   Rheumatoid arthritis  Immunosuppression,  secondary to above on chronic prednisone 10 mg daily  Thrombocytopenia-ongoing, appears to be chronic  Anemia-ongoing  Antibiotic allergies: Cephalexin reported as vomiting  Grade 2 diastolic heart failure on TTE  Low back pain-chronic, ongoing      Plan      ---  Stopped vancomycin and transitioned to daptomycin due to RAF. Continue daptomycin at 8 mg/kg q48 hours, follow CPK 2x weekly due to renal dysfunction-  17 on 6/6   --- Continue rifampin due to hardware. HOWEVER, she is also on chronic steroids and this reaction could potentially cause some decrease in steroid.  Monitor clinically.    --- TTE negative but patient is immunocompromised due to chronic steroid therapy and she has blood cultures positive on admit so unknown duration-recommended VERONICA to rule out endocarditis and results this will also effect duration of antibiotics- awaiting report   --- F/up repeat blood cultures  --- We will need multiple weeks of IV antibiotics, no PICC line until blood cultures are clear for 48 hours-if VERONICA is negative we will plan on 4-week course  --- Continue to monitor for any new neck joint or back pain image appropriately-very low threshold for imaging of lumbar spine- she says her pain is chronic and unchanged   --- Monitor labs,  CBC and CMP for LFTs as on rifampin  --- If she spikes a new fever please obtain a urine culture  --- Epigastric abdominal pain is significant and not abating -ordered lipase and recommend GI evaluation           Discussed with Dr. Neville.   Will continue to follow.

## 2020-06-07 NOTE — PROGRESS NOTES
Pt arrived on unit via wheelchair accompanied by transport team . Patient belongings at bedside, offered safekeeping, patient refused. Pt oriented to unit, call light system, and call light usage. Chart reviewed, labs reviewed, will continue to monitor. Pt is A/O x4. Pt on room air. 2 RN skin check performed. I agree with previous Rns assessment except for what this RN has charted. Pt is pleasant mood, pleasant in bed comfortably.

## 2020-06-07 NOTE — PROGRESS NOTES
Assumed care of patient at 0700. Report received from night RN. A&Ox4 and VSS on RA. Denies pain. IV abx administered as ordered. Pain meds given PRN. Asked patient if she wanted to ambulate around the unit, stated she wanted to sleep and go later. Bed locked and in the lowest position. Call bell within reach. Will continue to monitor.

## 2020-06-08 LAB
ALBUMIN SERPL BCP-MCNC: 2.1 G/DL (ref 3.2–4.9)
BASOPHILS # BLD AUTO: 0.3 % (ref 0–1.8)
BASOPHILS # BLD: 0.02 K/UL (ref 0–0.12)
BUN SERPL-MCNC: 35 MG/DL (ref 8–22)
CALCIUM SERPL-MCNC: 8.5 MG/DL (ref 8.5–10.5)
CHLORIDE SERPL-SCNC: 108 MMOL/L (ref 96–112)
CO2 SERPL-SCNC: 19 MMOL/L (ref 20–33)
CREAT SERPL-MCNC: 2.43 MG/DL (ref 0.5–1.4)
CREAT UR-MCNC: 31.29 MG/DL
CREAT UR-MCNC: 31.61 MG/DL
EOSINOPHIL # BLD AUTO: 0.13 K/UL (ref 0–0.51)
EOSINOPHIL NFR BLD: 1.7 % (ref 0–6.9)
ERYTHROCYTE [DISTWIDTH] IN BLOOD BY AUTOMATED COUNT: 50.8 FL (ref 35.9–50)
GLUCOSE BLD-MCNC: 140 MG/DL (ref 65–99)
GLUCOSE BLD-MCNC: 147 MG/DL (ref 65–99)
GLUCOSE BLD-MCNC: 158 MG/DL (ref 65–99)
GLUCOSE BLD-MCNC: 97 MG/DL (ref 65–99)
GLUCOSE SERPL-MCNC: 140 MG/DL (ref 65–99)
HCT VFR BLD AUTO: 31.8 % (ref 37–47)
HGB BLD-MCNC: 9.6 G/DL (ref 12–16)
IMM GRANULOCYTES # BLD AUTO: 0.26 K/UL (ref 0–0.11)
IMM GRANULOCYTES NFR BLD AUTO: 3.4 % (ref 0–0.9)
LIPASE SERPL-CCNC: 22 U/L (ref 11–82)
LYMPHOCYTES # BLD AUTO: 1.18 K/UL (ref 1–4.8)
LYMPHOCYTES NFR BLD: 15.3 % (ref 22–41)
MCH RBC QN AUTO: 26.2 PG (ref 27–33)
MCHC RBC AUTO-ENTMCNC: 30.2 G/DL (ref 33.6–35)
MCV RBC AUTO: 86.9 FL (ref 81.4–97.8)
MICROALBUMIN UR-MCNC: 1.9 MG/DL
MICROALBUMIN/CREAT UR: 61 MG/G (ref 0–30)
MONOCYTES # BLD AUTO: 0.54 K/UL (ref 0–0.85)
MONOCYTES NFR BLD AUTO: 7 % (ref 0–13.4)
NEUTROPHILS # BLD AUTO: 5.59 K/UL (ref 2–7.15)
NEUTROPHILS NFR BLD: 72.3 % (ref 44–72)
NRBC # BLD AUTO: 0.03 K/UL
NRBC BLD-RTO: 0.4 /100 WBC
PHOSPHATE SERPL-MCNC: 2.9 MG/DL (ref 2.5–4.5)
PLATELET # BLD AUTO: 265 K/UL (ref 164–446)
PMV BLD AUTO: 10.9 FL (ref 9–12.9)
POTASSIUM SERPL-SCNC: 3.8 MMOL/L (ref 3.6–5.5)
PROT UR-MCNC: 13 MG/DL (ref 0–15)
PROT/CREAT UR: 411 MG/G (ref 10–107)
RBC # BLD AUTO: 3.66 M/UL (ref 4.2–5.4)
SODIUM SERPL-SCNC: 141 MMOL/L (ref 135–145)
WBC # BLD AUTO: 7.7 K/UL (ref 4.8–10.8)

## 2020-06-08 PROCEDURE — 85025 COMPLETE CBC W/AUTO DIFF WBC: CPT

## 2020-06-08 PROCEDURE — 99233 SBSQ HOSP IP/OBS HIGH 50: CPT | Performed by: INTERNAL MEDICINE

## 2020-06-08 PROCEDURE — 83690 ASSAY OF LIPASE: CPT

## 2020-06-08 PROCEDURE — A9270 NON-COVERED ITEM OR SERVICE: HCPCS | Performed by: PSYCHIATRY & NEUROLOGY

## 2020-06-08 PROCEDURE — 700102 HCHG RX REV CODE 250 W/ 637 OVERRIDE(OP): Performed by: PSYCHIATRY & NEUROLOGY

## 2020-06-08 PROCEDURE — 84156 ASSAY OF PROTEIN URINE: CPT

## 2020-06-08 PROCEDURE — 82962 GLUCOSE BLOOD TEST: CPT | Mod: 91

## 2020-06-08 PROCEDURE — 81001 URINALYSIS AUTO W/SCOPE: CPT

## 2020-06-08 PROCEDURE — 82570 ASSAY OF URINE CREATININE: CPT | Mod: 91

## 2020-06-08 PROCEDURE — A9270 NON-COVERED ITEM OR SERVICE: HCPCS | Performed by: HOSPITALIST

## 2020-06-08 PROCEDURE — A9270 NON-COVERED ITEM OR SERVICE: HCPCS | Performed by: INTERNAL MEDICINE

## 2020-06-08 PROCEDURE — 700111 HCHG RX REV CODE 636 W/ 250 OVERRIDE (IP): Performed by: HOSPITALIST

## 2020-06-08 PROCEDURE — 36415 COLL VENOUS BLD VENIPUNCTURE: CPT

## 2020-06-08 PROCEDURE — 82043 UR ALBUMIN QUANTITATIVE: CPT

## 2020-06-08 PROCEDURE — 700102 HCHG RX REV CODE 250 W/ 637 OVERRIDE(OP): Performed by: INTERNAL MEDICINE

## 2020-06-08 PROCEDURE — 770021 HCHG ROOM/CARE - ISO PRIVATE

## 2020-06-08 PROCEDURE — 80069 RENAL FUNCTION PANEL: CPT

## 2020-06-08 PROCEDURE — 700102 HCHG RX REV CODE 250 W/ 637 OVERRIDE(OP): Performed by: HOSPITALIST

## 2020-06-08 PROCEDURE — 700111 HCHG RX REV CODE 636 W/ 250 OVERRIDE (IP): Performed by: PSYCHIATRY & NEUROLOGY

## 2020-06-08 PROCEDURE — 99233 SBSQ HOSP IP/OBS HIGH 50: CPT | Performed by: HOSPITALIST

## 2020-06-08 RX ORDER — ALUMINA, MAGNESIA, AND SIMETHICONE 2400; 2400; 240 MG/30ML; MG/30ML; MG/30ML
30 SUSPENSION ORAL EVERY 6 HOURS PRN
Status: DISCONTINUED | OUTPATIENT
Start: 2020-06-08 | End: 2020-06-15 | Stop reason: HOSPADM

## 2020-06-08 RX ADMIN — OXYCODONE 5 MG: 5 TABLET ORAL at 12:23

## 2020-06-08 RX ADMIN — HEPARIN SODIUM 5000 UNITS: 5000 INJECTION, SOLUTION INTRAVENOUS; SUBCUTANEOUS at 05:36

## 2020-06-08 RX ADMIN — FLUTICASONE PROPIONATE 50 MCG: 50 SPRAY, METERED NASAL at 05:35

## 2020-06-08 RX ADMIN — TIMOLOL MALEATE 1 DROP: 5 SOLUTION/ DROPS OPHTHALMIC at 05:36

## 2020-06-08 RX ADMIN — ALUMINUM HYDROXIDE, MAGNESIUM HYDROXIDE, AND DIMETHICONE 30 ML: 400; 400; 40 SUSPENSION ORAL at 20:38

## 2020-06-08 RX ADMIN — SITAGLIPTIN 25 MG: 25 TABLET, FILM COATED ORAL at 05:35

## 2020-06-08 RX ADMIN — RIFAMPIN 300 MG: 300 CAPSULE ORAL at 17:18

## 2020-06-08 RX ADMIN — PREDNISONE 10 MG: 10 TABLET ORAL at 05:35

## 2020-06-08 RX ADMIN — INSULIN HUMAN 2 UNITS: 100 INJECTION, SOLUTION PARENTERAL at 11:35

## 2020-06-08 RX ADMIN — OXYCODONE 5 MG: 5 TABLET ORAL at 18:50

## 2020-06-08 RX ADMIN — RIFAMPIN 300 MG: 300 CAPSULE ORAL at 05:35

## 2020-06-08 RX ADMIN — OXYCODONE 5 MG: 5 TABLET ORAL at 03:16

## 2020-06-08 RX ADMIN — OMEPRAZOLE 20 MG: 20 CAPSULE, DELAYED RELEASE ORAL at 17:17

## 2020-06-08 RX ADMIN — HEPARIN SODIUM 5000 UNITS: 5000 INJECTION, SOLUTION INTRAVENOUS; SUBCUTANEOUS at 21:06

## 2020-06-08 RX ADMIN — HEPARIN SODIUM 5000 UNITS: 5000 INJECTION, SOLUTION INTRAVENOUS; SUBCUTANEOUS at 13:40

## 2020-06-08 RX ADMIN — OMEPRAZOLE 20 MG: 20 CAPSULE, DELAYED RELEASE ORAL at 05:35

## 2020-06-08 RX ADMIN — LATANOPROST 1 DROP: 50 SOLUTION OPHTHALMIC at 21:06

## 2020-06-08 ASSESSMENT — ENCOUNTER SYMPTOMS
FEVER: 0
VOMITING: 0
DIARRHEA: 0
SHORTNESS OF BREATH: 0
ABDOMINAL PAIN: 0

## 2020-06-08 NOTE — PROGRESS NOTES
Assume care of pt at 0700. Report received from NOC RN. Pt is A/O x4. Pt declines pain this AM. Pt is resting in bed. Bed in lowest and locked position, call light in reach, hourly rounding in place. Labs reviewed. Communication board updated. Will continue to monitor.

## 2020-06-08 NOTE — CARE PLAN
Problem: Infection  Goal: Will remain free from infection  Intervention: Implement standard precautions and perform hand washing before and after patient contact  Note: Contact isolation precautions in place, yellow isolation gowns utilized.      Problem: Mobility  Goal: Risk for activity intolerance will decrease  Intervention: Assess and monitor signs of activity intolerance  Note: Pt ambulates to the bathroom with SBA, maintains steady gait, calls appropriately using the call light, bed alarm on.

## 2020-06-08 NOTE — PROGRESS NOTES
[facility] Medicine Daily Progress Note    Date of Service  6/8/2020    Chief Complaint  60 y.o. female admitted 6/1/2020 with diarrhea.    Hospital Course   Ms. Mcclain is a 60 y.o. female who presented 6/1/2020 with past medical history of rheumatoid arthritis on chronic prednisone therapy, type 2 diabetes on metformin, CKD stage III who comes into the emergency room with complaints of nausea, vomiting and diarrhea for the past 4 days.  Associated with an epigastric abdominal pain.  The pain radiates to the lower abdomen and feels like a burning sensation.  The pain is nonexertional, non-positional.  Patient states that she has not gone to the bathroom to urinate in 4 days.  She would have a bowel movement every 30 minutes.  She denies any blood in her stools.  Patient denies any fever, cough, shortness of breath, chest pain.   Patient came from outlying facility from [facility] includes  Blood pressure 86/50, respiratory rate 20, saturating 90% on 2 L oxygen, temperature 98.4  Sodium 129, potassium 3.6, chloride 89, CO2 21, AST 46, ALT 30, BUN 67, glucose 399, creatinine 6.4, calcium 8, bili 1.5, anion gap 22, lactic acid 2.5, lipase 163, , CK-MB 1.1, troponin 0.062, WBC 14.5, hemoglobin 10.9, platelets 175, d-dimer 3 8378, TSH 8.67  Patient was started on Unasyn and Levophed with ICU admission        Interval Problem Update  6/3: patient seen and evaluated in the ICU. The lab from [place] faxed over a preliminary + blood culture revealing gram + cocci in clusters. She has been started on broad spectrum antibiotics. She transitioned off the insulin drip to sliding scale.   6/4: Ms. Mcclain was evaluated and examined in the ICU. Her blood cultures have come back + for MRSA thus she has been placed under precautions. I have consulted Dr. Xavier, ID. She remains on IV fluids and her Cr is down to 3.7. glucose went down to 57 this morning.   6/5: patient seen and evaluated in the ICU. Reportedly there was not  a urine culture obtained in [place] and it was not done here either. An echocardiogram was done yesterday and did not reveal vegetations. I discussed with Dr. Navarro about a VERONICA and Dr. Ba about performing conscious sedation to eval for endocarditis.   6/6: patient seen and evaluated in the ICU. She had a VERONICA yesterday. Cr is 3.22 today.  6/7: Ms. Mcclain was evaluated and examined on the medical floor. Her glucose remains a bit high today thus Januvia will be initiated. Her Cr is 2.8 today. We discussed a PICC and 4-6 weeks of IV antibiotics as an outpatient or in a rehab facility.   6/8: Patient seen and evaluated on the medical floor.  She is feeling much better today.  She has been up walking around.  She is tolerating regular diet.  She had some diarrhea yesterday none today.  Discussed that if her blood cultures remain negative, she will receive a PICC line and then discharge planning will evaluate her for inpatient versus outpatient with infusion center options.  She hopes to live with her aunt here in [place] for about a month and go to the infusion center on a daily basis for daptomycin.  Consultants/Specialty  Critical care.   Nephrology   Infectious disease.  Code Status  Full     Disposition  med    Review of Systems  Review of Systems   Constitutional: Negative for fever.        She is feeling overall much better   Respiratory: Negative for shortness of breath.    Cardiovascular: Negative for chest pain.   Gastrointestinal: Negative for diarrhea and vomiting.   Genitourinary: Negative for dysuria.   Musculoskeletal: Negative for joint pain.   All other systems reviewed and are negative.       Physical Exam  Temp:  [36.2 °C (97.2 °F)-36.7 °C (98.1 °F)] 36.6 °C (97.8 °F)  Pulse:  [74-85] 85  Resp:  [18-19] 18  BP: (138-157)/(69-86) 138/69  SpO2:  [94 %-95 %] 94 %    Physical Exam  Vitals signs and nursing note reviewed.   Constitutional:       Appearance: Normal appearance. She is normal weight. She is not  ill-appearing.   HENT:      Mouth/Throat:      Mouth: Mucous membranes are moist.      Pharynx: Oropharynx is clear.   Cardiovascular:      Rate and Rhythm: Normal rate and regular rhythm.      Heart sounds: No murmur.   Pulmonary:      Effort: Pulmonary effort is normal.      Breath sounds: Normal breath sounds.   Abdominal:      General: There is no distension.      Tenderness: There is no abdominal tenderness.   Musculoskeletal:         General: No swelling.      Right lower leg: No edema.      Left lower leg: No edema.      Comments: Right toes amputated   Skin:     General: Skin is warm and dry.      Findings: No rash.   Neurological:      Mental Status: She is alert.      Comments: Awake, A&Ox4   Psychiatric:         Mood and Affect: Mood normal.         Behavior: Behavior normal.         Thought Content: Thought content normal.         Fluids    Intake/Output Summary (Last 24 hours) at 6/8/2020 0733  Last data filed at 6/7/2020 1328  Gross per 24 hour   Intake 1120 ml   Output --   Net 1120 ml       Laboratory  Recent Labs     06/08/20  0259   WBC 7.7   RBC 3.66*   HEMOGLOBIN 9.6*   HEMATOCRIT 31.8*   MCV 86.9   MCH 26.2*   MCHC 30.2*   RDW 50.8*   PLATELETCT 265   MPV 10.9     Recent Labs     06/06/20  0649 06/07/20  0225   SODIUM 140 139   POTASSIUM 4.0 3.8   CHLORIDE 109 109   CO2 18* 18*   GLUCOSE 149* 171*   BUN 48* 44*   CREATININE 3.22* 2.85*   CALCIUM 8.8 8.7                   Imaging  EC-VERONICA W/O CONT         EC-ECHOCARDIOGRAM COMPLETE W/O CONT   Final Result      CT-CHEST,ABDOMEN,PELVIS W/O   Final Result         1.  Asymmetric right perinephric fat stranding suspicious for infection/pyelonephritis. Correlate with urinalysis. No significant hydronephrosis. No obstructing stone is seen.   2.  Hepatomegaly and hepatic steatosis.   3.  Rectosigmoid colon is decompressed, limiting evaluation for wall thickening. Correlate for evidence of colitis.   4.  Right lower lobe bronchiectasis and peribronchial  opacities likely postinflammatory with atelectasis/scarring. Correlate clinically for evidence of infection.   5.  Atherosclerosis.                    Assessment/Plan  * Staphylococcus aureus bacteremia- (present on admission)  Assessment & Plan  Blood cultures + for Staph Aureus bacteremia prior to transfer and again on 6/2  Repeat blood cultures on 6/6  Source is presumably pyelonephritis  IV Daptomycin and rifampin  VERONICA on 6/5 which was negative for vegetations  Infectious disease consulted  PICC once blood cultures negative x 2. Blood cultures from 6/6 are negative so far.  [facility] home with outpatient infusion center for daptomycin.    Septic shock (HCC)- (present on admission)  Assessment & Plan  This is Septic shock Present on admission  SIRS criteria identified on my evaluation include: Tachycardia, with heart rate greater than 90 BPM and Leukocytosis, with WBC greater than 12,000  Source is pyelonephritis with MRSA bacteremia  Presentation includes: Severe sepsis present and persistent hypotension after 30 ml/kg completed.   Despite appropriate fluid resuscitation with crystalloid given per sepsis guidelines, the patient remained hypotensive and required IV levophed needed to maintain a SBP of 90 or MAP of 65        Diabetic ketoacidosis without coma associated with type 2 diabetes mellitus (HCC)- (present on admission)  Assessment & Plan  With renal failure, elevated but hydroxybutyrate  She was admitted to the ICU with an insulin drip and IV fluids  She had been transitioned to long-acting glargine and sliding scale subcutaneous insulin on 6/3--her glucose went down to 57 thus glargine 10 units stopped on 6/4  Hemoglobin A1c 11.2  Diabetes education ordered  She had been on metformin at home which was stopped due to renal failure  Started Januvia 25 mg daily on 6/7 and sliding scale and blood sugars have been in the 130's.      Acute renal failure  with tubular necrosis (HCC)- (present on admission)  Assessment & Plan  Likely prerenal-combination of sepsis, dehydration from diarrhea, likely component of acute tubular necrosis  Stop IV fluids on 6/5  Cr 5.5 on admit and remains high at 2.8 though improving  Nephrology consulted   Follow urine output.  Check a BMP every other day for now.      Pulmonary hypertension (HCC)- (present on admission)  Assessment & Plan  Echocardiogram reveals a RVSP 53 mm Hg thus she is at risk of volume overload    Metabolic acidosis- (present on admission)  Assessment & Plan  Secondary to DKA and renal failure      Adrenal insufficiency (HCC)- (present on admission)  Assessment & Plan  Patient is on chronic prednisone therapy for rheumatoid arthritis.  She presented with shock thus was started on IV hydrocortisone  Now transitioned back to home dose of prednisone 10 mg daily.    Diarrhea- (present on admission)  Assessment & Plan  C diff negative    Hyponatremia- (present on admission)  Assessment & Plan  Secondary to hyperglycemia       Bronchiectasis (HCC)- (present on admission)  Assessment & Plan  Chest CT findings - with no complains of cough or dyspnea   Patient had a previous pneumonia  Albuterol prn    Acute pyelonephritis- (present on admission)  Assessment & Plan  Presumably from Staph Aureus  Urine culture was not sent from [place] nor from here until after antibiotics were given             VTE prophylaxis: heparin

## 2020-06-08 NOTE — CARE PLAN
Problem: Knowledge Deficit  Goal: Knowledge of disease process/condition, treatment plan, diagnostic tests, and medications will improve  Outcome: PROGRESSING AS EXPECTED  Note: Pt understanding of POC with possible PICC placement.      Problem: Pain Management  Goal: Pain level will decrease to patient's comfort goal  Outcome: PROGRESSING AS EXPECTED  Note: Pt pain adequately controlled. Pt expresses needs when pain is present.

## 2020-06-08 NOTE — PROGRESS NOTES
Rec'd report from day shift RN. Assumed pt care. Assessment completed. AA&OX4. Denies pain at this time. No s/s of discomfort or distress. Pt ambulates to the bathroom with SBA, maintains steady gait. Bed in lowest position, bed locked, bed alarm on for safety, treaded socks in place, RN and CNA numbers provided, call light within reach.

## 2020-06-08 NOTE — PROGRESS NOTES
Nephrology Daily Progress Note    Date of Service  6/8/2020    Chief Complaint  60 y.o. female admitted 6/1/2020 with gastroenteritis, volume depletion, RAF, metabolic acidosis, DKA, MRSA  bacteriemia    Interval Problem Update  6/2 -still with diarrhea,   Nausea/vomiting better  Non oliguric  Creat level improving  On DKA protocol  6/3 feels much better  No N/V/D  Good UOP  6/4  -still poor appetite, some nausea  Diarrhea improved  Good UOP  Creat level improving  6/5 -doing better  No N/V/D  good UOP  Creat slowly improving  Acidosis improving  6/6 no acute events  Po intake better  Mild abdominal pain/distention  Good UOP  Creat slowly improving  Acidosis improving  6/7 -doing well, no complaints  Transferred to regular floor  No acute events  Good UOP  Creat improving  6/8 -patient says she is urinating well, diarrhea is better.  Denies chest pain, shortness of breath.    Review of Systems  Review of Systems   Constitutional: Negative for fever.   Respiratory: Negative for shortness of breath.    Cardiovascular: Negative for chest pain.   Gastrointestinal: Negative for abdominal pain.   All other systems reviewed and are negative.       Physical Exam  Temp:  [36.2 °C (97.2 °F)-36.8 °C (98.2 °F)] 36.4 °C (97.6 °F)  Pulse:  [74-85] 74  Resp:  [17-19] 17  BP: (138-157)/(66-86) 156/79  SpO2:  [94 %-95 %] 95 %    Physical Exam  Constitutional:       General: She is not in acute distress.     Appearance: Normal appearance.   HENT:      Head: Normocephalic and atraumatic.      Nose: Nose normal.      Mouth/Throat:      Mouth: Mucous membranes are moist.      Pharynx: Oropharynx is clear.   Eyes:      General: No scleral icterus.     Extraocular Movements: Extraocular movements intact.      Conjunctiva/sclera: Conjunctivae normal.   Neck:      Musculoskeletal: Normal range of motion and neck supple.   Cardiovascular:      Rate and Rhythm: Normal rate and regular rhythm.      Heart sounds: No murmur.   Pulmonary:       Effort: Pulmonary effort is normal.      Breath sounds: Normal breath sounds. No rales.   Abdominal:      General: Abdomen is flat. There is no distension.      Palpations: Abdomen is soft.   Musculoskeletal: Normal range of motion.      Right lower leg: No edema.      Left lower leg: No edema.   Skin:     General: Skin is warm and dry.      Coloration: Skin is not jaundiced.   Neurological:      General: No focal deficit present.      Mental Status: She is alert and oriented to person, place, and time. Mental status is at baseline.   Psychiatric:         Mood and Affect: Mood normal.         Behavior: Behavior normal.         Fluids    Intake/Output Summary (Last 24 hours) at 6/8/2020 1654  Last data filed at 6/8/2020 1335  Gross per 24 hour   Intake 120 ml   Output --   Net 120 ml       Laboratory  Recent Labs     06/08/20  0259   WBC 7.7   RBC 3.66*   HEMOGLOBIN 9.6*   HEMATOCRIT 31.8*   MCV 86.9   MCH 26.2*   MCHC 30.2*   RDW 50.8*   PLATELETCT 265   MPV 10.9     Recent Labs     06/06/20  0649 06/07/20  0225 06/08/20  0259   SODIUM 140 139 141   POTASSIUM 4.0 3.8 3.8   CHLORIDE 109 109 108   CO2 18* 18* 19*   GLUCOSE 149* 171* 140*   BUN 48* 44* 35*   CREATININE 3.22* 2.85* 2.43*   CALCIUM 8.8 8.7 8.5               Imaging  reviewed    Assessment/Plan  1.RAF due to volume depletion -improving, nonoliguric per patient.  Check labs daily.    2.HTN; blood pressure mildly elevated.  Continue to monitor.    3.Electrolytes: Low sodium normalized.  Electrolytes controlled.  Check daily labs.    4.Anemia: Anemia slightly improving.  Check daily CBC.    5.metabolic acidosis -slightly improving.  Check daily labs.    6.Volume: Controlled.      Recs: No need for dialysis.  No need for IV fluids.  Allow patient to eat and drink ad alena.  Avoid nephrotoxins.    Nephrology will sign off.  Please call with further questions or concerns.    Timo Abad MD  Nephrology

## 2020-06-09 ENCOUNTER — PATIENT OUTREACH (OUTPATIENT)
Dept: HEALTH INFORMATION MANAGEMENT | Facility: OTHER | Age: 60
End: 2020-06-09

## 2020-06-09 PROBLEM — R10.13 EPIGASTRIC PAIN: Status: ACTIVE | Noted: 2020-06-09

## 2020-06-09 LAB
APPEARANCE UR: CLEAR
BACTERIA #/AREA URNS HPF: NEGATIVE /HPF
BILIRUB UR QL STRIP.AUTO: NEGATIVE
COLOR UR: YELLOW
COVID ORDER STATUS COVID19: NORMAL
EPI CELLS #/AREA URNS HPF: ABNORMAL /HPF
GLUCOSE BLD-MCNC: 140 MG/DL (ref 65–99)
GLUCOSE BLD-MCNC: 155 MG/DL (ref 65–99)
GLUCOSE BLD-MCNC: 167 MG/DL (ref 65–99)
GLUCOSE BLD-MCNC: 212 MG/DL (ref 65–99)
GLUCOSE UR STRIP.AUTO-MCNC: NEGATIVE MG/DL
HYALINE CASTS #/AREA URNS LPF: ABNORMAL /LPF
KETONES UR STRIP.AUTO-MCNC: ABNORMAL MG/DL
LEUKOCYTE ESTERASE UR QL STRIP.AUTO: ABNORMAL
MICRO URNS: ABNORMAL
NITRITE UR QL STRIP.AUTO: NEGATIVE
PH UR STRIP.AUTO: 6 [PH] (ref 5–8)
PROT UR QL STRIP: NEGATIVE MG/DL
RBC # URNS HPF: ABNORMAL /HPF
RBC UR QL AUTO: ABNORMAL
SARS-COV-2 RNA RESP QL NAA+PROBE: NOTDETECTED
SP GR UR STRIP.AUTO: 1.01
SPECIMEN SOURCE: NORMAL
UROBILINOGEN UR STRIP.AUTO-MCNC: 0.2 MG/DL
WBC #/AREA URNS HPF: ABNORMAL /HPF

## 2020-06-09 PROCEDURE — 700105 HCHG RX REV CODE 258: Performed by: INTERNAL MEDICINE

## 2020-06-09 PROCEDURE — A9270 NON-COVERED ITEM OR SERVICE: HCPCS | Performed by: INTERNAL MEDICINE

## 2020-06-09 PROCEDURE — 700111 HCHG RX REV CODE 636 W/ 250 OVERRIDE (IP): Performed by: HOSPITALIST

## 2020-06-09 PROCEDURE — 700102 HCHG RX REV CODE 250 W/ 637 OVERRIDE(OP): Performed by: HOSPITALIST

## 2020-06-09 PROCEDURE — C9803 HOPD COVID-19 SPEC COLLECT: HCPCS | Performed by: INTERNAL MEDICINE

## 2020-06-09 PROCEDURE — 700102 HCHG RX REV CODE 250 W/ 637 OVERRIDE(OP): Performed by: INTERNAL MEDICINE

## 2020-06-09 PROCEDURE — 700102 HCHG RX REV CODE 250 W/ 637 OVERRIDE(OP): Performed by: PSYCHIATRY & NEUROLOGY

## 2020-06-09 PROCEDURE — A9270 NON-COVERED ITEM OR SERVICE: HCPCS | Performed by: PSYCHIATRY & NEUROLOGY

## 2020-06-09 PROCEDURE — 700102 HCHG RX REV CODE 250 W/ 637 OVERRIDE(OP): Performed by: FAMILY MEDICINE

## 2020-06-09 PROCEDURE — A9270 NON-COVERED ITEM OR SERVICE: HCPCS | Performed by: FAMILY MEDICINE

## 2020-06-09 PROCEDURE — 700111 HCHG RX REV CODE 636 W/ 250 OVERRIDE (IP): Performed by: PSYCHIATRY & NEUROLOGY

## 2020-06-09 PROCEDURE — 99232 SBSQ HOSP IP/OBS MODERATE 35: CPT | Performed by: FAMILY MEDICINE

## 2020-06-09 PROCEDURE — 82962 GLUCOSE BLOOD TEST: CPT | Mod: 91

## 2020-06-09 PROCEDURE — 770021 HCHG ROOM/CARE - ISO PRIVATE

## 2020-06-09 PROCEDURE — 94760 N-INVAS EAR/PLS OXIMETRY 1: CPT

## 2020-06-09 PROCEDURE — 99231 SBSQ HOSP IP/OBS SF/LOW 25: CPT | Performed by: INTERNAL MEDICINE

## 2020-06-09 PROCEDURE — 700111 HCHG RX REV CODE 636 W/ 250 OVERRIDE (IP): Performed by: INTERNAL MEDICINE

## 2020-06-09 PROCEDURE — U0004 COV-19 TEST NON-CDC HGH THRU: HCPCS

## 2020-06-09 PROCEDURE — A9270 NON-COVERED ITEM OR SERVICE: HCPCS | Performed by: HOSPITALIST

## 2020-06-09 RX ORDER — SUCRALFATE ORAL 1 G/10ML
1 SUSPENSION ORAL EVERY 6 HOURS
Status: DISCONTINUED | OUTPATIENT
Start: 2020-06-09 | End: 2020-06-15 | Stop reason: HOSPADM

## 2020-06-09 RX ORDER — PEG-3350, SODIUM SULFATE, SODIUM CHLORIDE, POTASSIUM CHLORIDE, SODIUM ASCORBATE AND ASCORBIC ACID 7.5-2.691G
100 KIT ORAL 2 TIMES DAILY
Status: COMPLETED | OUTPATIENT
Start: 2020-06-09 | End: 2020-06-09

## 2020-06-09 RX ADMIN — SUCRALFATE 1 G: 1 SUSPENSION ORAL at 17:47

## 2020-06-09 RX ADMIN — DAPTOMYCIN 530 MG: 350 INJECTION, POWDER, LYOPHILIZED, FOR SOLUTION INTRAVENOUS at 09:42

## 2020-06-09 RX ADMIN — POLYETHYLENE GLYCOL 3350, SODIUM SULFATE, SODIUM CHLORIDE, POTASSIUM CHLORIDE, ASCORBIC ACID, SODIUM ASCORBATE 100 G: KIT at 20:00

## 2020-06-09 RX ADMIN — SUCRALFATE 1 G: 1 SUSPENSION ORAL at 12:04

## 2020-06-09 RX ADMIN — ALUMINUM HYDROXIDE, MAGNESIUM HYDROXIDE, AND DIMETHICONE 30 ML: 400; 400; 40 SUSPENSION ORAL at 20:46

## 2020-06-09 RX ADMIN — ACETAMINOPHEN 650 MG: 325 TABLET, FILM COATED ORAL at 01:05

## 2020-06-09 RX ADMIN — ALUMINUM HYDROXIDE, MAGNESIUM HYDROXIDE, AND DIMETHICONE 30 ML: 400; 400; 40 SUSPENSION ORAL at 07:43

## 2020-06-09 RX ADMIN — SITAGLIPTIN 25 MG: 25 TABLET, FILM COATED ORAL at 06:00

## 2020-06-09 RX ADMIN — SUCRALFATE 1 G: 1 SUSPENSION ORAL at 23:22

## 2020-06-09 RX ADMIN — OXYCODONE 5 MG: 5 TABLET ORAL at 01:03

## 2020-06-09 RX ADMIN — PREDNISONE 10 MG: 10 TABLET ORAL at 04:58

## 2020-06-09 RX ADMIN — RIFAMPIN 300 MG: 300 CAPSULE ORAL at 17:47

## 2020-06-09 RX ADMIN — POLYETHYLENE GLYCOL 3350, SODIUM SULFATE, SODIUM CHLORIDE, POTASSIUM CHLORIDE, ASCORBIC ACID, SODIUM ASCORBATE 100 G: KIT at 14:58

## 2020-06-09 RX ADMIN — RIFAMPIN 300 MG: 300 CAPSULE ORAL at 04:58

## 2020-06-09 RX ADMIN — HEPARIN SODIUM 5000 UNITS: 5000 INJECTION, SOLUTION INTRAVENOUS; SUBCUTANEOUS at 22:26

## 2020-06-09 RX ADMIN — ACETAMINOPHEN 650 MG: 325 TABLET, FILM COATED ORAL at 14:24

## 2020-06-09 RX ADMIN — FLUTICASONE PROPIONATE 50 MCG: 50 SPRAY, METERED NASAL at 04:59

## 2020-06-09 RX ADMIN — INSULIN HUMAN 2 UNITS: 100 INJECTION, SOLUTION PARENTERAL at 12:11

## 2020-06-09 RX ADMIN — INSULIN HUMAN 3 UNITS: 100 INJECTION, SOLUTION PARENTERAL at 07:40

## 2020-06-09 RX ADMIN — INSULIN HUMAN 2 UNITS: 100 INJECTION, SOLUTION PARENTERAL at 22:34

## 2020-06-09 RX ADMIN — OXYCODONE 5 MG: 5 TABLET ORAL at 20:44

## 2020-06-09 RX ADMIN — TIMOLOL MALEATE 1 DROP: 5 SOLUTION/ DROPS OPHTHALMIC at 04:58

## 2020-06-09 RX ADMIN — OMEPRAZOLE 20 MG: 20 CAPSULE, DELAYED RELEASE ORAL at 17:47

## 2020-06-09 RX ADMIN — OXYCODONE 5 MG: 5 TABLET ORAL at 07:30

## 2020-06-09 RX ADMIN — ACETAMINOPHEN 650 MG: 325 TABLET, FILM COATED ORAL at 20:44

## 2020-06-09 RX ADMIN — HEPARIN SODIUM 5000 UNITS: 5000 INJECTION, SOLUTION INTRAVENOUS; SUBCUTANEOUS at 14:24

## 2020-06-09 RX ADMIN — ACETAMINOPHEN 650 MG: 325 TABLET, FILM COATED ORAL at 07:30

## 2020-06-09 RX ADMIN — HEPARIN SODIUM 5000 UNITS: 5000 INJECTION, SOLUTION INTRAVENOUS; SUBCUTANEOUS at 04:58

## 2020-06-09 RX ADMIN — ALUMINUM HYDROXIDE, MAGNESIUM HYDROXIDE, AND DIMETHICONE 30 ML: 400; 400; 40 SUSPENSION ORAL at 14:24

## 2020-06-09 RX ADMIN — OMEPRAZOLE 20 MG: 20 CAPSULE, DELAYED RELEASE ORAL at 04:58

## 2020-06-09 RX ADMIN — OXYCODONE 5 MG: 5 TABLET ORAL at 14:24

## 2020-06-09 ASSESSMENT — ENCOUNTER SYMPTOMS
DOUBLE VISION: 0
COUGH: 0
MYALGIAS: 0
PHOTOPHOBIA: 0
SHORTNESS OF BREATH: 0
FEVER: 0
DIZZINESS: 0
CHILLS: 0
HEARTBURN: 0
NAUSEA: 0
DEPRESSION: 0
PALPITATIONS: 0
HEMOPTYSIS: 0
BLURRED VISION: 0

## 2020-06-09 ASSESSMENT — PAIN SCALES - WONG BAKER: WONGBAKER_NUMERICALRESPONSE: HURTS JUST A LITTLE BIT

## 2020-06-09 NOTE — PROGRESS NOTES
Infectious Disease Progress Note    Author: Kaci Xavier M.D. Date & Time of service: 2020  11:44 AM    Chief Complaint:  MRSA bacteremia      Interval History:  F, vanco trough 22.3, transfer out of ICU, awaiting bed. Improvement in nausea and diarrhea but still with abdominal pain.  She has some lumbar back pain which she states is chronic and unchanged.  No new joint pain. Pt antibiotics transition from vancomycin to daptomycin 8 mg q48 hours due to elevated Vanco trough and ongoing renal dysfunction.  Continued on rifampin 300 mg twice daily.   AF, O2 RA, ongoing chronic abdominal pain, some loose stools. She has chronic low back pain that she states in uncharged from her baseline. No new neck, back or joint pain. She is continued on daptomycin q48 hours based on renal function and rifampin.   AF, VERONICA yesterday and awaiting report. Ongoing epigastric abdominal pain.  No new neck joint or back pain.     Review of Systems:  ROS    Hemodynamics:  Temp (24hrs), Av.6 °C (97.9 °F), Min:36.3 °C (97.3 °F), Max:37.1 °C (98.7 °F)  Temperature: 36.3 °C (97.3 °F)  Pulse  Av.8  Min: 61  Max: 105   Blood Pressure: (!) 164/108(RN notified.)       Physical Exam:  Physical Exam    Meds:    Current Facility-Administered Medications:   •  sucralfate  •  peg-electrolyte solution  •  mag hydrox-al hydrox-simeth  •  SITagliptin  •  fluticasone  •  omeprazole  •  DAPTOmycin  •  riFAMPin  •  insulin regular **AND** POC Blood Glucose **AND** NOTIFY MD and PharmD **AND** glucose **AND** dextrose 50%  •  predniSONE  •  heparin  •  oxyCODONE immediate-release  •  loperamide  •  [DISCONTINUED] magnesium sulfate **OR** magnesium sulfate  •  potassium phosphate ivpb **OR** sodium phosphate 30 mmol ivpb  •  acetaminophen  •  ondansetron  •  ondansetron  •  promethazine  •  promethazine  •  prochlorperazine  •  latanoprost  •  timolol  •  albuterol    Labs:  Recent Labs     20  0259   WBC 7.7   RBC 3.66*    HEMOGLOBIN 9.6*   HEMATOCRIT 31.8*   MCV 86.9   MCH 26.2*   RDW 50.8*   PLATELETCT 265   MPV 10.9   NEUTSPOLYS 72.30*   LYMPHOCYTES 15.30*   MONOCYTES 7.00   EOSINOPHILS 1.70   BASOPHILS 0.30     Recent Labs     06/07/20 0225 06/08/20  0259   SODIUM 139 141   POTASSIUM 3.8 3.8   CHLORIDE 109 108   CO2 18* 19*   GLUCOSE 171* 140*   BUN 44* 35*   CPKTOTAL 19  --      Recent Labs     06/07/20 0225 06/08/20  0259   ALBUMIN 2.2* 2.1*   TBILIRUBIN 0.5  --    ALKPHOSPHAT 140*  --    TOTPROTEIN 5.3*  --    ALTSGPT 76*  --    ASTSGOT 36  --    CREATININE 2.85* 2.43*       Imaging:  Ct-chest,abdomen,pelvis W/o    Result Date: 6/1/2020 6/1/2020 11:18 AM HISTORY/REASON FOR EXAM:  Sepsis Hypotension, renal injury, nausea vomiting diarrhea TECHNIQUE/EXAM DESCRIPTION: CT scan of the chest, abdomen and pelvis without contrast was performed. Noncontrast helical scanning of the chest, abdomen and pelvis was obtained from the lung apices through the pubic symphysis. Low dose optimization technique was utilized for this CT exam including automated exposure control and adjustment of the mA and/or kV according to patient size. COMPARISON:  None. FINDINGS: CHEST: Neck Base:  Normal. Lungs/Pleura: Right lower lobe bronchiectasis with peribronchial opacities which likely is atelectasis or scarring. Correlate clinically for infection. Mild dependent changes/atelectasis in the left lower lobe.  3 mm groundglass nodule in the right midlung on image 58 series 301. Calcified granuloma left upper lobe. No pneumothorax or pleural effusion. Cardiovascular Structures:  Heart size is normal. No pericardial effusion. Coronary artery stents and/or prominent calcification. Aorta is normal in caliber without aneurysm or dissection. Central pulmonary arteries are normal in caliber. Mediastinum/ lymph nodes: No lymphadenopathy. Small hiatal hernia. ABDOMEN/PELVIS Liver:  Diffuse hypoattenuation of the liver suggesting fatty infiltration. Liver is  enlarged. Gallbladder: Normal Biliary tract: Nondilated. Pancreas: Normal. Spleen: Normal. Adrenals: Normal. Kidneys and Collecting Systems:  Bilateral perinephric fat stranding, asymmetrically more prominent on the right. This is concerning for infection given the history. Correlate clinically and with urinalysis. No significant hydronephrosis. No obstructing stone is seen. Gastrointestinal tract:  Possible rectosigmoid wall thickening, suboptimally evaluated without contrast and due to decompression. The appendix is normal. Peritoneum: No free air or free fluid. Reproductive organs:  Normal. Bladder:  Normal. Vessels:  Moderately dense calcified plaque. Lymph  Nodes:  No lymphadenopathy. Soft tissues/wall: Within normal limits. Bones:  No acute or aggressive abnormality. Ossific density adjacent to the inferior right scapula probably related to old trauma.     1.  Asymmetric right perinephric fat stranding suspicious for infection/pyelonephritis. Correlate with urinalysis. No significant hydronephrosis. No obstructing stone is seen. 2.  Hepatomegaly and hepatic steatosis. 3.  Rectosigmoid colon is decompressed, limiting evaluation for wall thickening. Correlate for evidence of colitis. 4.  Right lower lobe bronchiectasis and peribronchial opacities likely postinflammatory with atelectasis/scarring. Correlate clinically for evidence of infection. 5.  Atherosclerosis.     Ec-echocardiogram Complete W/o Cont    Result Date: 6/4/2020  Transthoracic Echo Report Echocardiography Laboratory CONCLUSIONS Mildly reduced left ventricular systolic function. Left ventricular ejection fraction is visually estimated to be 45-50%. Grade II diastolic dysfunction. Dilated inferior vena cava without inspiratory collapse. Estimated right ventricular systolic pressure  is 53 mmHg. Normal right ventricular size. Reduced right ventricular systolic function. Moderate to severe tricuspid regurgitation. CAROLYNE SELLERS Exam Date:          2020                    09:48 Exam Location:     Inpatient Priority:          Routine Ordering Physician:        ISIDRO DELATORRE Referring Physician:       [id number] Pope Sonographer:               Jeramy Paul RDCS,                            RVT Age:    60     Gender:    F MRN:    [id number] :    1960 BSA:    1.71   Ht (in):    64     Wt (lb):    146 Exam Type:     Complete Indications:     Cardiac Murmurs, Undiagnosed ICD Codes:       785.2 CPT Codes:       87871 BP:   132    /   77     HR:   75 Technical Quality:       Technically difficult study -                          adequate information is obtained MEASUREMENTS  (Male / Female) Normal Values 2D ECHO LV Diastolic Diameter PLAX        3.9 cm                4.2 - 5.9 / 3.9 - 5.3 cm LV Systolic Diameter PLAX         2.8 cm                2.1 - 4.0 cm IVS Diastolic Thickness           1.2 cm                LVPW Diastolic Thickness          1.2 cm                RV Diameter 4C                    3.1 cm                2.5 - 2.9 cm LVOT Diameter                     1.7 cm                RA Diameter                       4.5 cm                LV Ejection Fraction MOD BP       54.1 %                >= 55  % LV Ejection Fraction MOD 4C       53.9 %                LV Ejection Fraction MOD 2C       58.7 %                LA Volume Index                   38.5 cm3/m2           16 - 28 cm3/m2 DOPPLER AV Peak Velocity                  1.1 m/s               AV Peak Gradient                  4.8 mmHg              AV Mean Gradient                  2.5 mmHg              LVOT Peak Velocity                0.77 m/s              AV Area Cont Eq vti               1.5 cm2               Mitral E Point Velocity           0.73 m/s              Mitral E to A Ratio               1.3                   MV Pressure Half Time             50 ms                 MV Area PHT                       4.4 cm2               MV Deceleration Time              172 ms                MR Flow  Convergence Radius        0.36 cm               MR ERO PISA                       0.042 cm2             MR Regurgitant Volume PISA        7.1 cm3               TR Peak Velocity                  257 cm/s              * Indicates values subject to auto-interpretation LV EF:        % FINDINGS Left Ventricle Normal left ventricular chamber size. Mild concentric left ventricular hypertrophy. Mildly reduced left ventricular systolic function. Left ventricular ejection fraction is visually estimated to be 45-50%. Abnormal septal motion consistent with right ventricular (RV) volume overload and/or elevated RV end-diastolic pressure. Grade II diastolic dysfunction. Right Ventricle Normal right ventricular size. Reduced right ventricular systolic function. Right Atrium Dilated right atrium. Dilated inferior vena cava without inspiratory collapse. Left Atrium Mildly dilated left atrium. Left atrial volume index is 39  mL/sq m. Mitral Valve Structurally normal mitral valve. No mitral stenosis. Mild mitral regurgitation. Aortic Valve Aortic sclerosis without stenosis. No aortic insufficiency. Tricuspid Valve Structurally normal tricuspid valve. No tricuspid stenosis. Moderate to severe tricuspid regurgitation. Right atrial pressure is estimated to be 15 mmHg. Estimated right ventricular systolic pressure  is 53 mmHg. Pulmonic Valve The pulmonic valve is not well visualized.  Mild pulmonic insufficiency. Pericardium Normal pericardium without effusion. Aorta Normal aortic root for body surface area. Ascending aorta diameter is 2.8 cm. Allyson Rivera MD (Electronically Signed) Final Date:     04 June 2020                 11:59    Ec-rayna W/o Cont    Result Date: 6/5/2020  Results Will be Available after Interpretation by Cardiologist.      Micro:  Results     Procedure Component Value Units Date/Time    COVID/SARS CoV-2 [[id number]]     Order Status:  No result Specimen:  Respirate from Nasopharyngeal     URINALYSIS [[id number]]      "Order Status:  No result Specimen:  Urine, Clean Catch     Blood Culture [[id number]] Collected:  06/06/20 1325    Order Status:  Completed Specimen:  Blood Updated:  06/07/20 0712     Significant Indicator NEG     Source BLD     Site Peripheral     Culture Result No Growth  Note: Blood cultures are incubated for 5 days and  are monitored continuously.Positive blood cultures  are called to the RN and reported as soon as  they are identified.      Narrative:       Left Hand    Blood Culture [[id number]] Collected:  06/06/20 1325    Order Status:  Completed Specimen:  Blood Updated:  06/07/20 0712     Significant Indicator NEG     Source BLD     Site Peripheral     Culture Result No Growth  Note: Blood cultures are incubated for 5 days and  are monitored continuously.Positive blood cultures  are called to the RN and reported as soon as  they are identified.      Narrative:       Right Forearm/Arm    BLOOD CULTURE [[id number]]     Order Status:  Canceled Specimen:  Blood from Peripheral     BLOOD CULTURE [[id number]]     Order Status:  Canceled Specimen:  Blood from Peripheral     BLOOD CULTURE [[id number]]  (Abnormal) Collected:  06/02/20 1542    Order Status:  Completed Specimen:  Blood from Peripheral Updated:  06/05/20 0904     Significant Indicator POS     Source BLD     Site PERIPHERAL     Culture Result Growth detected by Bactec instrument. 06/03/2020  18:41      Methicillin Resistant Staphylococcus aureus  See previous culture for sensitivity report.      Narrative:       CALL  [facility]  [facility] tel. [phone],  CALLED  [facility] tel. [phone] 06/03/2020, 18:57, RB PERF. RESULTS CALLED TO:  PHARMACY (left message x [phone])  Special Contact [id number] DAKSHA GRANT  Per Hospital Policy: Only change Specimen Src: to \"Line\" if  specified by physician order.  Left AC    BLOOD CULTURE [[id number]]  (Abnormal)  (Susceptibility) Collected:  06/02/20 1542    Order Status:  Completed Specimen:  Blood from Peripheral Updated:  " "06/05/20 0904     Significant Indicator POS     Source BLD     Site PERIPHERAL     Culture Result Growth detected by Bactec instrument. 06/03/2020  17:30  Methicillin Resistant Staphylococcus aureus (MRSA)  detected by PCR.        Methicillin Resistant Staphylococcus aureus    Narrative:       CALL  Nieto  [facility] tel. [phone],  CALLED  [facility] tel. [phone] 06/03/2020, 17:33, RB PERF. RESULTS CALLED  TO:[phone] LM  Special Contact [id number] DAKSHA GRANT  Per Hospital Policy: Only change Specimen Src: to \"Line\" if  specified by physician order.  Right Wrist    Susceptibility     Methicillin resistant staphylococcus aureus (1)     Antibiotic Interpretation Microscan Method Status    Azithromycin Resistant >4 mcg/mL SNOW Final    Clindamycin Sensitive <=0.5 mcg/mL SNOW Final    Cefazolin Resistant >16 mcg/mL SNOW Final    Ceftaroline Sensitive 1 mcg/mL SNOW Final    Daptomycin Sensitive <=1 mcg/mL SNOW Final    Ampicillin/sulbactam Resistant >16/8 mcg/mL SNOW Final    Erythromycin Resistant >4 mcg/mL SNOW Final    Vancomycin Sensitive 1 mcg/mL SNOW Final    Oxacillin Resistant >2 mcg/mL SNOW Final    Penicillin Resistant >8 mcg/mL SNOW Final    Trimeth/Sulfa Sensitive <=0.5/9.5 mcg/mL SNOW Final    Tetracycline Sensitive <=4 mcg/mL SNOW Final                   BLOOD CULTURE [[id number]]     Order Status:  No result Specimen:  Blood from Peripheral     BLOOD CULTURE [[id number]]     Order Status:  No result Specimen:  Blood from Peripheral     CULTURE STOOL [[id number]] Collected:  06/01/20 1440    Order Status:  Completed Specimen:  Stool Updated:  06/04/20 1236     Significant Indicator NEG     Source STL     Site STOOL     Culture Result No enteric pathogens isolated.  NOTE:  Stool cultures are screened for Shiga Toxins 1 and 2,  Salmonella, Shigella, Campylobacter, Aeromonas,  Plesiomonas, and Vibrio.       [facility] Negative for Shiga Toxin 1 and 2.    Narrative:       Special Contact Isolation  FENa = (UrineNa / SerumNA) / (UrineCr " / SerumCr) *100  Special Contact Isolation  Is this test for diagnosis or screening?->Diagnosis of ill  patient  Special Contact Isolation    URINE CULTURE(NEW) [[id number]]     Order Status:  Canceled     [facility](Siga Toxin)Detection [[id number]] Collected:  06/01/20 1440    Order Status:  Completed Specimen:  Stool Updated:  06/02/20 1620     Significant Indicator NEG     Source STL     Site STOOL     [facility] Negative for Shiga Toxin 1 and 2.    Narrative:       Special Contact Isolation  FENa = (UrineNa / SerumNA) / (UrineCr / SerumCr) *100  Special Contact Isolation  Is this test for diagnosis or screening?->Diagnosis of ill  patient  Special Contact Isolation    C Diff by PCR rflx Toxin [[id number]] Collected:  06/02/20 0739    Order Status:  Completed Specimen:  Stool Updated:  06/02/20 1507     C Diff by PCR Negative     Comment: C. difficile NOT detected by PCR.  Treatment not indicated per guidelines.  Repeat testing not indicated within 7 days.          027-NAP1-BI Presumptive Negative     Comment: Presumptive 027/NAP1/BI target DNA sequences are NOT DETECTED.       Narrative:       Special Contact [id number] DAKSHA GRANT  Does this patient have risk factors for C-diff?->Yes  C-Diff Risk Factors->a serious underlying illness          Assessment:  Active Hospital Problems    Diagnosis   • *Staphylococcus aureus bacteremia [R78.81, B95.61]   • Diabetic ketoacidosis without coma associated with type 2 diabetes mellitus (HCC) [E11.10]   • Septic shock (HCC) [A41.9, R65.21]   • Acute renal failure with tubular necrosis (HCC) [N17.0]   • Pulmonary hypertension (HCC) [I27.20]   • Acute pyelonephritis [N10]   • Bronchiectasis (HCC) [J47.9]   • Hyponatremia [E87.1]   • Diarrhea [R19.7]   • Adrenal insufficiency (HCC) [E27.40]   • Metabolic acidosis [E87.2]     Interval 24 hours:      AF, O2 0.5 L NC   Labs reviewed  Imaging personally reviewed both images and report.   Studies reviewed   Micro reviewed    Pt with  ongoing abdominal pain. Plan is for colonoscopy and EGD tomorrow.  Pt continued on daptomycin renally dosed and rifampin.        ASSESSMENT/PLAN:      60 y.o.  admitted 6/1/2020. Pt has a past medical history of RA on chronic prednisone 10 mg daily, DMT2 , CKD stage III.  She reports abdominal pain on and off again since December and also history of frequent UTIs.  She is most recently treated with amoxicillin but been off antibiotics for approximately 1 week prior to her admission. Presented to ER at [facility] c/o epigastric abdominal pain described as burning as well as nausea vomiting and diarrhea x4 days.  She was hypotensive, requiring oxygen support with lab irregularities including leukocytosis to 14.5, hyponatremia, elevated creatinine, elevated glucose and elevated lactic acid.  She was started on Unasyn and Levophed and transferred to [facility] for higher level of care.  The patient has hardware - pins in her right foot, a plate in her left ankle and replaced knuckle in her right hand     Hospital Course:   She has been afebrile, initially hypotensive and now improved.  Leukocytosis to 19.7 on admit.  Acute kidney injury on arrival.  Stool cultures were obtained which are pending in part but negative for Shiga toxin 1 and 2.  C. difficile was tested on 6/1 and 6/2 and negative on both.  Blood cultures were obtained which are positive for MRSA.  She was started on vancomycin Flagyl and ceftriaxone.  Transitioned to daptomycin.      Problem List      Septic and hypovolemic shock, secondary to MRSA bacteremia and profuse diarrhea - improved   MRSA bacteremia- source may be urinary but no urine cx to confirm, no other obvious source,   -Blood cultures from [facility] 6/1 are 2/2+ for MRSA per microbiology lab there, no urine culture was performed  -Blood cultures on 6/2 2/2+ for MRSA  -Blood cultures on 6/6- NGTD   -TTE on 6/4-  negative for vegetations.  Moderate to severe tricuspid  regurgitation, diastolic dysfunction  -VERONICA done and report pending   Leukocytosis-improved  UTI/Pyelonephritis-she endorses dysuria with pain, burning and hematuria prior to admission  -UA suggestive of infection, no urine culture  -CT on 6/1 with right-sided fat stranding suspicious for infection/Pyelo   Abdominal pain, epigastric- ongoing - chronic   Diarrhea - improved   -C. difficile tested negative x2  -Stool cultures pending, Shiga toxin 1 and 2  Acute on chronic kidney disease -improving -nephrology following and per notes no need for acute dialysis  Bronchiectasis, noted on CT in right lower lobe as well as atelectasis, calcified granuloma in left upper lobe groundglass nodule on right  Diabetes, uncontrolled   Diabetic ketoacidosis on presentation- improving   Rheumatoid arthritis  Immunosuppression,  secondary to above on chronic prednisone 10 mg daily  Thrombocytopenia- appears to be chronic, improved   Anemia-ongoing  Antibiotic allergies: Cephalexin reported as vomiting  Grade 2 diastolic heart failure on TTE  Low back pain-chronic, ongoing - no changes and at baseline per patient      Plan      ---  Stopped vancomycin and transitioned to daptomycin due to RAF. Continue daptomycin at 8 mg/kg q48 hours, follow CPK 2x weekly due to renal dysfunction-  17 on 6/6   --- Continue rifampin due to hardware. HOWEVER, she is also on chronic steroids and this reaction could potentially cause some decrease in steroid.  Monitor clinically.    --- TTE negative but patient is immunocompromised due to chronic steroid therapy and she has blood cultures positive on admit so unknown duration-recommended VERONICA to rule out endocarditis and results this will also effect duration of antibiotics- awaiting report   --- F/up repeat blood cultures- NGTD   --- We will need multiple weeks of IV antibiotics, no PICC line until blood cultures are clear for 48 hours-if VERONICA is negative we will plan on 4-week course and ok to place  midline   --- Continue to monitor for any new neck joint or back pain image appropriately-very low threshold for imaging of lumbar spine- she says her pain is chronic and unchanged   --- Monitor labs,  CBC and CMP for LFTs as on rifampin- CMP ordered for a,  --- If she spikes a new fever please obtain a urine culture  --- Epigastric abdominal pain - ongoing - appreciate GI evaluation - c-scope and EGD tomorrow          Discussed with Dr. Mayer.   Will continue to follow.

## 2020-06-09 NOTE — DISCHARGE PLANNING
Care Transition Team Assessment  Completed assessment with pt and discussed discharge plans/barriers. Pt's goal is to return home upon discharge and reported her family is available to provide transportation. She reported she follows with Dr. Tapia in [place], [place]. Pt denies any barriers related to discharge at this time.     Pt is a 60 year old  female who lives in [place], [place] with her parents. She reported she helps provide them support and indicated her family as a whole including her children a good support system for her. Pt reported at home she is independent with her ADL/iADL needs and denied the use of DME. She reported she is on SSDI about $1400 month and has Medicare and MediCal. Pt denied any financial barriers to meeting her basic needs. She denied any substance abuse or behavioral health needs at this time.     Pt does not currently have an Advanced Directive. LSW reviewed over information with pt and discussed benefits. Advanced directive packet provided to pt to complete should she choose.   Information Source  Orientation : Oriented x 4  Information Given By: Patient  Informant's Name: Savita  Who is responsible for making decisions for patient? : Patient    Readmission Evaluation  Is this a readmission?: No    Elopement Risk  Legal Hold: No  Ambulatory or Self Mobile in Wheelchair: Yes  Disoriented: No  Psychiatric Symptoms: None  History of Wandering: No  Elopement this Admit: No  Vocalizing Wanting to Leave: No  Displays Behaviors, Body Language Wanting to Leave: No-Not at Risk for Elopement  Elopement Risk: Not at Risk for Elopement    Interdisciplinary Discharge Planning   Primary Care Physician: Dr. Tapia  Lives with - Patient's Self Care Capacity: Parents  Patient or legal guardian wants to designate a caregiver (see row info): No  Support Systems: Family Member(s)  Housing / Facility: [address]  Durable Medical Equipment: Not Applicable    Discharge Preparedness  What is your plan  after discharge?: Home with help  What are your discharge supports?: Child, Parent  Prior Functional Level: Ambulatory, Drives Self, Independent with Activities of Daily Living, Independent with Medication Management  Difficulity with ADLs: None  Difficulity with IADLs: None    Functional Assesment  Prior Functional Level: Ambulatory, Drives Self, Independent with Activities of Daily Living, Independent with Medication Management    Finances  Financial Barriers to Discharge: No  Prescription Coverage: Yes    Vision / Hearing Impairment  Vision Impairment : No  Hearing Impairment : No         Advance Directive  Advance Directive?: None    Domestic Abuse  Have you ever been the victim of abuse or violence?: No  Physical Abuse or Sexual Abuse: No  Verbal Abuse or Emotional Abuse: No  Possible Abuse Reported to:: Not Applicable    Psychological Assessment  History of Substance Abuse: None  History of Psychiatric Problems: Yes  Non-compliant with Treatment: No  Newly Diagnosed Illness: Yes    Discharge Risks or Barriers  Discharge risks or barriers?: No    Anticipated Discharge Information  Anticipated discharge disposition: Home  Discharge Address: [address] [place] [id number]  Discharge Contact Phone Number: [phone]

## 2020-06-09 NOTE — RESPIRATORY CARE
Oxygen Rounds      Patient found on    O2 L/m:  __1_______    Oxygen device:  ____nc____   Spo2: ______99___%      Respiratory device skin site inspection completed.

## 2020-06-09 NOTE — PROGRESS NOTES
Assumed care at 1930, report received from Sheree, and Luke MOELLER.  Pt A&O x 4, states pain is 08/10 in her upper GI area. Pt states it is unbearable. MD Santizo notified and Maalox prn prescribed. Bed locked, 2 rails up, bed in lowest position. Call light in place, belongings at bedside, no needs at this time and hourly rounding in place.

## 2020-06-09 NOTE — ASSESSMENT & PLAN NOTE
Could be due to gastroparesis.  She is high risk for PUD being on steroids chronically.  GI consulted.  Planning for EGD tomorrow.  6/10: Patient had unremarkable EGD and colonoscopy done this morning.  If continues to have epigastric pain, will consider gastric emptying test to assess for gastroparesis.

## 2020-06-09 NOTE — PROGRESS NOTES
Diabetes education: Pt is currently on regular insulin sliding scale coverage ac and hs as well as Januvia 25 mg daily.Current blood sugars are 147, 158 ( 2 units) and 197 ( 2 units). If pt to go home on Januvia, please send RX to pharmacy and check on coverage/cost before pt is discharged. Please call [phone] if needs change.

## 2020-06-09 NOTE — PROGRESS NOTES
Assumed patient care at 0700. Received report from night shift. MRSA precautions in place. Assessment completed. A&Ox4. States 9/10 abdominal pain, medicated per MAR. MD also aware. Anxious this morning. UA pending collection, pt aware of need to collect. High fall precautions in place; treaded socks in place, alarm in use, personal possessions and call light placed within reach.  POC discussed with pt, communication board updated.

## 2020-06-09 NOTE — CARE PLAN
Problem: Safety  Goal: Will remain free from falls  Outcome: PROGRESSING AS EXPECTED  Intervention: Implement fall precautions  Flowsheets  Taken 6/8/2020 0714 by Luke Ribeiro RJaneNJane  Environmental Precautions:   Treaded Slipper Socks on Patient   Personal Belongings, Wastebasket, Call Bell etc. in Easy Reach   Transferred to Stronger Side   Report Given to Other Health Care Providers Regarding Fall Risk   Bed in Low Position   Communication Sign for Patients & Families   Mobility Assessed & Appropriate Sign Placed  Taken 6/8/2020 2259 by Carole Fischer RONDINA  Bedrails: Bedrails Closest to Bathroom Down     Problem: Pain Management  Goal: Pain level will decrease to patient's comfort goal  Outcome: PROGRESSING SLOWER THAN EXPECTED  Intervention: Educate and implement non-pharmacologic comfort measures. Examples: relaxation, distration, play therapy, activity therapy, massage, etc.  Flowsheets (Taken 6/8/2020 2259)  Intervention:   Medication (see MAR)   [place]   Education   Rest   Repositioned   Relaxation Technique

## 2020-06-09 NOTE — CONSULTS
Date of Service:6/9/20    Consult Requested By: Kat Mayer M.D.    Reason for Consultation: abdominal pain and diarrhea, aneima    History of Present Illness:   Savita Mcclain is a 60 y.o. female who presented 6/1/2020 with past medical history of rheumatoid arthritis on chronic prednisone therapy, type 2 diabetes on metformin, CKD stage III who comes into the emergency room with complaints of nausea, vomiting and diarrhea for the past 4 days.  Associated with an epigastric abdominal pain.  The pain radiates to the lower abdomen and feels like a burning sensation.  The pain is nonexertional, non-positional.  Patient states that she has not gone to the bathroom to urinate in 4 days.  She would have a bowel movement every 30 minutes.  She denies any blood in her stools.  Patient denies any fever, cough, shortness of breath, chest pain.      We were called or the above reasons, she has epigastric pain that was sudden in its presentation, she has had a colonoscopy in [place] three years ago and polyps were appreciated.  She was found to be septic on presentation and was treated with Abx and pressors which gradually improved and resolved.  She has underlying MRSA     Her pain is worse with eating, last all day  Improves with emesis, her stools are loose water brown with no pain or blood.  She has poorly controled DM with CKD, HGB A1C is 11.    Review Of Systems:    Review of Systems   Constitutional: Positive for chills. Negative for diaphoresis, fever and malaise/fatigue.   HENT: Negative for congestion, ear discharge, ear pain, hearing loss, nosebleeds, sinus pain, sore throat and tinnitus.    Eyes: Negative for blurred vision, double vision, photophobia and pain.   Respiratory: Negative for cough, hemoptysis, sputum production, shortness of breath, wheezing and stridor.    Cardiovascular: Negative for chest pain, palpitations, orthopnea, claudication, leg swelling and PND.   Gastrointestinal: Positive for  abdominal pain, diarrhea, nausea and vomiting. Negative for blood in stool, constipation, heartburn and melena.   Genitourinary: Negative for dysuria, flank pain, frequency, hematuria and urgency.   Musculoskeletal: Negative for back pain, falls, joint pain, myalgias and neck pain.   Skin: Negative for itching and rash.   Neurological: Negative for dizziness, tingling, tremors, weakness and headaches.   Endo/Heme/Allergies: Negative for environmental allergies and polydipsia. Does not bruise/bleed easily.   Psychiatric/Behavioral: Negative for depression, hallucinations, substance abuse and suicidal ideas.     PMH:   Past Medical History:   Diagnosis Date   • Diabetes    • Psychiatric disorder     depression   • Rheumatoid arthritis(714.0)          PSH:  Past Surgical History:   Procedure Laterality Date   • OTHER ORTHOPEDIC SURGERY      amputation of left toes       FAMILY HX:  No family history on file.    SOCIAL HX:  Social History     Socioeconomic History   • Marital status:      Spouse name: Not on file   • Number of children: Not on file   • Years of education: Not on file   • Highest education level: Not on file   Occupational History   • Not on file   Social Needs   • Financial resource strain: Not on file   • Food insecurity     Worry: Not on file     Inability: Not on file   • Transportation needs     Medical: Not on file     Non-medical: Not on file   Tobacco Use   • Smoking status: Never Smoker   • Smokeless tobacco: Never Used   Substance and Sexual Activity   • Alcohol use: Yes   • Drug use: Never   • Sexual activity: Not on file   Lifestyle   • Physical activity     Days per week: Not on file     Minutes per session: Not on file   • Stress: Not on file   Relationships   • Social connections     Talks on phone: Not on file     Gets together: Not on file     Attends Confucianism service: Not on file     Active member of club or organization: Not on file     Attends meetings of clubs or  organizations: Not on file     Relationship status: Not on file   • Intimate partner violence     Fear of current or ex partner: Not on file     Emotionally abused: Not on file     Physically abused: Not on file     Forced sexual activity: Not on file   Other Topics Concern   • Not on file   Social History Narrative   • Not on file     Social History     Tobacco Use   Smoking Status Never Smoker   Smokeless Tobacco Never Used     Social History     Substance and Sexual Activity   Alcohol Use Yes       Allergies/Intolerances:  Allergies   Allergen Reactions   • Cephalexin Unspecified and Vomiting     n/v  n/v         History reviewed with the patient    Other Current Medications:    Current Facility-Administered Medications:   •  sucralfate (CARAFATE) 1 GM/10ML suspension 1 g, 1 g, Oral, Q6HRS, Kat Mayer M.D.  •  mag hydrox-al hydrox-simeth (MAALOX PLUS ES or MYLANTA DS) suspension 30 mL, 30 mL, Oral, Q6HRS PRN, Irina Santizo M.D., 30 mL at 06/09/20 0743  •  SITagliptin (JANUVIA) tablet 25 mg, 25 mg, Oral, DAILY, Luis Neville M.D., 25 mg at 06/09/20 0600  •  fluticasone (FLONASE) nasal spray 50 mcg, 1 Spray, Nasal, DAILY, Irina Santizo M.D., 50 mcg at 06/09/20 0459  •  omeprazole (PRILOSEC) capsule 20 mg, 20 mg, Oral, BID, Irina Santizo M.D., 20 mg at 06/09/20 0458  •  DAPTOmycin 530 mg in NS 50 mL IVPB, 8 mg/kg, Intravenous, Q48HRS, Kaci Xavier M.D., Last Rate: 100 mL/hr at 06/09/20 0942, 530 mg at 06/09/20 0942  •  riFAMPin (RIFADINE) capsule 300 mg, 300 mg, Oral, BID, Kaci Xavier M.D., 300 mg at 06/09/20 0458  •  insulin regular (HUMULIN R) injection 2-9 Units, 2-9 Units, Subcutaneous, 4X/DAY ACHS, 3 Units at 06/09/20 0740 **AND** POC Blood Glucose, , , Q AC AND BEDTIME(S) **AND** NOTIFY MD and PharmD, , , Once **AND** glucose 4 g chewable tablet 16 g, 16 g, Oral, Q15 MIN PRN **AND** dextrose 50% (D50W) injection 50 mL, 50 mL, Intravenous, Q15 MIN PRN, Enrrique Ba M.D.  •   "predniSONE (DELTASONE) tablet 10 mg, 10 mg, Oral, DAILY, Luis Neville M.D., 10 mg at 06/09/20 0458  •  heparin injection 5,000 Units, 5,000 Units, Subcutaneous, Q8HRS, Enrrique Ba M.D., 5,000 Units at 06/09/20 0458  •  oxyCODONE immediate-release (ROXICODONE) tablet 5 mg, 5 mg, Oral, Q6HRS PRN, Enrrique Ba M.D., 5 mg at 06/09/20 0730  •  loperamide (IMODIUM) capsule 2 mg, 2 mg, Oral, 4X/DAY PRN, Enrrique Ba M.D., 2 mg at 06/02/20 1742  •  [DISCONTINUED] magnesium sulfate IVPB premix 2 g, 2 g, Intravenous, Once PRN **OR** magnesium sulfate IVPB premix 4 g, 4 g, Intravenous, Once PRN, Enrrique Ba M.D.  •  potassium phosphates 30 mmol in  mL ivpb, 30 mmol, Intravenous, Once PRN **OR** sodium phosphate 30 mmol in 1/2  mL ivpb, 30 mmol, Intravenous, Once PRN, Enrrique Ba M.D.  •  acetaminophen (TYLENOL) tablet 650 mg, 650 mg, Oral, Q6HRS PRN, Oni uRiz M.D., 650 mg at 06/09/20 0730  •  ondansetron (ZOFRAN) syringe/vial injection 4 mg, 4 mg, Intravenous, Q4HRS PRN, Oni Ruiz M.D., 4 mg at 06/05/20 1452  •  ondansetron (ZOFRAN ODT) dispertab 4 mg, 4 mg, Oral, Q4HRS PRN, Oni Ruiz M.D.  •  promethazine (PHENERGAN) tablet 12.5-25 mg, 12.5-25 mg, Oral, Q4HRS PRN, Oni Ruiz M.D.  •  promethazine (PHENERGAN) suppository 12.5-25 mg, 12.5-25 mg, Rectal, Q4HRS PRN, Oni Ruiz M.D.  •  prochlorperazine (COMPAZINE) injection 5-10 mg, 5-10 mg, Intravenous, Q4HRS PRN, Oni Ruiz M.D.  •  latanoprost (XALATAN) 0.005 % ophthalmic solution 1 Drop, 1 Drop, Both Eyes, QHS, Oni Ruiz M.D., 1 Drop at 06/08/20 2106  •  timolol (TIMOPTIC) 0.5 % ophthalmic solution 1 Drop, 1 Drop, Both Eyes, DAILY, Oni Ruiz M.D., 1 Drop at 06/09/20 5722  •  albuterol inhaler 2 Puff, 2 Puff, Inhalation, Q4HRS PRN, Oni Ruiz M.D.  [unfilled]    Most Recent Vital Signs:  BP (!) 164/108 Comment: RN notified.  Pulse 93   Temp 36.3 °C (97.3 °F) (Temporal)   Resp 18   Ht 1.626 m (5' 4\") " Comment: obtained from previous chart  Wt 66.4 kg (146 lb 6.2 oz)   SpO2 99%   BMI 25.13 kg/m²   Temp  Av.4 °C (97.6 °F)  Min: 35.9 °C (96.6 °F)  Max: 37.2 °C (99 °F)    Physical Exam:  General: Nontoxic, no acute distress  HEENT: sclera anicteric, PERRL, EOMI, MMM, no oral lesions  Neck: supple, no lymphadenopathy  Chest: CTAB, no r/r/w, normal work of breathing.  Cardiac: Regular, no murmurs no gallops heard  Abdomen: + bowel sounds, soft, non-tender, non-distended, no HSM  Extremities: No edema. No joint swelling.  Skin: no rashes or erythema  Neuro: Alert and oriented times 3, non-focal exam    Pertinent Lab Results:  Recent Labs     20   WBC 7.7      Recent Labs     20   HEMOGLOBIN 9.6*   HEMATOCRIT 31.8*   MCV 86.9   MCH 26.2*   PLATELETCT 265         Recent Labs     20   SODIUM 139 141   POTASSIUM 3.8 3.8   CHLORIDE 109 108   CO2 18* 19*   CREATININE 2.85* 2.43*        Recent Labs     20   ALBUMIN 2.2* 2.1*      Recent Labs     20   ASTSGOT 36   ALTSGPT 76*   TBILIRUBIN 0.5   ALKPHOSPHAT 140*   GLOBULIN 3.1       [unfilled]      Pertinent Micro:  Results     Procedure Component Value Units Date/Time    URINALYSIS [[id number]]     Order Status:  No result Specimen:  Urine, Clean Catch     Blood Culture [[id number]] Collected:  20    Order Status:  Completed Specimen:  Blood Updated:  20     Significant Indicator NEG     Source BLD     Site Peripheral     Culture Result No Growth  Note: Blood cultures are incubated for 5 days and  are monitored continuously.Positive blood cultures  are called to the RN and reported as soon as  they are identified.      Narrative:       Left Hand    Blood Culture [[id number]] Collected:  20    Order Status:  Completed Specimen:  Blood Updated:  20     Significant Indicator NEG     Source BLD     Site Peripheral     Culture Result No  "Growth  Note: Blood cultures are incubated for 5 days and  are monitored continuously.Positive blood cultures  are called to the RN and reported as soon as  they are identified.      Narrative:       Right Forearm/Arm    BLOOD CULTURE [[id number]]     Order Status:  Canceled Specimen:  Blood from Peripheral     BLOOD CULTURE [[id number]]     Order Status:  Canceled Specimen:  Blood from Peripheral     BLOOD CULTURE [[id number]]  (Abnormal) Collected:  06/02/20 1542    Order Status:  Completed Specimen:  Blood from Peripheral Updated:  06/05/20 0904     Significant Indicator POS     Source BLD     Site PERIPHERAL     Culture Result Growth detected by Bactec instrument. 06/03/2020  18:41      Methicillin Resistant Staphylococcus aureus  See previous culture for sensitivity report.      Narrative:       CALL  Nieto  [facility] tel. [phone],  CALLED  [facility] tel. [phone] 06/03/2020, 18:57, RB PERF. RESULTS CALLED TO:  PHARMACY (left message x [phone])  Special Contact [id number] DAKSHA SETHIANDA RUDY  Per Hospital Policy: Only change Specimen Src: to \"Line\" if  specified by physician order.  Left AC    BLOOD CULTURE [[id number]]  (Abnormal)  (Susceptibility) Collected:  06/02/20 1542    Order Status:  Completed Specimen:  Blood from Peripheral Updated:  06/05/20 0904     Significant Indicator POS     Source BLD     Site PERIPHERAL     Culture Result Growth detected by Bactec instrument. 06/03/2020  17:30  Methicillin Resistant Staphylococcus aureus (MRSA)  detected by PCR.        Methicillin Resistant Staphylococcus aureus    Narrative:       CALL  Nieto  [facility] tel. [phone],  CALLED  [facility] tel. [phone] 06/03/2020, 17:33, RB PERF. RESULTS CALLED  TO:[phone] LM  Special Contact [id number] DAKSHA FLASH RUDY  Per Hospital Policy: Only change Specimen Src: to \"Line\" if  specified by physician order.  Right Wrist    Susceptibility     Methicillin resistant staphylococcus aureus (1)     Antibiotic Interpretation Microscan Method Status    " Azithromycin Resistant >4 mcg/mL SNOW Final    Clindamycin Sensitive <=0.5 mcg/mL SNOW Final    Cefazolin Resistant >16 mcg/mL SNOW Final    Ceftaroline Sensitive 1 mcg/mL SNOW Final    Daptomycin Sensitive <=1 mcg/mL SNOW Final    Ampicillin/sulbactam Resistant >16/8 mcg/mL SNOW Final    Erythromycin Resistant >4 mcg/mL SNOW Final    Vancomycin Sensitive 1 mcg/mL SNOW Final    Oxacillin Resistant >2 mcg/mL SNOW Final    Penicillin Resistant >8 mcg/mL SNOW Final    Trimeth/Sulfa Sensitive <=0.5/9.5 mcg/mL SNOW Final    Tetracycline Sensitive <=4 mcg/mL SNOW Final                   BLOOD CULTURE [[id number]]     Order Status:  No result Specimen:  Blood from Peripheral     BLOOD CULTURE [[id number]]     Order Status:  No result Specimen:  Blood from Peripheral     CULTURE STOOL [[id number]] Collected:  06/01/20 1440    Order Status:  Completed Specimen:  Stool Updated:  06/04/20 1236     Significant Indicator NEG     Source STL     Site STOOL     Culture Result No enteric pathogens isolated.  NOTE:  Stool cultures are screened for Shiga Toxins 1 and 2,  Salmonella, Shigella, Campylobacter, Aeromonas,  Plesiomonas, and Vibrio.       [facility] Negative for Shiga Toxin 1 and 2.    Narrative:       Special Contact Isolation  FENa = (UrineNa / SerumNA) / (UrineCr / SerumCr) *100  Special Contact Isolation  Is this test for diagnosis or screening?->Diagnosis of ill  patient  Special Contact Isolation    URINE CULTURE(NEW) [[id number]]     Order Status:  Canceled     [facility](Siga Toxin)Detection [[id number]] Collected:  06/01/20 1440    Order Status:  Completed Specimen:  Stool Updated:  06/02/20 1620     Significant Indicator NEG     Source STL     Site STOOL     [facility] Negative for Shiga Toxin 1 and 2.    Narrative:       Special Contact Isolation  FENa = (UrineNa / SerumNA) / (UrineCr / SerumCr) *100  Special Contact Isolation  Is this test for diagnosis or screening?->Diagnosis of ill  patient  Special Contact Isolation    C Diff by PCR rflx  Toxin [[id number]] Collected:  06/02/20 0739    Order Status:  Completed Specimen:  Stool Updated:  06/02/20 1507     C Diff by PCR Negative     Comment: C. difficile NOT detected by PCR.  Treatment not indicated per guidelines.  Repeat testing not indicated within 7 days.          027-NAP1-BI Presumptive Negative     Comment: Presumptive 027/NAP1/BI target DNA sequences are NOT DETECTED.       Narrative:       Special Contact [id number] DAKSHA GRANT  Does this patient have risk factors for C-diff?->Yes  C-Diff Risk Factors->a serious underlying illness        Blood Culture Hold   Date Value Ref Range Status   06/01/2020 Collected  Final        Studies:                        IMPRESSION:        1.  Asymmetric right perinephric fat stranding suspicious for infection/pyelonephritis. Correlate with urinalysis. No significant hydronephrosis. No obstructing stone is seen.  2.  Hepatomegaly and hepatic steatosis.  3.  Rectosigmoid colon is decompressed, limiting evaluation for wall thickening. Correlate for evidence of colitis.  4.  Right lower lobe bronchiectasis and peribronchial opacities likely postinflammatory with atelectasis/scarring. Correlate clinically for evidence of infection.  5.  Atherosclerosis.                                                                       IMPRESSION:     Abdominal pain  N/V  Diarrhea  Anemia  DM  CKD  RA      PLAN:   Savita Mcclain is a 60 y.o.we will plan for EGD and colonoscopy to assess and determine the cause of her anemia and epigastric pain.  She may have poor GE and thus has gastroparesis secondary to her poorly controled DM but we will r/u for ulcer disease.  Start carafate for now   Plan discussed with primary team.  Call if questions [phone]      Discussed with IM. Will continue to follow    Tian Velazquez M.D.

## 2020-06-09 NOTE — CARE PLAN
Problem: Nutritional:  Goal: Achieve adequate nutritional intake  Description: Patient will consume 50% of meals/supplements  Outcome: NOT MET

## 2020-06-09 NOTE — DIETARY
"Nutrition services: Day 8 of admit.  Savita Mcclain is a 60 y.o. female with admitting DX of Pyelonephritis, DKA     Consult received for Poor PO on weekly screen    Pt is in isolation, RD unable to visit d/t department policy at this time. Spoke with RN who states pt refusing meals d/t abdominal pain. Pt tolerating Boost Glucose Control. Colonoscopy and EGD tomorrow.    Assessment:  Height: 162.6 cm (5' 4\")(obtained from previous chart)  Weight: 66.4 kg (146 lb 6.2 oz)  Body mass index is 25.13 kg/m²., BMI classification: Normal/overweight  Diet/Intake: Diabetic + Boost Glucose Control with meals. PO generally 0-<25%. 1 Boost documented % today.     Evaluation:   1. Nutrition admit screen negative for poor PO/weight loss. Weight trend up since admit (likely fluid related, +11L I/O).  2. PMH: Diabetes, RA, depression  3. Labs: Glu 140-170, BUN 35H (trend down from 48), Crea 2.43H (trend down from 3.22), A1c 11.2  4. DM education completed with pt on 6/3   5. Meds: Humulin R (SSI), Imodium (last given 6/2), Maalox Plus    Malnutrition Risk: Increased risk d/t poor PO/refusing meals last 2 days    Recommendations/Plan:  1. Continue Boost Glucose Control with meals, add additional Boost for  HS snack   2. Encourage intake of meals  3. Small portions  4. Document intake of all meals as % taken in ADL's to provide interdisciplinary communication across all shifts.   5. Monitor weight.  6. Nutrition rep will continue to see patient for ongoing meal and snack preferences.     RD following         "

## 2020-06-09 NOTE — CARE PLAN
Problem: Infection  Goal: Will remain free from infection  Note: Patient receiving q48h Dapto IV. Awaiting PICC placement for continued abx administration.      Problem: Pain Management  Goal: Pain level will decrease to patient's comfort goal  Note: Pain management discussed with MD during bedside rounds. Patient states pain intensity has increased today.

## 2020-06-09 NOTE — PROGRESS NOTES
[facility] Medicine Daily Progress Note    Date of Service  6/9/2020    Chief Complaint  60 y.o. female admitted 6/1/2020 with diarrhea.    Hospital Course   Ms. Mcclain is a 60 y.o. female who presented 6/1/2020 with past medical history of rheumatoid arthritis on chronic prednisone therapy, type 2 diabetes on metformin, CKD stage III who comes into the emergency room with complaints of nausea, vomiting and diarrhea for the past 4 days.  Associated with an epigastric abdominal pain.  The pain radiates to the lower abdomen and feels like a burning sensation.  The pain is nonexertional, non-positional.  Patient states that she has not gone to the bathroom to urinate in 4 days.  She would have a bowel movement every 30 minutes.  She denies any blood in her stools.  Patient denies any fever, cough, shortness of breath, chest pain.   Patient came from outlying facility from [facility] includes  Blood pressure 86/50, respiratory rate 20, saturating 90% on 2 L oxygen, temperature 98.4  Sodium 129, potassium 3.6, chloride 89, CO2 21, AST 46, ALT 30, BUN 67, glucose 399, creatinine 6.4, calcium 8, bili 1.5, anion gap 22, lactic acid 2.5, lipase 163, , CK-MB 1.1, troponin 0.062, WBC 14.5, hemoglobin 10.9, platelets 175, d-dimer 3 8378, TSH 8.67  Patient was started on Unasyn and Levophed with ICU admission        Interval Problem Update  6/3: patient seen and evaluated in the ICU. The lab from [facility] faxed over a preliminary + blood culture revealing gram + cocci in clusters. She has been started on broad spectrum antibiotics. She transitioned off the insulin drip to sliding scale.   6/4: Ms. Mcclain was evaluated and examined in the ICU. Her blood cultures have come back + for MRSA thus she has been placed under precautions. I have consulted Dr. Xavier, ID. She remains on IV fluids and her Cr is down to 3.7. glucose went down to 57 this morning.   6/5: patient seen and evaluated in the ICU. Reportedly there was not  a urine culture obtained in [place] and it was not done here either. An echocardiogram was done yesterday and did not reveal vegetations. I discussed with Dr. Navarro about a VERONICA and Dr. Ba about performing conscious sedation to eval for endocarditis.   6/6: patient seen and evaluated in the ICU. She had a VERONICA yesterday. Cr is 3.22 today.  6/7: Ms. Mcclain was evaluated and examined on the medical floor. Her glucose remains a bit high today thus Januvia will be initiated. Her Cr is 2.8 today. We discussed a PICC and 4-6 weeks of IV antibiotics as an outpatient or in a rehab facility.   6/8: Patient seen and evaluated on the medical floor.  She is feeling much better today.  She has been up walking around.  She is tolerating regular diet.  She had some diarrhea yesterday none today.  Discussed that if her blood cultures remain negative, she will receive a PICC line and then discharge planning will evaluate her for inpatient versus outpatient with infusion center options.  She hopes to live with her aunt here in [place] for about a month and go to the infusion center on a daily basis for daptomycin.  6/9: Sitting up in bed comfortable.  Complaining of epigastric pain.  Refusing to eat per staff.  I consulted GI.  Planning for endoscopy tomorrow.  Carafate was added.  She however did not look in acute distress.  No other issues to report.  Consultants/Specialty  Critical care.   Nephrology   Infectious disease.  Code Status  Full     Disposition  Pending clinical course    Review of Systems  Review of Systems   Constitutional: Negative for chills and fever.        She is feeling overall much better   HENT: Negative for hearing loss.    Eyes: Negative for blurred vision, double vision and photophobia.   Respiratory: Negative for cough, hemoptysis and shortness of breath.    Cardiovascular: Negative for chest pain and palpitations.   Gastrointestinal: Negative for heartburn and nausea.   Genitourinary: Negative for dysuria  and urgency.   Musculoskeletal: Negative for joint pain and myalgias.   Neurological: Negative for dizziness.   Psychiatric/Behavioral: Negative for depression and suicidal ideas.        Physical Exam  Temp:  [36.3 °C (97.3 °F)-37.1 °C (98.7 °F)] 36.3 °C (97.3 °F)  Pulse:  [] 93  Resp:  [17-22] 18  BP: (151-172)/() 164/108  SpO2:  [90 %-99 %] 99 %    Physical Exam  Vitals signs and nursing note reviewed.   Constitutional:       General: She is not in acute distress.     Appearance: Normal appearance. She is not ill-appearing.   HENT:      Head: Normocephalic and atraumatic.      Mouth/Throat:      Mouth: Mucous membranes are dry.   Eyes:      Extraocular Movements: Extraocular movements intact.      Pupils: Pupils are equal, round, and reactive to light.   Cardiovascular:      Rate and Rhythm: Normal rate and regular rhythm.      Heart sounds: No murmur.   Pulmonary:      Effort: Pulmonary effort is normal.      Breath sounds: Normal breath sounds.   Abdominal:      General: There is no distension.      Palpations: There is no mass.   Musculoskeletal:         General: No swelling.      Right lower leg: No edema.      Left lower leg: No edema.      Comments: Right toes amputated   Skin:     General: Skin is warm and dry.      Findings: No rash.   Neurological:      Mental Status: She is alert and oriented to person, place, and time.      Comments: Awake, A&Ox4   Psychiatric:         Mood and Affect: Mood normal.         Fluids    Intake/Output Summary (Last 24 hours) at 6/9/2020 1400  Last data filed at 6/9/2020 1349  Gross per 24 hour   Intake 920 ml   Output --   Net 920 ml       Laboratory  Recent Labs     06/08/20  0259   WBC 7.7   RBC 3.66*   HEMOGLOBIN 9.6*   HEMATOCRIT 31.8*   MCV 86.9   MCH 26.2*   MCHC 30.2*   RDW 50.8*   PLATELETCT 265   MPV 10.9     Recent Labs     06/07/20  0225 06/08/20  0259   SODIUM 139 141   POTASSIUM 3.8 3.8   CHLORIDE 109 108   CO2 18* 19*   GLUCOSE 171* 140*   BUN 44*  35*   CREATININE 2.85* 2.43*   CALCIUM 8.7 8.5                   Imaging  EC-VERONICA W/O CONT         EC-ECHOCARDIOGRAM COMPLETE W/O CONT   Final Result      CT-CHEST,ABDOMEN,PELVIS W/O   Final Result         1.  Asymmetric right perinephric fat stranding suspicious for infection/pyelonephritis. Correlate with urinalysis. No significant hydronephrosis. No obstructing stone is seen.   2.  Hepatomegaly and hepatic steatosis.   3.  Rectosigmoid colon is decompressed, limiting evaluation for wall thickening. Correlate for evidence of colitis.   4.  Right lower lobe bronchiectasis and peribronchial opacities likely postinflammatory with atelectasis/scarring. Correlate clinically for evidence of infection.   5.  Atherosclerosis.                    Assessment/Plan  * Staphylococcus aureus bacteremia- (present on admission)  Assessment & Plan  Blood cultures + for Staph Aureus bacteremia prior to transfer and again on 6/2  Repeat blood cultures on 6/6  Source is presumably pyelonephritis  IV Daptomycin and rifampin  VERONICA on 6/5 which was negative for vegetations  Infectious disease consulted  PICC once blood cultures negative x 2. Blood cultures from 6/6 are negative so far.  Case management will be consulted [facility] vs home with outpatient infusion center for daptomycin.    Septic shock (HCC)- (present on admission)  Assessment & Plan  This is Septic shock Present on admission  SIRS criteria identified on my evaluation include: Tachycardia, with heart rate greater than 90 BPM and Leukocytosis, with WBC greater than 12,000  Source is pyelonephritis with MRSA bacteremia  Presentation includes: Severe sepsis present and persistent hypotension after 30 ml/kg completed.   Despite appropriate fluid resuscitation with crystalloid given per sepsis guidelines, the patient remained hypotensive and required IV levophed needed to maintain a SBP of 90 or MAP of 65        Diabetic ketoacidosis without coma associated with  type 2 diabetes mellitus (HCC)- (present on admission)  Assessment & Plan  With renal failure, elevated but hydroxybutyrate  She was admitted to the ICU with an insulin drip and IV fluids  She had been transitioned to long-acting glargine and sliding scale subcutaneous insulin on 6/3--her glucose went down to 57 thus glargine 10 units stopped on 6/4  Hemoglobin A1c 11.2  Diabetes education ordered  She had been on metformin at home which was stopped due to renal failure  Started Januvia 25 mg daily on 6/7 and sliding scale and blood sugars have been in the 130's.      Acute renal failure with tubular necrosis (HCC)- (present on admission)  Assessment & Plan  Likely prerenal-combination of sepsis, dehydration from diarrhea, likely component of acute tubular necrosis  Stop IV fluids on 6/5  Cr 5.5 on admit and remains high at 2.8 though improving  Nephrology consulted   Follow urine output.  Check a BMP every other day for now.      Epigastric pain- (present on admission)  Assessment & Plan  Could be due to gastroparesis.  She is high risk for PUD being on steroids chronically.  GI consulted.  Planning for EGD tomorrow.    Pulmonary hypertension (HCC)- (present on admission)  Assessment & Plan  Echocardiogram reveals a RVSP 53 mm Hg thus she is at risk of volume overload    Metabolic acidosis- (present on admission)  Assessment & Plan  Secondary to DKA and renal failure      Adrenal insufficiency (HCC)- (present on admission)  Assessment & Plan  Patient is on chronic prednisone therapy for rheumatoid arthritis.  She presented with shock thus was started on IV hydrocortisone  Now transitioned back to home dose of prednisone 10 mg daily.    Diarrhea- (present on admission)  Assessment & Plan  C diff negative    Hyponatremia- (present on admission)  Assessment & Plan  Secondary to hyperglycemia       Bronchiectasis (HCC)- (present on admission)  Assessment & Plan  Chest CT findings - with no complains of cough or dyspnea    Patient had a previous pneumonia  Albuterol prn    Acute pyelonephritis- (present on admission)  Assessment & Plan  Presumably from Staph Aureus  Urine culture was not sent from [facility] nor from here until after antibiotics were given        Plan of care discussed with multidisciplinary team during rounds.    VTE prophylaxis: heparin

## 2020-06-10 ENCOUNTER — ANESTHESIA (OUTPATIENT)
Dept: SURGERY | Facility: MEDICAL CENTER | Age: 60
DRG: 871 | End: 2020-06-10
Payer: MEDICARE

## 2020-06-10 ENCOUNTER — ANESTHESIA EVENT (OUTPATIENT)
Dept: SURGERY | Facility: MEDICAL CENTER | Age: 60
DRG: 871 | End: 2020-06-10
Payer: MEDICARE

## 2020-06-10 LAB
ALBUMIN SERPL BCP-MCNC: 2.9 G/DL (ref 3.2–4.9)
ALBUMIN/GLOB SERPL: 0.7 G/DL
ALP SERPL-CCNC: 142 U/L (ref 30–99)
ALT SERPL-CCNC: 45 U/L (ref 2–50)
ANION GAP SERPL CALC-SCNC: 21 MMOL/L (ref 7–16)
AST SERPL-CCNC: 27 U/L (ref 12–45)
BILIRUB SERPL-MCNC: 0.3 MG/DL (ref 0.1–1.5)
BUN SERPL-MCNC: 21 MG/DL (ref 8–22)
CALCIUM SERPL-MCNC: 8.8 MG/DL (ref 8.5–10.5)
CHLORIDE SERPL-SCNC: 98 MMOL/L (ref 96–112)
CK SERPL-CCNC: 41 U/L (ref 0–154)
CO2 SERPL-SCNC: 19 MMOL/L (ref 20–33)
CREAT SERPL-MCNC: 1.78 MG/DL (ref 0.5–1.4)
GLOBULIN SER CALC-MCNC: 4.1 G/DL (ref 1.9–3.5)
GLUCOSE BLD-MCNC: 158 MG/DL (ref 65–99)
GLUCOSE BLD-MCNC: 176 MG/DL (ref 65–99)
GLUCOSE BLD-MCNC: 176 MG/DL (ref 65–99)
GLUCOSE BLD-MCNC: 177 MG/DL (ref 65–99)
GLUCOSE BLD-MCNC: 219 MG/DL (ref 65–99)
GLUCOSE SERPL-MCNC: 161 MG/DL (ref 65–99)
PATHOLOGY CONSULT NOTE: NORMAL
POTASSIUM SERPL-SCNC: 3.5 MMOL/L (ref 3.6–5.5)
PROT SERPL-MCNC: 7 G/DL (ref 6–8.2)
SODIUM SERPL-SCNC: 138 MMOL/L (ref 135–145)

## 2020-06-10 PROCEDURE — 82962 GLUCOSE BLOOD TEST: CPT | Mod: 91

## 2020-06-10 PROCEDURE — 700102 HCHG RX REV CODE 250 W/ 637 OVERRIDE(OP): Performed by: HOSPITALIST

## 2020-06-10 PROCEDURE — 700102 HCHG RX REV CODE 250 W/ 637 OVERRIDE(OP): Performed by: PSYCHIATRY & NEUROLOGY

## 2020-06-10 PROCEDURE — 700111 HCHG RX REV CODE 636 W/ 250 OVERRIDE (IP): Performed by: PSYCHIATRY & NEUROLOGY

## 2020-06-10 PROCEDURE — 700105 HCHG RX REV CODE 258: Performed by: INTERNAL MEDICINE

## 2020-06-10 PROCEDURE — 0DBN8ZX EXCISION OF SIGMOID COLON, VIA NATURAL OR ARTIFICIAL OPENING ENDOSCOPIC, DIAGNOSTIC: ICD-10-PCS | Performed by: INTERNAL MEDICINE

## 2020-06-10 PROCEDURE — 160002 HCHG RECOVERY MINUTES (STAT): Performed by: INTERNAL MEDICINE

## 2020-06-10 PROCEDURE — 88312 SPECIAL STAINS GROUP 1: CPT

## 2020-06-10 PROCEDURE — A9270 NON-COVERED ITEM OR SERVICE: HCPCS | Performed by: INTERNAL MEDICINE

## 2020-06-10 PROCEDURE — 160009 HCHG ANES TIME/MIN: Performed by: INTERNAL MEDICINE

## 2020-06-10 PROCEDURE — 160208 HCHG ENDO MINUTES - EA ADDL 1 MIN LEVEL 4: Performed by: INTERNAL MEDICINE

## 2020-06-10 PROCEDURE — 160048 HCHG OR STATISTICAL LEVEL 1-5: Performed by: INTERNAL MEDICINE

## 2020-06-10 PROCEDURE — 700111 HCHG RX REV CODE 636 W/ 250 OVERRIDE (IP): Performed by: HOSPITALIST

## 2020-06-10 PROCEDURE — A9270 NON-COVERED ITEM OR SERVICE: HCPCS | Performed by: PSYCHIATRY & NEUROLOGY

## 2020-06-10 PROCEDURE — 700111 HCHG RX REV CODE 636 W/ 250 OVERRIDE (IP): Performed by: ANESTHESIOLOGY

## 2020-06-10 PROCEDURE — A9270 NON-COVERED ITEM OR SERVICE: HCPCS

## 2020-06-10 PROCEDURE — 770021 HCHG ROOM/CARE - ISO PRIVATE

## 2020-06-10 PROCEDURE — 88305 TISSUE EXAM BY PATHOLOGIST: CPT | Mod: 59

## 2020-06-10 PROCEDURE — 160035 HCHG PACU - 1ST 60 MINS PHASE I: Performed by: INTERNAL MEDICINE

## 2020-06-10 PROCEDURE — 80053 COMPREHEN METABOLIC PANEL: CPT

## 2020-06-10 PROCEDURE — 0DB78ZX EXCISION OF STOMACH, PYLORUS, VIA NATURAL OR ARTIFICIAL OPENING ENDOSCOPIC, DIAGNOSTIC: ICD-10-PCS | Performed by: INTERNAL MEDICINE

## 2020-06-10 PROCEDURE — 36415 COLL VENOUS BLD VENIPUNCTURE: CPT

## 2020-06-10 PROCEDURE — A9270 NON-COVERED ITEM OR SERVICE: HCPCS | Performed by: HOSPITALIST

## 2020-06-10 PROCEDURE — 700101 HCHG RX REV CODE 250: Performed by: ANESTHESIOLOGY

## 2020-06-10 PROCEDURE — 502240 HCHG MISC OR SUPPLY RC 0272: Performed by: INTERNAL MEDICINE

## 2020-06-10 PROCEDURE — 99232 SBSQ HOSP IP/OBS MODERATE 35: CPT | Performed by: FAMILY MEDICINE

## 2020-06-10 PROCEDURE — 700102 HCHG RX REV CODE 250 W/ 637 OVERRIDE(OP): Performed by: FAMILY MEDICINE

## 2020-06-10 PROCEDURE — 0DB98ZX EXCISION OF DUODENUM, VIA NATURAL OR ARTIFICIAL OPENING ENDOSCOPIC, DIAGNOSTIC: ICD-10-PCS | Performed by: INTERNAL MEDICINE

## 2020-06-10 PROCEDURE — 501629 HCHG TUBE, LUKI TRAP STERILE DISP: Performed by: INTERNAL MEDICINE

## 2020-06-10 PROCEDURE — 700102 HCHG RX REV CODE 250 W/ 637 OVERRIDE(OP)

## 2020-06-10 PROCEDURE — 0DBL8ZZ EXCISION OF TRANSVERSE COLON, VIA NATURAL OR ARTIFICIAL OPENING ENDOSCOPIC: ICD-10-PCS | Performed by: INTERNAL MEDICINE

## 2020-06-10 PROCEDURE — 700102 HCHG RX REV CODE 250 W/ 637 OVERRIDE(OP): Performed by: INTERNAL MEDICINE

## 2020-06-10 PROCEDURE — A9270 NON-COVERED ITEM OR SERVICE: HCPCS | Performed by: FAMILY MEDICINE

## 2020-06-10 PROCEDURE — 500066 HCHG BITE BLOCK, ECT: Performed by: INTERNAL MEDICINE

## 2020-06-10 PROCEDURE — 82550 ASSAY OF CK (CPK): CPT

## 2020-06-10 PROCEDURE — 160203 HCHG ENDO MINUTES - 1ST 30 MINS LEVEL 4: Performed by: INTERNAL MEDICINE

## 2020-06-10 PROCEDURE — 99233 SBSQ HOSP IP/OBS HIGH 50: CPT | Performed by: INTERNAL MEDICINE

## 2020-06-10 RX ORDER — HALOPERIDOL 5 MG/ML
1 INJECTION INTRAMUSCULAR
Status: DISCONTINUED | OUTPATIENT
Start: 2020-06-10 | End: 2020-06-10 | Stop reason: HOSPADM

## 2020-06-10 RX ORDER — ONDANSETRON 2 MG/ML
4 INJECTION INTRAMUSCULAR; INTRAVENOUS
Status: DISCONTINUED | OUTPATIENT
Start: 2020-06-10 | End: 2020-06-10 | Stop reason: HOSPADM

## 2020-06-10 RX ORDER — METOPROLOL TARTRATE 1 MG/ML
1 INJECTION, SOLUTION INTRAVENOUS
Status: DISCONTINUED | OUTPATIENT
Start: 2020-06-10 | End: 2020-06-10 | Stop reason: HOSPADM

## 2020-06-10 RX ORDER — ONDANSETRON 2 MG/ML
INJECTION INTRAMUSCULAR; INTRAVENOUS PRN
Status: DISCONTINUED | OUTPATIENT
Start: 2020-06-10 | End: 2020-06-10 | Stop reason: SURG

## 2020-06-10 RX ORDER — PHENYLEPHRINE HCL IN 0.9% NACL 0.5 MG/5ML
SYRINGE (ML) INTRAVENOUS PRN
Status: DISCONTINUED | OUTPATIENT
Start: 2020-06-10 | End: 2020-06-10 | Stop reason: SURG

## 2020-06-10 RX ORDER — MIDAZOLAM HYDROCHLORIDE 1 MG/ML
INJECTION INTRAMUSCULAR; INTRAVENOUS PRN
Status: DISCONTINUED | OUTPATIENT
Start: 2020-06-10 | End: 2020-06-10 | Stop reason: SURG

## 2020-06-10 RX ORDER — SODIUM CHLORIDE, SODIUM LACTATE, POTASSIUM CHLORIDE, CALCIUM CHLORIDE 600; 310; 30; 20 MG/100ML; MG/100ML; MG/100ML; MG/100ML
INJECTION, SOLUTION INTRAVENOUS CONTINUOUS
Status: ACTIVE | OUTPATIENT
Start: 2020-06-10 | End: 2020-06-10

## 2020-06-10 RX ORDER — ROCURONIUM BROMIDE 10 MG/ML
INJECTION, SOLUTION INTRAVENOUS PRN
Status: DISCONTINUED | OUTPATIENT
Start: 2020-06-10 | End: 2020-06-10 | Stop reason: SURG

## 2020-06-10 RX ORDER — DIPHENHYDRAMINE HYDROCHLORIDE 50 MG/ML
12.5 INJECTION INTRAMUSCULAR; INTRAVENOUS
Status: DISCONTINUED | OUTPATIENT
Start: 2020-06-10 | End: 2020-06-10 | Stop reason: HOSPADM

## 2020-06-10 RX ORDER — HYDRALAZINE HYDROCHLORIDE 20 MG/ML
5 INJECTION INTRAMUSCULAR; INTRAVENOUS
Status: DISCONTINUED | OUTPATIENT
Start: 2020-06-10 | End: 2020-06-10 | Stop reason: HOSPADM

## 2020-06-10 RX ORDER — DEXAMETHASONE SODIUM PHOSPHATE 4 MG/ML
INJECTION, SOLUTION INTRA-ARTICULAR; INTRALESIONAL; INTRAMUSCULAR; INTRAVENOUS; SOFT TISSUE PRN
Status: DISCONTINUED | OUTPATIENT
Start: 2020-06-10 | End: 2020-06-10 | Stop reason: SURG

## 2020-06-10 RX ADMIN — ACETAMINOPHEN 650 MG: 325 TABLET, FILM COATED ORAL at 17:19

## 2020-06-10 RX ADMIN — INSULIN HUMAN 2 UNITS: 100 INJECTION, SOLUTION PARENTERAL at 21:32

## 2020-06-10 RX ADMIN — RIFAMPIN 300 MG: 300 CAPSULE ORAL at 05:05

## 2020-06-10 RX ADMIN — POVIDONE-IODINE 15 ML: 10 SOLUTION TOPICAL at 08:00

## 2020-06-10 RX ADMIN — SODIUM CHLORIDE, POTASSIUM CHLORIDE, SODIUM LACTATE AND CALCIUM CHLORIDE: 600; 310; 30; 20 INJECTION, SOLUTION INTRAVENOUS at 08:00

## 2020-06-10 RX ADMIN — ALUMINUM HYDROXIDE, MAGNESIUM HYDROXIDE, AND DIMETHICONE 30 ML: 400; 400; 40 SUSPENSION ORAL at 17:19

## 2020-06-10 RX ADMIN — ROCURONIUM BROMIDE 50 MG: 10 INJECTION, SOLUTION INTRAVENOUS at 08:48

## 2020-06-10 RX ADMIN — SUCRALFATE 1 G: 1 SUSPENSION ORAL at 05:04

## 2020-06-10 RX ADMIN — OXYCODONE 5 MG: 5 TABLET ORAL at 23:22

## 2020-06-10 RX ADMIN — OXYCODONE 5 MG: 5 TABLET ORAL at 02:52

## 2020-06-10 RX ADMIN — SODIUM CHLORIDE, POTASSIUM CHLORIDE, SODIUM LACTATE AND CALCIUM CHLORIDE: 600; 310; 30; 20 INJECTION, SOLUTION INTRAVENOUS at 08:36

## 2020-06-10 RX ADMIN — FLUTICASONE PROPIONATE 50 MCG: 50 SPRAY, METERED NASAL at 05:06

## 2020-06-10 RX ADMIN — ONDANSETRON 4 MG: 2 INJECTION INTRAMUSCULAR; INTRAVENOUS at 09:08

## 2020-06-10 RX ADMIN — PROPOFOL 150 MG: 10 INJECTION, EMULSION INTRAVENOUS at 08:51

## 2020-06-10 RX ADMIN — RIFAMPIN 300 MG: 300 CAPSULE ORAL at 17:19

## 2020-06-10 RX ADMIN — OXYCODONE 5 MG: 5 TABLET ORAL at 10:58

## 2020-06-10 RX ADMIN — Medication 100 MCG: at 08:52

## 2020-06-10 RX ADMIN — OMEPRAZOLE 20 MG: 20 CAPSULE, DELAYED RELEASE ORAL at 05:05

## 2020-06-10 RX ADMIN — ACETAMINOPHEN 650 MG: 325 TABLET, FILM COATED ORAL at 23:22

## 2020-06-10 RX ADMIN — PREDNISONE 10 MG: 10 TABLET ORAL at 05:06

## 2020-06-10 RX ADMIN — INSULIN HUMAN 3 UNITS: 100 INJECTION, SOLUTION PARENTERAL at 13:19

## 2020-06-10 RX ADMIN — ACETAMINOPHEN 650 MG: 325 TABLET, FILM COATED ORAL at 10:58

## 2020-06-10 RX ADMIN — SUCRALFATE 1 G: 1 SUSPENSION ORAL at 23:22

## 2020-06-10 RX ADMIN — SUGAMMADEX 200 MG: 100 INJECTION, SOLUTION INTRAVENOUS at 09:07

## 2020-06-10 RX ADMIN — DEXAMETHASONE SODIUM PHOSPHATE 4 MG: 4 INJECTION, SOLUTION INTRA-ARTICULAR; INTRALESIONAL; INTRAMUSCULAR; INTRAVENOUS; SOFT TISSUE at 09:08

## 2020-06-10 RX ADMIN — PROPOFOL 150 MG: 10 INJECTION, EMULSION INTRAVENOUS at 08:48

## 2020-06-10 RX ADMIN — ALUMINUM HYDROXIDE, MAGNESIUM HYDROXIDE, AND DIMETHICONE 30 ML: 400; 400; 40 SUSPENSION ORAL at 10:58

## 2020-06-10 RX ADMIN — SUCRALFATE 1 G: 1 SUSPENSION ORAL at 17:19

## 2020-06-10 RX ADMIN — INSULIN HUMAN 2 UNITS: 100 INJECTION, SOLUTION PARENTERAL at 17:23

## 2020-06-10 RX ADMIN — SITAGLIPTIN 25 MG: 25 TABLET, FILM COATED ORAL at 05:05

## 2020-06-10 RX ADMIN — HEPARIN SODIUM 5000 UNITS: 5000 INJECTION, SOLUTION INTRAVENOUS; SUBCUTANEOUS at 05:05

## 2020-06-10 RX ADMIN — SUCRALFATE 1 G: 1 SUSPENSION ORAL at 13:12

## 2020-06-10 RX ADMIN — OXYCODONE 5 MG: 5 TABLET ORAL at 17:19

## 2020-06-10 RX ADMIN — HEPARIN SODIUM 5000 UNITS: 5000 INJECTION, SOLUTION INTRAVENOUS; SUBCUTANEOUS at 21:32

## 2020-06-10 RX ADMIN — OMEPRAZOLE 20 MG: 20 CAPSULE, DELAYED RELEASE ORAL at 17:20

## 2020-06-10 RX ADMIN — MIDAZOLAM HYDROCHLORIDE 2 MG: 1 INJECTION, SOLUTION INTRAMUSCULAR; INTRAVENOUS at 08:39

## 2020-06-10 RX ADMIN — INSULIN HUMAN 2 UNITS: 100 INJECTION, SOLUTION PARENTERAL at 07:14

## 2020-06-10 RX ADMIN — ALUMINUM HYDROXIDE, MAGNESIUM HYDROXIDE, AND DIMETHICONE 30 ML: 400; 400; 40 SUSPENSION ORAL at 03:27

## 2020-06-10 RX ADMIN — FENTANYL CITRATE 100 MCG: 50 INJECTION INTRAMUSCULAR; INTRAVENOUS at 08:48

## 2020-06-10 RX ADMIN — HEPARIN SODIUM 5000 UNITS: 5000 INJECTION, SOLUTION INTRAVENOUS; SUBCUTANEOUS at 13:12

## 2020-06-10 RX ADMIN — ACETAMINOPHEN 650 MG: 325 TABLET, FILM COATED ORAL at 02:52

## 2020-06-10 ASSESSMENT — ENCOUNTER SYMPTOMS
ABDOMINAL PAIN: 1
MYALGIAS: 0
BLURRED VISION: 0
DEPRESSION: 0
DIARRHEA: 0
ORTHOPNEA: 0
NECK PAIN: 0
BACK PAIN: 1
TINGLING: 0
DIZZINESS: 0
COUGH: 0
HEADACHES: 0
NAUSEA: 0
PALPITATIONS: 0
CHILLS: 0
SHORTNESS OF BREATH: 0
PHOTOPHOBIA: 0
BACK PAIN: 0
HEARTBURN: 0
DOUBLE VISION: 0
HEMOPTYSIS: 0
VOMITING: 0
FEVER: 0
CONSTIPATION: 0

## 2020-06-10 ASSESSMENT — LIFESTYLE VARIABLES: SUBSTANCE_ABUSE: 0

## 2020-06-10 NOTE — ANESTHESIA QCDR
2019 [facility] Clinical Data Registry (for Quality Improvement)     Postoperative nausea/vomiting risk protocol (Adult = 18 yrs and Pediatric 3-17 yrs)- (430 and 463)  General inhalation anesthetic (NOT TIVA) with PONV risk factors: Yes  Provision of anti-emetic therapy with at least 2 different classes of agents: Yes   Patient DID NOT receive anti-emetic therapy and reason is documented in Medical Record:  N/A    Multimodal Pain Management- (477)  Non-emergent surgery AND patient age >= 18: No  Use of Multimodal Pain Management, two or more drugs and/or interventions, NOT including systemic opioids:   Exception: Documented allergy to multiple classes of analgesics:     Smoking Abstinence (404)  Patient is current smoker (cigarette, pipe, e-cig, marijuanna): No  Elective Surgery:   Abstinence instructions provided prior to day of surgery:   Patient abstained from smoking on day of surgery:     Pre-Op Beta-Blocker in Isolated CABG (44)  Isolated CABG AND patient age >= 18: No  Beta-blocker admin within 24 hours of surgical incision:   Exception:of medical reason(s) for not administering beta blocker within 24 hours prior to surgical incision (e.g., not  indicated,other medical reason):     PACU assessment of acute postoperative pain prior to Anesthesia Care End- Applies to Patients Age = 18- (ABG7)  Initial PACU pain score is which of the following: < 7/10  Patient unable to report pain score: N/A    Post-anesthetic transfer of care checklist/protocol to PACU/ICU- (426 and 427)  Upon conclusion of case, patient transferred to which of the following locations: PACU/Non-ICU  Use of transfer checklist/protocol: Yes  Exclusion: Service Performed in Patient Hospital Room (and thus did not require transfer): N/A  Unplanned admission to ICU related to anesthesia service up through end of PACU care- (MD51)  Unplanned admission to ICU (not initially anticipated at anesthesia start time): No

## 2020-06-10 NOTE — OR NURSING
0931 to pacu quiet room 3 from or waking on arrival oral airway removed on arrival breathing even unlabored Report recvd from or rn and Dr Mccormick   0940 states need for bm placed onto bedpan having multiple liquid bowel movements. Clean and bed linens changed no c/o pain or nausea   1000 more awake and alert meets criteria to dc from pacu report called and transport requested   1013 transported back to pt room with transport all belongings left in pt room

## 2020-06-10 NOTE — PROGRESS NOTES
Infectious Disease Progress Note    Author: Kaci Xavier M.D. Date & Time of service: 6/10/2020  4:08 PM    Chief Complaint:  MRSA bacteremia      Interval History:  66AF, vanco trough 22.3, transfer out of ICU, awaiting bed. Improvement in nausea and diarrhea but still with abdominal pain.  She has some lumbar back pain which she states is chronic and unchanged.  No new joint pain. Pt antibiotics transition from vancomycin to daptomycin 8 mg q48 hours due to elevated Vanco trough and ongoing renal dysfunction.  Continued on rifampin 300 mg twice daily.   AF, O2 RA, ongoing chronic abdominal pain, some loose stools. She has chronic low back pain that she states in uncharged from her baseline. No new neck, back or joint pain. She is continued on daptomycin q48 hours based on renal function and rifampin.   AF, VERONICA yesterday and awaiting report. Ongoing epigastric abdominal pain.  No new neck joint or back pain.    AF, O2 0.5 L NC, ongoing abdominal pain. Plan is for colonoscopy and EGD tomorrow.       Review of Systems:  Review of Systems   Constitutional: Negative for chills, fever and malaise/fatigue.   Respiratory: Negative for cough and shortness of breath.    Gastrointestinal: Positive for abdominal pain. Negative for constipation, diarrhea, nausea and vomiting.   Musculoskeletal: Positive for back pain. Negative for joint pain and myalgias.       Hemodynamics:  Temp (24hrs), Av.4 °C (97.6 °F), Min:36.1 °C (97 °F), Max:36.8 °C (98.2 °F)  Temperature: 36.8 °C (98.2 °F)  Pulse  Av.3  Min: 61  Max: 108   Blood Pressure: 137/76       Physical Exam:  Physical Exam  Constitutional:       Appearance: Normal appearance.   Cardiovascular:      Rate and Rhythm: Normal rate and regular rhythm.      Heart sounds: Normal heart sounds.   Pulmonary:      Effort: Pulmonary effort is normal.      Breath sounds: Normal breath sounds.   Abdominal:      General: Abdomen is flat. Bowel sounds are normal.       Palpations: Abdomen is soft.      Tenderness: There is abdominal tenderness. There is no guarding.   Musculoskeletal:         General: Deformity present.      Right lower leg: Edema present.      Left lower leg: Edema present.   Skin:     General: Skin is warm and dry.   Neurological:      General: No focal deficit present.      Mental Status: She is alert and oriented to person, place, and time.   Psychiatric:         Mood and Affect: Mood normal.         Behavior: Behavior normal.         Meds:    Current Facility-Administered Medications:   •  LR  •  [START ON 6/11/2020] DAPTOmycin  •  sucralfate  •  mag hydrox-al hydrox-simeth  •  SITagliptin  •  fluticasone  •  omeprazole  •  riFAMPin  •  insulin regular **AND** POC Blood Glucose **AND** NOTIFY MD and PharmD **AND** glucose **AND** dextrose 50%  •  predniSONE  •  heparin  •  oxyCODONE immediate-release  •  loperamide  •  [DISCONTINUED] magnesium sulfate **OR** magnesium sulfate  •  potassium phosphate ivpb **OR** sodium phosphate 30 mmol ivpb  •  acetaminophen  •  ondansetron  •  ondansetron  •  promethazine  •  promethazine  •  prochlorperazine  •  latanoprost  •  timolol  •  albuterol    Labs:  Recent Labs     06/08/20  0259   WBC 7.7   RBC 3.66*   HEMOGLOBIN 9.6*   HEMATOCRIT 31.8*   MCV 86.9   MCH 26.2*   RDW 50.8*   PLATELETCT 265   MPV 10.9   NEUTSPOLYS 72.30*   LYMPHOCYTES 15.30*   MONOCYTES 7.00   EOSINOPHILS 1.70   BASOPHILS 0.30     Recent Labs     06/08/20  0259 06/10/20  0208   SODIUM 141 138   POTASSIUM 3.8 3.5*   CHLORIDE 108 98   CO2 19* 19*   GLUCOSE 140* 161*   BUN 35* 21   CPKTOTAL  --  41     Recent Labs     06/08/20  0259 06/10/20  0208   ALBUMIN 2.1* 2.9*   TBILIRUBIN  --  0.3   ALKPHOSPHAT  --  142*   TOTPROTEIN  --  7.0   ALTSGPT  --  45   ASTSGOT  --  27   CREATININE 2.43* 1.78*       Imaging:  Ct-chest,abdomen,pelvis W/o    Result Date: 6/1/2020 6/1/2020 11:18 AM HISTORY/REASON FOR EXAM:  Sepsis Hypotension, renal injury, nausea  vomiting diarrhea TECHNIQUE/EXAM DESCRIPTION: CT scan of the chest, abdomen and pelvis without contrast was performed. Noncontrast helical scanning of the chest, abdomen and pelvis was obtained from the lung apices through the pubic symphysis. Low dose optimization technique was utilized for this CT exam including automated exposure control and adjustment of the mA and/or kV according to patient size. COMPARISON:  None. FINDINGS: CHEST: Neck Base:  Normal. Lungs/Pleura: Right lower lobe bronchiectasis with peribronchial opacities which likely is atelectasis or scarring. Correlate clinically for infection. Mild dependent changes/atelectasis in the left lower lobe.  3 mm groundglass nodule in the right midlung on image 58 series 301. Calcified granuloma left upper lobe. No pneumothorax or pleural effusion. Cardiovascular Structures:  Heart size is normal. No pericardial effusion. Coronary artery stents and/or prominent calcification. Aorta is normal in caliber without aneurysm or dissection. Central pulmonary arteries are normal in caliber. Mediastinum/ lymph nodes: No lymphadenopathy. Small hiatal hernia. ABDOMEN/PELVIS Liver:  Diffuse hypoattenuation of the liver suggesting fatty infiltration. Liver is enlarged. Gallbladder: Normal Biliary tract: Nondilated. Pancreas: Normal. Spleen: Normal. Adrenals: Normal. Kidneys and Collecting Systems:  Bilateral perinephric fat stranding, asymmetrically more prominent on the right. This is concerning for infection given the history. Correlate clinically and with urinalysis. No significant hydronephrosis. No obstructing stone is seen. Gastrointestinal tract:  Possible rectosigmoid wall thickening, suboptimally evaluated without contrast and due to decompression. The appendix is normal. Peritoneum: No free air or free fluid. Reproductive organs:  Normal. Bladder:  Normal. Vessels:  Moderately dense calcified plaque. Lymph  Nodes:  No lymphadenopathy. Soft tissues/wall: Within  normal limits. Bones:  No acute or aggressive abnormality. Ossific density adjacent to the inferior right scapula probably related to old trauma.     1.  Asymmetric right perinephric fat stranding suspicious for infection/pyelonephritis. Correlate with urinalysis. No significant hydronephrosis. No obstructing stone is seen. 2.  Hepatomegaly and hepatic steatosis. 3.  Rectosigmoid colon is decompressed, limiting evaluation for wall thickening. Correlate for evidence of colitis. 4.  Right lower lobe bronchiectasis and peribronchial opacities likely postinflammatory with atelectasis/scarring. Correlate clinically for evidence of infection. 5.  Atherosclerosis.     Ec-echocardiogram Complete W/o Cont    Result Date: 2020  Transthoracic Echo Report Echocardiography Laboratory CONCLUSIONS Mildly reduced left ventricular systolic function. Left ventricular ejection fraction is visually estimated to be 45-50%. Grade II diastolic dysfunction. Dilated inferior vena cava without inspiratory collapse. Estimated right ventricular systolic pressure  is 53 mmHg. Normal right ventricular size. Reduced right ventricular systolic function. Moderate to severe tricuspid regurgitation. CAROLYNE SELLERS Exam Date:         2020                    09:48 Exam Location:     Inpatient Priority:          Routine Ordering Physician:        ISIDRO DELATORRE Referring Physician:       [id number] Pope Sonographer:               Jeramy Paul RDCS,                            VIDA Age:    60     Gender:    F MRN:    [id number] :    1960 BSA:    1.71   Ht (in):    64     Wt (lb):    146 Exam Type:     Complete Indications:     Cardiac Murmurs, Undiagnosed ICD Codes:       785.2 CPT Codes:       37169 BP:   132    /   77     HR:   75 Technical Quality:       Technically difficult study -                          adequate information is obtained MEASUREMENTS  (Male / Female) Normal Values 2D ECHO LV Diastolic Diameter PLAX        3.9 cm                 4.2 - 5.9 / 3.9 - 5.3 cm LV Systolic Diameter PLAX         2.8 cm                2.1 - 4.0 cm IVS Diastolic Thickness           1.2 cm                LVPW Diastolic Thickness          1.2 cm                RV Diameter 4C                    3.1 cm                2.5 - 2.9 cm LVOT Diameter                     1.7 cm                RA Diameter                       4.5 cm                LV Ejection Fraction MOD BP       54.1 %                >= 55  % LV Ejection Fraction MOD 4C       53.9 %                LV Ejection Fraction MOD 2C       58.7 %                LA Volume Index                   38.5 cm3/m2           16 - 28 cm3/m2 DOPPLER AV Peak Velocity                  1.1 m/s               AV Peak Gradient                  4.8 mmHg              AV Mean Gradient                  2.5 mmHg              LVOT Peak Velocity                0.77 m/s              AV Area Cont Eq vti               1.5 cm2               Mitral E Point Velocity           0.73 m/s              Mitral E to A Ratio               1.3                   MV Pressure Half Time             50 ms                 MV Area PHT                       4.4 cm2               MV Deceleration Time              172 ms                MR Flow Convergence Radius        0.36 cm               MR ERO PISA                       0.042 cm2             MR Regurgitant Volume PISA        7.1 cm3               TR Peak Velocity                  257 cm/s              * Indicates values subject to auto-interpretation LV EF:        % FINDINGS Left Ventricle Normal left ventricular chamber size. Mild concentric left ventricular hypertrophy. Mildly reduced left ventricular systolic function. Left ventricular ejection fraction is visually estimated to be 45-50%. Abnormal septal motion consistent with right ventricular (RV) volume overload and/or elevated RV end-diastolic pressure. Grade II diastolic dysfunction. Right Ventricle Normal right ventricular size. Reduced right  ventricular systolic function. Right Atrium Dilated right atrium. Dilated inferior vena cava without inspiratory collapse. Left Atrium Mildly dilated left atrium. Left atrial volume index is 39  mL/sq m. Mitral Valve Structurally normal mitral valve. No mitral stenosis. Mild mitral regurgitation. Aortic Valve Aortic sclerosis without stenosis. No aortic insufficiency. Tricuspid Valve Structurally normal tricuspid valve. No tricuspid stenosis. Moderate to severe tricuspid regurgitation. Right atrial pressure is estimated to be 15 mmHg. Estimated right ventricular systolic pressure  is 53 mmHg. Pulmonic Valve The pulmonic valve is not well visualized.  Mild pulmonic insufficiency. Pericardium Normal pericardium without effusion. Aorta Normal aortic root for body surface area. Ascending aorta diameter is 2.8 cm. Allyson Rivera MD (Electronically Signed) Final Date:     04 June 2020                 11:59    Ec-rayna W/o Cont    Result Date: 6/5/2020  Results Will be Available after Interpretation by Cardiologist.      Micro:  Results     Procedure Component Value Units Date/Time    SARS-CoV-2, PCR (In-House) [[id number]] Collected:  06/09/20 1230    Order Status:  Completed Updated:  06/09/20 1350     SARS-CoV-2 Source NP Swab     SARS-CoV-2 by PCR NotDetected     Comment: [facility] providers: PLEASE REFER TO DE-ESCALATION AND RETESTING PROTOCOL  on [email or web address]  **The [employer] GeneXpert Xpress SARS-CoV-2 Test has been made available for  use under the Emergency Use Authorization (EUA) only.         Narrative:       [id number] PASCUAL ZOË  Is this test for diagnosis or screening?->Screen    URINALYSIS [[id number]]  (Abnormal) Collected:  06/08/20 1739    Order Status:  Completed Specimen:  Urine, Clean Catch Updated:  06/09/20 1343     Color Yellow     Character Clear     Specific Gravity 1.008     Ph 6.0     Glucose Negative mg/dL      Ketones Trace mg/dL      Protein Negative mg/dL      Bilirubin Negative      Urobilinogen, Urine 0.2     Nitrite Negative     Leukocyte Esterase Small     Occult Blood Small     Micro Urine Req Microscopic    Narrative:       [id number] ANKUR CAMP    COVID/SARS CoV-2 [[id number]] Collected:  06/09/20 1230    Order Status:  Completed Specimen:  Respirate from Nasopharyngeal Updated:  06/09/20 1241     COVID Order Status Received    Narrative:       [id number] ANKUR CAMP  Is this test for diagnosis or screening?->Screen    Blood Culture [[id number]] Collected:  06/06/20 1325    Order Status:  Completed Specimen:  Blood Updated:  06/07/20 0712     Significant Indicator NEG     Source BLD     Site Peripheral     Culture Result No Growth  Note: Blood cultures are incubated for 5 days and  are monitored continuously.Positive blood cultures  are called to the RN and reported as soon as  they are identified.      Narrative:       Left Hand    Blood Culture [[id number]] Collected:  06/06/20 1325    Order Status:  Completed Specimen:  Blood Updated:  06/07/20 0712     Significant Indicator NEG     Source BLD     Site Peripheral     Culture Result No Growth  Note: Blood cultures are incubated for 5 days and  are monitored continuously.Positive blood cultures  are called to the RN and reported as soon as  they are identified.      Narrative:       Right Forearm/Arm    BLOOD CULTURE [[id number]]     Order Status:  Canceled Specimen:  Blood from Peripheral     BLOOD CULTURE [[id number]]     Order Status:  Canceled Specimen:  Blood from Peripheral     BLOOD CULTURE [[id number]]  (Abnormal) Collected:  06/02/20 1542    Order Status:  Completed Specimen:  Blood from Peripheral Updated:  06/05/20 0904     Significant Indicator POS     Source BLD     Site PERIPHERAL     Culture Result Growth detected by Bactec instrument. 06/03/2020  18:41      Methicillin Resistant Staphylococcus aureus  See previous culture for sensitivity report.      Narrative:       CALL  [facility]  [facility] tel. [phone],  CALLED   "[facility] tel. [phone] 06/03/2020, 18:57, RB PERF. RESULTS CALLED TO:  PHARMACY (left message x [phone])  Special Contact [id number] DAKSHA GRANT  Per Hospital Policy: Only change Specimen Src: to \"Line\" if  specified by physician order.  Left AC    BLOOD CULTURE [[id number]]  (Abnormal)  (Susceptibility) Collected:  06/02/20 1542    Order Status:  Completed Specimen:  Blood from Peripheral Updated:  06/05/20 0904     Significant Indicator POS     Source BLD     Site PERIPHERAL     Culture Result Growth detected by Bactec instrument. 06/03/2020  17:30  Methicillin Resistant Staphylococcus aureus (MRSA)  detected by PCR.        Methicillin Resistant Staphylococcus aureus    Narrative:       CALL  Nieto  [facility] tel. [phone],  CALLED  [facility] tel. [phone] 06/03/2020, 17:33, RB PERF. RESULTS CALLED  TO:[phone] LM  Special Contact [id number] GRANT  Per Hospital Policy: Only change Specimen Src: to \"Line\" if  specified by physician order.  Right Wrist    Susceptibility     Methicillin resistant staphylococcus aureus (1)     Antibiotic Interpretation Microscan Method Status    Azithromycin Resistant >4 mcg/mL SNOW Final    Clindamycin Sensitive <=0.5 mcg/mL SNOW Final    Cefazolin Resistant >16 mcg/mL SNOW Final    Ceftaroline Sensitive 1 mcg/mL SNOW Final    Daptomycin Sensitive <=1 mcg/mL SNOW Final    Ampicillin/sulbactam Resistant >16/8 mcg/mL SNOW Final    Erythromycin Resistant >4 mcg/mL SNOW Final    Vancomycin Sensitive 1 mcg/mL SNOW Final    Oxacillin Resistant >2 mcg/mL SNOW Final    Penicillin Resistant >8 mcg/mL SNOW Final    Trimeth/Sulfa Sensitive <=0.5/9.5 mcg/mL SNOW Final    Tetracycline Sensitive <=4 mcg/mL SNOW Final                   BLOOD CULTURE [[id number]]     Order Status:  No result Specimen:  Blood from Peripheral     BLOOD CULTURE [[id number]]     Order Status:  No result Specimen:  Blood from Peripheral     CULTURE STOOL [[id number]] Collected:  06/01/20 1440    Order Status:  Completed Specimen:  " Stool Updated:  06/04/20 1236     Significant Indicator NEG     Source STL     Site STOOL     Culture Result No enteric pathogens isolated.  NOTE:  Stool cultures are screened for Shiga Toxins 1 and 2,  Salmonella, Shigella, Campylobacter, Aeromonas,  Plesiomonas, and Vibrio.       [facility] Negative for Shiga Toxin 1 and 2.    Narrative:       Special Contact Isolation  FENa = (UrineNa / SerumNA) / (UrineCr / SerumCr) *100  Special Contact Isolation  Is this test for diagnosis or screening?->Diagnosis of ill  patient  Special Contact Isolation    URINE CULTURE(NEW) [[id number]]     Order Status:  Canceled           Assessment:  Active Hospital Problems    Diagnosis   • *Staphylococcus aureus bacteremia [R78.81, B95.61]   • Diabetic ketoacidosis without coma associated with type 2 diabetes mellitus (HCC) [E11.10]   • Septic shock (HCC) [A41.9, R65.21]   • Acute renal failure with tubular necrosis (HCC) [N17.0]   • Epigastric pain [R10.13]   • Pulmonary hypertension (HCC) [I27.20]   • Acute pyelonephritis [N10]   • Bronchiectasis (HCC) [J47.9]   • Hyponatremia [E87.1]   • Diarrhea [R19.7]   • Adrenal insufficiency (HCC) [E27.40]   • Metabolic acidosis [E87.2]     Interval 24 hours:      AF, O2 4 L NC   Labs reviewed  Imaging personally reviewed both images and report.   Studies reviewed     Micro reviewed    Events: EGD and colonoscopy     Patient is doing well overall.  She has her ongoing abdominal pain.  Still with no new back or joint pain.  She is continued on daily dose daptomycin and rifampin.       ASSESSMENT/PLAN:      60 y.o.  admitted 6/1/2020. Pt has a past medical history of RA on chronic prednisone 10 mg daily, DMT2 , CKD stage III.  She reports abdominal pain on and off again since December and also history of frequent UTIs.  She is most recently treated with amoxicillin but been off antibiotics for approximately 1 week prior to her admission. Presented to ER at [facility] c/o epigastric abdominal pain  described as burning as well as nausea vomiting and diarrhea x4 days.  She was hypotensive, requiring oxygen support with lab irregularities including leukocytosis to 14.5, hyponatremia, elevated creatinine, elevated glucose and elevated lactic acid.  She was started on Unasyn and Levophed and transferred to [facility] for higher level of care.  The patient has hardware - pins in her right foot, a plate in her left ankle and replaced knuckle in her right hand     Hospital Course:   She has been afebrile, initially hypotensive and now improved.  Leukocytosis to 19.7 on admit.  Acute kidney injury on arrival.  Stool cultures were obtained which are pending in part but negative for Shiga toxin 1 and 2.  C. difficile was tested on 6/1 and 6/2 and negative on both.  Blood cultures were obtained which are positive for MRSA.  She was started on vancomycin Flagyl and ceftriaxone.  Transitioned to daptomycin and rifampin.       Problem List      Septic and hypovolemic shock, secondary to MRSA bacteremia and profuse diarrhea - improved   MRSA bacteremia- source may be urinary but no urine cx to confirm, no other obvious source,   -Blood cultures from [facility] 6/1 are 2/2+ for MRSA per microbiology lab there, no urine culture was performed  -Blood cultures on 6/2 2/2+ for MRSA  -Blood cultures on 6/6- NGTD   -TTE on 6/4-  negative for vegetations.  Moderate to severe tricuspid regurgitation, diastolic dysfunction  -VERONICA done and report pending   Leukocytosis-improved  UTI/Pyelonephritis-she endorses dysuria with pain, burning and hematuria prior to admission  -UA suggestive of infection, no urine culture  -CT on 6/1 with right-sided fat stranding suspicious for infection/Pyelo   Abdominal pain, epigastric- ongoing - chronic   -GI on board  -EGD and colonoscopy on 6/10-EGD with no significant findings.  Colonoscopy with sessile polyp removed, rectosigmoid ulcer which was biopsied and diverticulosis of the sigmoid  Diarrhea -  improved   -C. difficile tested negative x2  -Stool cultures pending, Shiga toxin 1 and 2  Acute on chronic kidney disease -improving -nephrology following and per notes no need for acute dialysis  Bronchiectasis, noted on CT in right lower lobe as well as atelectasis, calcified granuloma in left upper lobe groundglass nodule on right  Diabetes, uncontrolled   Diabetic ketoacidosis on presentation- improving   Rheumatoid arthritis  Immunosuppression,  secondary to above on chronic prednisone 10 mg daily  Thrombocytopenia- appears to be chronic, improved   Anemia-ongoing  Antibiotic allergies: Cephalexin reported as vomiting  Grade 2 diastolic heart failure on TTE  Low back pain-chronic, ongoing - no changes and at baseline per patient      Plan      ---  Stopped vancomycin and transitioned to daptomycin due to RAF. Continue daptomycin at 8 mg/kg q48 hours, may need to transition to daily based on improving kidney function   --- Follow CPK 2x weekly due to renal dysfunction-  17 on 6/6   --- Continue rifampin due to hardware. HOWEVER, she is also on chronic steroids and this reaction could potentially cause some decrease in steroid.  Monitor clinically.    --- TTE negative but patient is immunocompromised due to chronic steroid therapy and she has blood cultures positive on admit so unknown duration-recommended VERONICA to rule out endocarditis and results this will also effect duration of antibiotics-presume this was negative but still awaiting report   --- F/up repeat blood cultures- NGTD   --- We will need multiple weeks of IV antibiotics, no PICC line until blood cultures are clear for 48 hours-if VERONICA is negative we will plan on 4-week course and ok to place midline   --- Continue to monitor for any new neck joint or back pain image appropriately-very low threshold for imaging of lumbar spine- she says her pain is chronic and unchanged   --- Monitor labs,  CBC and CMP for LFTs as on rifampin- CMP ordered for  a,  --- If she spikes a new fever please obtain a urine culture    --- Dispo: Patient wishes to do home antibiotics if insurance will cover and will stay in [place] for this.  If unable to obtain coverage for home infusion then would prefer going into infusion center.  Still awaiting results of VERONICA and following kidney function prior to writing orders for case management      Discussed with Dr. Mayer.   Will continue to follow.

## 2020-06-10 NOTE — PROGRESS NOTES
Assumed patient care at 0700. Received report from night shift. MRSA precautions in place. Assessment completed. A&Ox4. States 6/10 upper abdominal pain, PRN medication not yet available, patient up to chair for comfort. On room air, no s/s of distress. Fall precautions in place; treaded socks in place, alarm in use, personal possessions and call light placed within reach.  POC discussed with pt, communication board updated.

## 2020-06-10 NOTE — ANESTHESIA TIME REPORT
Anesthesia Start and Stop Event Times     Date Time Event    6/10/2020 0844 Anesthesia Start     0930 Anesthesia Stop        Responsible Staff  06/10/20    Name Role Begin End    Den Mccormick M.D. Anesth 0844 0930        Preop Diagnosis (Free Text):  Pre-op Diagnosis     abdominal pain and diarrhea, aneima        Preop Diagnosis (Codes):    Post op Diagnosis  Abdominal pain      Premium Reason  Non-Premium    Comments:

## 2020-06-10 NOTE — OR SURGEON
Post OP Note    PreOp Diagnosis: abdominal pain    PostOp Diagnosis:    EGD    1/ normal esophagus, GEJ at 36 cm normal Z line    2/normal stomach,antrum, pylorus, bx of antrum    3/normal duodenum bulb second and third portion     Biopsied   Colonoscopy    1/ sessile polyp 6 mm cold snare removed and retrieved completely    2/ rectosigmoid ulcer 1 cm bx     3/diverticulosis in the sigmoid    4/otherwise normal colon with retroflexion        Procedure(s):  GASTROSCOPY, WITH BIOPSY - Wound Class: Clean Contaminated  COLONOSCOPY - Wound Class: Clean Contaminated    Surgeon(s):  Tian Velazquez M.D.    Anesthesiologist/Type of Anesthesia:  Anesthesiologist: Den Mccormick M.D./General    Surgical Staff:  Endoscopy Technician: Christin Feliciano  Endoscopy Nurse: Fahad Shane RONDINA; Neto Dao RONDINA    Specimens removed if any:  ID Type Source Tests Collected by Time Destination   A :  Tissue Duodenum PATHOLOGY SPECIMEN Tian Velazquez M.D. 6/10/2020  8:52 AM    B : antrum, r/o h-pylori Tissue Gastric PATHOLOGY SPECIMEN Tian Velazquez M.D. 6/10/2020  8:54 AM    C :  Polyp Colon - Transverse PATHOLOGY SPECIMEN Tian Velazquez M.D. 6/10/2020  9:07 AM    D : rectal-sigmoid biopsy Tissue Colon - Sigmoid PATHOLOGY SPECIMEN Tian Velazquez M.D. 6/10/2020  9:19 AM        Estimated Blood Loss: none    Findings:     Prior to the procedure the patient was informed the risks and benefits of the procedure and freely signed the consent form, she was monitored under standard monitoring blood pressure oxygen heart monitor no appreciable abnormality noted during the procedure.  She was placed in left lateral decubitus position bite-block was placed once adequate sedation was achieved gastroscope was introduced under direct visualization through the oropharynx which appeared normal.  Intubation esophagus achieved easily entire length the esophagus appeared normal.  GE junction appreciated at 36 cm  normal-appearing Z line.  Intubation the gastric cavity allowed good insufflation for a panoramic view the entire gastric cardia which appeared normal antrum pylorus appeared normal.  Intubation the pylorus and appreciation of duodenal bulb second third portions of duodenum all the structures appeared normal.  Extubation the pylorus allowed retroflexion performed in the antrum about the incisura body fundus cardia again no overt mucosal abnormalities were appreciated.  Biopsies of the small bowel and the antrum were performed for further evaluation regarding this patient symptoms.  Gastric was then gradually straightened and 1 withdrawal bleed repeat evaluation of mucosa was performed and no overt mucosal abnormalities were appreciated excess air was removed patient tolerated the procedure well.    Colonoscopy    Digital rectal exam was nontender no internal or external hemorrhoids were appreciated sphincter tone is good.  Introduction of the standard variable stiffness Olympus colonoscope was performed under direct indirect visualization and manually inserted to the cecum identified by appendiceal orifice ileocecal valve and cecal cap.  Gradual withdrawing the colonoscope without appreciation of the ascending colon which appeared normal.  In the transverse colon there is a 6 mm sessile polyp that was snared removed and retrieved using cold cautery.  The rest of the transverse and descending colon appeared normal.  Sigmoid colon there is some diverticulosis appreciated.  Rectosigmoid region there is a 1 cm clean-based ulceration no active bleeding no visible vessel was noted that was biopsied for further evaluation.  Rectum appeared normal.  Retroflexion was performed the rectum to evaluate the rectal anal verge and no overt mucosal abnormalities are noted.  Colonoscope was straightened excess air was removed patient tolerated the procedure well.  Complications: none    Recommendations   1/ await pathology  results   2/ consider GES if pain continues   3/ follow up at [facility] as o/p on discharge   4/advance diet as tolerated   5/increase fruit and fiber and exercise   6/if polyp is consistent with tubular adenoma would recommend repeat colonoscopy in approximately 5 years.  If any further questions or concerns please feel free to give us call at [phone].   7/ consider Canasa suppository for the ulcer QHS for the next 8 wks.           6/10/2020 9:26 AM Tian Velazquez M.D.

## 2020-06-10 NOTE — CARE PLAN
Problem: Knowledge Deficit  Goal: Knowledge of disease process/condition, treatment plan, diagnostic tests, and medications will improve  Note: Patient pending EGD and colonoscopy this am. Consent signed by patient and placed in chart. Patient questions/concern addressed.      Problem: Respiratory:  Goal: Respiratory status will improve  Note: Patient on room air. IS at bedside. Encouraging use as well as practicing deep breathing and coughing.

## 2020-06-10 NOTE — CARE PLAN
Problem: Safety  Goal: Will remain free from injury  Outcome: PROGRESSING AS EXPECTED  Intervention: Educate patient and significant other/support system about adaptive mobility strategies and safe transfers  Note: Although pt is steady, pt encouraged to call for assistance before ambulating due to generalized weakness.      Problem: Bowel/Gastric:  Goal: Will not experience complications related to bowel motility  Outcome: PROGRESSING SLOWER THAN EXPECTED  Intervention: Implement Bowel Protocol, if applicable  Note: Pt's stool has been yellow, and urine jefferson. Anticipating colonoscopy in the AM, pt drank 2 L of moviprep tonight.

## 2020-06-10 NOTE — ANESTHESIA PREPROCEDURE EVALUATION
Relevant Problems   CARDIAC   (+) Pulmonary hypertension (HCC)         (+) Acute pyelonephritis   (+) Acute renal failure with tubular necrosis (HCC)       Physical Exam    Airway   Mallampati: III  TM distance: >3 FB  Neck ROM: full       Cardiovascular - normal exam  Rhythm: regular  Rate: normal  (-) murmur     Dental - normal exam           Pulmonary - normal exam  Breath sounds clear to auscultation     Abdominal    Neurological - normal exam                 Anesthesia Plan    ASA 2       Plan - general       Airway plan will be ETT        Induction: intravenous    Postoperative Plan: Postoperative administration of opioids is intended.    Pertinent diagnostic labs and testing reviewed    Informed Consent:    Anesthetic plan and risks discussed with patient.    Use of blood products discussed with: patient whom consented to blood products.

## 2020-06-10 NOTE — PROGRESS NOTES
[facility] Medicine Daily Progress Note    Date of Service  6/10/2020    Chief Complaint  60 y.o. female admitted 6/1/2020 with diarrhea.    Hospital Course   Ms. Mcclain is a 60 y.o. female who presented 6/1/2020 with past medical history of rheumatoid arthritis on chronic prednisone therapy, type 2 diabetes on metformin, CKD stage III who comes into the emergency room with complaints of nausea, vomiting and diarrhea for the past 4 days.  Associated with an epigastric abdominal pain.  The pain radiates to the lower abdomen and feels like a burning sensation.  The pain is nonexertional, non-positional.  Patient states that she has not gone to the bathroom to urinate in 4 days.  She would have a bowel movement every 30 minutes.  She denies any blood in her stools.  Patient denies any fever, cough, shortness of breath, chest pain.   Patient came from outlying facility from [facility] includes  Blood pressure 86/50, respiratory rate 20, saturating 90% on 2 L oxygen, temperature 98.4  Sodium 129, potassium 3.6, chloride 89, CO2 21, AST 46, ALT 30, BUN 67, glucose 399, creatinine 6.4, calcium 8, bili 1.5, anion gap 22, lactic acid 2.5, lipase 163, , CK-MB 1.1, troponin 0.062, WBC 14.5, hemoglobin 10.9, platelets 175, d-dimer 3 8378, TSH 8.67  Patient was started on Unasyn and Levophed with ICU admission        Interval Problem Update  6/3: patient seen and evaluated in the ICU. The lab from [place] faxed over a preliminary + blood culture revealing gram + cocci in clusters. She has been started on broad spectrum antibiotics. She transitioned off the insulin drip to sliding scale.   6/4: Ms. Mcclain was evaluated and examined in the ICU. Her blood cultures have come back + for MRSA thus she has been placed under precautions. I have consulted Dr. Xavier, ID. She remains on IV fluids and her Cr is down to 3.7. glucose went down to 57 this morning.   6/5: patient seen and evaluated in the ICU. Reportedly there was  not a urine culture obtained in [place] and it was not done here either. An echocardiogram was done yesterday and did not reveal vegetations. I discussed with Dr. Navarro about a VERONICA and Dr. Ba about performing conscious sedation to eval for endocarditis.   6/6: patient seen and evaluated in the ICU. She had a VERONICA yesterday. Cr is 3.22 today.  6/7: Ms. Mcclain was evaluated and examined on the medical floor. Her glucose remains a bit high today thus Januvia will be initiated. Her Cr is 2.8 today. We discussed a PICC and 4-6 weeks of IV antibiotics as an outpatient or in a rehab facility.   6/8: Patient seen and evaluated on the medical floor.  She is feeling much better today.  She has been up walking around.  She is tolerating regular diet.  She had some diarrhea yesterday none today.  Discussed that if her blood cultures remain negative, she will receive a PICC line and then discharge planning will evaluate her for inpatient versus outpatient with infusion center options.  She hopes to live with her aunt here in [place] for about a month and go to the infusion center on a daily basis for daptomycin.  6/9: Sitting up in bed comfortable.  Complaining of epigastric pain.  Refusing to eat per staff.  I consulted GI.  Planning for endoscopy tomorrow.  Carafate was added.  She however did not look in acute distress.  No other issues to report.  6/10: Resting in bed comfortably.  Was going for EGD and colonoscopy in the morning which were both unremarkable.  Abdominal pain fairly controlled.  Denies any nausea or vomiting.  VERONICA results still pending.  Spoke with echo reading room to finalize it.  Hemodynamically stable.  No issues overnight per staff.  Consultants/Specialty  Critical care.   Nephrology   Infectious disease.  Code Status  Full     Disposition  Pending clinical course    Review of Systems  Review of Systems   Constitutional: Negative for chills and fever.        She is feeling overall much better   HENT:  Negative for hearing loss.    Eyes: Negative for blurred vision, double vision and photophobia.   Respiratory: Negative for cough, hemoptysis and shortness of breath.    Cardiovascular: Negative for chest pain, palpitations and orthopnea.   Gastrointestinal: Negative for heartburn, nausea and vomiting.   Genitourinary: Negative for dysuria, frequency and urgency.   Musculoskeletal: Negative for back pain, myalgias and neck pain.   Neurological: Negative for dizziness, tingling and headaches.   Psychiatric/Behavioral: Negative for depression, substance abuse and suicidal ideas.        Physical Exam  Temp:  [36.1 °C (97 °F)-36.8 °C (98.2 °F)] 36.8 °C (98.2 °F)  Pulse:  [] 89  Resp:  [16-18] 16  BP: (110-150)/() 137/76  SpO2:  [92 %-100 %] 99 %    Physical Exam  Vitals signs and nursing note reviewed.   Constitutional:       General: She is not in acute distress.     Appearance: Normal appearance. She is not ill-appearing.   HENT:      Head: Normocephalic and atraumatic.      Mouth/Throat:      Mouth: Mucous membranes are dry.   Eyes:      Extraocular Movements: Extraocular movements intact.      Pupils: Pupils are equal, round, and reactive to light.   Cardiovascular:      Rate and Rhythm: Normal rate and regular rhythm.      Heart sounds: No murmur.   Pulmonary:      Effort: Pulmonary effort is normal.      Breath sounds: Normal breath sounds.   Abdominal:      General: There is no distension.      Palpations: There is no mass.      Hernia: No hernia is present.   Musculoskeletal:         General: No swelling.      Right lower leg: No edema.      Left lower leg: No edema.      Comments: Right toes amputated   Skin:     General: Skin is warm and dry.      Findings: No rash.   Neurological:      Mental Status: She is alert and oriented to person, place, and time.      Sensory: No sensory deficit.      Comments: Awake, A&Ox4   Psychiatric:         Mood and Affect: Mood normal.         Fluids    Intake/Output  Summary (Last 24 hours) at 6/10/2020 1411  Last data filed at 6/10/2020 0930  Gross per 24 hour   Intake 590 ml   Output 5 ml   Net 585 ml       Laboratory  Recent Labs     06/08/20  0259   WBC 7.7   RBC 3.66*   HEMOGLOBIN 9.6*   HEMATOCRIT 31.8*   MCV 86.9   MCH 26.2*   MCHC 30.2*   RDW 50.8*   PLATELETCT 265   MPV 10.9     Recent Labs     06/08/20  0259 06/10/20  0208   SODIUM 141 138   POTASSIUM 3.8 3.5*   CHLORIDE 108 98   CO2 19* 19*   GLUCOSE 140* 161*   BUN 35* 21   CREATININE 2.43* 1.78*   CALCIUM 8.5 8.8                   Imaging  EC-VERONICA W/O CONT         EC-ECHOCARDIOGRAM COMPLETE W/O CONT   Final Result      CT-CHEST,ABDOMEN,PELVIS W/O   Final Result         1.  Asymmetric right perinephric fat stranding suspicious for infection/pyelonephritis. Correlate with urinalysis. No significant hydronephrosis. No obstructing stone is seen.   2.  Hepatomegaly and hepatic steatosis.   3.  Rectosigmoid colon is decompressed, limiting evaluation for wall thickening. Correlate for evidence of colitis.   4.  Right lower lobe bronchiectasis and peribronchial opacities likely postinflammatory with atelectasis/scarring. Correlate clinically for evidence of infection.   5.  Atherosclerosis.                    Assessment/Plan  * Staphylococcus aureus bacteremia- (present on admission)  Assessment & Plan  Blood cultures + for Staph Aureus bacteremia prior to transfer and again on 6/2  Repeat blood cultures on 6/6  Source is presumably pyelonephritis  IV Daptomycin and rifampin  VERONICA on 6/5 which was negative for vegetations  Infectious disease consulted  PICC once blood cultures negative x 2. Blood cultures from 6/6 are negative so far.  Case management will be consulted for inpatient at LTACH/SNF vs home with outpatient infusion center for daptomycin.    Septic shock (HCC)- (present on admission)  Assessment & Plan  This is Septic shock Present on admission  SIRS criteria identified on my evaluation include: Tachycardia, with  heart rate greater than 90 BPM and Leukocytosis, with WBC greater than 12,000  Source is pyelonephritis with MRSA bacteremia  Presentation includes: Severe sepsis present and persistent hypotension after 30 ml/kg completed.   Despite appropriate fluid resuscitation with crystalloid given per sepsis guidelines, the patient remained hypotensive and required IV levophed needed to maintain a SBP of 90 or MAP of 65  Shock resolved.  6/10: Awaiting on VERONICA results to decide on for versus 6 weeks of IV antibiotics with placing the suitable access.  Called echo reading room and they will finalize the VERONICA report.        Diabetic ketoacidosis without coma associated with type 2 diabetes mellitus (HCC)- (present on admission)  Assessment & Plan  With renal failure, elevated but hydroxybutyrate  She was admitted to the ICU with an insulin drip and IV fluids  She had been transitioned to long-acting glargine and sliding scale subcutaneous insulin on 6/3--her glucose went down to 57 thus glargine 10 units stopped on 6/4  Hemoglobin A1c 11.2  Diabetes education ordered  She had been on metformin at home which was stopped due to renal failure  Started Januvia 25 mg daily on 6/7 and sliding scale and blood sugars have been in the 130's.      Acute renal failure with tubular necrosis (HCC)- (present on admission)  Assessment & Plan  Likely prerenal-combination of sepsis, dehydration from diarrhea, likely component of acute tubular necrosis  Stop IV fluids on 6/5  Cr 5.5 on admit and remains high at 2.8 though improving  Nephrology consulted   Follow urine output.  Check a BMP every other day for now.  Slowly improving.      Epigastric pain- (present on admission)  Assessment & Plan  Could be due to gastroparesis.  She is high risk for PUD being on steroids chronically.  GI consulted.  Planning for EGD tomorrow.  6/10: Patient had unremarkable EGD and colonoscopy done this morning.  If continues to have epigastric pain, will consider  gastric emptying test to assess for gastroparesis.    Pulmonary hypertension (HCC)- (present on admission)  Assessment & Plan  Echocardiogram reveals a RVSP 53 mm Hg thus she is at risk of volume overload    Metabolic acidosis- (present on admission)  Assessment & Plan  Secondary to DKA and renal failure      Adrenal insufficiency (HCC)- (present on admission)  Assessment & Plan  Patient is on chronic prednisone therapy for rheumatoid arthritis.  She presented with shock thus was started on IV hydrocortisone  Now transitioned back to home dose of prednisone 10 mg daily.    Diarrhea- (present on admission)  Assessment & Plan  C diff negative    Hyponatremia- (present on admission)  Assessment & Plan  Secondary to hyperglycemia       Bronchiectasis (HCC)- (present on admission)  Assessment & Plan  Chest CT findings - with no complains of cough or dyspnea   Patient had a previous pneumonia  Albuterol prn    Acute pyelonephritis- (present on admission)  Assessment & Plan  Presumably from Staph Aureus  Urine culture was not sent from [place] nor from here until after antibiotics were given        Plan of care discussed with multidisciplinary team during rounds.    VTE prophylaxis: heparin

## 2020-06-10 NOTE — PROGRESS NOTES
Assumed care at 1900, report received from Janett MOELLER.  Pt A&O x 4, states pain is 09/10 in her upper abdomen. Pt repositioned with pillows, and medication administered per MAR. Pt is drinking 2 L of Moviprep for colonoscopy in the AM. Bed locked, 2 rails up, bed in lowest position. Call light in place, belongings at bedside, no needs at this time and hourly rounding in place.

## 2020-06-10 NOTE — ANESTHESIA PROCEDURE NOTES
Airway    Date/Time: 6/10/2020 8:48 AM  Performed by: Den Mccormick M.D.  Authorized by: Den Mccormick M.D.     Location:  OR  Urgency:  Elective  Indications for Airway Management:  Anesthesia      Spontaneous Ventilation: absent    Sedation Level:  Deep  Preoxygenated: Yes    Patient Position:  Sniffing  Final Airway Type:  Endotracheal airway  Final Endotracheal Airway:  ETT  Cuffed: Yes    Technique Used for Successful ETT Placement:  Direct laryngoscopy    Insertion Site:  Oral  Blade Type:  Reggie  Laryngoscope Blade/Videolaryngoscope Blade Size:  3  ETT Size (mm):  7.0  Measured from:  Teeth  ETT to Teeth (cm):  20  Placement Verified by: auscultation and capnometry    Cormack-Lehane Classification:  Grade IIb - view of arytenoids or posterior of glottis only  Number of Attempts at Approach:  1

## 2020-06-10 NOTE — ANESTHESIA POSTPROCEDURE EVALUATION
Patient: Savita Mcclain    Procedure Summary     Date:  06/10/20 Room / Location:  [facility] ROOM 26 / SURGERY SAME DAY [facility]    Anesthesia Start:  0844 Anesthesia Stop:  0930    Procedures:       GASTROSCOPY, WITH BIOPSY (Left Esophagus)      COLONOSCOPY (Left Anus) Diagnosis:  (colon polyps, normal upper )    Surgeon:  Tian Velazquez M.D. Responsible Provider:  Den Mccormick M.D.    Anesthesia Type:  general ASA Status:  2          Final Anesthesia Type: general  Last vitals  BP   Blood Pressure: 141/89    Temp   36.5 °C (97.7 °F)    Pulse   Pulse: 100   Resp   16    SpO2   92 %      Anesthesia Post Evaluation    Patient location during evaluation: PACU  Patient participation: complete - patient participated  Level of consciousness: awake and alert    Airway patency: patent  Anesthetic complications: no  Cardiovascular status: hemodynamically stable  Respiratory status: acceptable  Hydration status: euvolemic    PONV: none           Nurse Pain Score: 8 (NPRS)

## 2020-06-11 ENCOUNTER — APPOINTMENT (OUTPATIENT)
Dept: RADIOLOGY | Facility: MEDICAL CENTER | Age: 60
DRG: 871 | End: 2020-06-11
Attending: FAMILY MEDICINE
Payer: MEDICARE

## 2020-06-11 LAB
ALBUMIN SERPL BCP-MCNC: 2.4 G/DL (ref 3.2–4.9)
ALBUMIN/GLOB SERPL: 0.6 G/DL
ALP SERPL-CCNC: 116 U/L (ref 30–99)
ALT SERPL-CCNC: 32 U/L (ref 2–50)
ANION GAP SERPL CALC-SCNC: 13 MMOL/L (ref 7–16)
AST SERPL-CCNC: 21 U/L (ref 12–45)
BACTERIA BLD CULT: NORMAL
BACTERIA BLD CULT: NORMAL
BILIRUB SERPL-MCNC: 0.2 MG/DL (ref 0.1–1.5)
BUN SERPL-MCNC: 18 MG/DL (ref 8–22)
CALCIUM SERPL-MCNC: 8.4 MG/DL (ref 8.5–10.5)
CHLORIDE SERPL-SCNC: 103 MMOL/L (ref 96–112)
CO2 SERPL-SCNC: 22 MMOL/L (ref 20–33)
CREAT SERPL-MCNC: 1.73 MG/DL (ref 0.5–1.4)
GLOBULIN SER CALC-MCNC: 3.7 G/DL (ref 1.9–3.5)
GLUCOSE BLD-MCNC: 163 MG/DL (ref 65–99)
GLUCOSE BLD-MCNC: 165 MG/DL (ref 65–99)
GLUCOSE BLD-MCNC: 193 MG/DL (ref 65–99)
GLUCOSE BLD-MCNC: 199 MG/DL (ref 65–99)
GLUCOSE SERPL-MCNC: 158 MG/DL (ref 65–99)
POTASSIUM SERPL-SCNC: 3.9 MMOL/L (ref 3.6–5.5)
PROT SERPL-MCNC: 6.1 G/DL (ref 6–8.2)
SIGNIFICANT IND 70042: NORMAL
SIGNIFICANT IND 70042: NORMAL
SITE SITE: NORMAL
SITE SITE: NORMAL
SODIUM SERPL-SCNC: 138 MMOL/L (ref 135–145)
SOURCE SOURCE: NORMAL
SOURCE SOURCE: NORMAL

## 2020-06-11 PROCEDURE — A9270 NON-COVERED ITEM OR SERVICE: HCPCS | Performed by: FAMILY MEDICINE

## 2020-06-11 PROCEDURE — A9270 NON-COVERED ITEM OR SERVICE: HCPCS | Performed by: HOSPITALIST

## 2020-06-11 PROCEDURE — 700102 HCHG RX REV CODE 250 W/ 637 OVERRIDE(OP): Performed by: HOSPITALIST

## 2020-06-11 PROCEDURE — 700102 HCHG RX REV CODE 250 W/ 637 OVERRIDE(OP): Performed by: INTERNAL MEDICINE

## 2020-06-11 PROCEDURE — B54MZZA ULTRASONOGRAPHY OF RIGHT UPPER EXTREMITY VEINS, GUIDANCE: ICD-10-PCS | Performed by: FAMILY MEDICINE

## 2020-06-11 PROCEDURE — 99233 SBSQ HOSP IP/OBS HIGH 50: CPT | Performed by: INTERNAL MEDICINE

## 2020-06-11 PROCEDURE — 99233 SBSQ HOSP IP/OBS HIGH 50: CPT | Performed by: FAMILY MEDICINE

## 2020-06-11 PROCEDURE — 05HB33Z INSERTION OF INFUSION DEVICE INTO RIGHT BASILIC VEIN, PERCUTANEOUS APPROACH: ICD-10-PCS | Performed by: FAMILY MEDICINE

## 2020-06-11 PROCEDURE — 700105 HCHG RX REV CODE 258: Performed by: FAMILY MEDICINE

## 2020-06-11 PROCEDURE — 700111 HCHG RX REV CODE 636 W/ 250 OVERRIDE (IP): Performed by: FAMILY MEDICINE

## 2020-06-11 PROCEDURE — 80053 COMPREHEN METABOLIC PANEL: CPT

## 2020-06-11 PROCEDURE — C1751 CATH, INF, PER/CENT/MIDLINE: HCPCS

## 2020-06-11 PROCEDURE — 700111 HCHG RX REV CODE 636 W/ 250 OVERRIDE (IP): Performed by: HOSPITALIST

## 2020-06-11 PROCEDURE — A9270 NON-COVERED ITEM OR SERVICE: HCPCS | Performed by: INTERNAL MEDICINE

## 2020-06-11 PROCEDURE — 36415 COLL VENOUS BLD VENIPUNCTURE: CPT

## 2020-06-11 PROCEDURE — A9270 NON-COVERED ITEM OR SERVICE: HCPCS | Performed by: PSYCHIATRY & NEUROLOGY

## 2020-06-11 PROCEDURE — 770021 HCHG ROOM/CARE - ISO PRIVATE

## 2020-06-11 PROCEDURE — 700111 HCHG RX REV CODE 636 W/ 250 OVERRIDE (IP): Performed by: PSYCHIATRY & NEUROLOGY

## 2020-06-11 PROCEDURE — 82962 GLUCOSE BLOOD TEST: CPT | Mod: 91

## 2020-06-11 PROCEDURE — 700102 HCHG RX REV CODE 250 W/ 637 OVERRIDE(OP): Performed by: PSYCHIATRY & NEUROLOGY

## 2020-06-11 PROCEDURE — 700102 HCHG RX REV CODE 250 W/ 637 OVERRIDE(OP): Performed by: FAMILY MEDICINE

## 2020-06-11 RX ADMIN — SUCRALFATE 1 G: 1 SUSPENSION ORAL at 17:42

## 2020-06-11 RX ADMIN — INSULIN HUMAN 2 UNITS: 100 INJECTION, SOLUTION PARENTERAL at 13:05

## 2020-06-11 RX ADMIN — ACETAMINOPHEN 650 MG: 325 TABLET, FILM COATED ORAL at 13:09

## 2020-06-11 RX ADMIN — OMEPRAZOLE 20 MG: 20 CAPSULE, DELAYED RELEASE ORAL at 05:48

## 2020-06-11 RX ADMIN — INSULIN HUMAN 2 UNITS: 100 INJECTION, SOLUTION PARENTERAL at 17:42

## 2020-06-11 RX ADMIN — OXYCODONE 5 MG: 5 TABLET ORAL at 06:03

## 2020-06-11 RX ADMIN — ACETAMINOPHEN 650 MG: 325 TABLET, FILM COATED ORAL at 20:27

## 2020-06-11 RX ADMIN — OXYCODONE 5 MG: 5 TABLET ORAL at 13:07

## 2020-06-11 RX ADMIN — OXYCODONE 5 MG: 5 TABLET ORAL at 20:27

## 2020-06-11 RX ADMIN — HEPARIN SODIUM 5000 UNITS: 5000 INJECTION, SOLUTION INTRAVENOUS; SUBCUTANEOUS at 14:39

## 2020-06-11 RX ADMIN — SUCRALFATE 1 G: 1 SUSPENSION ORAL at 05:48

## 2020-06-11 RX ADMIN — HEPARIN SODIUM 5000 UNITS: 5000 INJECTION, SOLUTION INTRAVENOUS; SUBCUTANEOUS at 05:49

## 2020-06-11 RX ADMIN — FLUTICASONE PROPIONATE 50 MCG: 50 SPRAY, METERED NASAL at 05:51

## 2020-06-11 RX ADMIN — TIMOLOL MALEATE 1 DROP: 5 SOLUTION/ DROPS OPHTHALMIC at 05:51

## 2020-06-11 RX ADMIN — SITAGLIPTIN 25 MG: 25 TABLET, FILM COATED ORAL at 05:48

## 2020-06-11 RX ADMIN — PREDNISONE 10 MG: 10 TABLET ORAL at 05:49

## 2020-06-11 RX ADMIN — HEPARIN SODIUM 5000 UNITS: 5000 INJECTION, SOLUTION INTRAVENOUS; SUBCUTANEOUS at 21:53

## 2020-06-11 RX ADMIN — SUCRALFATE 1 G: 1 SUSPENSION ORAL at 13:00

## 2020-06-11 RX ADMIN — ALUMINUM HYDROXIDE, MAGNESIUM HYDROXIDE, AND DIMETHICONE 30 ML: 400; 400; 40 SUSPENSION ORAL at 06:04

## 2020-06-11 RX ADMIN — RIFAMPIN 300 MG: 300 CAPSULE ORAL at 17:42

## 2020-06-11 RX ADMIN — INSULIN HUMAN 2 UNITS: 100 INJECTION, SOLUTION PARENTERAL at 21:54

## 2020-06-11 RX ADMIN — RIFAMPIN 300 MG: 300 CAPSULE ORAL at 05:52

## 2020-06-11 RX ADMIN — OMEPRAZOLE 20 MG: 20 CAPSULE, DELAYED RELEASE ORAL at 17:42

## 2020-06-11 RX ADMIN — DAPTOMYCIN 530 MG: 350 INJECTION, POWDER, LYOPHILIZED, FOR SOLUTION INTRAVENOUS at 05:49

## 2020-06-11 RX ADMIN — ALUMINUM HYDROXIDE, MAGNESIUM HYDROXIDE, AND DIMETHICONE 30 ML: 400; 400; 40 SUSPENSION ORAL at 21:53

## 2020-06-11 RX ADMIN — ACETAMINOPHEN 650 MG: 325 TABLET, FILM COATED ORAL at 06:03

## 2020-06-11 RX ADMIN — INSULIN HUMAN 2 UNITS: 100 INJECTION, SOLUTION PARENTERAL at 08:20

## 2020-06-11 ASSESSMENT — ENCOUNTER SYMPTOMS
MYALGIAS: 0
HEMOPTYSIS: 0
COUGH: 0
FEVER: 0
HEADACHES: 0
BLURRED VISION: 0
ABDOMINAL PAIN: 1
NAUSEA: 0
BACK PAIN: 1
CONSTIPATION: 0
DOUBLE VISION: 0
DIZZINESS: 0
ABDOMINAL PAIN: 0
HEARTBURN: 0
PALPITATIONS: 0
VOMITING: 0
DEPRESSION: 0
NECK PAIN: 0
DIARRHEA: 0
WEIGHT LOSS: 0
CHILLS: 0
SHORTNESS OF BREATH: 0

## 2020-06-11 NOTE — PROGRESS NOTES
"Received alert and oriented x 4. Check vitals sign and recorded accordingly and due med given per MAR. Monitor sign and symptoms of respiratory distress and treatment given accordingly per MAR.Medicated per MAR and reassessed every 2 hours per protocol.C/o mid abdominal pain 7/10 pain level. Call light within reach. Bed alarm in placed. Needs attended. Will continue to monitor./66   Pulse 98   Temp 37.2 °C (98.9 °F) (Temporal)   Resp 18   Ht 1.626 m (5' 4\") Comment: obtained from previous chart  Wt 66.4 kg (146 lb 6.2 oz)   SpO2 97%   BMI 25.13 kg/m² .  "

## 2020-06-11 NOTE — CARE PLAN
Problem: Safety  Goal: Will remain free from injury  Outcome: PROGRESSING AS EXPECTED  Note: Pt calls staff to get out of bed and with help to restroom.      Problem: Knowledge Deficit  Goal: Knowledge of disease process/condition, treatment plan, diagnostic tests, and medications will improve  Outcome: PROGRESSING AS EXPECTED  Note: Pt able to give verbal understanding of POC and need for midline to be placed.

## 2020-06-11 NOTE — CARE PLAN
Problem: Safety  Goal: Will remain free from falls  Note: Encouraged to call for assistance when getting out of bed, fall precaution applied, pt calls appropriately.     Problem: Pain Management  Goal: Pain level will decrease to patient's comfort goal  Note: Due pain medication given per MD order and reassessed after 2 hours per protocol, pt able to sleep and rest of the night.

## 2020-06-11 NOTE — PROGRESS NOTES
[facility] Medicine Daily Progress Note    Date of Service  6/11/2020    Chief Complaint  60 y.o. female admitted 6/1/2020 with diarrhea.    Hospital Course   Ms. Mcclain is a 60 y.o. female who presented 6/1/2020 with past medical history of rheumatoid arthritis on chronic prednisone therapy, type 2 diabetes on metformin, CKD stage III who comes into the emergency room with complaints of nausea, vomiting and diarrhea for the past 4 days.  Associated with an epigastric abdominal pain.  The pain radiates to the lower abdomen and feels like a burning sensation.  The pain is nonexertional, non-positional.  Patient states that she has not gone to the bathroom to urinate in 4 days.  She would have a bowel movement every 30 minutes.  She denies any blood in her stools.  Patient denies any fever, cough, shortness of breath, chest pain.   Patient came from outlying facility from [facility] includes  Blood pressure 86/50, respiratory rate 20, saturating 90% on 2 L oxygen, temperature 98.4  Sodium 129, potassium 3.6, chloride 89, CO2 21, AST 46, ALT 30, BUN 67, glucose 399, creatinine 6.4, calcium 8, bili 1.5, anion gap 22, lactic acid 2.5, lipase 163, , CK-MB 1.1, troponin 0.062, WBC 14.5, hemoglobin 10.9, platelets 175, d-dimer 3 8378, TSH 8.67  Patient was started on Unasyn and Levophed with ICU admission        Interval Problem Update  6/3: patient seen and evaluated in the ICU. The lab from [place] faxed over a preliminary + blood culture revealing gram + cocci in clusters. She has been started on broad spectrum antibiotics. She transitioned off the insulin drip to sliding scale.   6/4: Ms. Mcclain was evaluated and examined in the ICU. Her blood cultures have come back + for MRSA thus she has been placed under precautions. I have consulted Dr. Xavier, ID. She remains on IV fluids and her Cr is down to 3.7. glucose went down to 57 this morning.   6/5: patient seen and evaluated in the ICU. Reportedly there was  not a urine culture obtained in [place] and it was not done here either. An echocardiogram was done yesterday and did not reveal vegetations. I discussed with Dr. Navarro about a VERONICA and Dr. Ba about performing conscious sedation to eval for endocarditis.   6/6: patient seen and evaluated in the ICU. She had a VERONICA yesterday. Cr is 3.22 today.  6/7: Ms. Mcclain was evaluated and examined on the medical floor. Her glucose remains a bit high today thus Januvia will be initiated. Her Cr is 2.8 today. We discussed a PICC and 4-6 weeks of IV antibiotics as an outpatient or in a rehab facility.   6/8: Patient seen and evaluated on the medical floor.  She is feeling much better today.  She has been up walking around.  She is tolerating regular diet.  She had some diarrhea yesterday none today.  Discussed that if her blood cultures remain negative, she will receive a PICC line and then discharge planning will evaluate her for inpatient versus outpatient with infusion center options.  She hopes to live with her aunt here in [place] for about a month and go to the infusion center on a daily basis for daptomycin.  6/9: Sitting up in bed comfortable.  Complaining of epigastric pain.  Refusing to eat per staff.  I consulted GI.  Planning for endoscopy tomorrow.  Carafate was added.  She however did not look in acute distress.  No other issues to report.  6/10: Resting in bed comfortably.  Was going for EGD and colonoscopy in the morning which were both unremarkable.  Abdominal pain fairly controlled.  Denies any nausea or vomiting.  VERONICA results still pending.  Spoke with echo reading room to finalize it.  Hemodynamically stable.  No issues overnight per staff.  6/11: Feels better today.  No abdominal pain.  Tolerating p.o. well.  No nausea or vomiting.  VERONICA was negative for any endocarditis or vegetations.  Midline ordered.  Patient has no new complaints this morning.  No issues overnight per staff.    Consultants/Specialty  Critical  care.   Nephrology   Infectious disease.  Code Status  Full     Disposition  Awaiting for ID to arrange for outpatient antibiotic infusion.    Review of Systems  Review of Systems   Constitutional: Negative for chills, fever and weight loss.        She is feeling overall much better   HENT: Negative for hearing loss and tinnitus.    Eyes: Negative for blurred vision and double vision.   Respiratory: Negative for cough and hemoptysis.    Cardiovascular: Negative for chest pain and palpitations.   Gastrointestinal: Negative for abdominal pain, heartburn and nausea.   Genitourinary: Negative for dysuria and urgency.   Musculoskeletal: Negative for myalgias and neck pain.   Neurological: Negative for dizziness and headaches.   Psychiatric/Behavioral: Negative for depression and suicidal ideas.        Physical Exam  Temp:  [36.7 °C (98 °F)-37.2 °C (98.9 °F)] 36.7 °C (98 °F)  Pulse:  [80-98] 80  Resp:  [16-18] 17  BP: (125-136)/(63-74) 134/74  SpO2:  [92 %-98 %] 93 %    Physical Exam  Vitals signs and nursing note reviewed.   Constitutional:       General: She is not in acute distress.     Appearance: Normal appearance. She is not ill-appearing.   HENT:      Head: Normocephalic and atraumatic.      Mouth/Throat:      Mouth: Mucous membranes are dry.   Eyes:      Extraocular Movements: Extraocular movements intact.      Pupils: Pupils are equal, round, and reactive to light.   Cardiovascular:      Rate and Rhythm: Normal rate and regular rhythm.      Heart sounds: No friction rub. No gallop.    Pulmonary:      Effort: Pulmonary effort is normal.      Breath sounds: Normal breath sounds.   Abdominal:      General: There is no distension.      Palpations: There is no mass.   Musculoskeletal:         General: No swelling.      Right lower leg: No edema.      Left lower leg: No edema.      Comments: Right toes amputated   Skin:     General: Skin is warm and dry.      Findings: No rash.   Neurological:      Mental Status: She  is alert and oriented to person, place, and time.      Cranial Nerves: No cranial nerve deficit.      Sensory: No sensory deficit.      Comments: Awake, A&Ox4   Psychiatric:         Mood and Affect: Mood normal.         Behavior: Behavior normal.         Fluids    Intake/Output Summary (Last 24 hours) at 6/11/2020 1248  Last data filed at 6/11/2020 0941  Gross per 24 hour   Intake 420 ml   Output --   Net 420 ml       Laboratory      Recent Labs     06/10/20  0208 06/11/20  0231   SODIUM 138 138   POTASSIUM 3.5* 3.9   CHLORIDE 98 103   CO2 19* 22   GLUCOSE 161* 158*   BUN 21 18   CREATININE 1.78* 1.73*   CALCIUM 8.8 8.4*                   Imaging  IR-MIDLINE CATHETER INSERTION WO GUIDANCE > AGE 3   Final Result                  Ultrasound-guided midline placement performed by qualified nursing staff    as above.          EC-VERONICA W/O CONT   Final Result      EC-ECHOCARDIOGRAM COMPLETE W/O CONT   Final Result      CT-CHEST,ABDOMEN,PELVIS W/O   Final Result         1.  Asymmetric right perinephric fat stranding suspicious for infection/pyelonephritis. Correlate with urinalysis. No significant hydronephrosis. No obstructing stone is seen.   2.  Hepatomegaly and hepatic steatosis.   3.  Rectosigmoid colon is decompressed, limiting evaluation for wall thickening. Correlate for evidence of colitis.   4.  Right lower lobe bronchiectasis and peribronchial opacities likely postinflammatory with atelectasis/scarring. Correlate clinically for evidence of infection.   5.  Atherosclerosis.                    Assessment/Plan  * Staphylococcus aureus bacteremia- (present on admission)  Assessment & Plan  Blood cultures + for Staph Aureus bacteremia prior to transfer and again on 6/2  Repeat blood cultures on 6/6  Source is presumably pyelonephritis  IV Daptomycin and rifampin  VERONICA on 6/5 which was negative for vegetations  Infectious disease consulted  PICC once blood cultures negative x 2. Blood cultures from 6/6 are negative so  far.  [facility] with outpatient infusion center for daptomycin.    Septic shock (HCC)- (present on admission)  Assessment & Plan  This is Septic shock Present on admission  SIRS criteria identified on my evaluation include: Tachycardia, with heart rate greater than 90 BPM and Leukocytosis, with WBC greater than 12,000  Source is pyelonephritis with MRSA bacteremia  Presentation includes: Severe sepsis present and persistent hypotension after 30 ml/kg completed.   Despite appropriate fluid resuscitation with crystalloid given per sepsis guidelines, the patient remained hypotensive and required IV levophed needed to maintain a SBP of 90 or MAP of 65  Shock resolved.  6/10: Awaiting on VERONICA results to decide on for versus 6 weeks of IV antibiotics with placing the suitable access.  Called echo reading room and they will finalize the VERONICA report.  6/11: VERONICA was negative for any endocarditis or vegetations.  Midline has been ordered.        Diabetic ketoacidosis without coma associated with type 2 diabetes mellitus (HCC)- (present on admission)  Assessment & Plan  With renal failure, elevated but hydroxybutyrate  She was admitted to the ICU with an insulin drip and IV fluids  She had been transitioned to long-acting glargine and sliding scale subcutaneous insulin on 6/3--her glucose went down to 57 thus glargine 10 units stopped on 6/4  Hemoglobin A1c 11.2  Diabetes education ordered  She had been on metformin at home which was stopped due to renal failure  Started Januvia 25 mg daily on 6/7 and sliding scale and blood sugars have been in the 130's.      Acute renal failure with tubular necrosis (HCC)- (present on admission)  Assessment & Plan  Likely prerenal-combination of sepsis, dehydration from diarrhea, likely component of acute tubular necrosis  Stop IV fluids on 6/5  Cr 5.5 on admit and remains high at 2.8 though improving  Nephrology consulted   Follow  urine output.  Check a BMP every other day for now.  Slowly improving.      Epigastric pain- (present on admission)  Assessment & Plan  Could be due to gastroparesis.  She is high risk for PUD being on steroids chronically.  GI consulted.  Planning for EGD tomorrow.  6/10: Patient had unremarkable EGD and colonoscopy done this morning.  If continues to have epigastric pain, will consider gastric emptying test to assess for gastroparesis.    Pulmonary hypertension (HCC)- (present on admission)  Assessment & Plan  Echocardiogram reveals a RVSP 53 mm Hg thus she is at risk of volume overload    Metabolic acidosis- (present on admission)  Assessment & Plan  Secondary to DKA and renal failure      Adrenal insufficiency (HCC)- (present on admission)  Assessment & Plan  Patient is on chronic prednisone therapy for rheumatoid arthritis.  She presented with shock thus was started on IV hydrocortisone  Now transitioned back to home dose of prednisone 10 mg daily.    Diarrhea- (present on admission)  Assessment & Plan  C diff negative    Hyponatremia- (present on admission)  Assessment & Plan  Resolved      Bronchiectasis (HCC)- (present on admission)  Assessment & Plan  Chest CT findings - with no complains of cough or dyspnea   Patient had a previous pneumonia  Albuterol prn    Acute pyelonephritis- (present on admission)  Assessment & Plan  Presumably from Staph Aureus  Urine culture was not sent from [place] nor from here until after antibiotics were given        Plan of care discussed with multidisciplinary team during rounds.    VTE prophylaxis: heparin

## 2020-06-11 NOTE — PROCEDURES
Vascular Access Team    Date of Insertion: 6/11/20  Arm Circumference: 23  Internal length: 14  External Length: 0  Vein Occupancy %: 37  Reason for Midline: Antibiotic therapy  Labs: WBC 7.7, , BUN 18, Cr 1.73, GFR 30, INR NA    Orders confirmed, vessel patency confirmed with ultrasound. Risks and benefits of procedure explained to patient and education regarding line associated bloodstream infections provided. Questions answered.     Power Midline placed in RUE per licensed provider order with ultrasound guidance. 4 Fr, Single lumen Power Midline placed in Bacilic vein after 1 attempt(s). 2 mL of 1% lidocaine injected intradermally, 21 gauge microintroducer needle and modified Seldinger technique used. 14 cm catheter inserted with good blood return. Secured at 0 cm marker. Each lumen flushed without resistance with 10 mL 0.9% normal saline. Midline secured with Biopatch and Tegaderm.     Midline placement is confirmed by nurse using ultrasound and ability to flush and draw blood. Midline is appropriate for use at this time.  Patient tolerated procedure well, without complications.  No X-ray is needed for placement confirmation.  Patient condition relayed to unit RN or ordering physician via this post procedure note in the EMR.     Ultrasound images uploaded to PACS and viewable in the EMR - yes  Ultrasound imaged printed and placed in paper chart - no     BARD Power Midline ref # [id number], Lot # [id number], Expiration Date 4/30/21

## 2020-06-11 NOTE — PROGRESS NOTES
Assume care of pt at 0700. Report received from NOC RN. Pt is A/O x4. Pain is 3/10 this AM.. Pt is resting in bed. Bed in lowest and locked position, call light in reach, hourly rounding in place. Labs reviewed. Communication board updated. Will continue to monitor.

## 2020-06-11 NOTE — PROGRESS NOTES
Infectious Disease Progress Note    Author: Mayra Rodriguez M.D. Date & Time of service: 2020  4:25 PM    Chief Complaint:  MRSA sepsis     Interval History:  60 y.o. female with RA admitted 2020 with abd pain and diarrhea On chronic prednisone 10 mg daily, DMT2 , CKD stage III  F, vanco trough 22.3, transfer out of ICU, awaiting bed. Improvement in nausea and diarrhea but still with abdominal pain.  She has some lumbar back pain which she states is chronic and unchanged.  No new joint pain. Pt antibiotics transition from vancomycin to daptomycin 8 mg q48 hours due to elevated Vanco trough and ongoing renal dysfunction.  Continued on rifampin 300 mg twice daily.   AF, O2 RA, ongoing chronic abdominal pain, some loose stools. She has chronic low back pain that she states in uncharged from her baseline. No new neck, back or joint pain. She is continued on daptomycin q48 hours based on renal function and rifampin.   AF, VERONICA yesterday and awaiting report. Ongoing epigastric abdominal pain.  No new neck joint or back pain.    AF, O2 0.5 L NC, ongoing abdominal pain. Plan is for colonoscopy and EGD tomorrow.     AF continued abd pain-states improved with Mylanta. No new complaints     Review of Systems:  Review of Systems   Constitutional: Negative for chills, fever and malaise/fatigue.   Respiratory: Negative for cough and shortness of breath.    Gastrointestinal: Positive for abdominal pain. Negative for constipation, diarrhea, nausea and vomiting.   Musculoskeletal: Positive for back pain. Negative for joint pain and myalgias.   All other systems reviewed and are negative.      Hemodynamics:  Temp (24hrs), Av.9 °C (98.4 °F), Min:36.7 °C (98 °F), Max:37.2 °C (98.9 °F)  Temperature: 36.9 °C (98.5 °F)  Pulse  Av.8  Min: 61  Max: 108   Blood Pressure: 153/94       Physical Exam:  Physical Exam  Vitals signs and nursing note reviewed.   Constitutional:       General: She is not in  acute distress.     Appearance: She is not toxic-appearing or diaphoretic.      Comments: Looks older than stated age   HENT:      Nose: No rhinorrhea.   Eyes:      Extraocular Movements: Extraocular movements intact.      Pupils: Pupils are equal, round, and reactive to light.   Cardiovascular:      Rate and Rhythm: Normal rate and regular rhythm.      Heart sounds: Normal heart sounds.   Pulmonary:      Effort: Pulmonary effort is normal. No respiratory distress.      Breath sounds: Normal breath sounds.   Abdominal:      General: Bowel sounds are normal. There is no distension.      Palpations: Abdomen is soft.      Tenderness: There is abdominal tenderness. There is no guarding.   Musculoskeletal:         General: Deformity present.      Right lower leg: Edema present.      Left lower leg: Edema present.   Skin:     General: Skin is warm.      Coloration: Skin is not jaundiced.      Findings: Bruising present.      Comments: E Psychiatric   Neurological:      General: No focal deficit present.      Mental Status: She is alert and oriented to person, place, and time.   Psychiatric:         Mood and Affect: Mood normal.         Behavior: Behavior normal.         Meds:    Current Facility-Administered Medications:   •  DAPTOmycin  •  sucralfate  •  mag hydrox-al hydrox-simeth  •  SITagliptin  •  fluticasone  •  omeprazole  •  riFAMPin  •  insulin regular **AND** POC Blood Glucose **AND** NOTIFY MD and PharmD **AND** glucose **AND** dextrose 50%  •  predniSONE  •  heparin  •  oxyCODONE immediate-release  •  loperamide  •  [DISCONTINUED] magnesium sulfate **OR** magnesium sulfate  •  potassium phosphate ivpb **OR** sodium phosphate 30 mmol ivpb  •  acetaminophen  •  ondansetron  •  ondansetron  •  promethazine  •  promethazine  •  prochlorperazine  •  latanoprost  •  timolol  •  albuterol    Labs:  No results for input(s): WBC, RBC, HEMOGLOBIN, HEMATOCRIT, MCV, MCH, RDW, PLATELETCT, MPV, NEUTSPOLYS, LYMPHOCYTES,  MONOCYTES, EOSINOPHILS, BASOPHILS, RBCMORPHOLO in the last 72 hours.  Recent Labs     06/10/20  0208 06/11/20  0231   SODIUM 138 138   POTASSIUM 3.5* 3.9   CHLORIDE 98 103   CO2 19* 22   GLUCOSE 161* 158*   BUN 21 18   CPKTOTAL 41  --      Recent Labs     06/10/20  0208 06/11/20  0231   ALBUMIN 2.9* 2.4*   TBILIRUBIN 0.3 0.2   ALKPHOSPHAT 142* 116*   TOTPROTEIN 7.0 6.1   ALTSGPT 45 32   ASTSGOT 27 21   CREATININE 1.78* 1.73*       Imaging:  Ct-chest,abdomen,pelvis W/o    Result Date: 6/1/2020 6/1/2020 11:18 AM HISTORY/REASON FOR EXAM:  Sepsis Hypotension, renal injury, nausea vomiting diarrhea TECHNIQUE/EXAM DESCRIPTION: CT scan of the chest, abdomen and pelvis without contrast was performed. Noncontrast helical scanning of the chest, abdomen and pelvis was obtained from the lung apices through the pubic symphysis. Low dose optimization technique was utilized for this CT exam including automated exposure control and adjustment of the mA and/or kV according to patient size. COMPARISON:  None. FINDINGS: CHEST: Neck Base:  Normal. Lungs/Pleura: Right lower lobe bronchiectasis with peribronchial opacities which likely is atelectasis or scarring. Correlate clinically for infection. Mild dependent changes/atelectasis in the left lower lobe.  3 mm groundglass nodule in the right midlung on image 58 series 301. Calcified granuloma left upper lobe. No pneumothorax or pleural effusion. Cardiovascular Structures:  Heart size is normal. No pericardial effusion. Coronary artery stents and/or prominent calcification. Aorta is normal in caliber without aneurysm or dissection. Central pulmonary arteries are normal in caliber. Mediastinum/ lymph nodes: No lymphadenopathy. Small hiatal hernia. ABDOMEN/PELVIS Liver:  Diffuse hypoattenuation of the liver suggesting fatty infiltration. Liver is enlarged. Gallbladder: Normal Biliary tract: Nondilated. Pancreas: Normal. Spleen: Normal. Adrenals: Normal. Kidneys and Collecting Systems:   Bilateral perinephric fat stranding, asymmetrically more prominent on the right. This is concerning for infection given the history. Correlate clinically and with urinalysis. No significant hydronephrosis. No obstructing stone is seen. Gastrointestinal tract:  Possible rectosigmoid wall thickening, suboptimally evaluated without contrast and due to decompression. The appendix is normal. Peritoneum: No free air or free fluid. Reproductive organs:  Normal. Bladder:  Normal. Vessels:  Moderately dense calcified plaque. Lymph  Nodes:  No lymphadenopathy. Soft tissues/wall: Within normal limits. Bones:  No acute or aggressive abnormality. Ossific density adjacent to the inferior right scapula probably related to old trauma.     1.  Asymmetric right perinephric fat stranding suspicious for infection/pyelonephritis. Correlate with urinalysis. No significant hydronephrosis. No obstructing stone is seen. 2.  Hepatomegaly and hepatic steatosis. 3.  Rectosigmoid colon is decompressed, limiting evaluation for wall thickening. Correlate for evidence of colitis. 4.  Right lower lobe bronchiectasis and peribronchial opacities likely postinflammatory with atelectasis/scarring. Correlate clinically for evidence of infection. 5.  Atherosclerosis.     Ec-echocardiogram Complete W/o Cont    Result Date: 6/4/2020  Transthoracic Echo Report Echocardiography Laboratory CONCLUSIONS Mildly reduced left ventricular systolic function. Left ventricular ejection fraction is visually estimated to be 45-50%. Grade II diastolic dysfunction. Dilated inferior vena cava without inspiratory collapse. Estimated right ventricular systolic pressure  is 53 mmHg. Normal right ventricular size. Reduced right ventricular systolic function. Moderate to severe tricuspid regurgitation. CAROLYNE SELLERS Exam Date:         06/04/2020                    09:48 Exam Location:     Inpatient Priority:          Routine Ordering Physician:        ISIDRO DELATORRE Referring  Physician:       [id number] Butler Sonographer:               Jeramy Paul RDCS,                            RVT Age:    60     Gender:    F MRN:    [id number] :    1960 BSA:    1.71   Ht (in):    64     Wt (lb):    146 Exam Type:     Complete Indications:     Cardiac Murmurs, Undiagnosed ICD Codes:       785.2 CPT Codes:       86993 BP:   132    /   77     HR:   75 Technical Quality:       Technically difficult study -                          adequate information is obtained MEASUREMENTS  (Male / Female) Normal Values 2D ECHO LV Diastolic Diameter PLAX        3.9 cm                4.2 - 5.9 / 3.9 - 5.3 cm LV Systolic Diameter PLAX         2.8 cm                2.1 - 4.0 cm IVS Diastolic Thickness           1.2 cm                LVPW Diastolic Thickness          1.2 cm                RV Diameter 4C                    3.1 cm                2.5 - 2.9 cm LVOT Diameter                     1.7 cm                RA Diameter                       4.5 cm                LV Ejection Fraction MOD BP       54.1 %                >= 55  % LV Ejection Fraction MOD 4C       53.9 %                LV Ejection Fraction MOD 2C       58.7 %                LA Volume Index                   38.5 cm3/m2           16 - 28 cm3/m2 DOPPLER AV Peak Velocity                  1.1 m/s               AV Peak Gradient                  4.8 mmHg              AV Mean Gradient                  2.5 mmHg              LVOT Peak Velocity                0.77 m/s              AV Area Cont Eq vti               1.5 cm2               Mitral E Point Velocity           0.73 m/s              Mitral E to A Ratio               1.3                   MV Pressure Half Time             50 ms                 MV Area PHT                       4.4 cm2               MV Deceleration Time              172 ms                MR Flow Convergence Radius        0.36 cm               MR ERO PISA                       0.042 cm2             MR Regurgitant Volume PISA        7.1 cm3                TR Peak Velocity                  257 cm/s              * Indicates values subject to auto-interpretation LV EF:        % FINDINGS Left Ventricle Normal left ventricular chamber size. Mild concentric left ventricular hypertrophy. Mildly reduced left ventricular systolic function. Left ventricular ejection fraction is visually estimated to be 45-50%. Abnormal septal motion consistent with right ventricular (RV) volume overload and/or elevated RV end-diastolic pressure. Grade II diastolic dysfunction. Right Ventricle Normal right ventricular size. Reduced right ventricular systolic function. Right Atrium Dilated right atrium. Dilated inferior vena cava without inspiratory collapse. Left Atrium Mildly dilated left atrium. Left atrial volume index is 39  mL/sq m. Mitral Valve Structurally normal mitral valve. No mitral stenosis. Mild mitral regurgitation. Aortic Valve Aortic sclerosis without stenosis. No aortic insufficiency. Tricuspid Valve Structurally normal tricuspid valve. No tricuspid stenosis. Moderate to severe tricuspid regurgitation. Right atrial pressure is estimated to be 15 mmHg. Estimated right ventricular systolic pressure  is 53 mmHg. Pulmonic Valve The pulmonic valve is not well visualized.  Mild pulmonic insufficiency. Pericardium Normal pericardium without effusion. Aorta Normal aortic root for body surface area. Ascending aorta diameter is 2.8 cm. Allyson Rivera MD (Electronically Signed) Final Date:     04 June 2020                 11:59    Ec-rayna W/o Cont    Result Date: 6/5/2020  Results Will be Available after Interpretation by Cardiologist.      Micro:  Results     Procedure Component Value Units Date/Time    Blood Culture [[id number]] Collected:  06/06/20 1325    Order Status:  Completed Specimen:  Blood Updated:  06/11/20 1500     Significant Indicator NEG     Source BLD     Site Peripheral     Culture Result No growth after 5 days of incubation.    Narrative:       Left Hand     Blood Culture [[id number]] Collected:  06/06/20 1325    Order Status:  Completed Specimen:  Blood Updated:  06/11/20 1500     Significant Indicator NEG     Source BLD     Site Peripheral     Culture Result No growth after 5 days of incubation.    Narrative:       Right Forearm/Arm    SARS-CoV-2, PCR (In-House) [[id number]] Collected:  06/09/20 1230    Order Status:  Completed Updated:  06/09/20 1350     SARS-CoV-2 Source NP Swab     SARS-CoV-2 by PCR NotDetected     Comment: [facility] providers: PLEASE REFER TO DE-ESCALATION AND RETESTING PROTOCOL  on [email or web address]  **The Incont GeneXpert Xpress SARS-CoV-2 Test has been made available for  use under the Emergency Use Authorization (EUA) only.         Narrative:       [id number] ANKUR CAMP  Is this test for diagnosis or screening?->Screen    URINALYSIS [[id number]]  (Abnormal) Collected:  06/08/20 1739    Order Status:  Completed Specimen:  Urine, Clean Catch Updated:  06/09/20 1343     Color Yellow     Character Clear     Specific Gravity 1.008     Ph 6.0     Glucose Negative mg/dL      Ketones Trace mg/dL      Protein Negative mg/dL      Bilirubin Negative     Urobilinogen, Urine 0.2     Nitrite Negative     Leukocyte Esterase Small     Occult Blood Small     Micro Urine Req Microscopic    Narrative:       [id number] CAMP    COVID/SARS CoV-2 [[id number]] Collected:  06/09/20 1230    Order Status:  Completed Specimen:  Respirate from Nasopharyngeal Updated:  06/09/20 1241     COVID Order Status Received    Narrative:       [id number] ANKUR CAMP  Is this test for diagnosis or screening?->Screen    BLOOD CULTURE [[id number]]     Order Status:  Canceled Specimen:  Blood from Peripheral     BLOOD CULTURE [[id number]]     Order Status:  Canceled Specimen:  Blood from Peripheral     BLOOD CULTURE [[id number]]  (Abnormal) Collected:  06/02/20 1542    Order Status:  Completed Specimen:  Blood from Peripheral Updated:  06/05/20 0904     Significant  "Indicator POS     Source BLD     Site PERIPHERAL     Culture Result Growth detected by Bactec instrument. 06/03/2020  18:41      Methicillin Resistant Staphylococcus aureus  See previous culture for sensitivity report.      Narrative:       CALL  Nieto  [facility] tel. [phone],  CALLED  [facility] tel. [phone] 06/03/2020, 18:57, RB PERF. RESULTS CALLED TO:  PHARMACY (left message x [phone])  Special Contact [id number] DAKSHA VIDALES D  Per Hospital Policy: Only change Specimen Src: to \"Line\" if  specified by physician order.  Left AC    BLOOD CULTURE [[id number]]  (Abnormal)  (Susceptibility) Collected:  06/02/20 1542    Order Status:  Completed Specimen:  Blood from Peripheral Updated:  06/05/20 0904     Significant Indicator POS     Source BLD     Site PERIPHERAL     Culture Result Growth detected by Bactec instrument. 06/03/2020  17:30  Methicillin Resistant Staphylococcus aureus (MRSA)  detected by PCR.        Methicillin Resistant Staphylococcus aureus    Narrative:       CALL  Nieto  [facility] tel. [phone],  CALLED  [facility] tel. [phone] 06/03/2020, 17:33, RB PERF. RESULTS CALLED  TO:[phone] LM  Special Contact [id number] FREIDA FLASH D  Per Hospital Policy: Only change Specimen Src: to \"Line\" if  specified by physician order.  Right Wrist    Susceptibility     Methicillin resistant staphylococcus aureus (1)     Antibiotic Interpretation Microscan Method Status    Azithromycin Resistant >4 mcg/mL SNOW Final    Clindamycin Sensitive <=0.5 mcg/mL SNOW Final    Cefazolin Resistant >16 mcg/mL SNOW Final    Ceftaroline Sensitive 1 mcg/mL SNOW Final    Daptomycin Sensitive <=1 mcg/mL SNOW Final    Ampicillin/sulbactam Resistant >16/8 mcg/mL SNOW Final    Erythromycin Resistant >4 mcg/mL SNOW Final    Vancomycin Sensitive 1 mcg/mL SNOW Final    Oxacillin Resistant >2 mcg/mL SNOW Final    Penicillin Resistant >8 mcg/mL SNOW Final    Trimeth/Sulfa Sensitive <=0.5/9.5 mcg/mL SNOW Final    Tetracycline Sensitive <=4 mcg/mL SNOW Final                "    BLOOD CULTURE [[id number]]     Order Status:  Canceled Specimen:  Blood from Peripheral     BLOOD CULTURE [[id number]]     Order Status:  Canceled Specimen:  Blood from Peripheral           Assessment:  Active Hospital Problems    Diagnosis   • *Staphylococcus aureus bacteremia [R78.81, B95.61]   • Diabetic ketoacidosis without coma associated with type 2 diabetes mellitus (HCC) [E11.10]   • Septic shock (HCC) [A41.9, R65.21]   • Acute renal failure with tubular necrosis (HCC) [N17.0]   • Epigastric pain [R10.13]   • Pulmonary hypertension (HCC) [I27.20]   • Acute pyelonephritis [N10]   • Bronchiectasis (HCC) [J47.9]   • Hyponatremia [E87.1]   • Diarrhea [R19.7]   • Adrenal insufficiency (HCC) [E27.40]   • Metabolic acidosis [E87.2]       ASSESSMENT/PLAN:      60 y.o.  admitted 6/1/2020. Pt has a past medical history of RA on chronic prednisone 10 mg daily, DMT2 , CKD stage III.  She reports abdominal pain on and off again since December and also history of frequent UTIs.  She is most recently treated with amoxicillin but been off antibiotics for approximately 1 week prior to her admission. Presented to ER at [facility] c/o epigastric abdominal pain described as burning as well as nausea vomiting and diarrhea x4 days.  She was hypotensive, requiring oxygen support with lab irregularities including leukocytosis to 14.5, hyponatremia, elevated creatinine, elevated glucose and elevated lactic acid.  She was started on Unasyn and Levophed and transferred to [facility] for higher level of care.  The patient has hardware - pins in her right foot, a plate in her left ankle and replaced knuckle in her right hand     Hospital Course:   She has been afebrile, initially hypotensive and now improved.  Leukocytosis to 19.7 on admit.  Acute kidney injury on arrival.  Stool cultures were obtained which are pending in part but negative for Shiga toxin 1 and 2.  C. difficile was tested on 6/1 and 6/2 and negative on both.  Blood  cultures were obtained which are positive for MRSA.  She was started on vancomycin Flagyl and ceftriaxone.  Transitioned to daptomycin and rifampin.         MRSA sepsis   Afebrile  Leukocytosis resolved at last check  BCxs + MRSA 6/1; 6/2  Repeat Bcxs neg 6/6  TTE neg for vegetation but moderate to severe tricuspid regurgitation, diastolic dysfunction: VERONICA  Neg 6/5  Vanco stopped due to RAF  Continue dapto/rif  Anticipate 6 weeks IV abx from date of negative blood cxs  Stop date 7/18/2020      UTI/Pyelonephritis  Dysuria with pain, burning and hematuria prior to admission  UA suggestive of infection, no urine culture  CT on 6/1 with right-sided fat stranding suspicious for infection/Pyelo     Abdominal pain, epigastric- ongoing - chronic   GI on board  -EGD and colonoscopy on 6/10-EGD with no significant findings.  Colonoscopy with sessile polyp removed, rectosigmoid ulcer which was biopsied and diverticulosis of the sigmoid    Diarrhea - improved   C. difficile tested negative x2  Stool cultures pending, Shiga toxin 1 and 2  EGD no ulcer  Rectal ulcer biopsied    Acute on chronic kidney disease   Adjust dosing abx    Bronchiectasis,   Noted on CT in right lower lobe as well as atelectasis  Calcified granuloma in left upper lobe groundglass nodule on right  No PNA    Diabetes, uncontrolled   DKA  Keep BS under 150 to help control current infection    Rheumatoid arthritis  Immunosuppressed         Dispo: Patient wishes to do home antibiotics if insurance will cover and will stay in [place] for this.  If unable to obtain coverage for home infusion then would prefer going into infusion center.

## 2020-06-12 LAB
ALBUMIN SERPL BCP-MCNC: 2.4 G/DL (ref 3.2–4.9)
ALBUMIN/GLOB SERPL: 0.6 G/DL
ALP SERPL-CCNC: 113 U/L (ref 30–99)
ALT SERPL-CCNC: 26 U/L (ref 2–50)
ANION GAP SERPL CALC-SCNC: 12 MMOL/L (ref 7–16)
ANISOCYTOSIS BLD QL SMEAR: ABNORMAL
AST SERPL-CCNC: 18 U/L (ref 12–45)
BASOPHILS # BLD AUTO: 0.9 % (ref 0–1.8)
BASOPHILS # BLD: 0.09 K/UL (ref 0–0.12)
BILIRUB SERPL-MCNC: 0.3 MG/DL (ref 0.1–1.5)
BUN SERPL-MCNC: 16 MG/DL (ref 8–22)
CALCIUM SERPL-MCNC: 8.6 MG/DL (ref 8.5–10.5)
CHLORIDE SERPL-SCNC: 103 MMOL/L (ref 96–112)
CO2 SERPL-SCNC: 26 MMOL/L (ref 20–33)
CREAT SERPL-MCNC: 1.51 MG/DL (ref 0.5–1.4)
EOSINOPHIL # BLD AUTO: 0.09 K/UL (ref 0–0.51)
EOSINOPHIL NFR BLD: 0.9 % (ref 0–6.9)
ERYTHROCYTE [DISTWIDTH] IN BLOOD BY AUTOMATED COUNT: 49.6 FL (ref 35.9–50)
GLOBULIN SER CALC-MCNC: 4.1 G/DL (ref 1.9–3.5)
GLUCOSE BLD-MCNC: 153 MG/DL (ref 65–99)
GLUCOSE BLD-MCNC: 162 MG/DL (ref 65–99)
GLUCOSE BLD-MCNC: 174 MG/DL (ref 65–99)
GLUCOSE BLD-MCNC: 192 MG/DL (ref 65–99)
GLUCOSE SERPL-MCNC: 157 MG/DL (ref 65–99)
HCT VFR BLD AUTO: 31.6 % (ref 37–47)
HGB BLD-MCNC: 9.6 G/DL (ref 12–16)
LYMPHOCYTES # BLD AUTO: 1.71 K/UL (ref 1–4.8)
LYMPHOCYTES NFR BLD: 17.4 % (ref 22–41)
MANUAL DIFF BLD: NORMAL
MCH RBC QN AUTO: 25.9 PG (ref 27–33)
MCHC RBC AUTO-ENTMCNC: 30.4 G/DL (ref 33.6–35)
MCV RBC AUTO: 85.4 FL (ref 81.4–97.8)
MICROCYTES BLD QL SMEAR: ABNORMAL
MONOCYTES # BLD AUTO: 0.17 K/UL (ref 0–0.85)
MONOCYTES NFR BLD AUTO: 1.7 % (ref 0–13.4)
MORPHOLOGY BLD-IMP: NORMAL
NEUTROPHILS # BLD AUTO: 7.76 K/UL (ref 2–7.15)
NEUTROPHILS NFR BLD: 78.3 % (ref 44–72)
NEUTS BAND NFR BLD MANUAL: 0.9 % (ref 0–10)
NRBC # BLD AUTO: 0 K/UL
NRBC BLD-RTO: 0 /100 WBC
OVALOCYTES BLD QL SMEAR: NORMAL
PLATELET # BLD AUTO: 301 K/UL (ref 164–446)
PLATELET BLD QL SMEAR: NORMAL
PMV BLD AUTO: 10.2 FL (ref 9–12.9)
POIKILOCYTOSIS BLD QL SMEAR: NORMAL
POLYCHROMASIA BLD QL SMEAR: NORMAL
POTASSIUM SERPL-SCNC: 3.7 MMOL/L (ref 3.6–5.5)
PROT SERPL-MCNC: 6.5 G/DL (ref 6–8.2)
RBC # BLD AUTO: 3.7 M/UL (ref 4.2–5.4)
RBC BLD AUTO: PRESENT
SODIUM SERPL-SCNC: 141 MMOL/L (ref 135–145)
WBC # BLD AUTO: 9.8 K/UL (ref 4.8–10.8)

## 2020-06-12 PROCEDURE — A9270 NON-COVERED ITEM OR SERVICE: HCPCS | Performed by: INTERNAL MEDICINE

## 2020-06-12 PROCEDURE — 80053 COMPREHEN METABOLIC PANEL: CPT

## 2020-06-12 PROCEDURE — 770021 HCHG ROOM/CARE - ISO PRIVATE

## 2020-06-12 PROCEDURE — 700102 HCHG RX REV CODE 250 W/ 637 OVERRIDE(OP): Performed by: INTERNAL MEDICINE

## 2020-06-12 PROCEDURE — 700102 HCHG RX REV CODE 250 W/ 637 OVERRIDE(OP): Performed by: HOSPITALIST

## 2020-06-12 PROCEDURE — A9270 NON-COVERED ITEM OR SERVICE: HCPCS | Performed by: PSYCHIATRY & NEUROLOGY

## 2020-06-12 PROCEDURE — 700102 HCHG RX REV CODE 250 W/ 637 OVERRIDE(OP): Performed by: PSYCHIATRY & NEUROLOGY

## 2020-06-12 PROCEDURE — 85027 COMPLETE CBC AUTOMATED: CPT

## 2020-06-12 PROCEDURE — 700111 HCHG RX REV CODE 636 W/ 250 OVERRIDE (IP): Performed by: FAMILY MEDICINE

## 2020-06-12 PROCEDURE — 700102 HCHG RX REV CODE 250 W/ 637 OVERRIDE(OP): Performed by: FAMILY MEDICINE

## 2020-06-12 PROCEDURE — 99233 SBSQ HOSP IP/OBS HIGH 50: CPT | Performed by: INTERNAL MEDICINE

## 2020-06-12 PROCEDURE — 700111 HCHG RX REV CODE 636 W/ 250 OVERRIDE (IP): Performed by: PSYCHIATRY & NEUROLOGY

## 2020-06-12 PROCEDURE — 85007 BL SMEAR W/DIFF WBC COUNT: CPT

## 2020-06-12 PROCEDURE — A9270 NON-COVERED ITEM OR SERVICE: HCPCS | Performed by: FAMILY MEDICINE

## 2020-06-12 PROCEDURE — A9270 NON-COVERED ITEM OR SERVICE: HCPCS | Performed by: HOSPITALIST

## 2020-06-12 PROCEDURE — 99232 SBSQ HOSP IP/OBS MODERATE 35: CPT | Performed by: FAMILY MEDICINE

## 2020-06-12 PROCEDURE — 82962 GLUCOSE BLOOD TEST: CPT | Mod: 91

## 2020-06-12 PROCEDURE — 700111 HCHG RX REV CODE 636 W/ 250 OVERRIDE (IP): Performed by: HOSPITALIST

## 2020-06-12 PROCEDURE — 700105 HCHG RX REV CODE 258: Performed by: FAMILY MEDICINE

## 2020-06-12 RX ORDER — SIMETHICONE 80 MG
80 TABLET,CHEWABLE ORAL 3 TIMES DAILY PRN
Status: DISCONTINUED | OUTPATIENT
Start: 2020-06-12 | End: 2020-06-15 | Stop reason: HOSPADM

## 2020-06-12 RX ADMIN — ACETAMINOPHEN 650 MG: 325 TABLET, FILM COATED ORAL at 03:28

## 2020-06-12 RX ADMIN — RIFAMPIN 300 MG: 300 CAPSULE ORAL at 17:40

## 2020-06-12 RX ADMIN — OXYCODONE 5 MG: 5 TABLET ORAL at 10:28

## 2020-06-12 RX ADMIN — OXYCODONE 5 MG: 5 TABLET ORAL at 17:39

## 2020-06-12 RX ADMIN — SUCRALFATE 1 G: 1 SUSPENSION ORAL at 05:45

## 2020-06-12 RX ADMIN — HEPARIN SODIUM 5000 UNITS: 5000 INJECTION, SOLUTION INTRAVENOUS; SUBCUTANEOUS at 05:45

## 2020-06-12 RX ADMIN — DAPTOMYCIN 530 MG: 350 INJECTION, POWDER, LYOPHILIZED, FOR SOLUTION INTRAVENOUS at 05:43

## 2020-06-12 RX ADMIN — SITAGLIPTIN 25 MG: 25 TABLET, FILM COATED ORAL at 05:45

## 2020-06-12 RX ADMIN — SUCRALFATE 1 G: 1 SUSPENSION ORAL at 17:40

## 2020-06-12 RX ADMIN — OXYCODONE 5 MG: 5 TABLET ORAL at 03:28

## 2020-06-12 RX ADMIN — PREDNISONE 10 MG: 10 TABLET ORAL at 05:45

## 2020-06-12 RX ADMIN — INSULIN HUMAN 2 UNITS: 100 INJECTION, SOLUTION PARENTERAL at 21:18

## 2020-06-12 RX ADMIN — INSULIN HUMAN 2 UNITS: 100 INJECTION, SOLUTION PARENTERAL at 08:00

## 2020-06-12 RX ADMIN — OMEPRAZOLE 20 MG: 20 CAPSULE, DELAYED RELEASE ORAL at 17:40

## 2020-06-12 RX ADMIN — OMEPRAZOLE 20 MG: 20 CAPSULE, DELAYED RELEASE ORAL at 05:45

## 2020-06-12 RX ADMIN — SUCRALFATE 1 G: 1 SUSPENSION ORAL at 12:25

## 2020-06-12 RX ADMIN — OXYCODONE 5 MG: 5 TABLET ORAL at 23:43

## 2020-06-12 RX ADMIN — HEPARIN SODIUM 5000 UNITS: 5000 INJECTION, SOLUTION INTRAVENOUS; SUBCUTANEOUS at 21:17

## 2020-06-12 RX ADMIN — TIMOLOL MALEATE 1 DROP: 5 SOLUTION/ DROPS OPHTHALMIC at 05:44

## 2020-06-12 RX ADMIN — ACETAMINOPHEN 650 MG: 325 TABLET, FILM COATED ORAL at 23:43

## 2020-06-12 RX ADMIN — ACETAMINOPHEN 650 MG: 325 TABLET, FILM COATED ORAL at 17:39

## 2020-06-12 RX ADMIN — SIMETHICONE CHEW TAB 80 MG 80 MG: 80 TABLET ORAL at 14:14

## 2020-06-12 RX ADMIN — INSULIN HUMAN 2 UNITS: 100 INJECTION, SOLUTION PARENTERAL at 17:44

## 2020-06-12 RX ADMIN — SIMETHICONE CHEW TAB 80 MG 80 MG: 80 TABLET ORAL at 21:17

## 2020-06-12 RX ADMIN — ACETAMINOPHEN 650 MG: 325 TABLET, FILM COATED ORAL at 10:36

## 2020-06-12 RX ADMIN — SUCRALFATE 1 G: 1 SUSPENSION ORAL at 23:43

## 2020-06-12 RX ADMIN — INSULIN HUMAN 2 UNITS: 100 INJECTION, SOLUTION PARENTERAL at 12:27

## 2020-06-12 RX ADMIN — SUCRALFATE 1 G: 1 SUSPENSION ORAL at 00:26

## 2020-06-12 RX ADMIN — HEPARIN SODIUM 5000 UNITS: 5000 INJECTION, SOLUTION INTRAVENOUS; SUBCUTANEOUS at 14:00

## 2020-06-12 RX ADMIN — FLUTICASONE PROPIONATE 50 MCG: 50 SPRAY, METERED NASAL at 05:43

## 2020-06-12 RX ADMIN — RIFAMPIN 300 MG: 300 CAPSULE ORAL at 05:45

## 2020-06-12 ASSESSMENT — ENCOUNTER SYMPTOMS
DOUBLE VISION: 0
DEPRESSION: 0
BLURRED VISION: 0
HEADACHES: 0
FEVER: 0
HEARTBURN: 0
NAUSEA: 0
DIZZINESS: 0
COUGH: 0
MYALGIAS: 0
CHILLS: 0
ABDOMINAL PAIN: 0

## 2020-06-12 NOTE — PROGRESS NOTES
CHW Reinier reached out to pt via TC to introduce CCM services. Pt indicated that she has a PCP in [place], [place] and simply wants to get out of the hospital and does not need help with resources/services. CHW will d/c pt from CCM services due to declined services. 6/12

## 2020-06-12 NOTE — PROGRESS NOTES
"Received alert and oriented x 4. C/o abdominal pain and bloatedness. Passing gas. Check vitals sign and recorded accordingly and due med given per MAR. Monitor sign and symptoms of respiratory distress and treatment given accordingly per MAR.Medicated per MAR and reassessed every 2 hours per protocol. Call light within reach. Calls appropriately. Needs attended. Will continue to monitor./98   Pulse (!) 109 Comment: RN notified  Temp 36.2 °C (97.2 °F) (Temporal)   Resp 19   Ht 1.626 m (5' 4\") Comment: obtained from previous chart  Wt 66.4 kg (146 lb 6.2 oz)   SpO2 96%   BMI 25.13 kg/m² .  "

## 2020-06-12 NOTE — PROGRESS NOTES
[facility] Medicine Daily Progress Note    Date of Service  6/12/2020    Chief Complaint  60 y.o. female admitted 6/1/2020 with diarrhea.    Hospital Course   Ms. Mcclain is a 60 y.o. female who presented 6/1/2020 with past medical history of rheumatoid arthritis on chronic prednisone therapy, type 2 diabetes on metformin, CKD stage III who comes into the emergency room with complaints of nausea, vomiting and diarrhea for the past 4 days.  Associated with an epigastric abdominal pain.  The pain radiates to the lower abdomen and feels like a burning sensation.  The pain is nonexertional, non-positional.  Patient states that she has not gone to the bathroom to urinate in 4 days.  She would have a bowel movement every 30 minutes.  She denies any blood in her stools.  Patient denies any fever, cough, shortness of breath, chest pain.   Patient came from outlying facility from [facility] includes  Blood pressure 86/50, respiratory rate 20, saturating 90% on 2 L oxygen, temperature 98.4  Sodium 129, potassium 3.6, chloride 89, CO2 21, AST 46, ALT 30, BUN 67, glucose 399, creatinine 6.4, calcium 8, bili 1.5, anion gap 22, lactic acid 2.5, lipase 163, , CK-MB 1.1, troponin 0.062, WBC 14.5, hemoglobin 10.9, platelets 175, d-dimer 3 8378, TSH 8.67  Patient was started on Unasyn and Levophed with ICU admission        Interval Problem Update  6/3: patient seen and evaluated in the ICU. The lab from [place] faxed over a preliminary + blood culture revealing gram + cocci in clusters. She has been started on broad spectrum antibiotics. She transitioned off the insulin drip to sliding scale.   6/4: Ms. Mcclain was evaluated and examined in the ICU. Her blood cultures have come back + for MRSA thus she has been placed under precautions. I have consulted Dr. Xavier, ID. She remains on IV fluids and her Cr is down to 3.7. glucose went down to 57 this morning.   6/5: patient seen and evaluated in the ICU. Reportedly there was  not a urine culture obtained in [place] and it was not done here either. An echocardiogram was done yesterday and did not reveal vegetations. I discussed with Dr. Navarro about a VERONICA and Dr. Ba about performing conscious sedation to eval for endocarditis.   6/6: patient seen and evaluated in the ICU. She had a VERONICA yesterday. Cr is 3.22 today.  6/7: Ms. Mcclain was evaluated and examined on the medical floor. Her glucose remains a bit high today thus Januvia will be initiated. Her Cr is 2.8 today. We discussed a PICC and 4-6 weeks of IV antibiotics as an outpatient or in a rehab facility.   6/8: Patient seen and evaluated on the medical floor.  She is feeling much better today.  She has been up walking around.  She is tolerating regular diet.  She had some diarrhea yesterday none today.  Discussed that if her blood cultures remain negative, she will receive a PICC line and then discharge planning will evaluate her for inpatient versus outpatient with infusion center options.  She hopes to live with her aunt here in [place] for about a month and go to the infusion center on a daily basis for daptomycin.  6/9: Sitting up in bed comfortable.  Complaining of epigastric pain.  Refusing to eat per staff.  I consulted GI.  Planning for endoscopy tomorrow.  Carafate was added.  She however did not look in acute distress.  No other issues to report.  6/10: Resting in bed comfortably.  Was going for EGD and colonoscopy in the morning which were both unremarkable.  Abdominal pain fairly controlled.  Denies any nausea or vomiting.  VERONICA results still pending.  Spoke with echo reading room to finalize it.  Hemodynamically stable.  No issues overnight per staff.  6/11: Feels better today.  No abdominal pain.  Tolerating p.o. well.  No nausea or vomiting.  VERONICA was negative for any endocarditis or vegetations.  Midline ordered.  Patient has no new complaints this morning.  No issues overnight per staff.  6/12: Resting in bed comfortably.   Respiratory status stable.  Denies any abdominal pain.  Denies any nausea or vomiting.  Has been afebrile for the last 24 hours.  Hemodynamically stable.  No acute distress noted.  No issues overnight per staff.    Consultants/Specialty  Critical care.   Nephrology   Infectious disease.  Code Status  Full     Disposition  Home with home health.  Awaiting for ID to arrange for outpatient antibiotic infusion.    Review of Systems  Review of Systems   Constitutional: Negative for chills and fever.        She is feeling overall much better   HENT: Negative for hearing loss and tinnitus.    Eyes: Negative for blurred vision and double vision.   Respiratory: Negative for cough.    Cardiovascular: Negative for chest pain.   Gastrointestinal: Negative for abdominal pain, heartburn and nausea.   Genitourinary: Negative for dysuria and urgency.   Musculoskeletal: Negative for myalgias.   Neurological: Negative for dizziness and headaches.   Psychiatric/Behavioral: Negative for depression.        Physical Exam  Temp:  [36.2 °C (97.2 °F)-36.9 °C (98.5 °F)] 36.8 °C (98.2 °F)  Pulse:  [] 92  Resp:  [16-20] 16  BP: (149-160)/(92-98) 149/95  SpO2:  [93 %-100 %] 94 %    Physical Exam  Vitals signs and nursing note reviewed.   Constitutional:       General: She is not in acute distress.     Appearance: Normal appearance.   HENT:      Head: Normocephalic and atraumatic.      Mouth/Throat:      Mouth: Mucous membranes are dry.   Eyes:      Extraocular Movements: Extraocular movements intact.      Pupils: Pupils are equal, round, and reactive to light.   Cardiovascular:      Rate and Rhythm: Normal rate and regular rhythm.      Heart sounds: No friction rub. No gallop.    Pulmonary:      Effort: Pulmonary effort is normal.      Breath sounds: Normal breath sounds. No stridor.   Abdominal:      General: There is no distension.      Palpations: There is no mass.   Musculoskeletal:         General: No swelling.      Right lower leg:  No edema.      Comments: Right toes amputated   Skin:     General: Skin is warm and dry.      Findings: No rash.   Neurological:      Mental Status: She is alert and oriented to person, place, and time.      Cranial Nerves: No cranial nerve deficit.      Sensory: No sensory deficit.      Motor: No weakness.      Comments: Awake, A&Ox4   Psychiatric:         Mood and Affect: Mood normal.         Behavior: Behavior normal.         Fluids    Intake/Output Summary (Last 24 hours) at 6/12/2020 1530  Last data filed at 6/11/2020 1818  Gross per 24 hour   Intake 300 ml   Output --   Net 300 ml       Laboratory  Recent Labs     06/12/20  0335   WBC 9.8   RBC 3.70*   HEMOGLOBIN 9.6*   HEMATOCRIT 31.6*   MCV 85.4   MCH 25.9*   MCHC 30.4*   RDW 49.6   PLATELETCT 301   MPV 10.2     Recent Labs     06/10/20  0208 06/11/20  0231 06/12/20  0335   SODIUM 138 138 141   POTASSIUM 3.5* 3.9 3.7   CHLORIDE 98 103 103   CO2 19* 22 26   GLUCOSE 161* 158* 157*   BUN 21 18 16   CREATININE 1.78* 1.73* 1.51*   CALCIUM 8.8 8.4* 8.6                   Imaging  IR-MIDLINE CATHETER INSERTION WO GUIDANCE > AGE 3   Final Result                  Ultrasound-guided midline placement performed by qualified nursing staff    as above.          EC-VERONICA W/O CONT   Final Result      EC-ECHOCARDIOGRAM COMPLETE W/O CONT   Final Result      CT-CHEST,ABDOMEN,PELVIS W/O   Final Result         1.  Asymmetric right perinephric fat stranding suspicious for infection/pyelonephritis. Correlate with urinalysis. No significant hydronephrosis. No obstructing stone is seen.   2.  Hepatomegaly and hepatic steatosis.   3.  Rectosigmoid colon is decompressed, limiting evaluation for wall thickening. Correlate for evidence of colitis.   4.  Right lower lobe bronchiectasis and peribronchial opacities likely postinflammatory with atelectasis/scarring. Correlate clinically for evidence of infection.   5.  Atherosclerosis.                    Assessment/Plan  * Staphylococcus  aureus bacteremia- (present on admission)  Assessment & Plan  Blood cultures + for Staph Aureus bacteremia prior to transfer and again on 6/2  Repeat blood cultures on 6/6  Source is presumably pyelonephritis  IV Daptomycin and rifampin  VERONICA on 6/5 which was negative for vegetations  Infectious disease consulted  PICC once blood cultures negative x 2. Blood cultures from 6/6 are negative so far.  [facility] with outpatient infusion center for daptomycin.    Septic shock (HCC)- (present on admission)  Assessment & Plan  This is Septic shock Present on admission  SIRS criteria identified on my evaluation include: Tachycardia, with heart rate greater than 90 BPM and Leukocytosis, with WBC greater than 12,000  Source is pyelonephritis with MRSA bacteremia  Presentation includes: Severe sepsis present and persistent hypotension after 30 ml/kg completed.   Despite appropriate fluid resuscitation with crystalloid given per sepsis guidelines, the patient remained hypotensive and required IV levophed needed to maintain a SBP of 90 or MAP of 65  Shock resolved.  6/10: Awaiting on VERONICA results to decide on 4 versus 6 weeks of IV antibiotics with placing the suitable access.  Called echo reading room and they will finalize the VERONICA report.  6/11: VERONICA was negative for any endocarditis or vegetations.  Midline has been ordered.  6/12: End date for antibiotics is 7/18/2020 per ID (6 weeks).      Diabetic ketoacidosis without coma associated with type 2 diabetes mellitus (HCC)- (present on admission)  Assessment & Plan  With renal failure, elevated but hydroxybutyrate  She was admitted to the ICU with an insulin drip and IV fluids  She had been transitioned to long-acting glargine and sliding scale subcutaneous insulin on 6/3--her glucose went down to 57 thus glargine 10 units stopped on 6/4  Hemoglobin A1c 11.2  Diabetes education ordered  She had been on metformin at home  which was stopped due to renal failure  Started Januvia 25 mg daily on 6/7 and sliding scale and blood sugars have been in the 130's.      Acute renal failure with tubular necrosis (HCC)- (present on admission)  Assessment & Plan  Likely prerenal-combination of sepsis, dehydration from diarrhea, likely component of acute tubular necrosis  Stop IV fluids on 6/5  Cr 5.5 on admit and remains high at 2.8 though improving  Nephrology consulted   Follow urine output.  Check a BMP every other day for now.  Slowly improving.      Epigastric pain- (present on admission)  Assessment & Plan  Could be due to gastroparesis.  She is high risk for PUD being on steroids chronically.  GI consulted.  Planning for EGD tomorrow.  6/10: Patient had unremarkable EGD and colonoscopy done this morning.  If continues to have epigastric pain, will consider gastric emptying test to assess for gastroparesis.    Pulmonary hypertension (HCC)- (present on admission)  Assessment & Plan  Echocardiogram reveals a RVSP 53 mm Hg thus she is at risk of volume overload    Metabolic acidosis- (present on admission)  Assessment & Plan  Secondary to DKA and renal failure      Adrenal insufficiency (HCC)- (present on admission)  Assessment & Plan  Patient is on chronic prednisone therapy for rheumatoid arthritis.  She presented with shock thus was started on IV hydrocortisone  Now transitioned back to home dose of prednisone 10 mg daily.    Diarrhea- (present on admission)  Assessment & Plan  C diff negative    Hyponatremia- (present on admission)  Assessment & Plan  Resolved      Bronchiectasis (HCC)- (present on admission)  Assessment & Plan  Chest CT findings - with no complains of cough or dyspnea   Patient had a previous pneumonia  Albuterol prn    Acute pyelonephritis- (present on admission)  Assessment & Plan  Presumably from Staph Aureus  Urine culture was not sent from [place] nor from here until after antibiotics were given        Plan of care  discussed with multidisciplinary team during rounds.    VTE prophylaxis: heparin

## 2020-06-12 NOTE — PROGRESS NOTES
Infectious Disease Progress Note    Author: Mayra Rodriguez M.D. Date & Time of service: 2020  2:54 PM    Chief Complaint:  MRSA sepsis     Interval History:  60 y.o. female with RA admitted 2020 with abd pain and diarrhea On chronic prednisone 10 mg daily, DMT2 , CKD stage III  F, vanco trough 22.3, transfer out of ICU, awaiting bed. Improvement in nausea and diarrhea but still with abdominal pain.  She has some lumbar back pain which she states is chronic and unchanged.  No new joint pain. Pt antibiotics transition from vancomycin to daptomycin 8 mg q48 hours due to elevated Vanco trough and ongoing renal dysfunction.  Continued on rifampin 300 mg twice daily.   AF, O2 RA, ongoing chronic abdominal pain, some loose stools. She has chronic low back pain that she states in uncharged from her baseline. No new neck, back or joint pain. She is continued on daptomycin q48 hours based on renal function and rifampin.   AF, VERONICA yesterday and awaiting report. Ongoing epigastric abdominal pain.  No new neck joint or back pain.    AF, O2 0.5 L NC, ongoing abdominal pain. Plan is for colonoscopy and EGD tomorrow.     AF continued abd pain-states improved with Mylanta. No new complaints   AF WBC 9.8 somnolent today; NAD     Review of Systems:  Review of Systems   Unable to perform ROS: Other   Constitutional: Negative for fever.       Hemodynamics:  Temp (24hrs), Av.6 °C (97.9 °F), Min:36.2 °C (97.2 °F), Max:36.9 °C (98.5 °F)  Temperature: 36.8 °C (98.2 °F)  Pulse  Av.3  Min: 61  Max: 109   Blood Pressure: 149/95       Physical Exam:  Physical Exam  Vitals signs and nursing note reviewed.   Constitutional:       General: She is not in acute distress.     Appearance: She is not toxic-appearing or diaphoretic.      Comments: Looks older than stated age   HENT:      Nose: No congestion or rhinorrhea.   Eyes:      General: No scleral icterus.     Extraocular Movements: Extraocular  movements intact.      Pupils: Pupils are equal, round, and reactive to light.   Cardiovascular:      Rate and Rhythm: Normal rate and regular rhythm.      Heart sounds: Normal heart sounds.   Pulmonary:      Effort: Pulmonary effort is normal. No respiratory distress.      Breath sounds: Normal breath sounds.   Abdominal:      General: Bowel sounds are normal. There is no distension.      Palpations: Abdomen is soft.      Tenderness: There is abdominal tenderness. There is no guarding.   Musculoskeletal:         General: Deformity present.      Right lower leg: Edema present.      Left lower leg: Edema present.   Skin:     General: Skin is warm.      Coloration: Skin is not jaundiced.      Findings: Bruising present.      Comments: [facility]   Neurological:      Comments: somnolent         Meds:    Current Facility-Administered Medications:   •  simethicone  •  DAPTOmycin  •  sucralfate  •  mag hydrox-al hydrox-simeth  •  SITagliptin  •  fluticasone  •  omeprazole  •  riFAMPin  •  insulin regular **AND** POC Blood Glucose **AND** NOTIFY MD and PharmD **AND** glucose **AND** dextrose 50%  •  predniSONE  •  heparin  •  oxyCODONE immediate-release  •  loperamide  •  [DISCONTINUED] magnesium sulfate **OR** magnesium sulfate  •  potassium phosphate ivpb **OR** sodium phosphate 30 mmol ivpb  •  acetaminophen  •  ondansetron  •  ondansetron  •  promethazine  •  promethazine  •  prochlorperazine  •  latanoprost  •  timolol  •  albuterol    Labs:  Recent Labs     06/12/20  0335   WBC 9.8   RBC 3.70*   HEMOGLOBIN 9.6*   HEMATOCRIT 31.6*   MCV 85.4   MCH 25.9*   RDW 49.6   PLATELETCT 301   MPV 10.2   NEUTSPOLYS 78.30*   LYMPHOCYTES 17.40*   MONOCYTES 1.70   EOSINOPHILS 0.90   BASOPHILS 0.90   RBCMORPHOLO Present     Recent Labs     06/10/20  0208 06/11/20  0231 06/12/20  0335   SODIUM 138 138 141   POTASSIUM 3.5* 3.9 3.7   CHLORIDE 98 103 103   CO2 19* 22 26   GLUCOSE 161* 158* 157*   BUN 21 18 16   CPKTOTAL 41  --   --       Recent Labs     06/10/20  0208 06/11/20  0231 06/12/20  0335   ALBUMIN 2.9* 2.4* 2.4*   TBILIRUBIN 0.3 0.2 0.3   ALKPHOSPHAT 142* 116* 113*   TOTPROTEIN 7.0 6.1 6.5   ALTSGPT 45 32 26   ASTSGOT 27 21 18   CREATININE 1.78* 1.73* 1.51*       Imaging:  Ct-chest,abdomen,pelvis W/o    Result Date: 6/1/2020 6/1/2020 11:18 AM HISTORY/REASON FOR EXAM:  Sepsis Hypotension, renal injury, nausea vomiting diarrhea TECHNIQUE/EXAM DESCRIPTION: CT scan of the chest, abdomen and pelvis without contrast was performed. Noncontrast helical scanning of the chest, abdomen and pelvis was obtained from the lung apices through the pubic symphysis. Low dose optimization technique was utilized for this CT exam including automated exposure control and adjustment of the mA and/or kV according to patient size. COMPARISON:  None. FINDINGS: CHEST: Neck Base:  Normal. Lungs/Pleura: Right lower lobe bronchiectasis with peribronchial opacities which likely is atelectasis or scarring. Correlate clinically for infection. Mild dependent changes/atelectasis in the left lower lobe.  3 mm groundglass nodule in the right midlung on image 58 series 301. Calcified granuloma left upper lobe. No pneumothorax or pleural effusion. Cardiovascular Structures:  Heart size is normal. No pericardial effusion. Coronary artery stents and/or prominent calcification. Aorta is normal in caliber without aneurysm or dissection. Central pulmonary arteries are normal in caliber. Mediastinum/ lymph nodes: No lymphadenopathy. Small hiatal hernia. ABDOMEN/PELVIS Liver:  Diffuse hypoattenuation of the liver suggesting fatty infiltration. Liver is enlarged. Gallbladder: Normal Biliary tract: Nondilated. Pancreas: Normal. Spleen: Normal. Adrenals: Normal. Kidneys and Collecting Systems:  Bilateral perinephric fat stranding, asymmetrically more prominent on the right. This is concerning for infection given the history. Correlate clinically and with urinalysis. No significant  hydronephrosis. No obstructing stone is seen. Gastrointestinal tract:  Possible rectosigmoid wall thickening, suboptimally evaluated without contrast and due to decompression. The appendix is normal. Peritoneum: No free air or free fluid. Reproductive organs:  Normal. Bladder:  Normal. Vessels:  Moderately dense calcified plaque. Lymph  Nodes:  No lymphadenopathy. Soft tissues/wall: Within normal limits. Bones:  No acute or aggressive abnormality. Ossific density adjacent to the inferior right scapula probably related to old trauma.     1.  Asymmetric right perinephric fat stranding suspicious for infection/pyelonephritis. Correlate with urinalysis. No significant hydronephrosis. No obstructing stone is seen. 2.  Hepatomegaly and hepatic steatosis. 3.  Rectosigmoid colon is decompressed, limiting evaluation for wall thickening. Correlate for evidence of colitis. 4.  Right lower lobe bronchiectasis and peribronchial opacities likely postinflammatory with atelectasis/scarring. Correlate clinically for evidence of infection. 5.  Atherosclerosis.     Ec-echocardiogram Complete W/o Cont    Result Date: 2020  Transthoracic Echo Report Echocardiography Laboratory CONCLUSIONS Mildly reduced left ventricular systolic function. Left ventricular ejection fraction is visually estimated to be 45-50%. Grade II diastolic dysfunction. Dilated inferior vena cava without inspiratory collapse. Estimated right ventricular systolic pressure  is 53 mmHg. Normal right ventricular size. Reduced right ventricular systolic function. Moderate to severe tricuspid regurgitation. CAROLYNE SELLERS Exam Date:         2020                    09:48 Exam Location:     Inpatient Priority:          Routine Ordering Physician:        ISIDRO DELATORRE Referring Physician:       [id number] Pope Sonographer:               [facility],                            RVT Age:    60     Gender:    F MRN:    [id number] :    1960 BSA:    1.71   Ht  (in):    64     Wt (lb):    146 Exam Type:     Complete Indications:     Cardiac Murmurs, Undiagnosed ICD Codes:       785.2 CPT Codes:       90891 BP:   132    /   77     HR:   75 Technical Quality:       Technically difficult study -                          adequate information is obtained MEASUREMENTS  (Male / Female) Normal Values 2D ECHO LV Diastolic Diameter PLAX        3.9 cm                4.2 - 5.9 / 3.9 - 5.3 cm LV Systolic Diameter PLAX         2.8 cm                2.1 - 4.0 cm IVS Diastolic Thickness           1.2 cm                LVPW Diastolic Thickness          1.2 cm                RV Diameter 4C                    3.1 cm                2.5 - 2.9 cm LVOT Diameter                     1.7 cm                RA Diameter                       4.5 cm                LV Ejection Fraction MOD BP       54.1 %                >= 55  % LV Ejection Fraction MOD 4C       53.9 %                LV Ejection Fraction MOD 2C       58.7 %                LA Volume Index                   38.5 cm3/m2           16 - 28 cm3/m2 DOPPLER AV Peak Velocity                  1.1 m/s               AV Peak Gradient                  4.8 mmHg              AV Mean Gradient                  2.5 mmHg              LVOT Peak Velocity                0.77 m/s              AV Area Cont Eq vti               1.5 cm2               Mitral E Point Velocity           0.73 m/s              Mitral E to A Ratio               1.3                   MV Pressure Half Time             50 ms                 MV Area PHT                       4.4 cm2               MV Deceleration Time              172 ms                MR Flow Convergence Radius        0.36 cm               MR ERO PISA                       0.042 cm2             MR Regurgitant Volume PISA        7.1 cm3               TR Peak Velocity                  257 cm/s              * Indicates values subject to auto-interpretation LV EF:        % FINDINGS Left Ventricle Normal left ventricular  chamber size. Mild concentric left ventricular hypertrophy. Mildly reduced left ventricular systolic function. Left ventricular ejection fraction is visually estimated to be 45-50%. Abnormal septal motion consistent with right ventricular (RV) volume overload and/or elevated RV end-diastolic pressure. Grade II diastolic dysfunction. Right Ventricle Normal right ventricular size. Reduced right ventricular systolic function. Right Atrium Dilated right atrium. Dilated inferior vena cava without inspiratory collapse. Left Atrium Mildly dilated left atrium. Left atrial volume index is 39  mL/sq m. Mitral Valve Structurally normal mitral valve. No mitral stenosis. Mild mitral regurgitation. Aortic Valve Aortic sclerosis without stenosis. No aortic insufficiency. Tricuspid Valve Structurally normal tricuspid valve. No tricuspid stenosis. Moderate to severe tricuspid regurgitation. Right atrial pressure is estimated to be 15 mmHg. Estimated right ventricular systolic pressure  is 53 mmHg. Pulmonic Valve The pulmonic valve is not well visualized.  Mild pulmonic insufficiency. Pericardium Normal pericardium without effusion. Aorta Normal aortic root for body surface area. Ascending aorta diameter is 2.8 cm. Allyson Rivera MD (Electronically Signed) Final Date:     04 June 2020                 11:59    Ec-rayna W/o Cont    Result Date: 6/5/2020  Results Will be Available after Interpretation by Cardiologist.      Micro:  Results     Procedure Component Value Units Date/Time    Blood Culture [[id number]] Collected:  06/06/20 1325    Order Status:  Completed Specimen:  Blood Updated:  06/11/20 1500     Significant Indicator NEG     Source BLD     Site Peripheral     Culture Result No growth after 5 days of incubation.    Narrative:       Left Hand    Blood Culture [[id number]] Collected:  06/06/20 1325    Order Status:  Completed Specimen:  Blood Updated:  06/11/20 1500     Significant Indicator NEG     Source BLD     Site Peripheral      Culture Result No growth after 5 days of incubation.    Narrative:       Right Forearm/Arm    SARS-CoV-2, PCR (In-House) [[id number]] Collected:  06/09/20 1230    Order Status:  Completed Updated:  06/09/20 1350     SARS-CoV-2 Source NP Swab     SARS-CoV-2 by PCR NotDetected     Comment: [facility] providers: PLEASE REFER TO DE-ESCALATION AND RETESTING PROTOCOL  on [email or web address]  **The [employer] GeneXpert Xpress SARS-CoV-2 Test has been made available for  use under the Emergency Use Authorization (EUA) only.         Narrative:       [id number] PASCUAL ZOË  Is this test for diagnosis or screening?->Screen    URINALYSIS [[id number]]  (Abnormal) Collected:  06/08/20 1739    Order Status:  Completed Specimen:  Urine, Clean Catch Updated:  06/09/20 1343     Color Yellow     Character Clear     Specific Gravity 1.008     Ph 6.0     Glucose Negative mg/dL      Ketones Trace mg/dL      Protein Negative mg/dL      Bilirubin Negative     Urobilinogen, Urine 0.2     Nitrite Negative     Leukocyte Esterase Small     Occult Blood Small     Micro Urine Req Microscopic    Narrative:       [id number] ANKUR LINKBA    COVID/SARS CoV-2 [[id number]] Collected:  06/09/20 1230    Order Status:  Completed Specimen:  Respirate from Nasopharyngeal Updated:  06/09/20 1241     COVID Order Status Received    Narrative:       [id number] ANKUR CAMP  Is this test for diagnosis or screening?->Screen    BLOOD CULTURE [[id number]]     Order Status:  Canceled Specimen:  Blood from Peripheral     BLOOD CULTURE [[id number]]     Order Status:  Canceled Specimen:  Blood from Peripheral           Assessment:  Active Hospital Problems    Diagnosis   • *Staphylococcus aureus bacteremia [R78.81, B95.61]   • Diabetic ketoacidosis without coma associated with type 2 diabetes mellitus (HCC) [E11.10]   • Septic shock (HCC) [A41.9, R65.21]   • Acute renal failure with tubular necrosis (HCC) [N17.0]   • Epigastric pain [R10.13]   • Pulmonary  hypertension (HCC) [I27.20]   • Acute pyelonephritis [N10]   • Bronchiectasis (HCC) [J47.9]   • Hyponatremia [E87.1]   • Diarrhea [R19.7]   • Adrenal insufficiency (HCC) [E27.40]   • Metabolic acidosis [E87.2]       ASSESSMENT/PLAN:      60 y.o.  admitted 6/1/2020. Pt has a past medical history of RA on chronic prednisone 10 mg daily, DMT2 , CKD stage III.  She reports abdominal pain on and off again since December and also history of frequent UTIs.  She is most recently treated with amoxicillin but been off antibiotics for approximately 1 week prior to her admission. Presented to ER at [facility] c/o epigastric abdominal pain described as burning as well as nausea vomiting and diarrhea x4 days.  She was hypotensive, requiring oxygen support with lab irregularities including leukocytosis to 14.5, hyponatremia, elevated creatinine, elevated glucose and elevated lactic acid.  She was started on Unasyn and Levophed and transferred to [facility] for higher level of care.  The patient has hardware - pins in her right foot, a plate in her left ankle and replaced knuckle in her right hand     Hospital Course:   She has been afebrile, initially hypotensive and now improved.  Leukocytosis to 19.7 on admit.  Acute kidney injury on arrival.  Stool cultures were obtained which are pending in part but negative for Shiga toxin 1 and 2.  C. difficile was tested on 6/1 and 6/2 and negative on both.  Blood cultures were obtained which are positive for MRSA.  She was started on vancomycin Flagyl and ceftriaxone.  Transitioned to daptomycin and rifampin.         MRSA sepsis   Afebrile  Leukocytosis resolved  BCxs + MRSA 6/1; 6/2  Repeat Bcxs neg 6/6  TTE neg for vegetation but moderate to severe tricuspid regurgitation, diastolic dysfunction: VERONICA  Neg 6/5  Vanco stopped due to RAF  Continue dapto/rif  Anticipate 6 weeks IV abx from date of negative blood cxs  Stop date 7/18/2020      UTI/Pyelonephritis  Dysuria with pain, burning and  hematuria prior to admission  UA suggestive of infection, no urine culture  CT on 6/1 with right-sided fat stranding suspicious for infection/Pyelo     Abdominal pain, epigastric- ongoing - chronic   GI on board  -EGD and colonoscopy on 6/10-EGD with no significant findings.  Colonoscopy with sessile polyp removed, rectosigmoid ulcer which was biopsied and diverticulosis of the sigmoid    Diarrhea - improved   C. difficile tested negative x2  Stool cultures pending, Shiga toxin 1 and 2  EGD no ulcer  Rectal ulcer biopsied    Acute on chronic kidney disease, improved  Adjust dosing abx    Bronchiectasis,   Noted on CT in right lower lobe as well as atelectasis  Calcified granuloma in left upper lobe groundglass nodule on right  No PNA    Diabetes, uncontrolled   DKA  Keep BS under 150 to help control current infection    Rheumatoid arthritis  Immunosuppressed

## 2020-06-12 NOTE — CARE PLAN
Problem: Nutritional:  Goal: Achieve adequate nutritional intake  Description: Patient will consume 50% of meals/supplements  Outcome: PROGRESSING AS EXPECTED

## 2020-06-12 NOTE — PROGRESS NOTES
Bedside report received from Ayde MOELLER . Assumed care of pt at 0645 . Pt is awake at this time with no signs of distress. Plan of care discussed with pt. Pt is A&O x 4. Pt is on 0.5 L NC. Bed alarm is on, bed in lowest position, bed rails up x 2, belongings and call light within reach. Hourly rounding in place.

## 2020-06-12 NOTE — CARE PLAN
Problem: Discharge Barriers/Planning  Goal: Patient's continuum of care needs will be met  Note: Inpatient LTACH/SVF vs Home with outpatient infusion center for Daptomycin iv.     Problem: Pain Management  Goal: Pain level will decrease to patient's comfort goal  Note: Due med given per MD order, pt requested to have it when its due every 6 hours due to severe pain when no pain medication for a long hours.

## 2020-06-13 DIAGNOSIS — A41.02 SEPSIS DUE TO METHICILLIN RESISTANT STAPHYLOCOCCUS AUREUS (MRSA) WITH ACUTE RENAL FAILURE WITHOUT SEPTIC SHOCK, UNSPECIFIED ACUTE RENAL FAILURE TYPE (HCC): ICD-10-CM

## 2020-06-13 DIAGNOSIS — N17.9 SEPSIS DUE TO METHICILLIN RESISTANT STAPHYLOCOCCUS AUREUS (MRSA) WITH ACUTE RENAL FAILURE WITHOUT SEPTIC SHOCK, UNSPECIFIED ACUTE RENAL FAILURE TYPE (HCC): ICD-10-CM

## 2020-06-13 DIAGNOSIS — R65.20 SEPSIS DUE TO METHICILLIN RESISTANT STAPHYLOCOCCUS AUREUS (MRSA) WITH ACUTE RENAL FAILURE WITHOUT SEPTIC SHOCK, UNSPECIFIED ACUTE RENAL FAILURE TYPE (HCC): ICD-10-CM

## 2020-06-13 LAB
ALBUMIN SERPL BCP-MCNC: 2.4 G/DL (ref 3.2–4.9)
ALBUMIN/GLOB SERPL: 0.6 G/DL
ALP SERPL-CCNC: 99 U/L (ref 30–99)
ALT SERPL-CCNC: 21 U/L (ref 2–50)
ANION GAP SERPL CALC-SCNC: 10 MMOL/L (ref 7–16)
AST SERPL-CCNC: 21 U/L (ref 12–45)
BILIRUB SERPL-MCNC: 0.3 MG/DL (ref 0.1–1.5)
BUN SERPL-MCNC: 15 MG/DL (ref 8–22)
CALCIUM SERPL-MCNC: 8.9 MG/DL (ref 8.5–10.5)
CHLORIDE SERPL-SCNC: 100 MMOL/L (ref 96–112)
CO2 SERPL-SCNC: 27 MMOL/L (ref 20–33)
CREAT SERPL-MCNC: 1.46 MG/DL (ref 0.5–1.4)
GLOBULIN SER CALC-MCNC: 3.9 G/DL (ref 1.9–3.5)
GLUCOSE BLD-MCNC: 150 MG/DL (ref 65–99)
GLUCOSE BLD-MCNC: 162 MG/DL (ref 65–99)
GLUCOSE BLD-MCNC: 173 MG/DL (ref 65–99)
GLUCOSE BLD-MCNC: 177 MG/DL (ref 65–99)
GLUCOSE SERPL-MCNC: 139 MG/DL (ref 65–99)
POTASSIUM SERPL-SCNC: 3.5 MMOL/L (ref 3.6–5.5)
PROT SERPL-MCNC: 6.3 G/DL (ref 6–8.2)
SODIUM SERPL-SCNC: 137 MMOL/L (ref 135–145)

## 2020-06-13 PROCEDURE — 700102 HCHG RX REV CODE 250 W/ 637 OVERRIDE(OP): Performed by: HOSPITALIST

## 2020-06-13 PROCEDURE — A9270 NON-COVERED ITEM OR SERVICE: HCPCS | Performed by: HOSPITALIST

## 2020-06-13 PROCEDURE — 700102 HCHG RX REV CODE 250 W/ 637 OVERRIDE(OP): Performed by: INTERNAL MEDICINE

## 2020-06-13 PROCEDURE — 700102 HCHG RX REV CODE 250 W/ 637 OVERRIDE(OP): Performed by: PSYCHIATRY & NEUROLOGY

## 2020-06-13 PROCEDURE — 99233 SBSQ HOSP IP/OBS HIGH 50: CPT | Performed by: INTERNAL MEDICINE

## 2020-06-13 PROCEDURE — 99232 SBSQ HOSP IP/OBS MODERATE 35: CPT | Performed by: FAMILY MEDICINE

## 2020-06-13 PROCEDURE — 770021 HCHG ROOM/CARE - ISO PRIVATE

## 2020-06-13 PROCEDURE — A9270 NON-COVERED ITEM OR SERVICE: HCPCS | Performed by: PSYCHIATRY & NEUROLOGY

## 2020-06-13 PROCEDURE — 82962 GLUCOSE BLOOD TEST: CPT | Mod: 91

## 2020-06-13 PROCEDURE — 80053 COMPREHEN METABOLIC PANEL: CPT

## 2020-06-13 PROCEDURE — A9270 NON-COVERED ITEM OR SERVICE: HCPCS | Performed by: INTERNAL MEDICINE

## 2020-06-13 PROCEDURE — 700102 HCHG RX REV CODE 250 W/ 637 OVERRIDE(OP): Performed by: FAMILY MEDICINE

## 2020-06-13 PROCEDURE — 700105 HCHG RX REV CODE 258: Performed by: FAMILY MEDICINE

## 2020-06-13 PROCEDURE — 700111 HCHG RX REV CODE 636 W/ 250 OVERRIDE (IP): Performed by: PSYCHIATRY & NEUROLOGY

## 2020-06-13 PROCEDURE — 700111 HCHG RX REV CODE 636 W/ 250 OVERRIDE (IP): Performed by: HOSPITALIST

## 2020-06-13 PROCEDURE — 700111 HCHG RX REV CODE 636 W/ 250 OVERRIDE (IP): Performed by: FAMILY MEDICINE

## 2020-06-13 PROCEDURE — A9270 NON-COVERED ITEM OR SERVICE: HCPCS | Performed by: FAMILY MEDICINE

## 2020-06-13 RX ORDER — 0.9 % SODIUM CHLORIDE 0.9 %
10-20 VIAL (ML) INJECTION PRN
Status: CANCELLED | OUTPATIENT
Start: 2020-06-13

## 2020-06-13 RX ORDER — 0.9 % SODIUM CHLORIDE 0.9 %
5 VIAL (ML) INJECTION PRN
Status: CANCELLED | OUTPATIENT
Start: 2020-06-13

## 2020-06-13 RX ADMIN — ACETAMINOPHEN 650 MG: 325 TABLET, FILM COATED ORAL at 05:26

## 2020-06-13 RX ADMIN — FLUTICASONE PROPIONATE 50 MCG: 50 SPRAY, METERED NASAL at 05:27

## 2020-06-13 RX ADMIN — OXYCODONE 5 MG: 5 TABLET ORAL at 05:27

## 2020-06-13 RX ADMIN — ALUMINUM HYDROXIDE, MAGNESIUM HYDROXIDE, AND DIMETHICONE 30 ML: 400; 400; 40 SUSPENSION ORAL at 17:50

## 2020-06-13 RX ADMIN — ACETAMINOPHEN 650 MG: 325 TABLET, FILM COATED ORAL at 11:27

## 2020-06-13 RX ADMIN — OMEPRAZOLE 20 MG: 20 CAPSULE, DELAYED RELEASE ORAL at 05:27

## 2020-06-13 RX ADMIN — RIFAMPIN 300 MG: 300 CAPSULE ORAL at 05:27

## 2020-06-13 RX ADMIN — ALUMINUM HYDROXIDE, MAGNESIUM HYDROXIDE, AND DIMETHICONE 30 ML: 400; 400; 40 SUSPENSION ORAL at 08:11

## 2020-06-13 RX ADMIN — OXYCODONE 5 MG: 5 TABLET ORAL at 17:50

## 2020-06-13 RX ADMIN — SIMETHICONE CHEW TAB 80 MG 80 MG: 80 TABLET ORAL at 05:27

## 2020-06-13 RX ADMIN — HEPARIN SODIUM 5000 UNITS: 5000 INJECTION, SOLUTION INTRAVENOUS; SUBCUTANEOUS at 21:28

## 2020-06-13 RX ADMIN — SITAGLIPTIN 25 MG: 25 TABLET, FILM COATED ORAL at 05:29

## 2020-06-13 RX ADMIN — TIMOLOL MALEATE 1 DROP: 5 SOLUTION/ DROPS OPHTHALMIC at 05:27

## 2020-06-13 RX ADMIN — OXYCODONE 5 MG: 5 TABLET ORAL at 11:27

## 2020-06-13 RX ADMIN — DAPTOMYCIN 530 MG: 350 INJECTION, POWDER, LYOPHILIZED, FOR SOLUTION INTRAVENOUS at 06:21

## 2020-06-13 RX ADMIN — HEPARIN SODIUM 5000 UNITS: 5000 INJECTION, SOLUTION INTRAVENOUS; SUBCUTANEOUS at 05:29

## 2020-06-13 RX ADMIN — SUCRALFATE 1 G: 1 SUSPENSION ORAL at 05:27

## 2020-06-13 RX ADMIN — SUCRALFATE 1 G: 1 SUSPENSION ORAL at 23:51

## 2020-06-13 RX ADMIN — ACETAMINOPHEN 650 MG: 325 TABLET, FILM COATED ORAL at 23:51

## 2020-06-13 RX ADMIN — SUCRALFATE 1 G: 1 SUSPENSION ORAL at 11:26

## 2020-06-13 RX ADMIN — OXYCODONE 5 MG: 5 TABLET ORAL at 23:51

## 2020-06-13 RX ADMIN — PREDNISONE 10 MG: 10 TABLET ORAL at 05:27

## 2020-06-13 RX ADMIN — INSULIN HUMAN 2 UNITS: 100 INJECTION, SOLUTION PARENTERAL at 21:33

## 2020-06-13 RX ADMIN — INSULIN HUMAN 2 UNITS: 100 INJECTION, SOLUTION PARENTERAL at 12:47

## 2020-06-13 RX ADMIN — OMEPRAZOLE 20 MG: 20 CAPSULE, DELAYED RELEASE ORAL at 17:52

## 2020-06-13 RX ADMIN — INSULIN HUMAN 2 UNITS: 100 INJECTION, SOLUTION PARENTERAL at 08:14

## 2020-06-13 RX ADMIN — ACETAMINOPHEN 650 MG: 325 TABLET, FILM COATED ORAL at 17:50

## 2020-06-13 ASSESSMENT — ENCOUNTER SYMPTOMS
DIZZINESS: 0
DEPRESSION: 0
COUGH: 0
HEADACHES: 0
DIARRHEA: 0
CHILLS: 0
FEVER: 0
TINGLING: 0
NAUSEA: 0
VOMITING: 0
PHOTOPHOBIA: 0
BACK PAIN: 1
DOUBLE VISION: 0
SHORTNESS OF BREATH: 0
CONSTIPATION: 0
BLURRED VISION: 0
HEARTBURN: 0
PALPITATIONS: 0
MYALGIAS: 0
ABDOMINAL PAIN: 0

## 2020-06-13 ASSESSMENT — FIBROSIS 4 INDEX: FIB4 SCORE: 0.9

## 2020-06-13 NOTE — CARE PLAN
Problem: Bowel/Gastric:  Goal: Will not experience complications related to bowel motility  Outcome: PROGRESSING AS EXPECTED     Problem: Mobility  Goal: Risk for activity intolerance will decrease  Outcome: PROGRESSING AS EXPECTED

## 2020-06-13 NOTE — PROGRESS NOTES
Bedside report received from Ayde MOELLER . Assumed care of pt at 0645 . Pt is awake at this time with no signs of distress. Plan of care discussed with pt. Pt is A&O x 4. Pt titrated down to RA saturating at 93%. Bed alarm is on, bed in lowest position, bed rails up x 2, belongings and call light within reach. Hourly rounding in place.

## 2020-06-13 NOTE — PROGRESS NOTES
[facility] Medicine Daily Progress Note    Date of Service  6/13/2020    Chief Complaint  60 y.o. female admitted 6/1/2020 with diarrhea.    Hospital Course   Ms. Mcclain is a 60 y.o. female who presented 6/1/2020 with past medical history of rheumatoid arthritis on chronic prednisone therapy, type 2 diabetes on metformin, CKD stage III who comes into the emergency room with complaints of nausea, vomiting and diarrhea for the past 4 days.  Associated with an epigastric abdominal pain.  The pain radiates to the lower abdomen and feels like a burning sensation.  The pain is nonexertional, non-positional.  Patient states that she has not gone to the bathroom to urinate in 4 days.  She would have a bowel movement every 30 minutes.  She denies any blood in her stools.  Patient denies any fever, cough, shortness of breath, chest pain.   Patient came from outlying facility from [facility] includes  Blood pressure 86/50, respiratory rate 20, saturating 90% on 2 L oxygen, temperature 98.4  Sodium 129, potassium 3.6, chloride 89, CO2 21, AST 46, ALT 30, BUN 67, glucose 399, creatinine 6.4, calcium 8, bili 1.5, anion gap 22, lactic acid 2.5, lipase 163, , CK-MB 1.1, troponin 0.062, WBC 14.5, hemoglobin 10.9, platelets 175, d-dimer 3 8378, TSH 8.67  Patient was started on Unasyn and Levophed with ICU admission        Interval Problem Update  6/3: patient seen and evaluated in the ICU. The lab from [place] faxed over a preliminary + blood culture revealing gram + cocci in clusters. She has been started on broad spectrum antibiotics. She transitioned off the insulin drip to sliding scale.   6/4: Ms. Mcclain was evaluated and examined in the ICU. Her blood cultures have come back + for MRSA thus she has been placed under precautions. I have consulted Dr. Xavier, ID. She remains on IV fluids and her Cr is down to 3.7. glucose went down to 57 this morning.   6/5: patient seen and evaluated in the ICU. Reportedly there was  not a urine culture obtained in [place] and it was not done here either. An echocardiogram was done yesterday and did not reveal vegetations. I discussed with Dr. Navarro about a VERONICA and Dr. Ba about performing conscious sedation to eval for endocarditis.   6/6: patient seen and evaluated in the ICU. She had a VERONICA yesterday. Cr is 3.22 today.  6/7: Ms. Mcclain was evaluated and examined on the medical floor. Her glucose remains a bit high today thus Januvia will be initiated. Her Cr is 2.8 today. We discussed a PICC and 4-6 weeks of IV antibiotics as an outpatient or in a rehab facility.   6/8: Patient seen and evaluated on the medical floor.  She is feeling much better today.  She has been up walking around.  She is tolerating regular diet.  She had some diarrhea yesterday none today.  Discussed that if her blood cultures remain negative, she will receive a PICC line and then discharge planning will evaluate her for inpatient versus outpatient with infusion center options.  She hopes to live with her aunt here in [place] for about a month and go to the infusion center on a daily basis for daptomycin.  6/9: Sitting up in bed comfortable.  Complaining of epigastric pain.  Refusing to eat per staff.  I consulted GI.  Planning for endoscopy tomorrow.  Carafate was added.  She however did not look in acute distress.  No other issues to report.  6/10: Resting in bed comfortably.  Was going for EGD and colonoscopy in the morning which were both unremarkable.  Abdominal pain fairly controlled.  Denies any nausea or vomiting.  VERONICA results still pending.  Spoke with echo reading room to finalize it.  Hemodynamically stable.  No issues overnight per staff.  6/11: Feels better today.  No abdominal pain.  Tolerating p.o. well.  No nausea or vomiting.  VERONICA was negative for any endocarditis or vegetations.  Midline ordered.  Patient has no new complaints this morning.  No issues overnight per staff.  6/12: Resting in bed comfortably.   Respiratory status stable.  Denies any abdominal pain.  Denies any nausea or vomiting.  Has been afebrile for the last 24 hours.  Hemodynamically stable.  No acute distress noted.  No issues overnight per staff.  6/13: Resting in bed comfortably.  Has been afebrile over the last 24 hours.  Hemodynamically stable.  Denies any abdominal pain.  Tolerating p.o. fairly.  Arranging for outpatient antibiotic infusion.  No issues overnight per staff.    Consultants/Specialty  Critical care.   Nephrology   Infectious disease.  Code Status  Full     Disposition  Home with home health.  Arranging for outpatient antibiotic infusion per ID.    Review of Systems  Review of Systems   Constitutional: Negative for chills and fever.        She is feeling overall much better   HENT: Negative for ear pain, hearing loss and tinnitus.    Eyes: Negative for blurred vision, double vision and photophobia.   Respiratory: Negative for cough.    Cardiovascular: Negative for chest pain and palpitations.   Gastrointestinal: Negative for heartburn, nausea and vomiting.   Genitourinary: Negative for dysuria and urgency.   Musculoskeletal: Negative for myalgias.   Neurological: Negative for dizziness, tingling and headaches.   Psychiatric/Behavioral: Negative for depression.        Physical Exam  Temp:  [36.2 °C (97.2 °F)-36.7 °C (98 °F)] 36.2 °C (97.2 °F)  Pulse:  [77-97] 77  Resp:  [16-19] 16  BP: (141-145)/(79-90) 143/79  SpO2:  [92 %-97 %] 97 %    Physical Exam  Vitals signs and nursing note reviewed.   Constitutional:       General: She is not in acute distress.     Appearance: Normal appearance.   HENT:      Head: Normocephalic and atraumatic.      Mouth/Throat:      Mouth: Mucous membranes are dry.   Eyes:      Extraocular Movements: Extraocular movements intact.      Pupils: Pupils are equal, round, and reactive to light.   Cardiovascular:      Rate and Rhythm: Normal rate and regular rhythm.      Heart sounds: No friction rub. No gallop.     Pulmonary:      Effort: Pulmonary effort is normal. No respiratory distress.      Breath sounds: Normal breath sounds. No stridor.   Abdominal:      General: There is no distension.      Palpations: There is no mass.   Musculoskeletal:         General: No swelling or tenderness.      Right lower leg: No edema.      Comments: Right toes amputated   Skin:     General: Skin is warm and dry.      Findings: No rash.   Neurological:      Mental Status: She is alert and oriented to person, place, and time.      Cranial Nerves: No cranial nerve deficit.      Sensory: No sensory deficit.      Comments: Awake, A&Ox4   Psychiatric:         Mood and Affect: Mood normal.         Behavior: Behavior normal.         Fluids    Intake/Output Summary (Last 24 hours) at 6/13/2020 1404  Last data filed at 6/13/2020 0900  Gross per 24 hour   Intake 800 ml   Output --   Net 800 ml       Laboratory  Recent Labs     06/12/20  0335   WBC 9.8   RBC 3.70*   HEMOGLOBIN 9.6*   HEMATOCRIT 31.6*   MCV 85.4   MCH 25.9*   MCHC 30.4*   RDW 49.6   PLATELETCT 301   MPV 10.2     Recent Labs     06/11/20  0231 06/12/20  0335 06/13/20  0532   SODIUM 138 141 137   POTASSIUM 3.9 3.7 3.5*   CHLORIDE 103 103 100   CO2 22 26 27   GLUCOSE 158* 157* 139*   BUN 18 16 15   CREATININE 1.73* 1.51* 1.46*   CALCIUM 8.4* 8.6 8.9                   Imaging  IR-MIDLINE CATHETER INSERTION WO GUIDANCE > AGE 3   Final Result                  Ultrasound-guided midline placement performed by qualified nursing staff    as above.          EC-VERONICA W/O CONT   Final Result      EC-ECHOCARDIOGRAM COMPLETE W/O CONT   Final Result      CT-CHEST,ABDOMEN,PELVIS W/O   Final Result         1.  Asymmetric right perinephric fat stranding suspicious for infection/pyelonephritis. Correlate with urinalysis. No significant hydronephrosis. No obstructing stone is seen.   2.  Hepatomegaly and hepatic steatosis.   3.  Rectosigmoid colon is decompressed, limiting evaluation for wall thickening.  Correlate for evidence of colitis.   4.  Right lower lobe bronchiectasis and peribronchial opacities likely postinflammatory with atelectasis/scarring. Correlate clinically for evidence of infection.   5.  Atherosclerosis.                    Assessment/Plan  * Staphylococcus aureus bacteremia- (present on admission)  Assessment & Plan  Blood cultures + for Staph Aureus bacteremia prior to transfer and again on 6/2  Repeat blood cultures on 6/6  Source is presumably pyelonephritis  IV Daptomycin and rifampin  VERONICA on 6/5 which was negative for vegetations  Infectious disease consulted  PICC once blood cultures negative x 2. Blood cultures from 6/6 are negative so far.  [facility] with outpatient infusion center for daptomycin.    Septic shock (HCC)- (present on admission)  Assessment & Plan  This is Septic shock Present on admission  SIRS criteria identified on my evaluation include: Tachycardia, with heart rate greater than 90 BPM and Leukocytosis, with WBC greater than 12,000  Source is pyelonephritis with MRSA bacteremia  Presentation includes: Severe sepsis present and persistent hypotension after 30 ml/kg completed.   Despite appropriate fluid resuscitation with crystalloid given per sepsis guidelines, the patient remained hypotensive and required IV levophed needed to maintain a SBP of 90 or MAP of 65  Shock resolved.  6/10: Awaiting on VERONICA results to decide on 4 versus 6 weeks of IV antibiotics with placing the suitable access.  Called echo reading room and they will finalize the VERONICA report.  6/11: VERONICA was negative for any endocarditis or vegetations.  Midline has been ordered.  6/12: End date for antibiotics is 7/18/2020 per ID.      Diabetic ketoacidosis without coma associated with type 2 diabetes mellitus (HCC)- (present on admission)  Assessment & Plan  With renal failure, elevated but hydroxybutyrate  She was admitted to the ICU with an insulin  drip and IV fluids  She had been transitioned to long-acting glargine and sliding scale subcutaneous insulin on 6/3--her glucose went down to 57 thus glargine 10 units stopped on 6/4  Hemoglobin A1c 11.2  Diabetes education ordered  She had been on metformin at home which was stopped due to renal failure  Started Januvia 25 mg daily on 6/7 and sliding scale and blood sugars have been in the 130's.      Acute renal failure with tubular necrosis (HCC)- (present on admission)  Assessment & Plan  Likely prerenal-combination of sepsis, dehydration from diarrhea, likely component of acute tubular necrosis  Stop IV fluids on 6/5  Cr 5.5 on admit and remains high at 2.8 though improving  Nephrology consulted   Follow urine output.  Check a BMP every other day for now.  Slowly improving.      Epigastric pain- (present on admission)  Assessment & Plan  Could be due to gastroparesis.  She is high risk for PUD being on steroids chronically.  GI consulted.  Planning for EGD tomorrow.  6/10: Patient had unremarkable EGD and colonoscopy done this morning.  If continues to have epigastric pain, will consider gastric emptying test to assess for gastroparesis.    Pulmonary hypertension (HCC)- (present on admission)  Assessment & Plan  Echocardiogram reveals a RVSP 53 mm Hg thus she is at risk of volume overload    Metabolic acidosis- (present on admission)  Assessment & Plan  Secondary to DKA and renal failure      Adrenal insufficiency (HCC)- (present on admission)  Assessment & Plan  Patient is on chronic prednisone therapy for rheumatoid arthritis.  She presented with shock thus was started on IV hydrocortisone  Now transitioned back to home dose of prednisone 10 mg daily.    Diarrhea- (present on admission)  Assessment & Plan  C diff negative    Hyponatremia- (present on admission)  Assessment & Plan  Resolved      Bronchiectasis (HCC)- (present on admission)  Assessment & Plan  Chest CT findings - with no complains of cough or  dyspnea   Patient had a previous pneumonia  Albuterol prn    Acute pyelonephritis- (present on admission)  Assessment & Plan  Presumably from Staph Aureus  Urine culture was not sent from [place] nor from here until after antibiotics were given        Plan of care discussed with multidisciplinary team during rounds.    VTE prophylaxis: heparin

## 2020-06-13 NOTE — CARE PLAN
Problem: Discharge Barriers/Planning  Goal: Patient's continuum of care needs will be met  Note: Awaiting for out patient infusion center set up for IV abx either 4 or 6 weeks duration.     Problem: Pain Management  Goal: Pain level will decrease to patient's comfort goal  Note: Requested to have her pain medication every 6 hours given at due time, better managed her pain per patient.

## 2020-06-13 NOTE — PROGRESS NOTES
Infectious Disease Progress Note    Author: Mayra Rodriguez M.D. Date & Time of service: 2020  1:52 PM    Chief Complaint:  MRSA sepsis     Interval History:  60 y.o. female with RA admitted 2020 with abd pain and diarrhea On chronic prednisone 10 mg daily, DMT2 , CKD stage III  5AF, vanco trough 22.3, transfer out of ICU, awaiting bed. Improvement in nausea and diarrhea but still with abdominal pain.  She has some lumbar back pain which she states is chronic and unchanged.  No new joint pain. Pt antibiotics transition from vancomycin to daptomycin 8 mg q48 hours due to elevated Vanco trough and ongoing renal dysfunction.  Continued on rifampin 300 mg twice daily.   AF, O2 RA, ongoing chronic abdominal pain, some loose stools. She has chronic low back pain that she states in uncharged from her baseline. No new neck, back or joint pain. She is continued on daptomycin q48 hours based on renal function and rifampin.   AF, VERONICA yesterday and awaiting report. Ongoing epigastric abdominal pain.  No new neck joint or back pain.    AF, O2 0.5 L NC, ongoing abdominal pain. Plan is for colonoscopy and EGD tomorrow.     AF continued abd pain-states improved with Mylanta. No new complaints   AF WBC 9.8 somnolent today; NAD   AF ambulating in room-states pain controlled. Denies SE abx no new complaints     Review of Systems:  Review of Systems   Constitutional: Negative for chills and fever.   Respiratory: Negative for shortness of breath.    Cardiovascular: Negative for chest pain.   Gastrointestinal: Negative for abdominal pain, constipation, diarrhea, nausea and vomiting.   Musculoskeletal: Positive for back pain. Negative for joint pain.        Decreased   All other systems reviewed and are negative.      Hemodynamics:  Temp (24hrs), Av.5 °C (97.7 °F), Min:36.2 °C (97.2 °F), Max:36.7 °C (98 °F)  Temperature: 36.2 °C (97.2 °F)  Pulse  Av.7  Min: 61  Max: 109   Blood Pressure: 143/79        Physical Exam:  Physical Exam  Vitals signs and nursing note reviewed.   Constitutional:       General: She is not in acute distress.     Appearance: She is ill-appearing. She is not toxic-appearing or diaphoretic.      Comments: Looks older than stated age   HENT:      Nose: No congestion or rhinorrhea.   Eyes:      General: No scleral icterus.     Extraocular Movements: Extraocular movements intact.      Pupils: Pupils are equal, round, and reactive to light.   Cardiovascular:      Rate and Rhythm: Normal rate and regular rhythm.      Heart sounds: Normal heart sounds.   Pulmonary:      Effort: Pulmonary effort is normal. No respiratory distress.      Breath sounds: Normal breath sounds. No stridor. No wheezing.   Abdominal:      General: Bowel sounds are normal. There is no distension.      Palpations: Abdomen is soft.      Tenderness: There is abdominal tenderness. There is no guarding.   Musculoskeletal:         General: Deformity present.      Right lower leg: Edema present.      Left lower leg: Edema present.   Skin:     General: Skin is warm.      Coloration: Skin is not jaundiced.      Findings: Bruising present.      Comments: RUE PICC   Neurological:      General: No focal deficit present.      Mental Status: She is alert and oriented to person, place, and time.   Psychiatric:         Mood and Affect: Mood normal.         Meds:    Current Facility-Administered Medications:   •  simethicone  •  DAPTOmycin  •  sucralfate  •  mag hydrox-al hydrox-simeth  •  SITagliptin  •  fluticasone  •  omeprazole  •  riFAMPin  •  insulin regular **AND** POC Blood Glucose **AND** NOTIFY MD and PharmD **AND** glucose **AND** dextrose 50%  •  predniSONE  •  heparin  •  oxyCODONE immediate-release  •  loperamide  •  [DISCONTINUED] magnesium sulfate **OR** magnesium sulfate  •  potassium phosphate ivpb **OR** sodium phosphate 30 mmol ivpb  •  acetaminophen  •  ondansetron  •  ondansetron  •  promethazine  •   promethazine  •  prochlorperazine  •  latanoprost  •  timolol  •  albuterol    Labs:  Recent Labs     06/12/20  0335   WBC 9.8   RBC 3.70*   HEMOGLOBIN 9.6*   HEMATOCRIT 31.6*   MCV 85.4   MCH 25.9*   RDW 49.6   PLATELETCT 301   MPV 10.2   NEUTSPOLYS 78.30*   LYMPHOCYTES 17.40*   MONOCYTES 1.70   EOSINOPHILS 0.90   BASOPHILS 0.90   RBCMORPHOLO Present     Recent Labs     06/11/20  0231 06/12/20  0335 06/13/20  0532   SODIUM 138 141 137   POTASSIUM 3.9 3.7 3.5*   CHLORIDE 103 103 100   CO2 22 26 27   GLUCOSE 158* 157* 139*   BUN 18 16 15     Recent Labs     06/11/20 0231 06/12/20  0335 06/13/20  0532   ALBUMIN 2.4* 2.4* 2.4*   TBILIRUBIN 0.2 0.3 0.3   ALKPHOSPHAT 116* 113* 99   TOTPROTEIN 6.1 6.5 6.3   ALTSGPT 32 26 21   ASTSGOT 21 18 21   CREATININE 1.73* 1.51* 1.46*       Imaging:  Ct-chest,abdomen,pelvis W/o    Result Date: 6/1/2020 6/1/2020 11:18 AM HISTORY/REASON FOR EXAM:  Sepsis Hypotension, renal injury, nausea vomiting diarrhea TECHNIQUE/EXAM DESCRIPTION: CT scan of the chest, abdomen and pelvis without contrast was performed. Noncontrast helical scanning of the chest, abdomen and pelvis was obtained from the lung apices through the pubic symphysis. Low dose optimization technique was utilized for this CT exam including automated exposure control and adjustment of the mA and/or kV according to patient size. COMPARISON:  None. FINDINGS: CHEST: Neck Base:  Normal. Lungs/Pleura: Right lower lobe bronchiectasis with peribronchial opacities which likely is atelectasis or scarring. Correlate clinically for infection. Mild dependent changes/atelectasis in the left lower lobe.  3 mm groundglass nodule in the right midlung on image 58 series 301. Calcified granuloma left upper lobe. No pneumothorax or pleural effusion. Cardiovascular Structures:  Heart size is normal. No pericardial effusion. Coronary artery stents and/or prominent calcification. Aorta is normal in caliber without aneurysm or dissection. Central  pulmonary arteries are normal in caliber. Mediastinum/ lymph nodes: No lymphadenopathy. Small hiatal hernia. ABDOMEN/PELVIS Liver:  Diffuse hypoattenuation of the liver suggesting fatty infiltration. Liver is enlarged. Gallbladder: Normal Biliary tract: Nondilated. Pancreas: Normal. Spleen: Normal. Adrenals: Normal. Kidneys and Collecting Systems:  Bilateral perinephric fat stranding, asymmetrically more prominent on the right. This is concerning for infection given the history. Correlate clinically and with urinalysis. No significant hydronephrosis. No obstructing stone is seen. Gastrointestinal tract:  Possible rectosigmoid wall thickening, suboptimally evaluated without contrast and due to decompression. The appendix is normal. Peritoneum: No free air or free fluid. Reproductive organs:  Normal. Bladder:  Normal. Vessels:  Moderately dense calcified plaque. Lymph  Nodes:  No lymphadenopathy. Soft tissues/wall: Within normal limits. Bones:  No acute or aggressive abnormality. Ossific density adjacent to the inferior right scapula probably related to old trauma.     1.  Asymmetric right perinephric fat stranding suspicious for infection/pyelonephritis. Correlate with urinalysis. No significant hydronephrosis. No obstructing stone is seen. 2.  Hepatomegaly and hepatic steatosis. 3.  Rectosigmoid colon is decompressed, limiting evaluation for wall thickening. Correlate for evidence of colitis. 4.  Right lower lobe bronchiectasis and peribronchial opacities likely postinflammatory with atelectasis/scarring. Correlate clinically for evidence of infection. 5.  Atherosclerosis.     Ec-echocardiogram Complete W/o Cont    Result Date: 6/4/2020  Transthoracic Echo Report Echocardiography Laboratory CONCLUSIONS Mildly reduced left ventricular systolic function. Left ventricular ejection fraction is visually estimated to be 45-50%. Grade II diastolic dysfunction. Dilated inferior vena cava without inspiratory collapse.  Estimated right ventricular systolic pressure  is 53 mmHg. Normal right ventricular size. Reduced right ventricular systolic function. Moderate to severe tricuspid regurgitation. CAROLYNE SELLERS Exam Date:         2020                    09:48 Exam Location:     Inpatient Priority:          Routine Ordering Physician:        ISIDRO DELATORRE Referring Physician:       [id number] Pope Sonographer:               Jeramy Paul RDCS,                            RVT Age:    60     Gender:    F MRN:    [id number] :    1960 BSA:    1.71   Ht (in):    64     Wt (lb):    146 Exam Type:     Complete Indications:     Cardiac Murmurs, Undiagnosed ICD Codes:       785.2 CPT Codes:       18826 BP:   132    /   77     HR:   75 Technical Quality:       Technically difficult study -                          adequate information is obtained MEASUREMENTS  (Male / Female) Normal Values 2D ECHO LV Diastolic Diameter PLAX        3.9 cm                4.2 - 5.9 / 3.9 - 5.3 cm LV Systolic Diameter PLAX         2.8 cm                2.1 - 4.0 cm IVS Diastolic Thickness           1.2 cm                LVPW Diastolic Thickness          1.2 cm                RV Diameter 4C                    3.1 cm                2.5 - 2.9 cm LVOT Diameter                     1.7 cm                RA Diameter                       4.5 cm                LV Ejection Fraction MOD BP       54.1 %                >= 55  % LV Ejection Fraction MOD 4C       53.9 %                LV Ejection Fraction MOD 2C       58.7 %                LA Volume Index                   38.5 cm3/m2           16 - 28 cm3/m2 DOPPLER AV Peak Velocity                  1.1 m/s               AV Peak Gradient                  4.8 mmHg              AV Mean Gradient                  2.5 mmHg              LVOT Peak Velocity                0.77 m/s              AV Area Cont Eq vti               1.5 cm2               Mitral E Point Velocity           0.73 m/s              Mitral E to A Ratio                1.3                   MV Pressure Half Time             50 ms                 MV Area PHT                       4.4 cm2               MV Deceleration Time              172 ms                MR Flow Convergence Radius        0.36 cm               MR ERO PISA                       0.042 cm2             MR Regurgitant Volume PISA        7.1 cm3               TR Peak Velocity                  257 cm/s              * Indicates values subject to auto-interpretation LV EF:        % FINDINGS Left Ventricle Normal left ventricular chamber size. Mild concentric left ventricular hypertrophy. Mildly reduced left ventricular systolic function. Left ventricular ejection fraction is visually estimated to be 45-50%. Abnormal septal motion consistent with right ventricular (RV) volume overload and/or elevated RV end-diastolic pressure. Grade II diastolic dysfunction. Right Ventricle Normal right ventricular size. Reduced right ventricular systolic function. Right Atrium Dilated right atrium. Dilated inferior vena cava without inspiratory collapse. Left Atrium Mildly dilated left atrium. Left atrial volume index is 39  mL/sq m. Mitral Valve Structurally normal mitral valve. No mitral stenosis. Mild mitral regurgitation. Aortic Valve Aortic sclerosis without stenosis. No aortic insufficiency. Tricuspid Valve Structurally normal tricuspid valve. No tricuspid stenosis. Moderate to severe tricuspid regurgitation. Right atrial pressure is estimated to be 15 mmHg. Estimated right ventricular systolic pressure  is 53 mmHg. Pulmonic Valve The pulmonic valve is not well visualized.  Mild pulmonic insufficiency. Pericardium Normal pericardium without effusion. Aorta Normal aortic root for body surface area. Ascending aorta diameter is 2.8 cm. Allyson Rivera MD (Electronically Signed) Final Date:     04 June 2020                 11:59    Ec-rayna W/o Cont    Result Date: 6/5/2020  Results Will be Available after Interpretation by  Cardiologist.      Micro:  Results     Procedure Component Value Units Date/Time    Blood Culture [[id number]] Collected:  06/06/20 1325    Order Status:  Completed Specimen:  Blood Updated:  06/11/20 1500     Significant Indicator NEG     Source BLD     Site Peripheral     Culture Result No growth after 5 days of incubation.    Narrative:       Left Hand    Blood Culture [[id number]] Collected:  06/06/20 1325    Order Status:  Completed Specimen:  Blood Updated:  06/11/20 1500     Significant Indicator NEG     Source BLD     Site Peripheral     Culture Result No growth after 5 days of incubation.    Narrative:       Right Forearm/Arm    SARS-CoV-2, PCR (In-House) [[id number]] Collected:  06/09/20 1230    Order Status:  Completed Updated:  06/09/20 1350     SARS-CoV-2 Source NP Swab     SARS-CoV-2 by PCR NotDetected     Comment: [facility] providers: PLEASE REFER TO DE-ESCALATION AND RETESTING PROTOCOL  on [email or web address]  **The [employer] GeneXpert Xpress SARS-CoV-2 Test has been made available for  use under the Emergency Use Authorization (EUA) only.         Narrative:       [id number] ANKUR CAMP  Is this test for diagnosis or screening?->Screen    URINALYSIS [[id number]]  (Abnormal) Collected:  06/08/20 1739    Order Status:  Completed Specimen:  Urine, Clean Catch Updated:  06/09/20 1343     Color Yellow     Character Clear     Specific Gravity 1.008     Ph 6.0     Glucose Negative mg/dL      Ketones Trace mg/dL      Protein Negative mg/dL      Bilirubin Negative     Urobilinogen, Urine 0.2     Nitrite Negative     Leukocyte Esterase Small     Occult Blood Small     Micro Urine Req Microscopic    Narrative:       [id number] ANKUR CAMP    COVID/SARS CoV-2 [[id number]] Collected:  06/09/20 1230    Order Status:  Completed Specimen:  Respirate from Nasopharyngeal Updated:  06/09/20 1241     COVID Order Status Received    Narrative:       [id number] ANKUR CAMP  Is this test for diagnosis or  screening?->Screen          Assessment:  Active Hospital Problems    Diagnosis   • *Staphylococcus aureus bacteremia [R78.81, B95.61]   • Diabetic ketoacidosis without coma associated with type 2 diabetes mellitus (HCC) [E11.10]   • Septic shock (HCC) [A41.9, R65.21]   • Acute renal failure with tubular necrosis (HCC) [N17.0]   • Epigastric pain [R10.13]   • Pulmonary hypertension (HCC) [I27.20]   • Acute pyelonephritis [N10]   • Bronchiectasis (HCC) [J47.9]   • Hyponatremia [E87.1]   • Diarrhea [R19.7]   • Adrenal insufficiency (HCC) [E27.40]   • Metabolic acidosis [E87.2]       ASSESSMENT/PLAN:      60 y.o.  admitted 6/1/2020. Pt has a past medical history of RA on chronic prednisone 10 mg daily, DMT2 , CKD stage III.  She reports abdominal pain on and off again since December and also history of frequent UTIs.  She is most recently treated with amoxicillin but been off antibiotics for approximately 1 week prior to her admission. Presented to ER at [facility] c/o epigastric abdominal pain described as burning as well as nausea vomiting and diarrhea x4 days.  She was hypotensive, requiring oxygen support with lab irregularities including leukocytosis to 14.5, hyponatremia, elevated creatinine, elevated glucose and elevated lactic acid.  She was started on Unasyn and Levophed and transferred to [facility] for higher level of care.  The patient has hardware - pins in her right foot, a plate in her left ankle and replaced knuckle in her right hand     Hospital Course:   She has been afebrile, initially hypotensive and now improved.  Leukocytosis to 19.7 on admit.  Acute kidney injury on arrival.  Stool cultures were obtained which are pending in part but negative for Shiga toxin 1 and 2.  C. difficile was tested on 6/1 and 6/2 and negative on both.  Blood cultures were obtained which are positive for MRSA.  She was started on vancomycin Flagyl and ceftriaxone.  Transitioned to daptomycin and rifampin.         MRSA  sepsis   Afebrile  Leukocytosis resolved  BCxs + MRSA 6/1; 6/2  Repeat Bcxs neg 6/6  TTE neg for vegetation but moderate to severe tricuspid regurgitation, diastolic dysfunction: VERONICA  Neg 6/5  Vanco stopped due to RAF  Continue dapto/rif  Anticipate 6 weeks IV abx from date of negative blood cxs  Stop date 7/18/2020-states going to [facility]. Orders done      UTI/Pyelonephritis  Dysuria with pain, burning and hematuria prior to admission  UA suggestive of infection, no urine culture  CT on 6/1 with right-sided fat stranding suspicious for infection/Pyelo     Abdominal pain, epigastric- ongoing - chronic   GI on board  -EGD and colonoscopy on 6/10-EGD with no significant findings.  Colonoscopy with sessile polyp removed, rectosigmoid ulcer which was biopsied and diverticulosis of the sigmoid    Diarrhea - improved   C. difficile tested negative x2  Stool cultures pending, Shiga toxin 1 and 2  EGD no ulcer  Rectal ulcer biopsied    Acute on chronic kidney disease, improved  Adjust dosing abx    Bronchiectasis,   Noted on CT in right lower lobe as well as atelectasis  Calcified granuloma in left upper lobe groundglass nodule on right  No PNA    Diabetes, uncontrolled   DKA  Keep BS under 150 to help control current infection    Rheumatoid arthritis  Immunosuppressed

## 2020-06-13 NOTE — PROGRESS NOTES
"Received alert and oriented x 4. Check vitals sign and recorded accordingly and due med given per MAR. Monitor sign and symptoms of respiratory distress and treatment given accordingly per MAR.Medicated per MAR and reassessed every 2 hours per protocol. Call light within reach. Call appropriately.  Needs attended. Will continue to monitor./88   Pulse 87   Temp 36.3 °C (97.4 °F) (Temporal)   Resp 19   Ht 1.626 m (5' 4\") Comment: obtained from previous chart  Wt 66.4 kg (146 lb 6.2 oz)   SpO2 96%   BMI 25.13 kg/m² . Still c/o bloated and burning on her stomach.  "

## 2020-06-14 ENCOUNTER — APPOINTMENT (OUTPATIENT)
Dept: RADIOLOGY | Facility: MEDICAL CENTER | Age: 60
DRG: 871 | End: 2020-06-14
Attending: INTERNAL MEDICINE
Payer: MEDICARE

## 2020-06-14 PROBLEM — E87.6 HYPOKALEMIA: Status: ACTIVE | Noted: 2020-06-14

## 2020-06-14 PROBLEM — I50.42 CHRONIC COMBINED SYSTOLIC AND DIASTOLIC CONGESTIVE HEART FAILURE (HCC): Status: ACTIVE | Noted: 2020-06-14

## 2020-06-14 LAB
ALBUMIN SERPL BCP-MCNC: 2.5 G/DL (ref 3.2–4.9)
ALBUMIN/GLOB SERPL: 0.7 G/DL
ALP SERPL-CCNC: 91 U/L (ref 30–99)
ALT SERPL-CCNC: 21 U/L (ref 2–50)
ANION GAP SERPL CALC-SCNC: 13 MMOL/L (ref 7–16)
AST SERPL-CCNC: 18 U/L (ref 12–45)
BILIRUB SERPL-MCNC: 0.2 MG/DL (ref 0.1–1.5)
BUN SERPL-MCNC: 14 MG/DL (ref 8–22)
CALCIUM SERPL-MCNC: 8.4 MG/DL (ref 8.5–10.5)
CHLORIDE SERPL-SCNC: 99 MMOL/L (ref 96–112)
CO2 SERPL-SCNC: 24 MMOL/L (ref 20–33)
CREAT SERPL-MCNC: 1.63 MG/DL (ref 0.5–1.4)
GLOBULIN SER CALC-MCNC: 3.8 G/DL (ref 1.9–3.5)
GLUCOSE BLD-MCNC: 140 MG/DL (ref 65–99)
GLUCOSE BLD-MCNC: 143 MG/DL (ref 65–99)
GLUCOSE BLD-MCNC: 179 MG/DL (ref 65–99)
GLUCOSE BLD-MCNC: 221 MG/DL (ref 65–99)
GLUCOSE SERPL-MCNC: 140 MG/DL (ref 65–99)
POTASSIUM SERPL-SCNC: 3.2 MMOL/L (ref 3.6–5.5)
PROT SERPL-MCNC: 6.3 G/DL (ref 6–8.2)
SODIUM SERPL-SCNC: 136 MMOL/L (ref 135–145)

## 2020-06-14 PROCEDURE — 700102 HCHG RX REV CODE 250 W/ 637 OVERRIDE(OP): Performed by: INTERNAL MEDICINE

## 2020-06-14 PROCEDURE — 700102 HCHG RX REV CODE 250 W/ 637 OVERRIDE(OP): Performed by: HOSPITALIST

## 2020-06-14 PROCEDURE — 700111 HCHG RX REV CODE 636 W/ 250 OVERRIDE (IP): Performed by: PSYCHIATRY & NEUROLOGY

## 2020-06-14 PROCEDURE — A9270 NON-COVERED ITEM OR SERVICE: HCPCS | Performed by: FAMILY MEDICINE

## 2020-06-14 PROCEDURE — 700111 HCHG RX REV CODE 636 W/ 250 OVERRIDE (IP): Performed by: HOSPITALIST

## 2020-06-14 PROCEDURE — 770021 HCHG ROOM/CARE - ISO PRIVATE

## 2020-06-14 PROCEDURE — A9270 NON-COVERED ITEM OR SERVICE: HCPCS | Performed by: PSYCHIATRY & NEUROLOGY

## 2020-06-14 PROCEDURE — 700105 HCHG RX REV CODE 258: Performed by: FAMILY MEDICINE

## 2020-06-14 PROCEDURE — 80053 COMPREHEN METABOLIC PANEL: CPT

## 2020-06-14 PROCEDURE — 700102 HCHG RX REV CODE 250 W/ 637 OVERRIDE(OP): Performed by: FAMILY MEDICINE

## 2020-06-14 PROCEDURE — 99233 SBSQ HOSP IP/OBS HIGH 50: CPT | Performed by: FAMILY MEDICINE

## 2020-06-14 PROCEDURE — 700102 HCHG RX REV CODE 250 W/ 637 OVERRIDE(OP): Performed by: PSYCHIATRY & NEUROLOGY

## 2020-06-14 PROCEDURE — A9270 NON-COVERED ITEM OR SERVICE: HCPCS | Performed by: HOSPITALIST

## 2020-06-14 PROCEDURE — 700111 HCHG RX REV CODE 636 W/ 250 OVERRIDE (IP): Performed by: FAMILY MEDICINE

## 2020-06-14 PROCEDURE — 82962 GLUCOSE BLOOD TEST: CPT | Mod: 91

## 2020-06-14 PROCEDURE — A9270 NON-COVERED ITEM OR SERVICE: HCPCS | Performed by: INTERNAL MEDICINE

## 2020-06-14 PROCEDURE — 99233 SBSQ HOSP IP/OBS HIGH 50: CPT | Performed by: INTERNAL MEDICINE

## 2020-06-14 RX ORDER — FUROSEMIDE 10 MG/ML
40 INJECTION INTRAMUSCULAR; INTRAVENOUS ONCE
Status: DISCONTINUED | OUTPATIENT
Start: 2020-06-14 | End: 2020-06-15 | Stop reason: HOSPADM

## 2020-06-14 RX ORDER — METOPROLOL SUCCINATE 25 MG/1
25 TABLET, EXTENDED RELEASE ORAL
Status: DISCONTINUED | OUTPATIENT
Start: 2020-06-14 | End: 2020-06-15 | Stop reason: HOSPADM

## 2020-06-14 RX ORDER — POTASSIUM CHLORIDE 20 MEQ/1
40 TABLET, EXTENDED RELEASE ORAL 3 TIMES DAILY
Status: DISCONTINUED | OUTPATIENT
Start: 2020-06-14 | End: 2020-06-14

## 2020-06-14 RX ORDER — FUROSEMIDE 10 MG/ML
40 INJECTION INTRAMUSCULAR; INTRAVENOUS
Status: DISCONTINUED | OUTPATIENT
Start: 2020-06-14 | End: 2020-06-14

## 2020-06-14 RX ORDER — POTASSIUM CHLORIDE 20 MEQ/1
40 TABLET, EXTENDED RELEASE ORAL 2 TIMES DAILY
Status: DISCONTINUED | OUTPATIENT
Start: 2020-06-14 | End: 2020-06-14

## 2020-06-14 RX ORDER — POTASSIUM CHLORIDE 20 MEQ/1
40 TABLET, EXTENDED RELEASE ORAL 2 TIMES DAILY
Status: DISCONTINUED | OUTPATIENT
Start: 2020-06-14 | End: 2020-06-15 | Stop reason: HOSPADM

## 2020-06-14 RX ADMIN — OXYCODONE 5 MG: 5 TABLET ORAL at 18:34

## 2020-06-14 RX ADMIN — POTASSIUM CHLORIDE 40 MEQ: 1500 TABLET, EXTENDED RELEASE ORAL at 09:32

## 2020-06-14 RX ADMIN — FLUTICASONE PROPIONATE 50 MCG: 50 SPRAY, METERED NASAL at 05:41

## 2020-06-14 RX ADMIN — SITAGLIPTIN 25 MG: 25 TABLET, FILM COATED ORAL at 05:40

## 2020-06-14 RX ADMIN — INSULIN HUMAN 3 UNITS: 100 INJECTION, SOLUTION PARENTERAL at 21:32

## 2020-06-14 RX ADMIN — RIFAMPIN 300 MG: 300 CAPSULE ORAL at 05:40

## 2020-06-14 RX ADMIN — OMEPRAZOLE 20 MG: 20 CAPSULE, DELAYED RELEASE ORAL at 05:41

## 2020-06-14 RX ADMIN — OXYCODONE 5 MG: 5 TABLET ORAL at 06:26

## 2020-06-14 RX ADMIN — PREDNISONE 10 MG: 10 TABLET ORAL at 05:40

## 2020-06-14 RX ADMIN — HEPARIN SODIUM 5000 UNITS: 5000 INJECTION, SOLUTION INTRAVENOUS; SUBCUTANEOUS at 05:40

## 2020-06-14 RX ADMIN — DAPTOMYCIN 530 MG: 350 INJECTION, POWDER, LYOPHILIZED, FOR SOLUTION INTRAVENOUS at 08:01

## 2020-06-14 RX ADMIN — ACETAMINOPHEN 650 MG: 325 TABLET, FILM COATED ORAL at 12:31

## 2020-06-14 RX ADMIN — OXYCODONE 5 MG: 5 TABLET ORAL at 12:31

## 2020-06-14 RX ADMIN — INSULIN HUMAN 2 UNITS: 100 INJECTION, SOLUTION PARENTERAL at 12:33

## 2020-06-14 RX ADMIN — ACETAMINOPHEN 650 MG: 325 TABLET, FILM COATED ORAL at 18:34

## 2020-06-14 RX ADMIN — ACETAMINOPHEN 650 MG: 325 TABLET, FILM COATED ORAL at 06:26

## 2020-06-14 RX ADMIN — FUROSEMIDE 40 MG: 10 INJECTION, SOLUTION INTRAMUSCULAR; INTRAVENOUS at 11:01

## 2020-06-14 RX ADMIN — POTASSIUM CHLORIDE 40 MEQ: 1500 TABLET, EXTENDED RELEASE ORAL at 17:32

## 2020-06-14 RX ADMIN — METOPROLOL SUCCINATE 25 MG: 25 TABLET, EXTENDED RELEASE ORAL at 14:38

## 2020-06-14 RX ADMIN — OMEPRAZOLE 20 MG: 20 CAPSULE, DELAYED RELEASE ORAL at 17:33

## 2020-06-14 RX ADMIN — RIFAMPIN 300 MG: 300 CAPSULE ORAL at 17:33

## 2020-06-14 RX ADMIN — HEPARIN SODIUM 5000 UNITS: 5000 INJECTION, SOLUTION INTRAVENOUS; SUBCUTANEOUS at 21:26

## 2020-06-14 RX ADMIN — SUCRALFATE 1 G: 1 SUSPENSION ORAL at 11:01

## 2020-06-14 RX ADMIN — SUCRALFATE 1 G: 1 SUSPENSION ORAL at 17:33

## 2020-06-14 RX ADMIN — SUCRALFATE 1 G: 1 SUSPENSION ORAL at 05:40

## 2020-06-14 ASSESSMENT — ENCOUNTER SYMPTOMS
CONSTIPATION: 0
TREMORS: 0
FEVER: 0
COUGH: 0
DIZZINESS: 0
DOUBLE VISION: 0
ABDOMINAL PAIN: 0
NAUSEA: 0
HEARTBURN: 0
PHOTOPHOBIA: 0
SHORTNESS OF BREATH: 0
TINGLING: 0
CHILLS: 0
BLURRED VISION: 0
HEADACHES: 0
DIARRHEA: 0
PALPITATIONS: 0
VOMITING: 0
BACK PAIN: 1
MYALGIAS: 0
DEPRESSION: 0

## 2020-06-14 NOTE — PROGRESS NOTES
[facility] Medicine Daily Progress Note    Date of Service  6/14/2020    Chief Complaint  60 y.o. female admitted 6/1/2020 with diarrhea.    Hospital Course   Ms. Mcclain is a 60 y.o. female who presented 6/1/2020 with past medical history of rheumatoid arthritis on chronic prednisone therapy, type 2 diabetes on metformin, CKD stage III who comes into the emergency room with complaints of nausea, vomiting and diarrhea for the past 4 days.  Associated with an epigastric abdominal pain.  The pain radiates to the lower abdomen and feels like a burning sensation.  The pain is nonexertional, non-positional.  Patient states that she has not gone to the bathroom to urinate in 4 days.  She would have a bowel movement every 30 minutes.  She denies any blood in her stools.  Patient denies any fever, cough, shortness of breath, chest pain.   Patient came from outlying facility from [facility] includes  Blood pressure 86/50, respiratory rate 20, saturating 90% on 2 L oxygen, temperature 98.4  Sodium 129, potassium 3.6, chloride 89, CO2 21, AST 46, ALT 30, BUN 67, glucose 399, creatinine 6.4, calcium 8, bili 1.5, anion gap 22, lactic acid 2.5, lipase 163, , CK-MB 1.1, troponin 0.062, WBC 14.5, hemoglobin 10.9, platelets 175, d-dimer 3 8378, TSH 8.67  Patient was started on Unasyn and Levophed with ICU admission        Interval Problem Update  6/3: patient seen and evaluated in the ICU. The lab from [place] faxed over a preliminary + blood culture revealing gram + cocci in clusters. She has been started on broad spectrum antibiotics. She transitioned off the insulin drip to sliding scale.   6/4: Ms. Mcclain was evaluated and examined in the ICU. Her blood cultures have come back + for MRSA thus she has been placed under precautions. I have consulted Dr. Xavier, ID. She remains on IV fluids and her Cr is down to 3.7. glucose went down to 57 this morning.   6/5: patient seen and evaluated in the ICU. Reportedly there was  not a urine culture obtained in [place] and it was not done here either. An echocardiogram was done yesterday and did not reveal vegetations. I discussed with Dr. Navarro about a VERONICA and Dr. Ba about performing conscious sedation to eval for endocarditis.   6/6: patient seen and evaluated in the ICU. She had a VERONICA yesterday. Cr is 3.22 today.  6/7: Ms. Mcclain was evaluated and examined on the medical floor. Her glucose remains a bit high today thus Januvia will be initiated. Her Cr is 2.8 today. We discussed a PICC and 4-6 weeks of IV antibiotics as an outpatient or in a rehab facility.   6/8: Patient seen and evaluated on the medical floor.  She is feeling much better today.  She has been up walking around.  She is tolerating regular diet.  She had some diarrhea yesterday none today.  Discussed that if her blood cultures remain negative, she will receive a PICC line and then discharge planning will evaluate her for inpatient versus outpatient with infusion center options.  She hopes to live with her aunt here in [place] for about a month and go to the infusion center on a daily basis for daptomycin.  6/9: Sitting up in bed comfortable.  Complaining of epigastric pain.  Refusing to eat per staff.  I consulted GI.  Planning for endoscopy tomorrow.  Carafate was added.  She however did not look in acute distress.  No other issues to report.  6/10: Resting in bed comfortably.  Was going for EGD and colonoscopy in the morning which were both unremarkable.  Abdominal pain fairly controlled.  Denies any nausea or vomiting.  VERONICA results still pending.  Spoke with echo reading room to finalize it.  Hemodynamically stable.  No issues overnight per staff.  6/11: Feels better today.  No abdominal pain.  Tolerating p.o. well.  No nausea or vomiting.  VERONICA was negative for any endocarditis or vegetations.  Midline ordered.  Patient has no new complaints this morning.  No issues overnight per staff.  6/12: Resting in bed comfortably.   Respiratory status stable.  Denies any abdominal pain.  Denies any nausea or vomiting.  Has been afebrile for the last 24 hours.  Hemodynamically stable.  No acute distress noted.  No issues overnight per staff.  6/13: Resting in bed comfortably.  Has been afebrile over the last 24 hours.  Hemodynamically stable.  Denies any abdominal pain.  Tolerating p.o. fairly.  Arranging for outpatient antibiotic infusion.  No issues overnight per staff.  6/14: Complaining that her legs and feet are getting more swelling.  Also has been nocturnal dyspnea when laying flat.  Saturating well on room air however.  1 dose of Lasix was given.  Has been afebrile over the last 24 hours.  Hemodynamically stable.  Pain is fairly controlled.  Issues overnight per staff.    Consultants/Specialty  Critical care.   Nephrology   Infectious disease.  Code Status  Full     Disposition  Home with home health.  Arranging for outpatient antibiotic infusion per ID.    Review of Systems  Review of Systems   Constitutional: Negative for chills and fever.        She is feeling overall much better   HENT: Negative for ear pain, hearing loss and tinnitus.    Eyes: Negative for blurred vision, double vision and photophobia.   Respiratory: Negative for cough.    Cardiovascular: Negative for chest pain and palpitations.   Gastrointestinal: Negative for heartburn, nausea and vomiting.   Genitourinary: Negative for dysuria, frequency and urgency.   Musculoskeletal: Negative for myalgias.   Neurological: Negative for dizziness, tingling, tremors and headaches.   Psychiatric/Behavioral: Negative for depression.        Physical Exam  Temp:  [36.5 °C (97.7 °F)-36.9 °C (98.4 °F)] 36.8 °C (98.2 °F)  Pulse:  [83-92] 92  Resp:  [16-18] 17  BP: (124-140)/(58-79) 140/79  SpO2:  [92 %-94 %] 94 %    Physical Exam  Vitals signs and nursing note reviewed.   Constitutional:       General: She is not in acute distress.     Appearance: Normal appearance.   HENT:      Head:  Normocephalic and atraumatic.      Mouth/Throat:      Mouth: Mucous membranes are dry.   Eyes:      Extraocular Movements: Extraocular movements intact.      Pupils: Pupils are equal, round, and reactive to light.   Cardiovascular:      Rate and Rhythm: Normal rate and regular rhythm.      Heart sounds: No friction rub. No gallop.    Pulmonary:      Effort: Pulmonary effort is normal. No respiratory distress.      Breath sounds: Normal breath sounds. No stridor.   Abdominal:      General: There is no distension.      Palpations: There is no mass.   Musculoskeletal:         General: No swelling or tenderness.      Right lower leg: Edema present.      Left lower leg: Edema present.      Comments: Right toes amputated   Skin:     General: Skin is warm and dry.      Findings: No rash.   Neurological:      Mental Status: She is alert and oriented to person, place, and time.      Cranial Nerves: No cranial nerve deficit.      Sensory: No sensory deficit.      Comments: Awake, A&Ox4   Psychiatric:         Mood and Affect: Mood normal.         Behavior: Behavior normal.         Fluids    Intake/Output Summary (Last 24 hours) at 6/14/2020 1323  Last data filed at 6/14/2020 1000  Gross per 24 hour   Intake 480 ml   Output --   Net 480 ml       Laboratory  Recent Labs     06/12/20  0335   WBC 9.8   RBC 3.70*   HEMOGLOBIN 9.6*   HEMATOCRIT 31.6*   MCV 85.4   MCH 25.9*   MCHC 30.4*   RDW 49.6   PLATELETCT 301   MPV 10.2     Recent Labs     06/12/20  0335 06/13/20  0532 06/14/20  0328   SODIUM 141 137 136   POTASSIUM 3.7 3.5* 3.2*   CHLORIDE 103 100 99   CO2 26 27 24   GLUCOSE 157* 139* 140*   BUN 16 15 14   CREATININE 1.51* 1.46* 1.63*   CALCIUM 8.6 8.9 8.4*                   Imaging  IR-MIDLINE CATHETER INSERTION WO GUIDANCE > AGE 3   Final Result                  Ultrasound-guided midline placement performed by qualified nursing staff    as above.          EC-VERONICA W/O CONT   Final Result      EC-ECHOCARDIOGRAM COMPLETE W/O CONT    Final Result      CT-CHEST,ABDOMEN,PELVIS W/O   Final Result         1.  Asymmetric right perinephric fat stranding suspicious for infection/pyelonephritis. Correlate with urinalysis. No significant hydronephrosis. No obstructing stone is seen.   2.  Hepatomegaly and hepatic steatosis.   3.  Rectosigmoid colon is decompressed, limiting evaluation for wall thickening. Correlate for evidence of colitis.   4.  Right lower lobe bronchiectasis and peribronchial opacities likely postinflammatory with atelectasis/scarring. Correlate clinically for evidence of infection.   5.  Atherosclerosis.               IR-PICC LINE PLACEMENT W/ GUIDANCE > AGE 5    (Results Pending)        Assessment/Plan  * Staphylococcus aureus bacteremia- (present on admission)  Assessment & Plan  Blood cultures + for Staph Aureus bacteremia prior to transfer and again on 6/2  Repeat blood cultures on 6/6  Source is presumably pyelonephritis  IV Daptomycin and rifampin  VERONICA on 6/5 which was negative for vegetations  Infectious disease consulted  Management as above.    Septic shock (HCC)- (present on admission)  Assessment & Plan  This is Septic shock Present on admission  SIRS criteria identified on my evaluation include: Tachycardia, with heart rate greater than 90 BPM and Leukocytosis, with WBC greater than 12,000  Source is pyelonephritis with MRSA bacteremia  Presentation includes: Severe sepsis present and persistent hypotension after 30 ml/kg completed.   Despite appropriate fluid resuscitation with crystalloid given per sepsis guidelines, the patient remained hypotensive and required IV levophed needed to maintain a SBP of 90 or MAP of 65  Shock resolved.  6/10: Awaiting on VERONICA results to decide on 4 versus 6 weeks of IV antibiotics with placing the suitable access.  Called echo reading room and they will finalize the VERONICA report.  6/11: VERONICA was negative for any endocarditis or vegetations.  Midline has been ordered.  6/12: End date for  antibiotics is 7/18/2020 per ID.      Diabetic ketoacidosis without coma associated with type 2 diabetes mellitus (HCC)- (present on admission)  Assessment & Plan  With renal failure, elevated but hydroxybutyrate  She was admitted to the ICU with an insulin drip and IV fluids  She had been transitioned to long-acting glargine and sliding scale subcutaneous insulin on 6/3--her glucose went down to 57 thus glargine 10 units stopped on 6/4  Hemoglobin A1c 11.2  Diabetes education ordered  She had been on metformin at home which was stopped due to renal failure  Started Januvia 25 mg daily on 6/7 and sliding scale and blood sugars have been in the 130's.      Acute renal failure with tubular necrosis (HCC)- (present on admission)  Assessment & Plan  Likely prerenal-combination of sepsis, dehydration from diarrhea, likely component of acute tubular necrosis  Patient likely have underlying baseline chronic kidney disease considering her longstanding poorly controlled diabetes.  Stop IV fluids on 6/5  Cr 5.5 on admit and remains high at 2.8 though improving  Nephrology consulted   Follow urine output.  Check a BMP every other day for now.  Slowly improving.      Hypokalemia- (present on admission)  Assessment & Plan  On replacement.  Continue to monitor.    Chronic combined systolic and diastolic congestive heart failure (HCC)- (present on admission)  Assessment & Plan  Mildly reduced ejection fraction around 45% and grade 2 diastolic dysfunction.  6/14: We will give 1 dose of Lasix considering RAF.  Will hold ACE/ARB for now due to RAF. Continue Metoprolol.     Epigastric pain- (present on admission)  Assessment & Plan  Could be due to gastroparesis.  She is high risk for PUD being on steroids chronically.  GI consulted.  Planning for EGD tomorrow.  6/10: Patient had unremarkable EGD and colonoscopy done this morning.  If continues to have epigastric pain, will consider gastric emptying test to assess for  gastroparesis.    Pulmonary hypertension (HCC)- (present on admission)  Assessment & Plan  Echocardiogram reveals a RVSP 53 mm Hg thus she is at risk of volume overload  Judicious diuresis considering acute kidney injury.    Metabolic acidosis- (present on admission)  Assessment & Plan  Secondary to DKA and renal failure      Adrenal insufficiency (HCC)- (present on admission)  Assessment & Plan  Patient is on chronic prednisone therapy for rheumatoid arthritis.  She presented with shock thus was started on IV hydrocortisone  Now transitioned back to home dose of prednisone 10 mg daily.    Diarrhea- (present on admission)  Assessment & Plan  C diff negative  Resolved.    Hyponatremia- (present on admission)  Assessment & Plan  Resolved      Bronchiectasis (HCC)- (present on admission)  Assessment & Plan  Chest CT findings - with no complains of cough or dyspnea   Patient had a previous pneumonia  Albuterol prn    Acute pyelonephritis- (present on admission)  Assessment & Plan  Presumably from Staph Aureus  Urine culture was not sent from [place] nor from here until after antibiotics were given        Plan of care discussed with multidisciplinary team during rounds.    VTE prophylaxis: heparin

## 2020-06-14 NOTE — ASSESSMENT & PLAN NOTE
Mildly reduced ejection fraction around 45% and grade 2 diastolic dysfunction.  6/14: We will give 1 dose of Lasix considering RAF.  Will hold ACE/ARB for now due to RAF. Continue Metoprolol.

## 2020-06-14 NOTE — PROGRESS NOTES
Bedside report received from Miriam MOELLER . Assumed care of pt at 0645. Pt is awake at this time with no signs of distress. Plan of care discussed with pt. Pt is A&O x 4. Pt on RA. Bed alarm is not needed, bed in lowest position, bed rails up x 2, belongings and call light within reach. Hourly rounding in place.

## 2020-06-14 NOTE — CARE PLAN
Problem: Bowel/Gastric:  Goal: Will not experience complications related to bowel motility  Outcome: PROGRESSING AS EXPECTED     Problem: Discharge Barriers/Planning  Goal: Patient's continuum of care needs will be met  Outcome: PROGRESSING AS EXPECTED

## 2020-06-14 NOTE — CARE PLAN
Problem: Safety  Goal: Will remain free from falls  Outcome: PROGRESSING AS EXPECTED  Bed alarm in use. Pt educated to use call light for assistance out of bed. Bed locked in the lowest position. Although Pt states that she can get up by herself, education provided to alert RN for assistance any time up out of bed.      Problem: Knowledge Deficit  Goal: Knowledge of the prescribed therapeutic regimen will improve  Outcome: PROGRESSING AS EXPECTED   Pt updated on POC. Answered any questions from Pt regarding POC.

## 2020-06-14 NOTE — PROGRESS NOTES
Assumed care at 1900, report received from Day RN.  Pt A&O x 4, states pain is 6/10 in abdomen. Pain medications not due at this time. Bed alarm in use. Bed locked, 2 rails up, bed in lowest position. Call light in place, belongings at bedside, no needs at this time and hourly rounding in place.

## 2020-06-14 NOTE — PROGRESS NOTES
Infectious Disease Progress Note    Author: Mayra Rodriguez M.D. Date & Time of service: 2020  2:21 PM    Chief Complaint:  MRSA sepsis     Interval History:  60 y.o. female with RA admitted 2020 with abd pain and diarrhea On chronic prednisone 10 mg daily, DMT2 , CKD stage III  F, vanco trough 22.3, transfer out of ICU, awaiting bed. Improvement in nausea and diarrhea but still with abdominal pain.  She has some lumbar back pain which she states is chronic and unchanged.  No new joint pain. Pt antibiotics transition from vancomycin to daptomycin 8 mg q48 hours due to elevated Vanco trough and ongoing renal dysfunction.  Continued on rifampin 300 mg twice daily.   AF, O2 RA, ongoing chronic abdominal pain, some loose stools. She has chronic low back pain that she states in uncharged from her baseline. No new neck, back or joint pain. She is continued on daptomycin q48 hours based on renal function and rifampin.   AF, VERONICA yesterday and awaiting report. Ongoing epigastric abdominal pain.  No new neck joint or back pain.    AF, O2 0.5 L NC, ongoing abdominal pain. Plan is for colonoscopy and EGD tomorrow.     AF continued abd pain-states improved with Mylanta. No new complaints   AF WBC 9.8 somnolent today; NAD   AF ambulating in room-states pain controlled. Denies SE abx no new complaints   AF aware of need to change to PICC-not placed yet. Tolerating abx well- wants to talk to nutritionist about diet to help her kidneys     Review of Systems:  Review of Systems   Constitutional: Negative for chills and fever.   Respiratory: Negative for cough and shortness of breath.    Cardiovascular: Negative for chest pain.   Gastrointestinal: Negative for abdominal pain, constipation, diarrhea, nausea and vomiting.   Musculoskeletal: Positive for back pain. Negative for joint pain.        Decreased   All other systems reviewed and are negative.      Hemodynamics:  Temp (24hrs), Av.7  °C (98.1 °F), Min:36.5 °C (97.7 °F), Max:36.9 °C (98.4 °F)  Temperature: 36.8 °C (98.2 °F)  Pulse  Av  Min: 61  Max: 109   Blood Pressure: 140/79       Physical Exam:  Physical Exam  Vitals signs and nursing note reviewed.   Constitutional:       General: She is not in acute distress.     Appearance: She is ill-appearing. She is not toxic-appearing or diaphoretic.      Comments: Looks older than stated age   HENT:      Nose: No congestion or rhinorrhea.      Mouth/Throat:      Pharynx: No oropharyngeal exudate.   Eyes:      General: No scleral icterus.     Extraocular Movements: Extraocular movements intact.      Pupils: Pupils are equal, round, and reactive to light.   Cardiovascular:      Rate and Rhythm: Normal rate and regular rhythm.      Heart sounds: Normal heart sounds.   Pulmonary:      Effort: Pulmonary effort is normal. No respiratory distress.      Breath sounds: Normal breath sounds. No stridor. No wheezing.   Abdominal:      General: Bowel sounds are normal. There is no distension.      Palpations: Abdomen is soft.      Tenderness: There is no abdominal tenderness. There is no guarding.   Musculoskeletal:         General: Deformity present.      Right lower leg: Edema present.      Left lower leg: Edema present.   Skin:     General: Skin is warm.      Coloration: Skin is not jaundiced.      Findings: Bruising present.      Comments: RUE [facility]   Neurological:      General: No focal deficit present.      Mental Status: She is alert and oriented to person, place, and time.   Psychiatric:         Mood and Affect: Mood normal.         Behavior: Behavior normal.         Meds:    Current Facility-Administered Medications:   •  lidocaine  •  furosemide  •  potassium chloride SA  •  metoprolol SR  •  simethicone  •  DAPTOmycin  •  sucralfate  •  mag hydrox-al hydrox-simeth  •  SITagliptin  •  fluticasone  •  omeprazole  •  riFAMPin  •  insulin regular **AND** POC Blood Glucose **AND** NOTIFY MD and PharmD  **AND** glucose **AND** dextrose 50%  •  predniSONE  •  heparin  •  oxyCODONE immediate-release  •  loperamide  •  [DISCONTINUED] magnesium sulfate **OR** magnesium sulfate  •  acetaminophen  •  ondansetron  •  ondansetron  •  promethazine  •  promethazine  •  prochlorperazine  •  latanoprost  •  timolol  •  albuterol    Labs:  Recent Labs     06/12/20  0335   WBC 9.8   RBC 3.70*   HEMOGLOBIN 9.6*   HEMATOCRIT 31.6*   MCV 85.4   MCH 25.9*   RDW 49.6   PLATELETCT 301   MPV 10.2   NEUTSPOLYS 78.30*   LYMPHOCYTES 17.40*   MONOCYTES 1.70   EOSINOPHILS 0.90   BASOPHILS 0.90   RBCMORPHOLO Present     Recent Labs     06/12/20  0335 06/13/20  0532 06/14/20  0328   SODIUM 141 137 136   POTASSIUM 3.7 3.5* 3.2*   CHLORIDE 103 100 99   CO2 26 27 24   GLUCOSE 157* 139* 140*   BUN 16 15 14     Recent Labs     06/12/20 0335 06/13/20  0532 06/14/20  0328   ALBUMIN 2.4* 2.4* 2.5*   TBILIRUBIN 0.3 0.3 0.2   ALKPHOSPHAT 113* 99 91   TOTPROTEIN 6.5 6.3 6.3   ALTSGPT 26 21 21   ASTSGOT 18 21 18   CREATININE 1.51* 1.46* 1.63*       Imaging:  Ct-chest,abdomen,pelvis W/o    Result Date: 6/1/2020 6/1/2020 11:18 AM HISTORY/REASON FOR EXAM:  Sepsis Hypotension, renal injury, nausea vomiting diarrhea TECHNIQUE/EXAM DESCRIPTION: CT scan of the chest, abdomen and pelvis without contrast was performed. Noncontrast helical scanning of the chest, abdomen and pelvis was obtained from the lung apices through the pubic symphysis. Low dose optimization technique was utilized for this CT exam including automated exposure control and adjustment of the mA and/or kV according to patient size. COMPARISON:  None. FINDINGS: CHEST: Neck Base:  Normal. Lungs/Pleura: Right lower lobe bronchiectasis with peribronchial opacities which likely is atelectasis or scarring. Correlate clinically for infection. Mild dependent changes/atelectasis in the left lower lobe.  3 mm groundglass nodule in the right midlung on image 58 series 301. Calcified granuloma left upper  lobe. No pneumothorax or pleural effusion. Cardiovascular Structures:  Heart size is normal. No pericardial effusion. Coronary artery stents and/or prominent calcification. Aorta is normal in caliber without aneurysm or dissection. Central pulmonary arteries are normal in caliber. Mediastinum/ lymph nodes: No lymphadenopathy. Small hiatal hernia. ABDOMEN/PELVIS Liver:  Diffuse hypoattenuation of the liver suggesting fatty infiltration. Liver is enlarged. Gallbladder: Normal Biliary tract: Nondilated. Pancreas: Normal. Spleen: Normal. Adrenals: Normal. Kidneys and Collecting Systems:  Bilateral perinephric fat stranding, asymmetrically more prominent on the right. This is concerning for infection given the history. Correlate clinically and with urinalysis. No significant hydronephrosis. No obstructing stone is seen. Gastrointestinal tract:  Possible rectosigmoid wall thickening, suboptimally evaluated without contrast and due to decompression. The appendix is normal. Peritoneum: No free air or free fluid. Reproductive organs:  Normal. Bladder:  Normal. Vessels:  Moderately dense calcified plaque. Lymph  Nodes:  No lymphadenopathy. Soft tissues/wall: Within normal limits. Bones:  No acute or aggressive abnormality. Ossific density adjacent to the inferior right scapula probably related to old trauma.     1.  Asymmetric right perinephric fat stranding suspicious for infection/pyelonephritis. Correlate with urinalysis. No significant hydronephrosis. No obstructing stone is seen. 2.  Hepatomegaly and hepatic steatosis. 3.  Rectosigmoid colon is decompressed, limiting evaluation for wall thickening. Correlate for evidence of colitis. 4.  Right lower lobe bronchiectasis and peribronchial opacities likely postinflammatory with atelectasis/scarring. Correlate clinically for evidence of infection. 5.  Atherosclerosis.     Ec-echocardiogram Complete W/o Cont    Result Date: 6/4/2020  Transthoracic Echo Report Echocardiography  Laboratory CONCLUSIONS Mildly reduced left ventricular systolic function. Left ventricular ejection fraction is visually estimated to be 45-50%. Grade II diastolic dysfunction. Dilated inferior vena cava without inspiratory collapse. Estimated right ventricular systolic pressure  is 53 mmHg. Normal right ventricular size. Reduced right ventricular systolic function. Moderate to severe tricuspid regurgitation. CAROLYNE SELLERS Exam Date:         2020                    09:48 Exam Location:     Inpatient Priority:          Routine Ordering Physician:        ISIDRO DELATORRE Referring Physician:       NANDINI Baeza Sonographer:               Jeramy Paul RDCS,                            RVT Age:    60     Gender:    F MRN:    [id number] :    1960 BSA:    1.71   Ht (in):    64     Wt (lb):    146 Exam Type:     Complete Indications:     Cardiac Murmurs, Undiagnosed ICD Codes:       785.2 CPT Codes:       25514 BP:   132    /   77     HR:   75 Technical Quality:       Technically difficult study -                          adequate information is obtained MEASUREMENTS  (Male / Female) Normal Values 2D ECHO LV Diastolic Diameter PLAX        3.9 cm                4.2 - 5.9 / 3.9 - 5.3 cm LV Systolic Diameter PLAX         2.8 cm                2.1 - 4.0 cm IVS Diastolic Thickness           1.2 cm                LVPW Diastolic Thickness          1.2 cm                RV Diameter 4C                    3.1 cm                2.5 - 2.9 cm LVOT Diameter                     1.7 cm                RA Diameter                       4.5 cm                LV Ejection Fraction MOD BP       54.1 %                >= 55  % LV Ejection Fraction MOD 4C       53.9 %                LV Ejection Fraction MOD 2C       58.7 %                LA Volume Index                   38.5 cm3/m2           16 - 28 cm3/m2 DOPPLER AV Peak Velocity                  1.1 m/s               AV Peak Gradient                  4.8 mmHg              AV Mean  Gradient                  2.5 mmHg              LVOT Peak Velocity                0.77 m/s              AV Area Cont Eq vti               1.5 cm2               Mitral E Point Velocity           0.73 m/s              Mitral E to A Ratio               1.3                   MV Pressure Half Time             50 ms                 MV Area PHT                       4.4 cm2               MV Deceleration Time              172 ms                MR Flow Convergence Radius        0.36 cm               MR ERO PISA                       0.042 cm2             MR Regurgitant Volume PISA        7.1 cm3               TR Peak Velocity                  257 cm/s              * Indicates values subject to auto-interpretation LV EF:        % FINDINGS Left Ventricle Normal left ventricular chamber size. Mild concentric left ventricular hypertrophy. Mildly reduced left ventricular systolic function. Left ventricular ejection fraction is visually estimated to be 45-50%. Abnormal septal motion consistent with right ventricular (RV) volume overload and/or elevated RV end-diastolic pressure. Grade II diastolic dysfunction. Right Ventricle Normal right ventricular size. Reduced right ventricular systolic function. Right Atrium Dilated right atrium. Dilated inferior vena cava without inspiratory collapse. Left Atrium Mildly dilated left atrium. Left atrial volume index is 39  mL/sq m. Mitral Valve Structurally normal mitral valve. No mitral stenosis. Mild mitral regurgitation. Aortic Valve Aortic sclerosis without stenosis. No aortic insufficiency. Tricuspid Valve Structurally normal tricuspid valve. No tricuspid stenosis. Moderate to severe tricuspid regurgitation. Right atrial pressure is estimated to be 15 mmHg. Estimated right ventricular systolic pressure  is 53 mmHg. Pulmonic Valve The pulmonic valve is not well visualized.  Mild pulmonic insufficiency. Pericardium Normal pericardium without effusion. Aorta Normal aortic root for body  surface area. Ascending aorta diameter is 2.8 cm. Allyson Rivera MD (Electronically Signed) Final Date:     04 June 2020                 11:59    Ec-rayna W/o Cont    Result Date: 6/5/2020  Results Will be Available after Interpretation by Cardiologist.      Micro:  Results     Procedure Component Value Units Date/Time    Blood Culture [[id number]] Collected:  06/06/20 1325    Order Status:  Completed Specimen:  Blood Updated:  06/11/20 1500     Significant Indicator NEG     Source BLD     Site Peripheral     Culture Result No growth after 5 days of incubation.    Narrative:       Left Hand    Blood Culture [[id number]] Collected:  06/06/20 1325    Order Status:  Completed Specimen:  Blood Updated:  06/11/20 1500     Significant Indicator NEG     Source BLD     Site Peripheral     Culture Result No growth after 5 days of incubation.    Narrative:       Right Forearm/Arm    SARS-CoV-2, PCR (In-House) [[id number]] Collected:  06/09/20 1230    Order Status:  Completed Updated:  06/09/20 1350     SARS-CoV-2 Source NP Swab     SARS-CoV-2 by PCR NotDetected     Comment: [facility] providers: PLEASE REFER TO DE-ESCALATION AND RETESTING PROTOCOL  on [email or web address]  **The [employer] GeneXpert Xpress SARS-CoV-2 Test has been made available for  use under the Emergency Use Authorization (EUA) only.         Narrative:       [id number] ANKUR CAMP  Is this test for diagnosis or screening?->Screen    URINALYSIS [[id number]]  (Abnormal) Collected:  06/08/20 1739    Order Status:  Completed Specimen:  Urine, Clean Catch Updated:  06/09/20 1343     Color Yellow     Character Clear     Specific Gravity 1.008     Ph 6.0     Glucose Negative mg/dL      Ketones Trace mg/dL      Protein Negative mg/dL      Bilirubin Negative     Urobilinogen, Urine 0.2     Nitrite Negative     Leukocyte Esterase Small     Occult Blood Small     Micro Urine Req Microscopic    Narrative:       [id number] ANKUR CAMP    COVID/SARS CoV-2 [[id number]]  Collected:  06/09/20 1230    Order Status:  Completed Specimen:  Respirate from Nasopharyngeal Updated:  06/09/20 1241     COVID Order Status Received    Narrative:       [id number] ANKUR CAMP  Is this test for diagnosis or screening?->Screen          Assessment:  Active Hospital Problems    Diagnosis   • *Staphylococcus aureus bacteremia [R78.81, B95.61]   • Diabetic ketoacidosis without coma associated with type 2 diabetes mellitus (HCC) [E11.10]   • Septic shock (HCC) [A41.9, R65.21]   • Acute renal failure with tubular necrosis (HCC) [N17.0]   • Epigastric pain [R10.13]   • Pulmonary hypertension (HCC) [I27.20]   • Acute pyelonephritis [N10]   • Bronchiectasis (HCC) [J47.9]   • Hyponatremia [E87.1]   • Diarrhea [R19.7]   • Adrenal insufficiency (HCC) [E27.40]   • Metabolic acidosis [E87.2]       ASSESSMENT/PLAN:      60 y.o.  admitted 6/1/2020. Pt has a past medical history of RA on chronic prednisone 10 mg daily, DMT2 , CKD stage III.  She reports abdominal pain on and off again since December and also history of frequent UTIs.  She is most recently treated with amoxicillin but been off antibiotics for approximately 1 week prior to her admission. Presented to ER at [facility] c/o epigastric abdominal pain described as burning as well as nausea vomiting and diarrhea x4 days.  She was hypotensive, requiring oxygen support with lab irregularities including leukocytosis to 14.5, hyponatremia, elevated creatinine, elevated glucose and elevated lactic acid.  She was started on Unasyn and Levophed and transferred to [facility] for higher level of care.  The patient has hardware - pins in her right foot, a plate in her left ankle and replaced knuckle in her right hand     Hospital Course:   She has been afebrile, initially hypotensive and now improved.  Leukocytosis to 19.7 on admit.  Acute kidney injury on arrival.  Stool cultures were obtained which are pending in part but negative for Shiga toxin 1 and  2.  C. difficile was tested on 6/1 and 6/2 and negative on both.  Blood cultures were obtained which are positive for MRSA.  She was started on vancomycin Flagyl and ceftriaxone.  Transitioned to daptomycin and rifampin.         MRSA sepsis   Afebrile  Leukocytosis resolved  BCxs + MRSA 6/1; 6/2  Repeat Bcxs neg 6/6  TTE neg for vegetation but moderate to severe tricuspid regurgitation, diastolic dysfunction:   VERONICA  Neg 6/5  Vanco stopped due to RAF  Continue dapto/rif  Anticipate 6 weeks IV abx from date of negative blood cxs  Stop date 7/18/2020-states going to [facility]. Orders done 6/13/2020      UTI/Pyelonephritis  Dysuria with pain, burning and hematuria prior to admission  UA suggestive of infection, no urine culture  CT on 6/1 with right-sided fat stranding suspicious for infection/Pyelo     Abdominal pain, epigastric- ongoing - chronic   GI on board  -EGD and colonoscopy on 6/10-EGD with no significant findings.  Colonoscopy with sessile polyp removed, rectosigmoid ulcer which was biopsied and diverticulosis of the sigmoid    Diarrhea - improved   C. difficile tested negative x2  Stool cultures pending, Shiga toxin 1 and 2  EGD no ulcer  Rectal ulcer biopsied    Acute on chronic kidney disease, worse today  Adjust dosing abx as needed  Weekly BMP as outpatient    Bronchiectasis,   Noted on CT in right lower lobe as well as atelectasis  Calcified granuloma in left upper lobe groundglass nodule on right  No PNA    Diabetes, chronic   DKA  Keep BS under 150 to help control current infection    Rheumatoid arthritis  Immunosuppressed       NIKI RN

## 2020-06-15 ENCOUNTER — APPOINTMENT (OUTPATIENT)
Dept: RADIOLOGY | Facility: MEDICAL CENTER | Age: 60
DRG: 871 | End: 2020-06-15
Attending: INTERNAL MEDICINE
Payer: MEDICARE

## 2020-06-15 VITALS
WEIGHT: 122.58 LBS | HEIGHT: 64 IN | OXYGEN SATURATION: 95 % | BODY MASS INDEX: 20.93 KG/M2 | HEART RATE: 88 BPM | TEMPERATURE: 98.5 F | RESPIRATION RATE: 18 BRPM | DIASTOLIC BLOOD PRESSURE: 59 MMHG | SYSTOLIC BLOOD PRESSURE: 129 MMHG

## 2020-06-15 PROBLEM — A41.9 SEPTIC SHOCK (HCC): Status: RESOLVED | Noted: 2020-06-01 | Resolved: 2020-06-15

## 2020-06-15 PROBLEM — R65.21 SEPTIC SHOCK (HCC): Status: RESOLVED | Noted: 2020-06-01 | Resolved: 2020-06-15

## 2020-06-15 PROBLEM — E11.10 DIABETIC KETOACIDOSIS WITHOUT COMA ASSOCIATED WITH TYPE 2 DIABETES MELLITUS (HCC): Status: RESOLVED | Noted: 2020-06-01 | Resolved: 2020-06-15

## 2020-06-15 PROBLEM — E87.6 HYPOKALEMIA: Status: RESOLVED | Noted: 2020-06-14 | Resolved: 2020-06-15

## 2020-06-15 PROBLEM — E87.20 METABOLIC ACIDOSIS: Status: RESOLVED | Noted: 2020-06-01 | Resolved: 2020-06-15

## 2020-06-15 PROBLEM — E87.1 HYPONATREMIA: Status: RESOLVED | Noted: 2020-06-01 | Resolved: 2020-06-15

## 2020-06-15 PROBLEM — N10 ACUTE PYELONEPHRITIS: Status: RESOLVED | Noted: 2020-06-01 | Resolved: 2020-06-15

## 2020-06-15 PROBLEM — E27.40 ADRENAL INSUFFICIENCY (HCC): Status: RESOLVED | Noted: 2020-06-01 | Resolved: 2020-06-15

## 2020-06-15 PROBLEM — R19.7 DIARRHEA: Status: RESOLVED | Noted: 2020-06-01 | Resolved: 2020-06-15

## 2020-06-15 PROBLEM — N17.0 ACUTE RENAL FAILURE WITH TUBULAR NECROSIS (HCC): Status: RESOLVED | Noted: 2020-06-01 | Resolved: 2020-06-15

## 2020-06-15 LAB
ALBUMIN SERPL BCP-MCNC: 2.5 G/DL (ref 3.2–4.9)
ALBUMIN/GLOB SERPL: 0.7 G/DL
ALP SERPL-CCNC: 90 U/L (ref 30–99)
ALT SERPL-CCNC: 18 U/L (ref 2–50)
ANION GAP SERPL CALC-SCNC: 11 MMOL/L (ref 7–16)
AST SERPL-CCNC: 22 U/L (ref 12–45)
BILIRUB SERPL-MCNC: 0.2 MG/DL (ref 0.1–1.5)
BUN SERPL-MCNC: 17 MG/DL (ref 8–22)
CALCIUM SERPL-MCNC: 8.4 MG/DL (ref 8.5–10.5)
CHLORIDE SERPL-SCNC: 101 MMOL/L (ref 96–112)
CO2 SERPL-SCNC: 26 MMOL/L (ref 20–33)
CREAT SERPL-MCNC: 1.73 MG/DL (ref 0.5–1.4)
GLOBULIN SER CALC-MCNC: 3.8 G/DL (ref 1.9–3.5)
GLUCOSE BLD-MCNC: 160 MG/DL (ref 65–99)
GLUCOSE SERPL-MCNC: 167 MG/DL (ref 65–99)
POTASSIUM SERPL-SCNC: 4.2 MMOL/L (ref 3.6–5.5)
PROT SERPL-MCNC: 6.3 G/DL (ref 6–8.2)
SODIUM SERPL-SCNC: 138 MMOL/L (ref 135–145)

## 2020-06-15 PROCEDURE — 99239 HOSP IP/OBS DSCHRG MGMT >30: CPT | Performed by: FAMILY MEDICINE

## 2020-06-15 PROCEDURE — 700102 HCHG RX REV CODE 250 W/ 637 OVERRIDE(OP): Performed by: INTERNAL MEDICINE

## 2020-06-15 PROCEDURE — A9270 NON-COVERED ITEM OR SERVICE: HCPCS | Performed by: FAMILY MEDICINE

## 2020-06-15 PROCEDURE — 700102 HCHG RX REV CODE 250 W/ 637 OVERRIDE(OP): Performed by: FAMILY MEDICINE

## 2020-06-15 PROCEDURE — 82962 GLUCOSE BLOOD TEST: CPT

## 2020-06-15 PROCEDURE — A9270 NON-COVERED ITEM OR SERVICE: HCPCS | Performed by: HOSPITALIST

## 2020-06-15 PROCEDURE — 700111 HCHG RX REV CODE 636 W/ 250 OVERRIDE (IP): Performed by: HOSPITALIST

## 2020-06-15 PROCEDURE — A9270 NON-COVERED ITEM OR SERVICE: HCPCS | Performed by: INTERNAL MEDICINE

## 2020-06-15 PROCEDURE — 94760 N-INVAS EAR/PLS OXIMETRY 1: CPT

## 2020-06-15 PROCEDURE — 700111 HCHG RX REV CODE 636 W/ 250 OVERRIDE (IP): Performed by: PSYCHIATRY & NEUROLOGY

## 2020-06-15 PROCEDURE — 700111 HCHG RX REV CODE 636 W/ 250 OVERRIDE (IP): Performed by: FAMILY MEDICINE

## 2020-06-15 PROCEDURE — 700105 HCHG RX REV CODE 258: Performed by: FAMILY MEDICINE

## 2020-06-15 PROCEDURE — 700102 HCHG RX REV CODE 250 W/ 637 OVERRIDE(OP): Performed by: PSYCHIATRY & NEUROLOGY

## 2020-06-15 PROCEDURE — 700102 HCHG RX REV CODE 250 W/ 637 OVERRIDE(OP): Performed by: HOSPITALIST

## 2020-06-15 PROCEDURE — A9270 NON-COVERED ITEM OR SERVICE: HCPCS | Performed by: PSYCHIATRY & NEUROLOGY

## 2020-06-15 PROCEDURE — 80053 COMPREHEN METABOLIC PANEL: CPT

## 2020-06-15 RX ORDER — SUCRALFATE ORAL 1 G/10ML
1 SUSPENSION ORAL EVERY 6 HOURS
Qty: 280 ML | Refills: 0 | Status: SHIPPED | OUTPATIENT
Start: 2020-06-15 | End: 2020-06-22

## 2020-06-15 RX ORDER — ALBUTEROL SULFATE 90 UG/1
2 AEROSOL, METERED RESPIRATORY (INHALATION) EVERY 4 HOURS PRN
Qty: 8.5 G | Refills: 0 | Status: SHIPPED | OUTPATIENT
Start: 2020-06-15 | End: 2024-01-21

## 2020-06-15 RX ORDER — OMEPRAZOLE 20 MG/1
20 CAPSULE, DELAYED RELEASE ORAL DAILY
Qty: 30 CAP | Refills: 0 | Status: SHIPPED | OUTPATIENT
Start: 2020-06-15 | End: 2024-01-21

## 2020-06-15 RX ORDER — METOPROLOL SUCCINATE 25 MG/1
25 TABLET, EXTENDED RELEASE ORAL DAILY
Qty: 30 TAB | Refills: 0 | Status: SHIPPED | OUTPATIENT
Start: 2020-06-16 | End: 2024-01-21

## 2020-06-15 RX ORDER — ONDANSETRON 4 MG/1
4 TABLET, ORALLY DISINTEGRATING ORAL EVERY 4 HOURS PRN
Qty: 10 TAB | Refills: 0 | Status: SHIPPED | OUTPATIENT
Start: 2020-06-15 | End: 2024-01-21

## 2020-06-15 RX ADMIN — RIFAMPIN 300 MG: 300 CAPSULE ORAL at 05:41

## 2020-06-15 RX ADMIN — DAPTOMYCIN 530 MG: 350 INJECTION, POWDER, LYOPHILIZED, FOR SOLUTION INTRAVENOUS at 05:41

## 2020-06-15 RX ADMIN — ACETAMINOPHEN 650 MG: 325 TABLET, FILM COATED ORAL at 07:54

## 2020-06-15 RX ADMIN — INSULIN HUMAN 2 UNITS: 100 INJECTION, SOLUTION PARENTERAL at 07:58

## 2020-06-15 RX ADMIN — ACETAMINOPHEN 650 MG: 325 TABLET, FILM COATED ORAL at 00:52

## 2020-06-15 RX ADMIN — METOPROLOL SUCCINATE 25 MG: 25 TABLET, EXTENDED RELEASE ORAL at 05:41

## 2020-06-15 RX ADMIN — OMEPRAZOLE 20 MG: 20 CAPSULE, DELAYED RELEASE ORAL at 05:41

## 2020-06-15 RX ADMIN — TIMOLOL MALEATE 1 DROP: 5 SOLUTION/ DROPS OPHTHALMIC at 05:40

## 2020-06-15 RX ADMIN — PREDNISONE 10 MG: 10 TABLET ORAL at 05:41

## 2020-06-15 RX ADMIN — POTASSIUM CHLORIDE 40 MEQ: 1500 TABLET, EXTENDED RELEASE ORAL at 05:40

## 2020-06-15 RX ADMIN — SUCRALFATE 1 G: 1 SUSPENSION ORAL at 00:52

## 2020-06-15 RX ADMIN — SIMETHICONE CHEW TAB 80 MG 80 MG: 80 TABLET ORAL at 00:52

## 2020-06-15 RX ADMIN — SUCRALFATE 1 G: 1 SUSPENSION ORAL at 05:40

## 2020-06-15 RX ADMIN — HEPARIN SODIUM 5000 UNITS: 5000 INJECTION, SOLUTION INTRAVENOUS; SUBCUTANEOUS at 05:40

## 2020-06-15 RX ADMIN — OXYCODONE 5 MG: 5 TABLET ORAL at 00:53

## 2020-06-15 RX ADMIN — SITAGLIPTIN 25 MG: 25 TABLET, FILM COATED ORAL at 05:41

## 2020-06-15 RX ADMIN — OXYCODONE 5 MG: 5 TABLET ORAL at 07:54

## 2020-06-15 ASSESSMENT — COGNITIVE AND FUNCTIONAL STATUS - GENERAL
DAILY ACTIVITIY SCORE: 20
SUGGESTED CMS G CODE MODIFIER MOBILITY: CI
CLIMB 3 TO 5 STEPS WITH RAILING: A LITTLE
HELP NEEDED FOR BATHING: A LITTLE
DRESSING REGULAR LOWER BODY CLOTHING: A LITTLE
MOBILITY SCORE: 23
TOILETING: A LITTLE
DRESSING REGULAR UPPER BODY CLOTHING: A LITTLE
SUGGESTED CMS G CODE MODIFIER DAILY ACTIVITY: CJ

## 2020-06-15 NOTE — RESPIRATORY CARE
Oxygen Rounds      Patient found on    O2 L/m:  ___1______    Oxygen device:  ___nc_____   Spo2: _____94____%      Respiratory device skin site inspection completed.

## 2020-06-15 NOTE — DISCHARGE PLANNING
Anticipated Discharge Disposition: Home with outpatient infusion at [facility]    Action: Call placed to [facility] and they have rec’d an order for Dapto IV through to 7/18/20. Spoke to pt on the phone and she is going to be staying at her aunt’s house here in town and her aunt will drive her everyday to the appt and her aunt has been to the infusion center before so she knows the location. Reviewed above with BHARATHI Stone. Pt need PICC line placed, Bertha placed call to PICC team and pt is 3rd on their list today.     Barriers to Discharge: needs PICC placed.    Plan: To [facility] 1st appt Tuesday 6/16/20 at 16:00. Reviewed that with pt and she is agreeable to that appt time.       Update 6-15-20 at 9:58, per Bertha GARVIN pt has a midline and does not need a PICC line placed.

## 2020-06-15 NOTE — CARE PLAN
Problem: Pain Management  Goal: Pain level will decrease to patient's comfort goal  Outcome: PROGRESSING AS EXPECTED   PRN pain medications available and administered as needed for Pt. Pt reporting abdominal pain. Pt with minimal LLE pain.     Problem: Respiratory:  Goal: Respiratory status will improve  Outcome: PROGRESSING AS EXPECTED   Pt on 1L via nasal cannula for comfort. No S/S of respiratory distress.

## 2020-06-15 NOTE — DISCHARGE SUMMARY
Discharge Summary    CHIEF COMPLAINT ON ADMISSION  Chief Complaint   Patient presents with   • N/V     x 3 days   • Diarrhea       Reason for Admission  EMS 06     Admission Date  6/1/2020    CODE STATUS  Prior    HPI & HOSPITAL COURSE  Ms. Mcclain is a 60 y.o. female who presented 6/1/2020 with past medical history of rheumatoid arthritis on chronic prednisone therapy, type 2 diabetes on metformin, CKD stage III who comes into the emergency room with complaints of nausea, vomiting and diarrhea for the past 4 days.  Associated with an epigastric abdominal pain.  The pain radiates to the lower abdomen and feels like a burning sensation.  The pain is nonexertional, non-positional.  Patient states that she has not gone to the bathroom to urinate in 4 days.  She would have a bowel movement every 30 minutes.  She denies any blood in her stools.  Patient denies any fever, cough, shortness of breath, chest pain.   [facility]  Blood pressure 86/50, respiratory rate 20, saturating 90% on 2 L oxygen, temperature 98.4  Sodium 129, potassium 3.6, chloride 89, CO2 21, AST 46, ALT 30, BUN 67, glucose 399, creatinine 6.4, calcium 8, bili 1.5, anion gap 22, lactic acid 2.5, lipase 163, , CK-MB 1.1, troponin 0.062, WBC 14.5, hemoglobin 10.9, platelets 175, d-dimer 3 8378, TSH 8.67  Patient was started on Unasyn and Levophed with ICU admission.  Was stabilized in the ICU then transferred to the floor.  Septic shock resolved.   Blood culture grew MRSA.  Patient had TTE and VERONICA that were both negative for endocarditis or vegetations.  ID were consulted.  Initially recommended 4 weeks of IV antibiotics since VERONICA was negative.  However, another ID provider came on board and recommended 6 weeks of IV antibiotics.  Outpatient infusion was arranged for the patient.  As a midline was placed and patient ready received 2 weeks of IV antibiotics while her hospital stay here, ID agreed  with midline to complete the rest of her IV antibiotic course.  Also patient continued to complain of epigastric pain.  She had EGD and colonoscopy which were unremarkable.  Was started on Carafate and her abdominal pain resolved over hospital course.  Electrolyte imbalance was addressed during this hospital stay.  Kidney functions improved over hospital course.  For that patient has likely chronic kidney disease considering her poorly controlled diabetes.  On echocardiogram, she was noted to have ejection fraction of 45% and grade 2 diastolic dysfunction.  Was not given any ACE/ARB or diuretics due to worsening kidney functions.  She was hemodynamically and clinically stable.  Outpatient IV antibiotic infusion was arranged.  Patient was cleared for discharge from medical standpoint.    Therefore, she is discharged in guarded and stable condition to home with close outpatient follow-up.    The patient met 2-midnight criteria for an inpatient stay at the time of discharge.    Discharge Date  6/15/2020    FOLLOW UP ITEMS POST DISCHARGE  Follow-up with PCP in 1 week  Follow-up with ID as per recommendations      DISCHARGE DIAGNOSES  Principal Problem:    Staphylococcus aureus bacteremia POA: Yes  Active Problems:    Bronchiectasis (HCC) POA: Yes    Pulmonary hypertension (HCC) POA: Yes    Epigastric pain POA: Yes    Chronic combined systolic and diastolic congestive heart failure (HCC) POA: Yes  Resolved Problems:    Diabetic ketoacidosis without coma associated with type 2 diabetes mellitus (HCC) POA: Yes    Septic shock (HCC) POA: Yes    Acute renal failure with tubular necrosis (HCC) POA: Yes    Acute pyelonephritis POA: Yes    Hyponatremia POA: Yes    Diarrhea POA: Yes    Adrenal insufficiency (HCC) POA: Yes    Metabolic acidosis POA: Yes    Hypokalemia POA: Yes      FOLLOW UP  Future Appointments   Date Time Provider Department Center   6/16/2020  4:00 PM [facility]   6/17/2020  4:30 PM  [facility]   6/18/2020  4:00 PM [facility]   6/19/2020  5:15 PM [facility]   6/20/2020  5:30 PM [facility]   6/21/2020  5:00 PM [facility]   6/22/2020  4:00 PM [facility]   6/23/2020  4:00 PM [facility]   6/24/2020  4:00 PM [facility]   6/25/2020  4:15 PM [facility]   6/26/2020  4:00 PM [facility]   6/27/2020  4:00 PM [facility]   6/28/2020  4:45 PM [facility]   6/29/2020  4:00 PM [facility]   6/30/2020  5:45 PM [facility]   7/1/2020  4:30 PM [facility]   7/2/2020  4:15 PM [facility]   7/3/2020  5:00 PM [facility]   7/4/2020  4:30 PM [facility]   7/5/2020  5:00 PM [facility]   7/6/2020  4:30 PM [facility]   7/7/2020  4:00 PM [facility]   7/8/2020  4:00 PM [facility]   7/9/2020  4:15 PM [facility]   7/10/2020  4:00 PM [facility]   7/11/2020  4:15 PM [facility]   7/12/2020  4:00 PM [facility]   7/13/2020  4:00 PM [facility]   7/14/2020  4:00 PM [facility]   7/15/2020  4:30 PM [facility]   7/16/2020  4:00 PM [facility]   7/17/2020  4:00 PM [facility]   7/18/2020  4:00 PM [facility]     No follow-up provider specified.    MEDICATIONS ON DISCHARGE     Medication List      START taking these medications      Instructions   albuterol 108 (90 Base) MCG/ACT Aers  inhalation aerosol   Inhale 2 Puffs by mouth every four hours as needed.  Dose:  2 Puff     metoprolol SR 25 MG Tb24  Start taking on:  June 16, 2020  Commonly known as:  TOPROL XL   Take 1 Tab by mouth every day.  Dose:  25 mg     omeprazole 20 MG delayed-release capsule  Commonly known as:  PRILOSEC   Take 1 Cap by mouth every day.  Dose:  20 mg     ondansetron 4 MG Tbdp  Commonly known as:  ZOFRAN ODT   Take 1 Tab by mouth every four hours as needed for Nausea (give PO if no IV route available).  Dose:  4 mg     SITagliptin 25 MG Tabs  Start taking on:  June 16, 2020  Commonly known as:  JANUVIA   Take 1 Tab by mouth every day.  Dose:  25 mg     sucralfate 1 GM/10ML Susp  Commonly known as:  CARAFATE   Take 10 mL by mouth every 6 hours for 7 days.  Dose:  1 g        CONTINUE taking these medications      Instructions   fluticasone 50 MCG/ACT nasal spray  Commonly known as:  FLONASE   Spray 1 Spray in nose every day.  Dose:  1 Spray     HYDROcodone-acetaminophen 5-325 MG Tabs per tablet  Commonly known as:  NORCO   Take 1 Tab by mouth 3 times a day as needed.  Dose:  1 Tab     latanoprost 0.005 % Soln  Commonly known as:  XALATAN   Place 1 Drop in both eyes every bedtime.  Dose:  1 Drop     Lexapro 20 MG tablet  Generic drug:  escitalopram   Take 20 mg by mouth every day.  Dose:  20 mg     lisinopril 5 MG Tabs  Commonly known as:  PRINIVIL   Take 5 mg by mouth every day.  Dose:  5 mg     lovastatin 20 MG Tabs  Commonly known as:  MEVACOR   Take 20 mg by mouth every day.  Dose:  20 mg     Melatonin 5 MG Tabs   Take 5 mg by mouth every bedtime.  Dose:  5 mg     pantoprazole 40 MG Tbec  Commonly known as:  PROTONIX   Take 40 mg by mouth every day.  Dose:  40 mg     predniSONE 10 MG Tabs  Commonly known as:  DELTASONE   Take 10 mg by mouth every day.  Dose:  10 mg     timolol 0.5 % Soln  Commonly known as:  TIMOPTIC   instill 1 drop into both eyes every morning     tizanidine 4 MG Tabs  Commonly known as:  ZANAFLEX    Take 4 mg by mouth 2 times a day as needed.  Dose:  4 mg        STOP taking these medications    amoxicillin-clavulanate 500-125 MG Tabs  Commonly known as:  AUGMENTIN     metformin 1000 MG tablet  Commonly known as:  GLUCOPHAGE            Allergies  Allergies   Allergen Reactions   • Cephalexin Unspecified and Vomiting     n/v  n/v         DIET  No orders of the defined types were placed in this encounter.      ACTIVITY  As tolerated.  Weight bearing as tolerated    CONSULTATIONS  Critical care  ID  GI    PROCEDURES  As above    LABORATORY  Lab Results   Component Value Date    SODIUM 138 06/15/2020    POTASSIUM 4.2 06/15/2020    CHLORIDE 101 06/15/2020    CO2 26 06/15/2020    GLUCOSE 167 (H) 06/15/2020    BUN 17 06/15/2020    CREATININE 1.73 (H) 06/15/2020        Lab Results   Component Value Date    WBC 9.8 06/12/2020    HEMOGLOBIN 9.6 (L) 06/12/2020    HEMATOCRIT 31.6 (L) 06/12/2020    PLATELETCT 301 06/12/2020        Total time of the discharge process exceeds 43 minutes.

## 2020-06-15 NOTE — CARE PLAN
Problem: Safety  Goal: Will remain free from injury  Outcome: PROGRESSING AS EXPECTED  Goal: Will remain free from falls  Outcome: PROGRESSING AS EXPECTED     Problem: Pain Management  Goal: Pain level will decrease to patient's comfort goal  Outcome: PROGRESSING AS EXPECTED     Problem: Mobility  Goal: Risk for activity intolerance will decrease  Outcome: PROGRESSING AS EXPECTED     Problem: Respiratory:  Goal: Respiratory status will improve  Outcome: PROGRESSING AS EXPECTED

## 2020-06-15 NOTE — PROGRESS NOTES
Assumed care at 1900, report received from Day RN.  Pt A&O x 4, states pain is 6/10. PRN pain medications not due at this time. Pt resting in bed. Contact precautions in place. Bed locked, 2 rails up, bed in lowest position. Call light in place, belongings at bedside, no needs at this time and hourly rounding in place.

## 2020-06-15 NOTE — DISCHARGE INSTRUCTIONS
Discharge Instructions    Discharged to home by car with relative. Discharged via wheelchair, hospital escort: Yes.  Special equipment needed: Not Applicable    Be sure to schedule a follow-up appointment with your primary care doctor or any specialists as instructed.     Discharge Plan:        I understand that a diet low in cholesterol, fat, and sodium is recommended for good health. Unless I have been given specific instructions below for another diet, I accept this instruction as my diet prescription.   Other diet: Diabetic    Special Instructions: None    · Is patient discharged on Warfarin / Coumadin?   No     Depression / Suicide Risk    As you are discharged from this [facility] facility, it is important to learn how to keep safe from harming yourself.    Recognize the warning signs:  · Abrupt changes in personality, positive or negative- including increase in energy   · Giving away possessions  · Change in eating patterns- significant weight changes-  positive or negative  · Change in sleeping patterns- unable to sleep or sleeping all the time   · Unwillingness or inability to communicate  · Depression  · Unusual sadness, discouragement and loneliness  · Talk of wanting to die  · Neglect of personal appearance   · Rebelliousness- reckless behavior  · Withdrawal from people/activities they love  · Confusion- inability to concentrate     If you or a loved one observes any of these behaviors or has concerns about self-harm, here's what you can do:  · Talk about it- your feelings and reasons for harming yourself  · Remove any means that you might use to hurt yourself (examples: pills, rope, extension cords, firearm)  · Get professional help from the community (Mental Health, Substance Abuse, psychological counseling)  · Do not be alone:Call your Safe Contact- someone whom you trust who will be there for you.  · Call your local CRISIS HOTLINE 564-4274 or 941-226-4983  · Call your local Children's Mobile  Crisis Response Team [place] (925) 187-5971 or www.[employer]  · Call the toll free National Suicide Prevention Hotlines   · National Suicide Prevention Lifeline 887-800-OMIS (6116)  · National Hope Line Network 800-SUICIDE (364-2662)    Daptomycin injection  What is this medicine?  DAPTOMYCIN (DAP toe MYE sin) is a lipopeptide antibiotic. It is used to treat certain kinds of bacterial infections. It will not work for colds, flu, or other viral infections.  This medicine may be used for other purposes; ask your health care provider or pharmacist if you have questions.  COMMON BRAND NAME(S): Cubicin Marilynin RF  What should I tell my health care provider before I take this medicine?  They need to know if you have any of these conditions:  -kidney disease  -an unusual or allergic reaction to daptomycin, other medicines, foods, dyes, or preservatives  -pregnant or trying to get pregnant  -breast-feeding  How should I use this medicine?  This medicine is for infusion into a vein. It is usually given by a health care professional in a hospital or clinic setting.  If you get this medicine at home, you will be taught how to prepare and give this medicine. Use exactly as directed. Take your medicine at regular intervals. Do not take your medicine more often than directed. Take all of your medicine as directed even if you think you are better. Do not skip doses or stop your medicine early.  It is important that you put your used needles and syringes in a special sharps container. Do not put them in a trash can. If you do not have a sharps container, call your pharmacist or healthcare provider to get one.  Talk to your pediatrician regarding the use of this medicine in children. While this drug may be prescribed for children as young as 1 year for selected conditions, precautions do apply.  Overdosage: If you think you have taken too much of this medicine contact a poison control center or emergency room at  once.  NOTE: This medicine is only for you. Do not share this medicine with others.  What if I miss a dose?  If you miss a dose, take it as soon as you can. If it is almost time for your next dose, take only that dose. Do not take double or extra doses.  What may interact with this medicine?  -birth control pills  -some antibiotics like tobramycin  This list may not describe all possible interactions. Give your health care provider a list of all the medicines, herbs, non-prescription drugs, or dietary supplements you use. Also tell them if you smoke, drink alcohol, or use illegal drugs. Some items may interact with your medicine.  What should I watch for while using this medicine?  Your condition will be monitored carefully while you are receiving this medicine.  Do not treat diarrhea with over the counter products. Contact your doctor if you have diarrhea that lasts more than 2 days or if it is severe and watery.  What side effects may I notice from receiving this medicine?  Side effects that you should report to your doctor or health care professional as soon as possible:  -allergic reactions like skin rash, itching or hives, swelling of the face, lips, or tongue  -breathing problems  -fever, infection  -high or low blood pressure  -muscle pain  -numb or tingling pain  -trouble passing urine or change in the amount of urine  -unusually tired or weak  -vomiting  Side effects that usually do not require medical attention (report to your doctor or health care professional if they continue or are bothersome):  -constipation or diarrhea  -trouble sleeping  -headache  -nausea  -stomach upset  This list may not describe all possible side effects. Call your doctor for medical advice about side effects. You may report side effects to FDA at 0-600-FDA-0353.  Where should I keep my medicine?  Keep out of the reach of children.  If you are using this medicine at home, you will be instructed on how to store this medicine.  Throw away any unused medicine after the expiration date on the label.  NOTE: This sheet is a summary. It may not cover all possible information. If you have questions about this medicine, talk to your doctor, pharmacist, or health care provider.  © 2018 [employer] (2017-03-31 11:21:16)

## 2020-06-15 NOTE — PROGRESS NOTES
Patient discharged home via private car with relative. Nurse went over and gave AVS to patient. Patient stated she will follow-up with her provider back home. Any questions or concerns answered at this time.

## 2020-06-16 ENCOUNTER — OUTPATIENT INFUSION SERVICES (OUTPATIENT)
Dept: ONCOLOGY | Facility: MEDICAL CENTER | Age: 60
End: 2020-06-16
Attending: INTERNAL MEDICINE
Payer: MEDICARE

## 2020-06-16 VITALS
DIASTOLIC BLOOD PRESSURE: 66 MMHG | TEMPERATURE: 97.6 F | RESPIRATION RATE: 18 BRPM | HEIGHT: 64 IN | OXYGEN SATURATION: 95 % | SYSTOLIC BLOOD PRESSURE: 122 MMHG | HEART RATE: 93 BPM | WEIGHT: 117.5 LBS | BODY MASS INDEX: 20.06 KG/M2

## 2020-06-16 DIAGNOSIS — R65.20 SEPSIS DUE TO METHICILLIN RESISTANT STAPHYLOCOCCUS AUREUS (MRSA) WITH ACUTE RENAL FAILURE WITHOUT SEPTIC SHOCK, UNSPECIFIED ACUTE RENAL FAILURE TYPE (HCC): ICD-10-CM

## 2020-06-16 DIAGNOSIS — A41.02 SEPSIS DUE TO METHICILLIN RESISTANT STAPHYLOCOCCUS AUREUS (MRSA) WITH ACUTE RENAL FAILURE WITHOUT SEPTIC SHOCK, UNSPECIFIED ACUTE RENAL FAILURE TYPE (HCC): ICD-10-CM

## 2020-06-16 DIAGNOSIS — N17.9 SEPSIS DUE TO METHICILLIN RESISTANT STAPHYLOCOCCUS AUREUS (MRSA) WITH ACUTE RENAL FAILURE WITHOUT SEPTIC SHOCK, UNSPECIFIED ACUTE RENAL FAILURE TYPE (HCC): ICD-10-CM

## 2020-06-16 LAB
ANISOCYTOSIS BLD QL SMEAR: ABNORMAL
BASOPHILS # BLD AUTO: 2.6 % (ref 0–1.8)
BASOPHILS # BLD: 0.15 K/UL (ref 0–0.12)
CK SERPL-CCNC: 71 U/L (ref 0–154)
EOSINOPHIL # BLD AUTO: 0.15 K/UL (ref 0–0.51)
EOSINOPHIL NFR BLD: 2.6 % (ref 0–6.9)
ERYTHROCYTE [DISTWIDTH] IN BLOOD BY AUTOMATED COUNT: 50.6 FL (ref 35.9–50)
HCT VFR BLD AUTO: 33.3 % (ref 37–47)
HGB BLD-MCNC: 10 G/DL (ref 12–16)
HYPOCHROMIA BLD QL SMEAR: ABNORMAL
LYMPHOCYTES # BLD AUTO: 1.31 K/UL (ref 1–4.8)
LYMPHOCYTES NFR BLD: 22.6 % (ref 22–41)
MANUAL DIFF BLD: NORMAL
MCH RBC QN AUTO: 26 PG (ref 27–33)
MCHC RBC AUTO-ENTMCNC: 30 G/DL (ref 33.6–35)
MCV RBC AUTO: 86.7 FL (ref 81.4–97.8)
MICROCYTES BLD QL SMEAR: ABNORMAL
MONOCYTES # BLD AUTO: 0.2 K/UL (ref 0–0.85)
MONOCYTES NFR BLD AUTO: 3.5 % (ref 0–13.4)
MORPHOLOGY BLD-IMP: NORMAL
NEUTROPHILS # BLD AUTO: 3.98 K/UL (ref 2–7.15)
NEUTROPHILS NFR BLD: 68.7 % (ref 44–72)
NRBC # BLD AUTO: 0 K/UL
NRBC BLD-RTO: 0 /100 WBC
PLATELET # BLD AUTO: 287 K/UL (ref 164–446)
PLATELET BLD QL SMEAR: NORMAL
PMV BLD AUTO: 10.1 FL (ref 9–12.9)
POIKILOCYTOSIS BLD QL SMEAR: NORMAL
POLYCHROMASIA BLD QL SMEAR: NORMAL
RBC # BLD AUTO: 3.84 M/UL (ref 4.2–5.4)
RBC BLD AUTO: PRESENT
STOMATOCYTES BLD QL SMEAR: NORMAL
WBC # BLD AUTO: 5.8 K/UL (ref 4.8–10.8)

## 2020-06-16 PROCEDURE — 700111 HCHG RX REV CODE 636 W/ 250 OVERRIDE (IP): Performed by: INTERNAL MEDICINE

## 2020-06-16 PROCEDURE — 85027 COMPLETE CBC AUTOMATED: CPT

## 2020-06-16 PROCEDURE — 85007 BL SMEAR W/DIFF WBC COUNT: CPT

## 2020-06-16 PROCEDURE — 700105 HCHG RX REV CODE 258: Performed by: INTERNAL MEDICINE

## 2020-06-16 PROCEDURE — 36415 COLL VENOUS BLD VENIPUNCTURE: CPT

## 2020-06-16 PROCEDURE — 82550 ASSAY OF CK (CPK): CPT

## 2020-06-16 PROCEDURE — 96365 THER/PROPH/DIAG IV INF INIT: CPT

## 2020-06-16 RX ORDER — 0.9 % SODIUM CHLORIDE 0.9 %
5 VIAL (ML) INJECTION PRN
Status: CANCELLED | OUTPATIENT
Start: 2020-06-17

## 2020-06-16 RX ORDER — 0.9 % SODIUM CHLORIDE 0.9 %
10-20 VIAL (ML) INJECTION PRN
Status: CANCELLED | OUTPATIENT
Start: 2020-06-17

## 2020-06-16 RX ADMIN — DAPTOMYCIN 430 MG: 500 INJECTION, POWDER, LYOPHILIZED, FOR SOLUTION INTRAVENOUS at 16:28

## 2020-06-16 ASSESSMENT — FIBROSIS 4 INDEX: FIB4 SCORE: 1.02

## 2020-06-16 NOTE — PROGRESS NOTES
Pt to [facility] for daily daptomycin infusion for MRSA sepsis.  Pt midline flushed per protocol w/ no blood return noted.  Labs drawn from [facility] w/ 25G butterfly, gauze and coban dressing applied.  Daptomycin infused through midline with no adverse reactions.  Pt midline again flushed per protocol w/ no blood return noted, wrapped in gauze and protective sleeve placed.  Pt left on foot in NAD, next appt in place

## 2020-06-17 ENCOUNTER — OUTPATIENT INFUSION SERVICES (OUTPATIENT)
Dept: ONCOLOGY | Facility: MEDICAL CENTER | Age: 60
End: 2020-06-17
Attending: INTERNAL MEDICINE
Payer: MEDICARE

## 2020-06-17 VITALS
DIASTOLIC BLOOD PRESSURE: 63 MMHG | RESPIRATION RATE: 18 BRPM | OXYGEN SATURATION: 97 % | HEART RATE: 90 BPM | SYSTOLIC BLOOD PRESSURE: 120 MMHG | TEMPERATURE: 98 F

## 2020-06-17 DIAGNOSIS — R65.20 SEPSIS DUE TO METHICILLIN RESISTANT STAPHYLOCOCCUS AUREUS (MRSA) WITH ACUTE RENAL FAILURE WITHOUT SEPTIC SHOCK, UNSPECIFIED ACUTE RENAL FAILURE TYPE (HCC): ICD-10-CM

## 2020-06-17 DIAGNOSIS — N17.9 SEPSIS DUE TO METHICILLIN RESISTANT STAPHYLOCOCCUS AUREUS (MRSA) WITH ACUTE RENAL FAILURE WITHOUT SEPTIC SHOCK, UNSPECIFIED ACUTE RENAL FAILURE TYPE (HCC): ICD-10-CM

## 2020-06-17 DIAGNOSIS — A41.02 SEPSIS DUE TO METHICILLIN RESISTANT STAPHYLOCOCCUS AUREUS (MRSA) WITH ACUTE RENAL FAILURE WITHOUT SEPTIC SHOCK, UNSPECIFIED ACUTE RENAL FAILURE TYPE (HCC): ICD-10-CM

## 2020-06-17 PROCEDURE — 700105 HCHG RX REV CODE 258: Performed by: INTERNAL MEDICINE

## 2020-06-17 PROCEDURE — 700111 HCHG RX REV CODE 636 W/ 250 OVERRIDE (IP): Performed by: INTERNAL MEDICINE

## 2020-06-17 PROCEDURE — 96365 THER/PROPH/DIAG IV INF INIT: CPT

## 2020-06-17 RX ORDER — 0.9 % SODIUM CHLORIDE 0.9 %
10-20 VIAL (ML) INJECTION PRN
Status: CANCELLED | OUTPATIENT
Start: 2020-06-18

## 2020-06-17 RX ORDER — 0.9 % SODIUM CHLORIDE 0.9 %
5 VIAL (ML) INJECTION PRN
Status: CANCELLED | OUTPATIENT
Start: 2020-06-18

## 2020-06-17 RX ADMIN — DAPTOMYCIN 430 MG: 500 INJECTION, POWDER, LYOPHILIZED, FOR SOLUTION INTRAVENOUS at 16:35

## 2020-06-18 ENCOUNTER — OUTPATIENT INFUSION SERVICES (OUTPATIENT)
Dept: ONCOLOGY | Facility: MEDICAL CENTER | Age: 60
End: 2020-06-18
Attending: INTERNAL MEDICINE
Payer: MEDICARE

## 2020-06-18 VITALS
OXYGEN SATURATION: 94 % | TEMPERATURE: 98.2 F | DIASTOLIC BLOOD PRESSURE: 65 MMHG | RESPIRATION RATE: 18 BRPM | HEART RATE: 84 BPM | SYSTOLIC BLOOD PRESSURE: 115 MMHG

## 2020-06-18 DIAGNOSIS — A41.02 SEPSIS DUE TO METHICILLIN RESISTANT STAPHYLOCOCCUS AUREUS (MRSA) WITH ACUTE RENAL FAILURE WITHOUT SEPTIC SHOCK, UNSPECIFIED ACUTE RENAL FAILURE TYPE (HCC): ICD-10-CM

## 2020-06-18 DIAGNOSIS — N17.9 SEPSIS DUE TO METHICILLIN RESISTANT STAPHYLOCOCCUS AUREUS (MRSA) WITH ACUTE RENAL FAILURE WITHOUT SEPTIC SHOCK, UNSPECIFIED ACUTE RENAL FAILURE TYPE (HCC): ICD-10-CM

## 2020-06-18 DIAGNOSIS — R65.20 SEPSIS DUE TO METHICILLIN RESISTANT STAPHYLOCOCCUS AUREUS (MRSA) WITH ACUTE RENAL FAILURE WITHOUT SEPTIC SHOCK, UNSPECIFIED ACUTE RENAL FAILURE TYPE (HCC): ICD-10-CM

## 2020-06-18 PROCEDURE — 700111 HCHG RX REV CODE 636 W/ 250 OVERRIDE (IP): Performed by: INTERNAL MEDICINE

## 2020-06-18 PROCEDURE — 700105 HCHG RX REV CODE 258: Performed by: INTERNAL MEDICINE

## 2020-06-18 PROCEDURE — 96365 THER/PROPH/DIAG IV INF INIT: CPT

## 2020-06-18 RX ORDER — 0.9 % SODIUM CHLORIDE 0.9 %
10-20 VIAL (ML) INJECTION PRN
Status: CANCELLED | OUTPATIENT
Start: 2020-06-19

## 2020-06-18 RX ORDER — 0.9 % SODIUM CHLORIDE 0.9 %
5 VIAL (ML) INJECTION PRN
Status: CANCELLED | OUTPATIENT
Start: 2020-06-19

## 2020-06-18 RX ADMIN — DAPTOMYCIN 430 MG: 500 INJECTION, POWDER, LYOPHILIZED, FOR SOLUTION INTRAVENOUS at 16:08

## 2020-06-18 NOTE — PROGRESS NOTES
Pt arrived to [facility], ambulatory, for daptomycin infusion. Pt voices no complaints. Midline flushed per policy, no blood return noted. Dapto infused with no s/sx of adverse reaction. Midline flushed per policy. Pt left IS with no s/sx of distress. Follow up appointment confirmed.

## 2020-06-18 NOTE — PROGRESS NOTES
Pt presented to infusion center for daily daptomycin. Pt reports no significant changes since yesterday. Right arm midline in place, flushed and  no blood return observed. Daptomycin infused with no s/s of adverse reaction. Changed clave, midline flushed, wrapped in gauze and protective sleeve. Has next appt, discharged home to self care.

## 2020-06-19 ENCOUNTER — OUTPATIENT INFUSION SERVICES (OUTPATIENT)
Dept: ONCOLOGY | Facility: MEDICAL CENTER | Age: 60
End: 2020-06-19
Attending: INTERNAL MEDICINE
Payer: MEDICARE

## 2020-06-19 VITALS
DIASTOLIC BLOOD PRESSURE: 66 MMHG | SYSTOLIC BLOOD PRESSURE: 123 MMHG | RESPIRATION RATE: 18 BRPM | BODY MASS INDEX: 20.17 KG/M2 | WEIGHT: 117.5 LBS | TEMPERATURE: 97.9 F | HEART RATE: 93 BPM | OXYGEN SATURATION: 97 %

## 2020-06-19 DIAGNOSIS — R65.20 SEPSIS DUE TO METHICILLIN RESISTANT STAPHYLOCOCCUS AUREUS (MRSA) WITH ACUTE RENAL FAILURE WITHOUT SEPTIC SHOCK, UNSPECIFIED ACUTE RENAL FAILURE TYPE (HCC): ICD-10-CM

## 2020-06-19 DIAGNOSIS — N17.9 SEPSIS DUE TO METHICILLIN RESISTANT STAPHYLOCOCCUS AUREUS (MRSA) WITH ACUTE RENAL FAILURE WITHOUT SEPTIC SHOCK, UNSPECIFIED ACUTE RENAL FAILURE TYPE (HCC): ICD-10-CM

## 2020-06-19 DIAGNOSIS — A41.02 SEPSIS DUE TO METHICILLIN RESISTANT STAPHYLOCOCCUS AUREUS (MRSA) WITH ACUTE RENAL FAILURE WITHOUT SEPTIC SHOCK, UNSPECIFIED ACUTE RENAL FAILURE TYPE (HCC): ICD-10-CM

## 2020-06-19 PROCEDURE — 700111 HCHG RX REV CODE 636 W/ 250 OVERRIDE (IP): Performed by: INTERNAL MEDICINE

## 2020-06-19 PROCEDURE — 700105 HCHG RX REV CODE 258: Performed by: INTERNAL MEDICINE

## 2020-06-19 PROCEDURE — 96365 THER/PROPH/DIAG IV INF INIT: CPT

## 2020-06-19 RX ORDER — 0.9 % SODIUM CHLORIDE 0.9 %
5 VIAL (ML) INJECTION PRN
Status: CANCELLED | OUTPATIENT
Start: 2020-06-20

## 2020-06-19 RX ORDER — 0.9 % SODIUM CHLORIDE 0.9 %
10-20 VIAL (ML) INJECTION PRN
Status: CANCELLED | OUTPATIENT
Start: 2020-06-20

## 2020-06-19 RX ADMIN — DAPTOMYCIN 430 MG: 500 INJECTION, POWDER, LYOPHILIZED, FOR SOLUTION INTRAVENOUS at 17:26

## 2020-06-19 ASSESSMENT — FIBROSIS 4 INDEX: FIB4 SCORE: 1.07

## 2020-06-20 ENCOUNTER — OUTPATIENT INFUSION SERVICES (OUTPATIENT)
Dept: ONCOLOGY | Facility: MEDICAL CENTER | Age: 60
End: 2020-06-20
Attending: INTERNAL MEDICINE
Payer: MEDICARE

## 2020-06-20 VITALS
OXYGEN SATURATION: 95 % | SYSTOLIC BLOOD PRESSURE: 126 MMHG | TEMPERATURE: 97.4 F | RESPIRATION RATE: 18 BRPM | DIASTOLIC BLOOD PRESSURE: 63 MMHG | HEART RATE: 87 BPM

## 2020-06-20 DIAGNOSIS — N17.9 SEPSIS DUE TO METHICILLIN RESISTANT STAPHYLOCOCCUS AUREUS (MRSA) WITH ACUTE RENAL FAILURE WITHOUT SEPTIC SHOCK, UNSPECIFIED ACUTE RENAL FAILURE TYPE (HCC): ICD-10-CM

## 2020-06-20 DIAGNOSIS — R65.20 SEPSIS DUE TO METHICILLIN RESISTANT STAPHYLOCOCCUS AUREUS (MRSA) WITH ACUTE RENAL FAILURE WITHOUT SEPTIC SHOCK, UNSPECIFIED ACUTE RENAL FAILURE TYPE (HCC): ICD-10-CM

## 2020-06-20 DIAGNOSIS — A41.02 SEPSIS DUE TO METHICILLIN RESISTANT STAPHYLOCOCCUS AUREUS (MRSA) WITH ACUTE RENAL FAILURE WITHOUT SEPTIC SHOCK, UNSPECIFIED ACUTE RENAL FAILURE TYPE (HCC): ICD-10-CM

## 2020-06-20 PROCEDURE — 700105 HCHG RX REV CODE 258: Performed by: INTERNAL MEDICINE

## 2020-06-20 PROCEDURE — 96365 THER/PROPH/DIAG IV INF INIT: CPT

## 2020-06-20 PROCEDURE — 700111 HCHG RX REV CODE 636 W/ 250 OVERRIDE (IP): Performed by: INTERNAL MEDICINE

## 2020-06-20 PROCEDURE — 96366 THER/PROPH/DIAG IV INF ADDON: CPT

## 2020-06-20 RX ORDER — 0.9 % SODIUM CHLORIDE 0.9 %
5 VIAL (ML) INJECTION PRN
Status: CANCELLED | OUTPATIENT
Start: 2020-06-21

## 2020-06-20 RX ORDER — 0.9 % SODIUM CHLORIDE 0.9 %
10-20 VIAL (ML) INJECTION PRN
Status: CANCELLED | OUTPATIENT
Start: 2020-06-21

## 2020-06-20 RX ADMIN — DAPTOMYCIN 430 MG: 500 INJECTION, POWDER, LYOPHILIZED, FOR SOLUTION INTRAVENOUS at 17:28

## 2020-06-20 NOTE — PROGRESS NOTES
Here for daily daptomycin. Denies any complaints. Midline in place with no blood return. Daptomycin infused with no reactions. Claves changed per protocol. Discharged in no distress. Returns tomorrow.

## 2020-06-21 ENCOUNTER — OUTPATIENT INFUSION SERVICES (OUTPATIENT)
Dept: ONCOLOGY | Facility: MEDICAL CENTER | Age: 60
End: 2020-06-21
Attending: INTERNAL MEDICINE
Payer: MEDICARE

## 2020-06-21 VITALS
OXYGEN SATURATION: 94 % | HEART RATE: 81 BPM | DIASTOLIC BLOOD PRESSURE: 71 MMHG | SYSTOLIC BLOOD PRESSURE: 141 MMHG | TEMPERATURE: 98 F | RESPIRATION RATE: 18 BRPM

## 2020-06-21 DIAGNOSIS — R65.20 SEPSIS DUE TO METHICILLIN RESISTANT STAPHYLOCOCCUS AUREUS (MRSA) WITH ACUTE RENAL FAILURE WITHOUT SEPTIC SHOCK, UNSPECIFIED ACUTE RENAL FAILURE TYPE (HCC): ICD-10-CM

## 2020-06-21 DIAGNOSIS — A41.02 SEPSIS DUE TO METHICILLIN RESISTANT STAPHYLOCOCCUS AUREUS (MRSA) WITH ACUTE RENAL FAILURE WITHOUT SEPTIC SHOCK, UNSPECIFIED ACUTE RENAL FAILURE TYPE (HCC): ICD-10-CM

## 2020-06-21 DIAGNOSIS — N17.9 SEPSIS DUE TO METHICILLIN RESISTANT STAPHYLOCOCCUS AUREUS (MRSA) WITH ACUTE RENAL FAILURE WITHOUT SEPTIC SHOCK, UNSPECIFIED ACUTE RENAL FAILURE TYPE (HCC): ICD-10-CM

## 2020-06-21 PROCEDURE — 96365 THER/PROPH/DIAG IV INF INIT: CPT

## 2020-06-21 PROCEDURE — 700105 HCHG RX REV CODE 258: Performed by: INTERNAL MEDICINE

## 2020-06-21 PROCEDURE — 700111 HCHG RX REV CODE 636 W/ 250 OVERRIDE (IP): Performed by: INTERNAL MEDICINE

## 2020-06-21 RX ORDER — 0.9 % SODIUM CHLORIDE 0.9 %
10-20 VIAL (ML) INJECTION PRN
Status: CANCELLED | OUTPATIENT
Start: 2020-06-22

## 2020-06-21 RX ORDER — 0.9 % SODIUM CHLORIDE 0.9 %
5 VIAL (ML) INJECTION PRN
Status: CANCELLED | OUTPATIENT
Start: 2020-06-22

## 2020-06-21 RX ADMIN — DAPTOMYCIN 430 MG: 500 INJECTION, POWDER, LYOPHILIZED, FOR SOLUTION INTRAVENOUS at 16:51

## 2020-06-21 NOTE — PROGRESS NOTES
Here for daily daptomycin. Denies any complaints. Midline in place with no blood return. Dressing changed per protocol. Daptomycin infused with no reactions. Claves changed per protocol. Discharged in no distress. Returns tomorrow.

## 2020-06-22 ENCOUNTER — OUTPATIENT INFUSION SERVICES (OUTPATIENT)
Dept: ONCOLOGY | Facility: MEDICAL CENTER | Age: 60
End: 2020-06-22
Attending: INTERNAL MEDICINE
Payer: MEDICARE

## 2020-06-22 VITALS
RESPIRATION RATE: 18 BRPM | DIASTOLIC BLOOD PRESSURE: 73 MMHG | HEART RATE: 81 BPM | WEIGHT: 112.21 LBS | BODY MASS INDEX: 19.16 KG/M2 | OXYGEN SATURATION: 96 % | HEIGHT: 64 IN | SYSTOLIC BLOOD PRESSURE: 139 MMHG | TEMPERATURE: 97 F

## 2020-06-22 DIAGNOSIS — R65.20 SEPSIS DUE TO METHICILLIN RESISTANT STAPHYLOCOCCUS AUREUS (MRSA) WITH ACUTE RENAL FAILURE WITHOUT SEPTIC SHOCK, UNSPECIFIED ACUTE RENAL FAILURE TYPE (HCC): ICD-10-CM

## 2020-06-22 DIAGNOSIS — N17.9 SEPSIS DUE TO METHICILLIN RESISTANT STAPHYLOCOCCUS AUREUS (MRSA) WITH ACUTE RENAL FAILURE WITHOUT SEPTIC SHOCK, UNSPECIFIED ACUTE RENAL FAILURE TYPE (HCC): ICD-10-CM

## 2020-06-22 DIAGNOSIS — A41.02 SEPSIS DUE TO METHICILLIN RESISTANT STAPHYLOCOCCUS AUREUS (MRSA) WITH ACUTE RENAL FAILURE WITHOUT SEPTIC SHOCK, UNSPECIFIED ACUTE RENAL FAILURE TYPE (HCC): ICD-10-CM

## 2020-06-22 LAB
ALBUMIN SERPL BCP-MCNC: 3.3 G/DL (ref 3.2–4.9)
ALBUMIN/GLOB SERPL: 0.7 G/DL
ALP SERPL-CCNC: 66 U/L (ref 30–99)
ALT SERPL-CCNC: 22 U/L (ref 2–50)
ANION GAP SERPL CALC-SCNC: 15 MMOL/L (ref 7–16)
ANISOCYTOSIS BLD QL SMEAR: ABNORMAL
AST SERPL-CCNC: 40 U/L (ref 12–45)
BASOPHILS # BLD AUTO: 0.5 % (ref 0–1.8)
BASOPHILS # BLD: 0.05 K/UL (ref 0–0.12)
BILIRUB SERPL-MCNC: 0.2 MG/DL (ref 0.1–1.5)
BUN SERPL-MCNC: 22 MG/DL (ref 8–22)
CALCIUM SERPL-MCNC: 9.4 MG/DL (ref 8.5–10.5)
CHLORIDE SERPL-SCNC: 96 MMOL/L (ref 96–112)
CK SERPL-CCNC: 424 U/L (ref 0–154)
CO2 SERPL-SCNC: 21 MMOL/L (ref 20–33)
COMMENT 1642: NORMAL
CREAT SERPL-MCNC: 1.66 MG/DL (ref 0.5–1.4)
CRP SERPL HS-MCNC: 0.71 MG/DL (ref 0–0.75)
EOSINOPHIL # BLD AUTO: 0.05 K/UL (ref 0–0.51)
EOSINOPHIL NFR BLD: 0.5 % (ref 0–6.9)
ERYTHROCYTE [DISTWIDTH] IN BLOOD BY AUTOMATED COUNT: 49.7 FL (ref 35.9–50)
ERYTHROCYTE [SEDIMENTATION RATE] IN BLOOD BY WESTERGREN METHOD: 100 MM/HOUR (ref 0–30)
GLOBULIN SER CALC-MCNC: 4.8 G/DL (ref 1.9–3.5)
GLUCOSE SERPL-MCNC: 210 MG/DL (ref 65–99)
HCT VFR BLD AUTO: 32.5 % (ref 37–47)
HGB BLD-MCNC: 9.4 G/DL (ref 12–16)
IMM GRANULOCYTES # BLD AUTO: 0.04 K/UL (ref 0–0.11)
IMM GRANULOCYTES NFR BLD AUTO: 0.4 % (ref 0–0.9)
LYMPHOCYTES # BLD AUTO: 1.56 K/UL (ref 1–4.8)
LYMPHOCYTES NFR BLD: 15.1 % (ref 22–41)
MCH RBC QN AUTO: 25.5 PG (ref 27–33)
MCHC RBC AUTO-ENTMCNC: 28.9 G/DL (ref 33.6–35)
MCV RBC AUTO: 88.1 FL (ref 81.4–97.8)
MICROCYTES BLD QL SMEAR: ABNORMAL
MONOCYTES # BLD AUTO: 0.34 K/UL (ref 0–0.85)
MONOCYTES NFR BLD AUTO: 3.3 % (ref 0–13.4)
MORPHOLOGY BLD-IMP: NORMAL
NEUTROPHILS # BLD AUTO: 8.31 K/UL (ref 2–7.15)
NEUTROPHILS NFR BLD: 80.2 % (ref 44–72)
NRBC # BLD AUTO: 0 K/UL
NRBC BLD-RTO: 0 /100 WBC
OVALOCYTES BLD QL SMEAR: NORMAL
PLATELET # BLD AUTO: 447 K/UL (ref 164–446)
PLATELET BLD QL SMEAR: NORMAL
PMV BLD AUTO: 10.5 FL (ref 9–12.9)
POIKILOCYTOSIS BLD QL SMEAR: NORMAL
POLYCHROMASIA BLD QL SMEAR: NORMAL
POTASSIUM SERPL-SCNC: 5.1 MMOL/L (ref 3.6–5.5)
PROT SERPL-MCNC: 8.1 G/DL (ref 6–8.2)
RBC # BLD AUTO: 3.69 M/UL (ref 4.2–5.4)
RBC BLD AUTO: PRESENT
SODIUM SERPL-SCNC: 132 MMOL/L (ref 135–145)
WBC # BLD AUTO: 10.4 K/UL (ref 4.8–10.8)

## 2020-06-22 PROCEDURE — 96365 THER/PROPH/DIAG IV INF INIT: CPT

## 2020-06-22 PROCEDURE — 700105 HCHG RX REV CODE 258: Performed by: INTERNAL MEDICINE

## 2020-06-22 PROCEDURE — 85652 RBC SED RATE AUTOMATED: CPT

## 2020-06-22 PROCEDURE — 85025 COMPLETE CBC W/AUTO DIFF WBC: CPT

## 2020-06-22 PROCEDURE — 36415 COLL VENOUS BLD VENIPUNCTURE: CPT

## 2020-06-22 PROCEDURE — 86140 C-REACTIVE PROTEIN: CPT

## 2020-06-22 PROCEDURE — 700111 HCHG RX REV CODE 636 W/ 250 OVERRIDE (IP): Performed by: INTERNAL MEDICINE

## 2020-06-22 PROCEDURE — 82550 ASSAY OF CK (CPK): CPT

## 2020-06-22 PROCEDURE — 80053 COMPREHEN METABOLIC PANEL: CPT

## 2020-06-22 RX ORDER — 0.9 % SODIUM CHLORIDE 0.9 %
5 VIAL (ML) INJECTION PRN
Status: CANCELLED | OUTPATIENT
Start: 2020-06-23

## 2020-06-22 RX ORDER — 0.9 % SODIUM CHLORIDE 0.9 %
10-20 VIAL (ML) INJECTION PRN
Status: CANCELLED | OUTPATIENT
Start: 2020-06-23

## 2020-06-22 RX ORDER — HEPARIN SODIUM (PORCINE) LOCK FLUSH IV SOLN 100 UNIT/ML 100 UNIT/ML
500 SOLUTION INTRAVENOUS PRN
Status: CANCELLED | OUTPATIENT
Start: 2020-06-23

## 2020-06-22 RX ADMIN — DAPTOMYCIN 430 MG: 500 INJECTION, POWDER, LYOPHILIZED, FOR SOLUTION INTRAVENOUS at 16:12

## 2020-06-22 ASSESSMENT — FIBROSIS 4 INDEX: FIB4 SCORE: 1.07

## 2020-06-22 NOTE — PROGRESS NOTES
Patient arrived ambulatory to [facility] for daily D[facility].  She denies any acute changes from previous appointment.  LUE midline flushes well, no blood return noted.  Daptomycin infused, pt tolerated well.  Midline flushed and covered.  Confirmed appointment for tomorrow and pt ambulated out of clinic in no apparent distress.

## 2020-06-22 NOTE — PROGRESS NOTES
Pt arrived ambulatory to  for daily Daptomycin.  POC discussed.  RUE midline in place, flushes well without blood return.  Labs drawn as ordered from [facility].  Dapto infused without complication.  Line flushed and protected with gauze and arm wrap.  Next appointment confirmed.  Pt discharged from IS in NAD under self care.

## 2020-06-23 ENCOUNTER — OUTPATIENT INFUSION SERVICES (OUTPATIENT)
Dept: ONCOLOGY | Facility: MEDICAL CENTER | Age: 60
End: 2020-06-23
Attending: INTERNAL MEDICINE
Payer: MEDICARE

## 2020-06-23 VITALS
TEMPERATURE: 97 F | OXYGEN SATURATION: 95 % | DIASTOLIC BLOOD PRESSURE: 77 MMHG | SYSTOLIC BLOOD PRESSURE: 137 MMHG | HEART RATE: 81 BPM | RESPIRATION RATE: 18 BRPM

## 2020-06-23 DIAGNOSIS — R65.20 SEPSIS DUE TO METHICILLIN RESISTANT STAPHYLOCOCCUS AUREUS (MRSA) WITH ACUTE RENAL FAILURE WITHOUT SEPTIC SHOCK, UNSPECIFIED ACUTE RENAL FAILURE TYPE (HCC): ICD-10-CM

## 2020-06-23 DIAGNOSIS — N17.9 SEPSIS DUE TO METHICILLIN RESISTANT STAPHYLOCOCCUS AUREUS (MRSA) WITH ACUTE RENAL FAILURE WITHOUT SEPTIC SHOCK, UNSPECIFIED ACUTE RENAL FAILURE TYPE (HCC): ICD-10-CM

## 2020-06-23 DIAGNOSIS — A41.02 SEPSIS DUE TO METHICILLIN RESISTANT STAPHYLOCOCCUS AUREUS (MRSA) WITH ACUTE RENAL FAILURE WITHOUT SEPTIC SHOCK, UNSPECIFIED ACUTE RENAL FAILURE TYPE (HCC): ICD-10-CM

## 2020-06-23 PROCEDURE — 96365 THER/PROPH/DIAG IV INF INIT: CPT

## 2020-06-23 PROCEDURE — 700105 HCHG RX REV CODE 258: Performed by: INTERNAL MEDICINE

## 2020-06-23 PROCEDURE — 700111 HCHG RX REV CODE 636 W/ 250 OVERRIDE (IP): Performed by: INTERNAL MEDICINE

## 2020-06-23 RX ORDER — 0.9 % SODIUM CHLORIDE 0.9 %
10-20 VIAL (ML) INJECTION PRN
Status: CANCELLED | OUTPATIENT
Start: 2020-06-24

## 2020-06-23 RX ORDER — 0.9 % SODIUM CHLORIDE 0.9 %
5 VIAL (ML) INJECTION PRN
Status: CANCELLED | OUTPATIENT
Start: 2020-06-24

## 2020-06-23 RX ORDER — HEPARIN SODIUM (PORCINE) LOCK FLUSH IV SOLN 100 UNIT/ML 100 UNIT/ML
500 SOLUTION INTRAVENOUS PRN
Status: CANCELLED | OUTPATIENT
Start: 2020-06-24

## 2020-06-23 RX ADMIN — DAPTOMYCIN 430 MG: 500 INJECTION, POWDER, LYOPHILIZED, FOR SOLUTION INTRAVENOUS at 16:05

## 2020-06-23 NOTE — PROGRESS NOTES
Pt presented to infusion center for daily daptomycin. Pt reports no significant changes since yesterday. Right arm midline in place, flushed and no blood return observed. Daptomycin infused with no s/s of adverse reaction. Midline flushed, clave changed per protocol, wrapped in gauze and protective sleeve. Two pages sent to Dr. Mayra Rodriguez to report abnormal labs, no response returned at this time. Has next appt, discharged home to self care.

## 2020-06-24 ENCOUNTER — OUTPATIENT INFUSION SERVICES (OUTPATIENT)
Dept: ONCOLOGY | Facility: MEDICAL CENTER | Age: 60
End: 2020-06-24
Attending: INTERNAL MEDICINE
Payer: MEDICARE

## 2020-06-24 ENCOUNTER — TELEPHONE (OUTPATIENT)
Dept: INFECTIOUS DISEASES | Facility: MEDICAL CENTER | Age: 60
End: 2020-06-24

## 2020-06-24 VITALS
RESPIRATION RATE: 18 BRPM | HEART RATE: 70 BPM | SYSTOLIC BLOOD PRESSURE: 137 MMHG | DIASTOLIC BLOOD PRESSURE: 77 MMHG | TEMPERATURE: 97.3 F | OXYGEN SATURATION: 94 %

## 2020-06-24 DIAGNOSIS — R65.20 SEPSIS DUE TO METHICILLIN RESISTANT STAPHYLOCOCCUS AUREUS (MRSA) WITH ACUTE RENAL FAILURE WITHOUT SEPTIC SHOCK, UNSPECIFIED ACUTE RENAL FAILURE TYPE (HCC): ICD-10-CM

## 2020-06-24 DIAGNOSIS — A41.02 SEPSIS DUE TO METHICILLIN RESISTANT STAPHYLOCOCCUS AUREUS (MRSA) WITH ACUTE RENAL FAILURE WITHOUT SEPTIC SHOCK, UNSPECIFIED ACUTE RENAL FAILURE TYPE (HCC): ICD-10-CM

## 2020-06-24 DIAGNOSIS — N17.9 SEPSIS DUE TO METHICILLIN RESISTANT STAPHYLOCOCCUS AUREUS (MRSA) WITH ACUTE RENAL FAILURE WITHOUT SEPTIC SHOCK, UNSPECIFIED ACUTE RENAL FAILURE TYPE (HCC): ICD-10-CM

## 2020-06-24 DIAGNOSIS — B95.61 STAPHYLOCOCCUS AUREUS BACTEREMIA: ICD-10-CM

## 2020-06-24 DIAGNOSIS — R78.81 STAPHYLOCOCCUS AUREUS BACTEREMIA: ICD-10-CM

## 2020-06-24 LAB — NORWALK VIRUS PCR NORWK1: NORMAL

## 2020-06-24 PROCEDURE — 700111 HCHG RX REV CODE 636 W/ 250 OVERRIDE (IP): Performed by: INTERNAL MEDICINE

## 2020-06-24 PROCEDURE — 96365 THER/PROPH/DIAG IV INF INIT: CPT

## 2020-06-24 PROCEDURE — 700105 HCHG RX REV CODE 258: Performed by: INTERNAL MEDICINE

## 2020-06-24 RX ORDER — 0.9 % SODIUM CHLORIDE 0.9 %
10-20 VIAL (ML) INJECTION PRN
Status: CANCELLED | OUTPATIENT
Start: 2020-06-25

## 2020-06-24 RX ORDER — HEPARIN SODIUM (PORCINE) LOCK FLUSH IV SOLN 100 UNIT/ML 100 UNIT/ML
500 SOLUTION INTRAVENOUS PRN
Status: CANCELLED | OUTPATIENT
Start: 2020-06-25

## 2020-06-24 RX ORDER — 0.9 % SODIUM CHLORIDE 0.9 %
5 VIAL (ML) INJECTION PRN
Status: CANCELLED | OUTPATIENT
Start: 2020-06-25

## 2020-06-24 RX ADMIN — DAPTOMYCIN 430 MG: 500 INJECTION, POWDER, LYOPHILIZED, FOR SOLUTION INTRAVENOUS at 16:09

## 2020-06-24 NOTE — TELEPHONE ENCOUNTER
Allergic rhinoconjunctivitis; Eustachian tube dysfunction  Plan: Skin prick testing positive to ragweed pollen, molds 7/2018. Prior allergy injections for grass/ragweed pollen and mold with Dr. Topete for 3-4 years. He did not find these to be particularly beneficial. Sinus culture negative 5/2018.       No recent issues. ETD has improved.      · Continue loratadine 10 mg daily as needed.   · May use afrin 2 sprays in each nostril 30 minutes prior to flying.   · Continue flonase 2 sprays in each nostril once daily as needed.   · Continue astelin 2 sprays in each nostril nightly as needed. May cause drowsiness.   · Continue once daily nasal saline irrigation with distilled room temperature water mixed with saline packets.      Mild persistent asthma  Plan: Mild obstruction reversible 7/2018. Uses albuterol with exercise. Oral steroids 8/2019 and 9/2019.      Well controlled.       · Continue Flovent 110 mcg 2 puffs twice daily. Consistent use emphasized.   · Continue albuterol 2 puffs every 4 hours as needed should respiratory symptoms arise.  · Consider the addition of singulair if symptoms persist.      Follow up in 6 month(s)     Called and LM for pt to return my call to schedule a hospital follow up appointment with Infectious Disease.

## 2020-06-24 NOTE — TELEPHONE ENCOUNTER
Martin Thornton,     Thank you for informing us regarding this. I would like to continue Daptomycin at the moment because the CK elevation hasn't reached >10x upper limit normal.     I would like to repeat CK on Monday 6/29 and if CK continues to increase >1000 and pt's symptoms are worsening, we could stop the daptomycin. She did have RAF when she was on vancomycin. One option we have is ceftaroline.     Thank you    Lowell Ureña D.O.  Infectious Disease      ----- Message from Mayra Rodriguez M.D. sent at 6/24/2020  2:23 PM PDT -----  Regarding: FW: CPK level    ----- Message -----  From: Hillary Peña R.N.  Sent: 6/23/2020   4:42 PM PDT  To: Mayra Rodriguez M.D.  Subject: CPK level                                        Good evening,    Savita Mcclain reports arms as sore and has elevated CPK level of 424 as of 6/22/20. CPK has increased from 71 on 6/16/20.    Thank you for your time,  Hillary Peña RN

## 2020-06-25 ENCOUNTER — OUTPATIENT INFUSION SERVICES (OUTPATIENT)
Dept: ONCOLOGY | Facility: MEDICAL CENTER | Age: 60
End: 2020-06-25
Attending: INTERNAL MEDICINE
Payer: MEDICARE

## 2020-06-25 VITALS
SYSTOLIC BLOOD PRESSURE: 155 MMHG | DIASTOLIC BLOOD PRESSURE: 87 MMHG | HEART RATE: 68 BPM | TEMPERATURE: 98.2 F | RESPIRATION RATE: 18 BRPM | OXYGEN SATURATION: 96 %

## 2020-06-25 DIAGNOSIS — N17.9 SEPSIS DUE TO METHICILLIN RESISTANT STAPHYLOCOCCUS AUREUS (MRSA) WITH ACUTE RENAL FAILURE WITHOUT SEPTIC SHOCK, UNSPECIFIED ACUTE RENAL FAILURE TYPE (HCC): ICD-10-CM

## 2020-06-25 DIAGNOSIS — A41.02 SEPSIS DUE TO METHICILLIN RESISTANT STAPHYLOCOCCUS AUREUS (MRSA) WITH ACUTE RENAL FAILURE WITHOUT SEPTIC SHOCK, UNSPECIFIED ACUTE RENAL FAILURE TYPE (HCC): ICD-10-CM

## 2020-06-25 DIAGNOSIS — R65.20 SEPSIS DUE TO METHICILLIN RESISTANT STAPHYLOCOCCUS AUREUS (MRSA) WITH ACUTE RENAL FAILURE WITHOUT SEPTIC SHOCK, UNSPECIFIED ACUTE RENAL FAILURE TYPE (HCC): ICD-10-CM

## 2020-06-25 PROCEDURE — 700105 HCHG RX REV CODE 258: Performed by: INTERNAL MEDICINE

## 2020-06-25 PROCEDURE — 96365 THER/PROPH/DIAG IV INF INIT: CPT

## 2020-06-25 PROCEDURE — 700111 HCHG RX REV CODE 636 W/ 250 OVERRIDE (IP): Performed by: INTERNAL MEDICINE

## 2020-06-25 RX ORDER — 0.9 % SODIUM CHLORIDE 0.9 %
10-20 VIAL (ML) INJECTION PRN
Status: CANCELLED | OUTPATIENT
Start: 2020-06-26

## 2020-06-25 RX ORDER — 0.9 % SODIUM CHLORIDE 0.9 %
5 VIAL (ML) INJECTION PRN
Status: CANCELLED | OUTPATIENT
Start: 2020-06-26

## 2020-06-25 RX ORDER — HEPARIN SODIUM (PORCINE) LOCK FLUSH IV SOLN 100 UNIT/ML 100 UNIT/ML
500 SOLUTION INTRAVENOUS PRN
Status: CANCELLED | OUTPATIENT
Start: 2020-06-26

## 2020-06-25 RX ADMIN — DAPTOMYCIN 430 MG: 500 INJECTION, POWDER, LYOPHILIZED, FOR SOLUTION INTRAVENOUS at 16:25

## 2020-06-25 NOTE — PROGRESS NOTES
Pt arrived to IS, ambulatory, for daptomycin. Pt c/o significant bilateral arm pain. Pt states it has increased from a 4/10 to a 7/10 since yesterday. Pt's MD is aware of her elevated CPK of 424 on 6/22. Per Dr. Ureña, continue daptomycin and repeat labs on Monday 6/29. Second Epic message sent to MD to inform him of pt's increasing pain and to inform MD that patient is taking Lovastatin while on daptomycin. Significant education provided to patient about risks of increased CPK levels and to present to ER if symptoms worsen, pt verbalized understanding. Midline flushed per policy, no blood return noted. Dapto infused with no s/sx of adverse reaction. Midline flushed per policy. Pt left IS with no s/sx of distress. Follow up appointment confirmed.

## 2020-06-26 ENCOUNTER — HOSPITAL ENCOUNTER (OUTPATIENT)
Facility: MEDICAL CENTER | Age: 60
DRG: 558 | End: 2020-06-26
Payer: MEDICARE

## 2020-06-26 ENCOUNTER — OUTPATIENT INFUSION SERVICES (OUTPATIENT)
Dept: ONCOLOGY | Facility: MEDICAL CENTER | Age: 60
End: 2020-06-26
Attending: INTERNAL MEDICINE
Payer: MEDICARE

## 2020-06-26 ENCOUNTER — HOSPITAL ENCOUNTER (INPATIENT)
Facility: MEDICAL CENTER | Age: 60
LOS: 5 days | DRG: 558 | End: 2020-07-01
Attending: HOSPITALIST | Admitting: HOSPITALIST
Payer: MEDICARE

## 2020-06-26 ENCOUNTER — DOCUMENTATION (OUTPATIENT)
Dept: INFECTIOUS DISEASES | Facility: MEDICAL CENTER | Age: 60
End: 2020-06-26

## 2020-06-26 VITALS
DIASTOLIC BLOOD PRESSURE: 81 MMHG | SYSTOLIC BLOOD PRESSURE: 156 MMHG | OXYGEN SATURATION: 93 % | RESPIRATION RATE: 18 BRPM | TEMPERATURE: 97.3 F | HEART RATE: 76 BPM

## 2020-06-26 DIAGNOSIS — A41.02 SEPSIS DUE TO METHICILLIN RESISTANT STAPHYLOCOCCUS AUREUS (MRSA) WITH ACUTE RENAL FAILURE WITHOUT SEPTIC SHOCK, UNSPECIFIED ACUTE RENAL FAILURE TYPE (HCC): ICD-10-CM

## 2020-06-26 DIAGNOSIS — R65.20 SEPSIS DUE TO METHICILLIN RESISTANT STAPHYLOCOCCUS AUREUS (MRSA) WITH ACUTE RENAL FAILURE WITHOUT SEPTIC SHOCK, UNSPECIFIED ACUTE RENAL FAILURE TYPE (HCC): ICD-10-CM

## 2020-06-26 DIAGNOSIS — N17.9 SEPSIS DUE TO METHICILLIN RESISTANT STAPHYLOCOCCUS AUREUS (MRSA) WITH ACUTE RENAL FAILURE WITHOUT SEPTIC SHOCK, UNSPECIFIED ACUTE RENAL FAILURE TYPE (HCC): ICD-10-CM

## 2020-06-26 PROBLEM — M62.82 NON-TRAUMATIC RHABDOMYOLYSIS: Status: ACTIVE | Noted: 2020-06-26

## 2020-06-26 PROBLEM — N18.30 STAGE 3 CHRONIC KIDNEY DISEASE: Status: ACTIVE | Noted: 2020-06-26

## 2020-06-26 LAB
CK SERPL-CCNC: 9759 U/L (ref 0–154)
CREAT SERPL-MCNC: 1.67 MG/DL (ref 0.5–1.4)

## 2020-06-26 PROCEDURE — 96365 THER/PROPH/DIAG IV INF INIT: CPT

## 2020-06-26 PROCEDURE — 82565 ASSAY OF CREATININE: CPT

## 2020-06-26 PROCEDURE — 82550 ASSAY OF CK (CPK): CPT

## 2020-06-26 PROCEDURE — 700111 HCHG RX REV CODE 636 W/ 250 OVERRIDE (IP): Performed by: INTERNAL MEDICINE

## 2020-06-26 PROCEDURE — 700102 HCHG RX REV CODE 250 W/ 637 OVERRIDE(OP): Performed by: INTERNAL MEDICINE

## 2020-06-26 PROCEDURE — A9270 NON-COVERED ITEM OR SERVICE: HCPCS | Performed by: INTERNAL MEDICINE

## 2020-06-26 PROCEDURE — 700105 HCHG RX REV CODE 258: Performed by: INTERNAL MEDICINE

## 2020-06-26 PROCEDURE — 99223 1ST HOSP IP/OBS HIGH 75: CPT | Mod: AI | Performed by: INTERNAL MEDICINE

## 2020-06-26 PROCEDURE — 770006 HCHG ROOM/CARE - MED/SURG/GYN SEMI*

## 2020-06-26 PROCEDURE — 36415 COLL VENOUS BLD VENIPUNCTURE: CPT

## 2020-06-26 RX ORDER — 0.9 % SODIUM CHLORIDE 0.9 %
10-20 VIAL (ML) INJECTION PRN
Status: CANCELLED | OUTPATIENT
Start: 2020-06-27

## 2020-06-26 RX ORDER — POLYETHYLENE GLYCOL 3350 17 G/17G
1 POWDER, FOR SOLUTION ORAL
Status: DISCONTINUED | OUTPATIENT
Start: 2020-06-26 | End: 2020-07-01 | Stop reason: HOSPADM

## 2020-06-26 RX ORDER — SODIUM CHLORIDE 9 MG/ML
INJECTION, SOLUTION INTRAVENOUS CONTINUOUS
Status: DISCONTINUED | OUTPATIENT
Start: 2020-06-26 | End: 2020-07-01 | Stop reason: HOSPADM

## 2020-06-26 RX ORDER — PREDNISONE 10 MG/1
10 TABLET ORAL DAILY
Status: DISCONTINUED | OUTPATIENT
Start: 2020-06-27 | End: 2020-07-01 | Stop reason: HOSPADM

## 2020-06-26 RX ORDER — ALBUTEROL SULFATE 90 UG/1
2 AEROSOL, METERED RESPIRATORY (INHALATION) EVERY 4 HOURS PRN
Status: DISCONTINUED | OUTPATIENT
Start: 2020-06-26 | End: 2020-07-01 | Stop reason: HOSPADM

## 2020-06-26 RX ORDER — METOPROLOL SUCCINATE 25 MG/1
25 TABLET, EXTENDED RELEASE ORAL DAILY
Status: DISCONTINUED | OUTPATIENT
Start: 2020-06-27 | End: 2020-07-01 | Stop reason: HOSPADM

## 2020-06-26 RX ORDER — HEPARIN SODIUM (PORCINE) LOCK FLUSH IV SOLN 100 UNIT/ML 100 UNIT/ML
500 SOLUTION INTRAVENOUS PRN
Status: CANCELLED | OUTPATIENT
Start: 2020-06-27

## 2020-06-26 RX ORDER — BISACODYL 10 MG
10 SUPPOSITORY, RECTAL RECTAL
Status: DISCONTINUED | OUTPATIENT
Start: 2020-06-26 | End: 2020-07-01 | Stop reason: HOSPADM

## 2020-06-26 RX ORDER — ACETAMINOPHEN 325 MG/1
650 TABLET ORAL EVERY 6 HOURS PRN
Status: DISCONTINUED | OUTPATIENT
Start: 2020-06-26 | End: 2020-07-01 | Stop reason: HOSPADM

## 2020-06-26 RX ORDER — TIMOLOL MALEATE 5 MG/ML
1 SOLUTION/ DROPS OPHTHALMIC DAILY
Status: DISCONTINUED | OUTPATIENT
Start: 2020-06-27 | End: 2020-07-01 | Stop reason: HOSPADM

## 2020-06-26 RX ORDER — AMOXICILLIN 250 MG
2 CAPSULE ORAL 2 TIMES DAILY
Status: DISCONTINUED | OUTPATIENT
Start: 2020-06-26 | End: 2020-07-01 | Stop reason: HOSPADM

## 2020-06-26 RX ORDER — HEPARIN SODIUM 5000 [USP'U]/ML
5000 INJECTION, SOLUTION INTRAVENOUS; SUBCUTANEOUS EVERY 8 HOURS
Status: DISCONTINUED | OUTPATIENT
Start: 2020-06-26 | End: 2020-07-01 | Stop reason: HOSPADM

## 2020-06-26 RX ORDER — DEXTROSE MONOHYDRATE 25 G/50ML
50 INJECTION, SOLUTION INTRAVENOUS
Status: DISCONTINUED | OUTPATIENT
Start: 2020-06-26 | End: 2020-07-01 | Stop reason: HOSPADM

## 2020-06-26 RX ORDER — TIZANIDINE 4 MG/1
4 TABLET ORAL 2 TIMES DAILY PRN
Status: DISCONTINUED | OUTPATIENT
Start: 2020-06-26 | End: 2020-07-01 | Stop reason: HOSPADM

## 2020-06-26 RX ORDER — LABETALOL HYDROCHLORIDE 5 MG/ML
10 INJECTION, SOLUTION INTRAVENOUS EVERY 4 HOURS PRN
Status: DISCONTINUED | OUTPATIENT
Start: 2020-06-26 | End: 2020-07-01 | Stop reason: HOSPADM

## 2020-06-26 RX ORDER — OMEPRAZOLE 20 MG/1
20 CAPSULE, DELAYED RELEASE ORAL DAILY
Status: DISCONTINUED | OUTPATIENT
Start: 2020-06-27 | End: 2020-07-01 | Stop reason: HOSPADM

## 2020-06-26 RX ORDER — HYDROCODONE BITARTRATE AND ACETAMINOPHEN 5; 325 MG/1; MG/1
1 TABLET ORAL 3 TIMES DAILY PRN
Status: DISCONTINUED | OUTPATIENT
Start: 2020-06-26 | End: 2020-07-01 | Stop reason: HOSPADM

## 2020-06-26 RX ORDER — LATANOPROST 50 UG/ML
1 SOLUTION/ DROPS OPHTHALMIC
Status: DISCONTINUED | OUTPATIENT
Start: 2020-06-26 | End: 2020-07-01 | Stop reason: HOSPADM

## 2020-06-26 RX ORDER — 0.9 % SODIUM CHLORIDE 0.9 %
5 VIAL (ML) INJECTION PRN
Status: CANCELLED | OUTPATIENT
Start: 2020-06-27

## 2020-06-26 RX ORDER — ESCITALOPRAM OXALATE 10 MG/1
20 TABLET ORAL DAILY
Status: DISCONTINUED | OUTPATIENT
Start: 2020-06-27 | End: 2020-07-01 | Stop reason: HOSPADM

## 2020-06-26 RX ADMIN — LATANOPROST 1 DROP: 50 SOLUTION OPHTHALMIC at 22:22

## 2020-06-26 RX ADMIN — DAPTOMYCIN 430 MG: 500 INJECTION, POWDER, LYOPHILIZED, FOR SOLUTION INTRAVENOUS at 16:32

## 2020-06-26 RX ADMIN — HEPARIN SODIUM 5000 UNITS: 5000 INJECTION, SOLUTION INTRAVENOUS; SUBCUTANEOUS at 22:22

## 2020-06-26 RX ADMIN — HYDROCODONE BITARTRATE AND ACETAMINOPHEN 1 TABLET: 5; 325 TABLET ORAL at 22:21

## 2020-06-26 RX ADMIN — SODIUM CHLORIDE: 9 INJECTION, SOLUTION INTRAVENOUS at 22:24

## 2020-06-26 SDOH — HEALTH STABILITY: MENTAL HEALTH: HOW OFTEN DO YOU HAVE 6 OR MORE DRINKS ON ONE OCCASION?: NEVER

## 2020-06-26 SDOH — ECONOMIC STABILITY: FOOD INSECURITY: WITHIN THE PAST 12 MONTHS, THE FOOD YOU BOUGHT JUST DIDN'T LAST AND YOU DIDN'T HAVE MONEY TO GET MORE.: PATIENT DECLINED

## 2020-06-26 SDOH — HEALTH STABILITY: MENTAL HEALTH: HOW OFTEN DO YOU HAVE A DRINK CONTAINING ALCOHOL?: 4 OR MORE TIMES A WEEK

## 2020-06-26 SDOH — HEALTH STABILITY: MENTAL HEALTH: HOW MANY STANDARD DRINKS CONTAINING ALCOHOL DO YOU HAVE ON A TYPICAL DAY?: 10 OR MORE

## 2020-06-26 SDOH — ECONOMIC STABILITY: TRANSPORTATION INSECURITY
IN THE PAST 12 MONTHS, HAS LACK OF TRANSPORTATION KEPT YOU FROM MEETINGS, WORK, OR FROM GETTING THINGS NEEDED FOR DAILY LIVING?: NO

## 2020-06-26 SDOH — ECONOMIC STABILITY: FOOD INSECURITY: WITHIN THE PAST 12 MONTHS, YOU WORRIED THAT YOUR FOOD WOULD RUN OUT BEFORE YOU GOT MONEY TO BUY MORE.: PATIENT DECLINED

## 2020-06-26 SDOH — ECONOMIC STABILITY: TRANSPORTATION INSECURITY
IN THE PAST 12 MONTHS, HAS THE LACK OF TRANSPORTATION KEPT YOU FROM MEDICAL APPOINTMENTS OR FROM GETTING MEDICATIONS?: NO

## 2020-06-26 ASSESSMENT — FIBROSIS 4 INDEX
FIB4 SCORE: 1.13

## 2020-06-26 ASSESSMENT — COGNITIVE AND FUNCTIONAL STATUS - GENERAL
DAILY ACTIVITIY SCORE: 24
SUGGESTED CMS G CODE MODIFIER DAILY ACTIVITY: CH
SUGGESTED CMS G CODE MODIFIER MOBILITY: CH
MOBILITY SCORE: 24

## 2020-06-26 ASSESSMENT — ENCOUNTER SYMPTOMS
ABDOMINAL PAIN: 0
WEIGHT LOSS: 0
HALLUCINATIONS: 0
SENSORY CHANGE: 0
CHILLS: 0
TINGLING: 0
EYE PAIN: 0
NAUSEA: 0
MYALGIAS: 1
FOCAL WEAKNESS: 0
DIZZINESS: 0
CONSTIPATION: 0
FEVER: 0
SHORTNESS OF BREATH: 0
NECK PAIN: 0
ORTHOPNEA: 0
TREMORS: 0
PHOTOPHOBIA: 0
SPUTUM PRODUCTION: 0
DIARRHEA: 0
SPEECH CHANGE: 0
HEADACHES: 0
COUGH: 0
DOUBLE VISION: 0
BLURRED VISION: 0
PALPITATIONS: 0
VOMITING: 0
BACK PAIN: 0

## 2020-06-26 ASSESSMENT — PATIENT HEALTH QUESTIONNAIRE - PHQ9
SUM OF ALL RESPONSES TO PHQ9 QUESTIONS 1 AND 2: 0
1. LITTLE INTEREST OR PLEASURE IN DOING THINGS: NOT AT ALL
2. FEELING DOWN, DEPRESSED, IRRITABLE, OR HOPELESS: NOT AT ALL

## 2020-06-26 ASSESSMENT — LIFESTYLE VARIABLES: SUBSTANCE_ABUSE: 0

## 2020-06-26 NOTE — PROGRESS NOTES
Pt to [facility] for daily daptomycin infusion for MRSA sepsis.  Pt c/o increased pain in BUE, pt's last CPK elevated, page placed to Dr Ureña.  Pt spoke w/ Dr Ureña by phone who instructed her to stop taking her lovastatin while on daptomycin infusions and for pt to see him in his office Tuesday July 7th.  Dr Ureña then spoke w/ this RN and repeated his orders.  Pt states her understanding.  Pt midline flushed per protocol w/ no blood return noted.  Daptomycin infused through midline with no adverse reactions.  Pt midline again flushed per protocol w/ no blood return noted, wrapped in gauze and protective sleeve placed.  Pt left on foot in NAD, next appt in place

## 2020-06-27 PROBLEM — I10 HYPERTENSION: Status: ACTIVE | Noted: 2020-06-27

## 2020-06-27 PROBLEM — I50.32 CHRONIC DIASTOLIC HEART FAILURE (HCC): Status: ACTIVE | Noted: 2020-06-27

## 2020-06-27 PROBLEM — E11.9 TYPE 2 DIABETES MELLITUS (HCC): Status: ACTIVE | Noted: 2020-06-27

## 2020-06-27 LAB
ALBUMIN SERPL BCP-MCNC: 3.3 G/DL (ref 3.2–4.9)
ALBUMIN/GLOB SERPL: 0.8 G/DL
ALP SERPL-CCNC: 58 U/L (ref 30–99)
ALT SERPL-CCNC: 151 U/L (ref 2–50)
ANION GAP SERPL CALC-SCNC: 14 MMOL/L (ref 7–16)
AST SERPL-CCNC: 411 U/L (ref 12–45)
BASOPHILS # BLD AUTO: 1 % (ref 0–1.8)
BASOPHILS # BLD: 0.09 K/UL (ref 0–0.12)
BILIRUB SERPL-MCNC: 0.2 MG/DL (ref 0.1–1.5)
BUN SERPL-MCNC: 24 MG/DL (ref 8–22)
CALCIUM SERPL-MCNC: 9.3 MG/DL (ref 8.5–10.5)
CHLORIDE SERPL-SCNC: 98 MMOL/L (ref 96–112)
CK SERPL-CCNC: 8161 U/L (ref 0–154)
CK SERPL-CCNC: 8627 U/L (ref 0–154)
CO2 SERPL-SCNC: 23 MMOL/L (ref 20–33)
CREAT SERPL-MCNC: 1.45 MG/DL (ref 0.5–1.4)
EOSINOPHIL # BLD AUTO: 0.24 K/UL (ref 0–0.51)
EOSINOPHIL NFR BLD: 2.8 % (ref 0–6.9)
ERYTHROCYTE [DISTWIDTH] IN BLOOD BY AUTOMATED COUNT: 45.9 FL (ref 35.9–50)
GLOBULIN SER CALC-MCNC: 3.9 G/DL (ref 1.9–3.5)
GLUCOSE BLD-MCNC: 155 MG/DL (ref 65–99)
GLUCOSE BLD-MCNC: 168 MG/DL (ref 65–99)
GLUCOSE BLD-MCNC: 187 MG/DL (ref 65–99)
GLUCOSE BLD-MCNC: 202 MG/DL (ref 65–99)
GLUCOSE SERPL-MCNC: 227 MG/DL (ref 65–99)
HCT VFR BLD AUTO: 32.3 % (ref 37–47)
HGB BLD-MCNC: 9.8 G/DL (ref 12–16)
IMM GRANULOCYTES # BLD AUTO: 0.05 K/UL (ref 0–0.11)
IMM GRANULOCYTES NFR BLD AUTO: 0.6 % (ref 0–0.9)
LYMPHOCYTES # BLD AUTO: 2.41 K/UL (ref 1–4.8)
LYMPHOCYTES NFR BLD: 27.9 % (ref 22–41)
MAGNESIUM SERPL-MCNC: 1.7 MG/DL (ref 1.5–2.5)
MCH RBC QN AUTO: 25.8 PG (ref 27–33)
MCHC RBC AUTO-ENTMCNC: 30.3 G/DL (ref 33.6–35)
MCV RBC AUTO: 85 FL (ref 81.4–97.8)
MONOCYTES # BLD AUTO: 0.58 K/UL (ref 0–0.85)
MONOCYTES NFR BLD AUTO: 6.7 % (ref 0–13.4)
NEUTROPHILS # BLD AUTO: 5.27 K/UL (ref 2–7.15)
NEUTROPHILS NFR BLD: 61 % (ref 44–72)
NRBC # BLD AUTO: 0 K/UL
NRBC BLD-RTO: 0 /100 WBC
PLATELET # BLD AUTO: 513 K/UL (ref 164–446)
PMV BLD AUTO: 10.5 FL (ref 9–12.9)
POTASSIUM SERPL-SCNC: 4.5 MMOL/L (ref 3.6–5.5)
PROT SERPL-MCNC: 7.2 G/DL (ref 6–8.2)
RBC # BLD AUTO: 3.8 M/UL (ref 4.2–5.4)
SODIUM SERPL-SCNC: 135 MMOL/L (ref 135–145)
WBC # BLD AUTO: 8.6 K/UL (ref 4.8–10.8)

## 2020-06-27 PROCEDURE — 700111 HCHG RX REV CODE 636 W/ 250 OVERRIDE (IP): Performed by: INTERNAL MEDICINE

## 2020-06-27 PROCEDURE — A9270 NON-COVERED ITEM OR SERVICE: HCPCS | Performed by: INTERNAL MEDICINE

## 2020-06-27 PROCEDURE — 99233 SBSQ HOSP IP/OBS HIGH 50: CPT | Performed by: HOSPITALIST

## 2020-06-27 PROCEDURE — 36415 COLL VENOUS BLD VENIPUNCTURE: CPT

## 2020-06-27 PROCEDURE — 85025 COMPLETE CBC W/AUTO DIFF WBC: CPT

## 2020-06-27 PROCEDURE — 770006 HCHG ROOM/CARE - MED/SURG/GYN SEMI*

## 2020-06-27 PROCEDURE — 82962 GLUCOSE BLOOD TEST: CPT

## 2020-06-27 PROCEDURE — 700105 HCHG RX REV CODE 258: Performed by: INTERNAL MEDICINE

## 2020-06-27 PROCEDURE — 700111 HCHG RX REV CODE 636 W/ 250 OVERRIDE (IP): Mod: JG | Performed by: INTERNAL MEDICINE

## 2020-06-27 PROCEDURE — 99221 1ST HOSP IP/OBS SF/LOW 40: CPT | Performed by: INTERNAL MEDICINE

## 2020-06-27 PROCEDURE — 700102 HCHG RX REV CODE 250 W/ 637 OVERRIDE(OP): Performed by: INTERNAL MEDICINE

## 2020-06-27 PROCEDURE — 87040 BLOOD CULTURE FOR BACTERIA: CPT | Mod: 91

## 2020-06-27 PROCEDURE — 700101 HCHG RX REV CODE 250: Performed by: INTERNAL MEDICINE

## 2020-06-27 PROCEDURE — 82550 ASSAY OF CK (CPK): CPT | Mod: 91

## 2020-06-27 PROCEDURE — 83735 ASSAY OF MAGNESIUM: CPT

## 2020-06-27 PROCEDURE — 80053 COMPREHEN METABOLIC PANEL: CPT

## 2020-06-27 RX ADMIN — INSULIN HUMAN 1 UNITS: 100 INJECTION, SOLUTION PARENTERAL at 18:03

## 2020-06-27 RX ADMIN — LATANOPROST 1 DROP: 50 SOLUTION OPHTHALMIC at 20:28

## 2020-06-27 RX ADMIN — TIZANIDINE 4 MG: 4 TABLET ORAL at 14:27

## 2020-06-27 RX ADMIN — INSULIN HUMAN 2 UNITS: 100 INJECTION, SOLUTION PARENTERAL at 21:19

## 2020-06-27 RX ADMIN — TIMOLOL MALEATE 1 DROP: 5 SOLUTION/ DROPS OPHTHALMIC at 05:46

## 2020-06-27 RX ADMIN — OMEPRAZOLE 20 MG: 20 CAPSULE, DELAYED RELEASE ORAL at 05:32

## 2020-06-27 RX ADMIN — HYDROCODONE BITARTRATE AND ACETAMINOPHEN 1 TABLET: 5; 325 TABLET ORAL at 17:59

## 2020-06-27 RX ADMIN — HEPARIN SODIUM 5000 UNITS: 5000 INJECTION, SOLUTION INTRAVENOUS; SUBCUTANEOUS at 20:28

## 2020-06-27 RX ADMIN — HYDROCODONE BITARTRATE AND ACETAMINOPHEN 1 TABLET: 5; 325 TABLET ORAL at 09:21

## 2020-06-27 RX ADMIN — DOCUSATE SODIUM 50 MG AND SENNOSIDES 8.6 MG 2 TABLET: 8.6; 5 TABLET, FILM COATED ORAL at 17:59

## 2020-06-27 RX ADMIN — HEPARIN SODIUM 5000 UNITS: 5000 INJECTION, SOLUTION INTRAVENOUS; SUBCUTANEOUS at 05:32

## 2020-06-27 RX ADMIN — INSULIN HUMAN 1 UNITS: 100 INJECTION, SOLUTION PARENTERAL at 13:04

## 2020-06-27 RX ADMIN — CEFTAROLINE FOSAMIL 600 MG: 600 POWDER, FOR SOLUTION INTRAVENOUS at 17:59

## 2020-06-27 RX ADMIN — HEPARIN SODIUM 5000 UNITS: 5000 INJECTION, SOLUTION INTRAVENOUS; SUBCUTANEOUS at 14:21

## 2020-06-27 RX ADMIN — CEFTAROLINE FOSAMIL 600 MG: 600 POWDER, FOR SOLUTION INTRAVENOUS at 10:01

## 2020-06-27 RX ADMIN — ACETAMINOPHEN 650 MG: 325 TABLET, FILM COATED ORAL at 05:29

## 2020-06-27 RX ADMIN — PREDNISONE 10 MG: 10 TABLET ORAL at 05:29

## 2020-06-27 RX ADMIN — ESCITALOPRAM OXALATE 20 MG: 10 TABLET ORAL at 05:29

## 2020-06-27 RX ADMIN — METOPROLOL SUCCINATE 25 MG: 25 TABLET, EXTENDED RELEASE ORAL at 05:28

## 2020-06-27 RX ADMIN — INSULIN HUMAN 1 UNITS: 100 INJECTION, SOLUTION PARENTERAL at 09:06

## 2020-06-27 ASSESSMENT — ENCOUNTER SYMPTOMS
BLURRED VISION: 0
COUGH: 0
FEVER: 0
ORTHOPNEA: 0
HEADACHES: 0
WEAKNESS: 0
DIARRHEA: 0
NAUSEA: 0
CHILLS: 0
VOMITING: 0
PHOTOPHOBIA: 0
NERVOUS/ANXIOUS: 0
ABDOMINAL PAIN: 0
PALPITATIONS: 0
MYALGIAS: 1
DOUBLE VISION: 0
HEMOPTYSIS: 0
BACK PAIN: 0
DEPRESSION: 0
HEARTBURN: 0
SHORTNESS OF BREATH: 0
WEAKNESS: 1

## 2020-06-27 ASSESSMENT — LIFESTYLE VARIABLES
HAVE YOU EVER FELT YOU SHOULD CUT DOWN ON YOUR DRINKING: NO
HAVE PEOPLE ANNOYED YOU BY CRITICIZING YOUR DRINKING: NO
CONSUMPTION TOTAL: NEGATIVE
TOTAL SCORE: 0
TOTAL SCORE: 0
EVER_SMOKED: YES
EVER FELT BAD OR GUILTY ABOUT YOUR DRINKING: NO
ALCOHOL_USE: NO
TOTAL SCORE: 0
AVERAGE NUMBER OF DAYS PER WEEK YOU HAVE A DRINK CONTAINING ALCOHOL: 0
HOW MANY TIMES IN THE PAST YEAR HAVE YOU HAD 5 OR MORE DRINKS IN A DAY: 0
EVER HAD A DRINK FIRST THING IN THE MORNING TO STEADY YOUR NERVES TO GET RID OF A HANGOVER: NO
ON A TYPICAL DAY WHEN YOU DRINK ALCOHOL HOW MANY DRINKS DO YOU HAVE: 0

## 2020-06-27 NOTE — PROGRESS NOTES
See prior note    -Patient has been accepted by hospitalist service   -ID will see patient in the a.m.    -Please start IV fluids.      Kaci Xavier MD  Infectious Diseases     .

## 2020-06-27 NOTE — ASSESSMENT & PLAN NOTE
Patient is noted to have systolic heart failure with EF of 45% and grade 2 diastolic dysfunction  --- Continue cardiac and diabetic diet  --- Caution with IV fluid

## 2020-06-27 NOTE — ASSESSMENT & PLAN NOTE
Patient is noted to be elevated, will continue amlodipine 5 mg p.o. daily.  Hold lisinopril in the setting of RAF

## 2020-06-27 NOTE — PROGRESS NOTES
Patient seen today for Daptomycin. Plan of care reviewed. Complains of body pain. Reports stopping statin drug yesterday. Midline without blood return, but flushes well. Daptomycin given as ordered. Prior to leaving patient asks if the pain should feel better tomorrow. Ultimately yolanda a CPK which is ordered as prn. Patient discharged in good condition, ambulatory.   Critical results received of elevated CPK. Called to Dr. Xavier. Plan to hospitalize patient.

## 2020-06-27 NOTE — PROGRESS NOTES
from Lab called with critical result of CPK 8627 at 0350. Critical lab result read back to .   Dr. Valdivia notified of critical lab result at 0400.  Critical lab result read back by Dr. Valdivia. No new interventions given at this time.

## 2020-06-27 NOTE — ASSESSMENT & PLAN NOTE
hemoglobin A1c at 9.3;  diabetic diet, hypoglycemia recall, Accu-Cheks AC at bedtime   --Fingerstick is still elevated, will increase Lantus to 15   -- diabetic education

## 2020-06-27 NOTE — PROGRESS NOTES
Discussed with Dr Xavier, patient with pmh of MRSA bacteremia on daptomycin as outpatient, patient developed myalgia cpk 9759 likely rhabdo due to daptomycin, ID is recommending admission, stopped dapto, will need to start iv fluid, repeat labs upon arrival and in am, ID consultation, ABX per ID pending on kidney function in am, patient received dapto today per ID no more abx for today.   Please call on call hospitalist upon arrival

## 2020-06-27 NOTE — ASSESSMENT & PLAN NOTE
She found to significantly elevated CPK secondary to daptomycin use, myalgia  For 4 days   --Daptomycin was stopped, started on ceftaroline by infectious disease on 6/27, will continue IV fluid   -- CPK trending down 2900 s     --- ID plans to discharge the patient on antibiotics the last day being 7/3/2020.

## 2020-06-27 NOTE — PROGRESS NOTES
60 y.o.  recently admitted on 6/1/2020. Pt has a past medical history of RA on chronic prednisone 10 mg daily, DMT2 , CKD stage III.  The patient has hardware - pins in her right foot, a plate in her left ankle and replaced knuckle in her right hand.  During the prior hospital course the patient was found to have RAF  MRSA bacteremia. She was started on vancomycin Flagyl and ceftriaxone.  Transitioned to daptomycin and rifampin due to renal dysfunction in the presence of hardware.       Problem List      Rhabdomyolysis, CPK 9759 on last labs-thought due to daptomycin therapy for her MRSA bacteremia  -Received daptomycin dose today  -Diffuse ongoing muscle aches and weakness  MRSA bacteremia- source may be urinary but no urine cx to confirm, no other obvious source,   -Blood cultures from [facility] 6/1 are 2/2+ for MRSA per microbiology lab there, no urine culture was performed  -Blood cultures on 6/2 2/2+ for MRSA  -Blood cultures on 6/6- NGTD   -TTE on 6/4-  negative for vegetations.  Moderate to severe tricuspid regurgitation, diastolic dysfunction  -VERONICA negative for vegetation on 6/10   Prior acute on chronic kidney disease   Bronchiectasis, noted on CT in right lower lobe as well as atelectasis, calcified granuloma in left upper lobe groundglass nodule on right  Diabetes, uncontrolled   Rheumatoid arthritis  Immunosuppression,  secondary to above on chronic prednisone 10 mg daily  Thrombocytopenia- appears to be chronic, improved   Antibiotic allergies: Cephalexin reported as vomiting  Grade 2 diastolic heart failure on TTE  Low back pain-chronic, ongoing - no changes and at baseline per patient      Plan      --- The patient was discharged on daptomycin and rifampin with a plan to complete 6 weeks of IV antibiotics at outpatient infusion center.   She has had increasing CPK levels and her statin was stopped.    --- Patient complaining of diffuse myalgias, fatigue and weakness and after daptomycin dose  today CPK was drawn and extremely elevated at 9759.   --- Concern for rhabdomyolysis as well as acute kidney injury as she is been prone to this in the past and already has chronic kidney disease  --- Recommending patient be admitted for monitoring and IV antibiotics-stop daptomycin, continue rifampin 3 mg twice daily, start ceftaroline once kidney function is known-no dose needed until tomorrow as she received her daptomycin today  --- Labs: CMP, CBC and CPK tomorrow  --- I have requested a direct admit and call the patient letting her know my recommendations and she is willing to come in  --- No need for 6 weeks of antibiotics as VERONICA negative so will complete a four-week course on July 4      Kaci Xavier MD  Infectious Diseases

## 2020-06-27 NOTE — CARE PLAN
Problem: Communication  Goal: The ability to communicate needs accurately and effectively will improve  Outcome: PROGRESSING AS EXPECTED     Problem: Safety  Goal: Will remain free from injury  Outcome: PROGRESSING AS EXPECTED  Goal: Will remain free from falls  Outcome: PROGRESSING AS EXPECTED     Problem: Infection  Goal: Will remain free from infection  Outcome: PROGRESSING AS EXPECTED     Problem: Venous Thromboembolism (VTW)/Deep Vein Thrombosis (DVT) Prevention:  Goal: Patient will participate in Venous Thrombosis (VTE)/Deep Vein Thrombosis (DVT)Prevention Measures  Outcome: PROGRESSING AS EXPECTED     Problem: Bowel/Gastric:  Goal: Normal bowel function is maintained or improved  Outcome: PROGRESSING AS EXPECTED  Goal: Will not experience complications related to bowel motility  Outcome: PROGRESSING AS EXPECTED     Problem: Knowledge Deficit  Goal: Knowledge of disease process/condition, treatment plan, diagnostic tests, and medications will improve  Outcome: PROGRESSING AS EXPECTED  Goal: Knowledge of the prescribed therapeutic regimen will improve  Outcome: PROGRESSING AS EXPECTED     Problem: Discharge Barriers/Planning  Goal: Patient's continuum of care needs will be met  Outcome: PROGRESSING AS EXPECTED     Problem: Mobility  Goal: Risk for activity intolerance will decrease  Outcome: PROGRESSING AS EXPECTED     Problem: Pain Management  Goal: Pain level will decrease to patient's comfort goal  Outcome: PROGRESSING AS EXPECTED

## 2020-06-27 NOTE — CONSULTS
Infectious Disease Consultation    Date of consult: 6/27/2020    Referring Physician  Adore Moran M.D.    Reason for Consultation  Rhabdomyolysis    History of Presenting Illness  60 y.o. female with hx of RA on chronic prednisone 10mg, DM II (last A1C 11.3 in 6/2020), recurrent UTIs, recent hospitalization from 6/1-6/15 for MRSA bacteremia/sepsis, pyelonephritis who now readmitted on 6/26 after found to have rhabdomyolysis.     Pt was discharged with daptomycin 8mg/kg daily for her MRSA bacteremia, planning to treat for total 6 weeks from previous [facility]. Pt's CK slowly rising and yesterday up to 9000. Pt has been complaining of muscle pain in bilateral shoulders in the past 3-4 days.     At admission, pt is afebrile, hemodynamically stable. Daptomycin was discontinued. Creatinine was 1.67 with good UOP. Fluids were started for hydration. WBC 8.6.      Code Status  Full Code    Review of Systems  Review of Systems   Constitutional: Negative for chills, fever and malaise/fatigue.   Respiratory: Negative for cough and shortness of breath.    Cardiovascular: Negative for chest pain and leg swelling.   Gastrointestinal: Negative for abdominal pain, diarrhea, nausea and vomiting.   Genitourinary: Negative for dysuria.   Musculoskeletal: Positive for myalgias. Negative for back pain and joint pain.        In bilateral shoulders    Skin: Negative for rash.   Neurological: Negative for weakness and headaches.   Psychiatric/Behavioral: The patient is not nervous/anxious.    All other systems reviewed and are negative.      Past Medical History   has a past medical history of Diabetes, Psychiatric disorder, and Rheumatoid arthritis(714.0).    Surgical History   has a past surgical history that includes other orthopedic surgery; pr upper gi endoscopy,biopsy (Left, 6/10/2020); pr colonoscopy,diagnostic (Left, 6/10/2020); and toe amputation (Left, 2000).    Family History  family history is not on file.    Social  History   reports that she quit smoking about 5 years ago. Her smoking use included cigarettes. She started smoking about 40 years ago. She has a 80.00 pack-year smoking history. She has never used smokeless tobacco. She reports previous alcohol use. She reports that she does not use drugs.    Medications  Home Medications     Reviewed by Debbie Moss, Student (Registered Nurse) on 06/27/20 at 0116  Med List Status: Not Addressed   Medication Last Dose Status   albuterol 108 (90 Base) MCG/ACT Aero Soln inhalation aerosol 6/25/2020 Active   escitalopram (LEXAPRO) 20 MG tablet 6/25/2020 Active   fluticasone (FLONASE) 50 MCG/ACT nasal spray 6/25/2020 Active   HYDROcodone-acetaminophen (NORCO) 5-325 MG Tab per tablet 6/26/2020 Active   latanoprost (XALATAN) 0.005 % Solution 6/26/2020 Active   lisinopril (PRINIVIL) 5 MG Tab 6/25/2020 Active   lovastatin (MEVACOR) 20 MG Tab 6/25/2020 Active   Melatonin 5 MG Tab 6/25/2020 Active   metoprolol SR (TOPROL XL) 25 MG TABLET SR 24 HR 6/25/2020 Active   omeprazole (PRILOSEC) 20 MG delayed-release capsule 6/25/2020 Active   ondansetron (ZOFRAN ODT) 4 MG TABLET DISPERSIBLE 6/25/2020 Active   pantoprazole (PROTONIX) 40 MG Tablet Delayed Response 6/25/2020 Active   predniSONE (DELTASONE) 10 MG Tab 6/25/2020 Active   SITagliptin (JANUVIA) 25 MG Tab 6/25/2020 Active   timolol (TIMOPTIC) 0.5 % Solution 6/25/2020 Active   tizanidine (ZANAFLEX) 4 MG Tab 6/25/2020 Active              Current Facility-Administered Medications   Medication Dose Route Frequency Provider Last Rate Last Dose   • senna-docusate (PERICOLACE or SENOKOT S) 8.6-50 MG per tablet 2 Tab  2 Tab Oral BID Yane Johnson M.D.        And   • polyethylene glycol/lytes (MIRALAX) PACKET 1 Packet  1 Packet Oral QDAY PRN Yane Johnson M.D.        And   • magnesium hydroxide (MILK OF MAGNESIA) suspension 30 mL  30 mL Oral QDAY PRN Yane Johnson M.D.        And   • bisacodyl (DULCOLAX) suppository  10 mg  10 mg Rectal QDAY PRN Yane Johnson M.D.       • NS infusion   Intravenous Continuous Yane Johnson M.D. 125 mL/hr at 06/26/20 2224     • heparin injection 5,000 Units  5,000 Units Subcutaneous Q8HRS Yane Johnson M.D.   5,000 Units at 06/27/20 0532   • acetaminophen (TYLENOL) tablet 650 mg  650 mg Oral Q6HRS PRN Yane Johnson M.D.   650 mg at 06/27/20 0529   • labetalol (NORMODYNE/TRANDATE) injection 10 mg  10 mg Intravenous Q4HRS PRN Yane Johnson M.D.       • insulin regular (HUMULIN R) injection 1-6 Units  1-6 Units Subcutaneous 4X/DAY ACHS Yane Johnson M.D.        And   • glucose 4 g chewable tablet 16 g  16 g Oral Q15 MIN PRN Yane Johnson M.D.        And   • dextrose 50% (D50W) injection 50 mL  50 mL Intravenous Q15 MIN PRN Yane Johnson M.D.       • albuterol inhaler 2 Puff  2 Puff Inhalation Q4HRS PRN Yane Johnson M.D.       • escitalopram (LEXAPRO) tablet 20 mg  20 mg Oral DAILY Yane Johnson M.D.   20 mg at 06/27/20 0529   • HYDROcodone-acetaminophen (NORCO) 5-325 MG per tablet 1 Tab  1 Tab Oral TID PRN Yane Johnson M.D.   1 Tab at 06/26/20 2221   • latanoprost (XALATAN) 0.005 % ophthalmic solution 1 Drop  1 Drop Both Eyes QHS Yane Johnson M.D.   1 Drop at 06/26/20 2222   • metoprolol SR (TOPROL XL) tablet 25 mg  25 mg Oral DAILY Yane Johnson M.D.   25 mg at 06/27/20 0528   • omeprazole (PRILOSEC) capsule 20 mg  20 mg Oral DAILY Yane Johnson M.D.   20 mg at 06/27/20 0532   • predniSONE (DELTASONE) tablet 10 mg  10 mg Oral DAILY Yane Johnson M.D.   10 mg at 06/27/20 0529   • timolol (TIMOPTIC) 0.5 % ophthalmic solution 1 Drop  1 Drop Both Eyes DAILY Yane Johnson M.D.   1 Drop at 06/27/20 0546   • tizanidine (ZANAFLEX) tablet 4 mg  4 mg Oral BID PRN Yane Johnson M.D.           Allergies  Allergies   Allergen Reactions   • Cephalexin  "Unspecified and Vomiting     n/v  n/v         Vital Signs last 24 hours  Temp:  [36.1 °C (97 °F)-36.4 °C (97.6 °F)] 36.1 °C (97 °F)  Pulse:  [72-74] 72  Resp:  [17-18] 17  BP: (142-161)/(84-90) 161/90  SpO2:  [92 %-93 %] 92 %    Physical Exam  Physical Exam   Constitutional: She is oriented to person, place, and time. No distress.   HENT:   Head: Normocephalic and atraumatic.   Mouth/Throat: Oropharynx is clear and moist.   Eyes: Conjunctivae are normal.   Neck: Neck supple.   Cardiovascular: Normal rate, regular rhythm, normal heart sounds and intact distal pulses.   No murmur heard.  Pulmonary/Chest: Effort normal and breath sounds normal. No respiratory distress. She has no wheezes. She has no rales.   Abdominal: Soft. Bowel sounds are normal. She exhibits no distension. There is no abdominal tenderness. There is no rebound and no guarding.   Musculoskeletal: Normal range of motion.         General: No deformity or edema.      Comments: Right PICC line on the right arm    Pt has hardware in left ankle (examined and no erythema or pain noted)    Pt has hardware in right toes (all toes). These were examined as well and no erythema or pain noted.     Pt has \"plastic material\" in her right index finger - no erythema/pain either.   Neurological: She is alert and oriented to person, place, and time.   Skin: Skin is warm. No rash noted. She is not diaphoretic. No erythema.   Psychiatric: She has a normal mood and affect. Her behavior is normal. Thought content normal.   Nursing note and vitals reviewed.      Fluids    Intake/Output Summary (Last 24 hours) at 6/27/2020 1039  Last data filed at 6/26/2020 2230  Gross per 24 hour   Intake 150 ml   Output --   Net 150 ml       Laboratory  Recent Results (from the past 48 hour(s))   CPK    Collection Time: 06/26/20  4:50 PM   Result Value Ref Range    CPK Total 9759 (HH) 0 - 154 U/L   CREATININE    Collection Time: 06/26/20  4:50 PM   Result Value Ref Range    Creatinine 1.67 " (H) 0.50 - 1.40 mg/dL   ESTIMATED GFR    Collection Time: 06/26/20  4:50 PM   Result Value Ref Range    GFR If  38 (A) >60 mL/min/1.73 m 2    GFR If Non  31 (A) >60 mL/min/1.73 m 2   CBC with Differential    Collection Time: 06/27/20  2:10 AM   Result Value Ref Range    WBC 8.6 4.8 - 10.8 K/uL    RBC 3.80 (L) 4.20 - 5.40 M/uL    Hemoglobin 9.8 (L) 12.0 - 16.0 g/dL    Hematocrit 32.3 (L) 37.0 - 47.0 %    MCV 85.0 81.4 - 97.8 fL    MCH 25.8 (L) 27.0 - 33.0 pg    MCHC 30.3 (L) 33.6 - 35.0 g/dL    RDW 45.9 35.9 - 50.0 fL    Platelet Count 513 (H) 164 - 446 K/uL    MPV 10.5 9.0 - 12.9 fL    Neutrophils-Polys 61.00 44.00 - 72.00 %    Lymphocytes 27.90 22.00 - 41.00 %    Monocytes 6.70 0.00 - 13.40 %    Eosinophils 2.80 0.00 - 6.90 %    Basophils 1.00 0.00 - 1.80 %    Immature Granulocytes 0.60 0.00 - 0.90 %    Nucleated RBC 0.00 /100 WBC    Neutrophils (Absolute) 5.27 2.00 - 7.15 K/uL    Lymphs (Absolute) 2.41 1.00 - 4.80 K/uL    Monos (Absolute) 0.58 0.00 - 0.85 K/uL    Eos (Absolute) 0.24 0.00 - 0.51 K/uL    Baso (Absolute) 0.09 0.00 - 0.12 K/uL    Immature Granulocytes (abs) 0.05 0.00 - 0.11 K/uL    NRBC (Absolute) 0.00 K/uL   Comp Metabolic Panel (CMP)    Collection Time: 06/27/20  2:10 AM   Result Value Ref Range    Sodium 135 135 - 145 mmol/L    Potassium 4.5 3.6 - 5.5 mmol/L    Chloride 98 96 - 112 mmol/L    Co2 23 20 - 33 mmol/L    Anion Gap 14.0 7.0 - 16.0    Glucose 227 (H) 65 - 99 mg/dL    Bun 24 (H) 8 - 22 mg/dL    Creatinine 1.45 (H) 0.50 - 1.40 mg/dL    Calcium 9.3 8.5 - 10.5 mg/dL    AST(SGOT) 411 (H) 12 - 45 U/L    ALT(SGPT) 151 (H) 2 - 50 U/L    Alkaline Phosphatase 58 30 - 99 U/L    Total Bilirubin 0.2 0.1 - 1.5 mg/dL    Albumin 3.3 3.2 - 4.9 g/dL    Total Protein 7.2 6.0 - 8.2 g/dL    Globulin 3.9 (H) 1.9 - 3.5 g/dL    A-G Ratio 0.8 g/dL   Magnesium    Collection Time: 06/27/20  2:10 AM   Result Value Ref Range    Magnesium 1.7 1.5 - 2.5 mg/dL   CREATINE KINASE     Collection Time: 06/27/20  2:10 AM   Result Value Ref Range    CPK Total 8627 (HH) 0 - 154 U/L   ESTIMATED GFR    Collection Time: 06/27/20  2:10 AM   Result Value Ref Range    GFR If African American 45 (A) >60 mL/min/1.73 m 2    GFR If Non  37 (A) >60 mL/min/1.73 m 2   CREATINE KINASE    Collection Time: 06/27/20  6:42 AM   Result Value Ref Range    CPK Total 8161 (HH) 0 - 154 U/L       Pertinent Micro:  Results     ** No results found for the last 168 hours. **        Blood Culture Hold   Date Value Ref Range Status   06/01/2020 Collected  Final        Imaging  No orders to display       Assessment/Plan  60 y.o female with   1. MRSA bacteremia/sepsis in early June  - TTE in 6/4 negative, moderate to severe tricuspid regurgitation, diastolic dysfunction:   - VERONICA in 6/5 negative  - First negative blood cultures 6/6 from previous admission     2. Rhabdomyolysis (CK 9000 at admission)  3. Rheumatoid arthritis - on chronic prednisone 10mg daily   4. DM II (last A1C 11.2 in 6/6/2020)  5. RAF - creatinine improving since early June. At admission 1.67  6. Hx of recurrent UTIs.   7. The patient has hardware - pins in her right foot, a plate in her left ankle and replaced knuckle in her right hand  8. Bronchiectasis - noted on CT in right lower lobe as well as atelectasis. Calcified granuloma in left upper lobe groundglass nodule on right  9. PICC Line placed on 6/15    Daptomycin was discontinued yesterday.   She has no evidence of IE from VERONICA and last negative BCx is 6/5.   I think we can treat for total 4 weeks, rather than 6 weeks. Will plan to treat until 6/3/2020  Her creatinine seems to relatively improving compared to early June when she was hospitalized.   She was on rifampin x 2 weeks 6/4-6/15. It's unclear to me why pt was off rifampin at time of discharge. At this time, pt does not show any evidence of recurrent infection. I think we can keep off rifampin at this time.      Plan:   Start  ceftaroline 600mg IV Q12H  Treat for total 4 weeks, end date 7/3/2020  Serial CKs  Continue fluids for hydration. Serum creatinine is slightly better today   Monitor UOP closely  Avoid statin  Continue home dose prednisone 10mg daily   Repeat A1C. BG control to keep -140. Mx per Hospital medicine  Repeat blood cultures.     Discussed patient condition and risk of morbidity and/or mortality with Hospitalist, RN and Patient.    D/w Dr. Moran

## 2020-06-27 NOTE — PROGRESS NOTES
Patient is a direct admit from home. Dr Xavier is sending MD.  Dr Briceño is accepting the patient and will place orders into [facility].

## 2020-06-27 NOTE — PROGRESS NOTES
2 RN Skin Check    2 RN skin check complete with Briana RN  Devices in place: Midline  Skin assessed under devices: Yes.  Confirmed pressure ulcers found on: N/A  New potential pressure ulcers noted on: N/A Wound consult placed: N/A  The following interventions in place: Encouraged ambulation. Pillows placed for support    Skin Assessment:  Pink and blanching behind ears bilaterally. Small healed scab under right nare. Small scabs throughout arms bilaterally. Elbows are pink and blanching bilaterally. Scant bruising on abdomen no redness noted under breasts. Bilateral knees pink and blanching with scabs on both left and right shins. Heels are pink and blanching however boggy. Left foot all toes have been amputated. Sacrum is pink and blanching

## 2020-06-27 NOTE — ASSESSMENT & PLAN NOTE
Patient had MRSA bacteremia this was on daptomycin plus rifampin.  Daptomycin is been stopped considering rhabdomyolysis   --- Infectious disease consulted, started on ceftaroline on 6/27 and ID plans to dc on Zyvox  For 3 days, total dose 7

## 2020-06-27 NOTE — PROGRESS NOTES
Assumed care of patient at 2000 as a direct admit from home. Pt is A&Ox4 and sating mid 90's on room air. She has complaints of pain bilaterally in arms she states is related to her RA. She was placed on contact precautions for active MRSA in the blood. I completed the admission profile and assessment. Patient was medicated per MAR for her pain and is now resting in bed. Bed locked and in lowest position, water and call light within reach.

## 2020-06-27 NOTE — PROGRESS NOTES
Hospital Medicine Daily Progress Note    Date of Service  6/27/2020    Chief Complaint   60 y.o. female admitted 6/26/2020 with No chief complaint on file.        Hospital Course    A 60-year-old female with a past medical history significant for type 2 diabetes mellitus, rheumatoid arthritis on chronic prednisone, chronic immunosuppression present to the ER on 6/26/2020 from infectious disease clinic when she was found to have elevated CK.  Patient was taking daptomycin/ rifampin  secondary to MRSA bacteremia      Interval Problem Update  No acute events overnight.  Patient noted to be hypertensive, will start amlodipine 5 mg p.o. daily.  See has been complaining of generalized weakness.  --CPK elevated, will continue IV fluid, monitor daily CPK    Consultants/Specialty  Infectious disease    Code Status  full    Disposition   Home once medically cleared     Review of Systems  Review of Systems   Constitutional: Positive for malaise/fatigue. Negative for chills and fever.   HENT: Negative for hearing loss.    Eyes: Negative for blurred vision, double vision and photophobia.   Respiratory: Negative for cough and hemoptysis.    Cardiovascular: Negative for chest pain, palpitations and orthopnea.   Gastrointestinal: Negative for abdominal pain, heartburn, nausea and vomiting.   Genitourinary: Negative for dysuria.   Musculoskeletal: Positive for myalgias.   Neurological: Positive for weakness.   Psychiatric/Behavioral: Negative for depression and suicidal ideas.        Physical Exam  Temp:  [36.1 °C (97 °F)-36.5 °C (97.7 °F)] 36.5 °C (97.7 °F)  Pulse:  [66-74] 66  Resp:  [16-18] 16  BP: (142-175)/(84-96) 175/96  SpO2:  [92 %-96 %] 96 %    Physical Exam  Vitals signs and nursing note reviewed.   Constitutional:       Appearance: Normal appearance.   HENT:      Head: Normocephalic and atraumatic.   Eyes:      Extraocular Movements: Extraocular movements intact.      Pupils: Pupils are equal, round, and reactive to  light.   Cardiovascular:      Rate and Rhythm: Normal rate and regular rhythm.   Pulmonary:      Effort: Pulmonary effort is normal. No respiratory distress.   Abdominal:      General: Abdomen is flat. Bowel sounds are normal. There is no distension.      Palpations: Abdomen is soft.   Musculoskeletal: Normal range of motion.   Skin:     General: Skin is warm.   Neurological:      Mental Status: She is alert and oriented to person, place, and time. Mental status is at baseline.         Fluids    Intake/Output Summary (Last 24 hours) at 6/27/2020 1103  Last data filed at 6/27/2020 1000  Gross per 24 hour   Intake 510 ml   Output --   Net 510 ml       Laboratory  Recent Labs     06/27/20  0210   WBC 8.6   RBC 3.80*   HEMOGLOBIN 9.8*   HEMATOCRIT 32.3*   MCV 85.0   MCH 25.8*   MCHC 30.3*   RDW 45.9   PLATELETCT 513*   MPV 10.5     Recent Labs     06/26/20  1650 06/27/20  0210   SODIUM  --  135   POTASSIUM  --  4.5   CHLORIDE  --  98   CO2  --  23   GLUCOSE  --  227*   BUN  --  24*   CREATININE 1.67* 1.45*   CALCIUM  --  9.3                   Imaging  No orders to display        Assessment/Plan  Non-traumatic rhabdomyolysis  Assessment & Plan  She found to significantly elevated CPK secondary to daptomycin use, myalgia  For 4 days   --Daptomycin was stopped, started on ceftaroline by infectious disease, will continue IV fluid, monitor CPK on a daily basis    Chronic diastolic heart failure (HCC)  Assessment & Plan  Patient is noted to have systolic heart failure with EF of 45% and grade 2 diastolic dysfunction  --- Continue cardiac and diabetic diet  --- Caution with IV fluid    Hypertension  Assessment & Plan  Patient is noted to be elevated, will continue amlodipine 5 mg p.o. daily.  Hold lisinopril in the setting of RAF    Type 2 diabetes mellitus (HCC)  Assessment & Plan  Obtain hemoglobin A1c, diabetic diet, hypoglycemia recall, Accu-Cheks AC at bedtime    Stage 3 chronic kidney disease (HCC)  Assessment &  Plan  Monitor renal function, continue IV fluid avoid nephrotoxin   -Creatinine improved from 1.67-1.45, monitor    Staphylococcus aureus bacteremia- (present on admission)  Assessment & Plan  Patient had MRSA bacteremia this was on daptomycin plus rifampin.  Daptomycin is been stopped considering rhabdomyolysis   --- Infectious disease consulted, started on ceftaroline       VTE prophylaxis: heparin

## 2020-06-27 NOTE — H&P
Hospital Medicine History & Physical Note    Date of Service  6/26/2020    Primary Care Physician  Pcp Unknown    Code Status  Prior    Chief Complaint    Direct admission from infectious disease clinic due to significantly elevated CPK and myalgias  Myalgias for 4 days    History of Presenting Illness    60 y.o. female with past medical history of diabetes mellitus and rheumatoid arthritis on chronic prednisone who presented to the hospital 6/26/2020 from infectious disease clinic when she found to have elevated CPK.  She recently discharged from the hospital and at that time she was diagnosed with MRSA bacteremia and she was started on vancomycin, Flagyl and ceftriaxone.  Patient then transitioned to daptomycin and rifampin due to renal dysfunction in the presence of hardware.  Patient reported severe myalgias and she underwent CPK evaluation and she found to have significantly elevated CPK.  I evaluated her at the bedside and she reported she has been having significant generalized muscular pain for 4 days and it has been getting worse.  She denies any symptoms of nausea, vomiting, diarrhea and constipation.  She received the dose of antibiotic today.  She denies any recent travel or known COVID-19 exposures.  She has not been taking her statin.    Review of Systems  Review of Systems   Constitutional: Negative for chills, fever and weight loss.   HENT: Negative for hearing loss and tinnitus.    Eyes: Negative for blurred vision, double vision, photophobia and pain.   Respiratory: Negative for cough, sputum production and shortness of breath.    Cardiovascular: Negative for chest pain, palpitations, orthopnea and leg swelling.   Gastrointestinal: Negative for abdominal pain, constipation, diarrhea, nausea and vomiting.   Genitourinary: Negative for dysuria, frequency and urgency.   Musculoskeletal: Positive for myalgias. Negative for back pain, joint pain and neck pain.   Skin: Negative for rash.   Neurological:  Negative for dizziness, tingling, tremors, sensory change, speech change, focal weakness and headaches.   Psychiatric/Behavioral: Negative for hallucinations and substance abuse.   All other systems reviewed and are negative.      Past Medical History   has a past medical history of Diabetes, Psychiatric disorder, and Rheumatoid arthritis(714.0).    Surgical History   has a past surgical history that includes other orthopedic surgery; pr upper gi endoscopy,biopsy (Left, 6/10/2020); and pr colonoscopy,diagnostic (Left, 6/10/2020).     Family History  Not pertinent    Social History   reports that she has never smoked. She has never used smokeless tobacco. She reports current alcohol use. She reports that she does not use drugs.    Allergies  Allergies   Allergen Reactions   • Cephalexin Unspecified and Vomiting     n/v  n/v         Medications  Prior to Admission Medications   Prescriptions Last Dose Informant Patient Reported? Taking?   HYDROcodone-acetaminophen (NORCO) 5-325 MG Tab per tablet 6/26/2020 at Unknown time Patient Yes No   Sig: Take 1 Tab by mouth 3 times a day as needed.   Melatonin 5 MG Tab 6/25/2020 at Unknown time Patient Yes No   Sig: Take 5 mg by mouth every bedtime.   SITagliptin (JANUVIA) 25 MG Tab 6/25/2020 at Unknown time  No No   Sig: Take 1 Tab by mouth every day.   albuterol 108 (90 Base) MCG/ACT Aero Soln inhalation aerosol 6/25/2020 at Unknown time  No No   Sig: Inhale 2 Puffs by mouth every four hours as needed.   escitalopram (LEXAPRO) 20 MG tablet 6/25/2020 at Unknown time Patient Yes No   Sig: Take 20 mg by mouth every day.   fluticasone (FLONASE) 50 MCG/ACT nasal spray 6/25/2020 at Unknown time Patient Yes No   Sig: Spray 1 Spray in nose every day.   latanoprost (XALATAN) 0.005 % Solution 6/26/2020 at Unknown time Patient Yes No   Sig: Place 1 Drop in both eyes every bedtime.   lisinopril (PRINIVIL) 5 MG Tab 6/25/2020 at Unknown time Patient Yes No   Sig: Take 5 mg by mouth every  day.   lovastatin (MEVACOR) 20 MG Tab 6/25/2020 at Not taking due to dapto Patient Yes No   Sig: Take 20 mg by mouth every day.   metoprolol SR (TOPROL XL) 25 MG TABLET SR 24 HR 6/25/2020 at Unknown time  No No   Sig: Take 1 Tab by mouth every day.   omeprazole (PRILOSEC) 20 MG delayed-release capsule 6/25/2020 at Unknown time  No No   Sig: Take 1 Cap by mouth every day.   ondansetron (ZOFRAN ODT) 4 MG TABLET DISPERSIBLE 6/25/2020 at Unknown time  No No   Sig: Take 1 Tab by mouth every four hours as needed for Nausea (give PO if no IV route available).   pantoprazole (PROTONIX) 40 MG Tablet Delayed Response 6/25/2020 at Unknown time Patient Yes No   Sig: Take 40 mg by mouth every day.   predniSONE (DELTASONE) 10 MG Tab 6/25/2020 at Unknown time Patient Yes No   Sig: Take 10 mg by mouth every day.   timolol (TIMOPTIC) 0.5 % Solution 6/25/2020 at Unknown time Patient Yes No   Sig: instill 1 drop into both eyes every morning   tizanidine (ZANAFLEX) 4 MG Tab 6/25/2020 at Unknown time Patient Yes No   Sig: Take 4 mg by mouth 2 times a day as needed.      Facility-Administered Medications: None       Physical Exam       Physical Exam  Vitals signs reviewed.   Constitutional:       General: She is not in acute distress.     Appearance: Normal appearance. She is not ill-appearing.   HENT:      Head: Normocephalic and atraumatic.      Nose: No congestion.   Eyes:      General:         Right eye: No discharge.         Left eye: No discharge.      Pupils: Pupils are equal, round, and reactive to light.   Neck:      Musculoskeletal: Normal range of motion. No neck rigidity.   Cardiovascular:      Rate and Rhythm: Normal rate and regular rhythm.      Pulses: Normal pulses.      Heart sounds: Normal heart sounds. No murmur.   Pulmonary:      Effort: Pulmonary effort is normal. No respiratory distress.      Breath sounds: Normal breath sounds. No stridor.   Abdominal:      General: Bowel sounds are normal. There is no distension.       Palpations: Abdomen is soft.      Tenderness: There is no abdominal tenderness.   Musculoskeletal: Normal range of motion.         General: No swelling or tenderness.   Skin:     General: Skin is warm.      Capillary Refill: Capillary refill takes less than 2 seconds.      Coloration: Skin is pale. Skin is not jaundiced.      Findings: No bruising.   Neurological:      General: No focal deficit present.      Mental Status: She is alert and oriented to person, place, and time.      Cranial Nerves: No cranial nerve deficit.      Comments: No focal neurological deficit.   Psychiatric:         Mood and Affect: Mood normal.         Behavior: Behavior normal.         Laboratory:      Recent Labs     06/26/20  1650   CREATININE 1.67*     No results for input(s): ALTSGPT, ASTSGOT, ALKPHOSPHAT, TBILIRUBIN, DBILIRUBIN, GAMMAGT, AMYLASE, LIPASE, ALB, PREALBUMIN, GLUCOSE in the last 72 hours.      No results for input(s): NTPROBNP in the last 72 hours.      No results for input(s): TROPONINT in the last 72 hours.    Imaging:  No orders to display         Assessment/Plan:      Non-traumatic rhabdomyolysis  Assessment & Plan  She found to significantly elevated CPK secondary to daptomycin use.  She has been having myalgia for 4 days per  I started her on IV fluid per  I ordered repeat CPK.  I discussed plan of care with her.  Held daptomycin.  Please consult with infectious disease tomorrow morning.    Stage 3 chronic kidney disease (HCC)  Assessment & Plan  Currently no functions are at baseline.  Order lab work-up  Avoid nephrotoxins   Medication dose adjustment as per renal functions.    Staphylococcus aureus bacteremia- (present on admission)  Assessment & Plan  She found to have MRSA bacteremia and she has been receiving daptomycin.  She has been receiving infusion at infusion clinic.  She received a dose of daptomycin today.  Please consult with infectious disease for the possibility of initiation of ceftaroline  starting from tomorrow.  I reviewed Dr. Xavier note from today.        I reviewed Dr. Xavier note from infectious disease.  She received dose of daptomycin.  And please consult with infectious disease tomorrow for possible initiation of ceftaroline

## 2020-06-28 LAB
ALBUMIN SERPL BCP-MCNC: 3 G/DL (ref 3.2–4.9)
ANISOCYTOSIS BLD QL SMEAR: ABNORMAL
BASOPHILS # BLD AUTO: 0.9 % (ref 0–1.8)
BASOPHILS # BLD: 0.08 K/UL (ref 0–0.12)
BUN SERPL-MCNC: 21 MG/DL (ref 8–22)
CALCIUM SERPL-MCNC: 9.2 MG/DL (ref 8.5–10.5)
CHLORIDE SERPL-SCNC: 103 MMOL/L (ref 96–112)
CK SERPL-CCNC: 6172 U/L (ref 0–154)
CO2 SERPL-SCNC: 21 MMOL/L (ref 20–33)
CREAT SERPL-MCNC: 1.48 MG/DL (ref 0.5–1.4)
EOSINOPHIL # BLD AUTO: 0.08 K/UL (ref 0–0.51)
EOSINOPHIL NFR BLD: 0.9 % (ref 0–6.9)
ERYTHROCYTE [DISTWIDTH] IN BLOOD BY AUTOMATED COUNT: 46 FL (ref 35.9–50)
EST. AVERAGE GLUCOSE BLD GHB EST-MCNC: 220 MG/DL
GLUCOSE BLD-MCNC: 155 MG/DL (ref 65–99)
GLUCOSE BLD-MCNC: 201 MG/DL (ref 65–99)
GLUCOSE BLD-MCNC: 232 MG/DL (ref 65–99)
GLUCOSE BLD-MCNC: 248 MG/DL (ref 65–99)
GLUCOSE SERPL-MCNC: 156 MG/DL (ref 65–99)
HBA1C MFR BLD: 9.3 % (ref 0–5.6)
HCT VFR BLD AUTO: 32.4 % (ref 37–47)
HGB BLD-MCNC: 9.7 G/DL (ref 12–16)
LYMPHOCYTES # BLD AUTO: 2.31 K/UL (ref 1–4.8)
LYMPHOCYTES NFR BLD: 25.4 % (ref 22–41)
MANUAL DIFF BLD: NORMAL
MCH RBC QN AUTO: 25.5 PG (ref 27–33)
MCHC RBC AUTO-ENTMCNC: 29.9 G/DL (ref 33.6–35)
MCV RBC AUTO: 85.3 FL (ref 81.4–97.8)
MICROCYTES BLD QL SMEAR: ABNORMAL
MONOCYTES # BLD AUTO: 0.48 K/UL (ref 0–0.85)
MONOCYTES NFR BLD AUTO: 5.3 % (ref 0–13.4)
MORPHOLOGY BLD-IMP: NORMAL
NEUTROPHILS # BLD AUTO: 6.14 K/UL (ref 2–7.15)
NEUTROPHILS NFR BLD: 67.5 % (ref 44–72)
NRBC # BLD AUTO: 0 K/UL
NRBC BLD-RTO: 0 /100 WBC
PHOSPHATE SERPL-MCNC: 3.6 MG/DL (ref 2.5–4.5)
PLATELET # BLD AUTO: 470 K/UL (ref 164–446)
PLATELET BLD QL SMEAR: NORMAL
PMV BLD AUTO: 10.4 FL (ref 9–12.9)
POLYCHROMASIA BLD QL SMEAR: NORMAL
POTASSIUM SERPL-SCNC: 4.4 MMOL/L (ref 3.6–5.5)
RBC # BLD AUTO: 3.8 M/UL (ref 4.2–5.4)
RBC BLD AUTO: PRESENT
SODIUM SERPL-SCNC: 138 MMOL/L (ref 135–145)
WBC # BLD AUTO: 9.1 K/UL (ref 4.8–10.8)

## 2020-06-28 PROCEDURE — 36415 COLL VENOUS BLD VENIPUNCTURE: CPT

## 2020-06-28 PROCEDURE — 700111 HCHG RX REV CODE 636 W/ 250 OVERRIDE (IP): Mod: JG | Performed by: INTERNAL MEDICINE

## 2020-06-28 PROCEDURE — 82550 ASSAY OF CK (CPK): CPT

## 2020-06-28 PROCEDURE — 700102 HCHG RX REV CODE 250 W/ 637 OVERRIDE(OP): Performed by: HOSPITALIST

## 2020-06-28 PROCEDURE — 770006 HCHG ROOM/CARE - MED/SURG/GYN SEMI*

## 2020-06-28 PROCEDURE — 80069 RENAL FUNCTION PANEL: CPT

## 2020-06-28 PROCEDURE — 700105 HCHG RX REV CODE 258: Performed by: INTERNAL MEDICINE

## 2020-06-28 PROCEDURE — 700111 HCHG RX REV CODE 636 W/ 250 OVERRIDE (IP): Performed by: HOSPITALIST

## 2020-06-28 PROCEDURE — 85007 BL SMEAR W/DIFF WBC COUNT: CPT

## 2020-06-28 PROCEDURE — 99232 SBSQ HOSP IP/OBS MODERATE 35: CPT | Performed by: HOSPITALIST

## 2020-06-28 PROCEDURE — 83036 HEMOGLOBIN GLYCOSYLATED A1C: CPT

## 2020-06-28 PROCEDURE — A9270 NON-COVERED ITEM OR SERVICE: HCPCS | Performed by: INTERNAL MEDICINE

## 2020-06-28 PROCEDURE — 700102 HCHG RX REV CODE 250 W/ 637 OVERRIDE(OP): Performed by: INTERNAL MEDICINE

## 2020-06-28 PROCEDURE — 700111 HCHG RX REV CODE 636 W/ 250 OVERRIDE (IP): Performed by: INTERNAL MEDICINE

## 2020-06-28 PROCEDURE — 85027 COMPLETE CBC AUTOMATED: CPT

## 2020-06-28 PROCEDURE — 82962 GLUCOSE BLOOD TEST: CPT | Mod: 91

## 2020-06-28 RX ORDER — FUROSEMIDE 10 MG/ML
20 INJECTION INTRAMUSCULAR; INTRAVENOUS ONCE
Status: COMPLETED | OUTPATIENT
Start: 2020-06-28 | End: 2020-06-28

## 2020-06-28 RX ORDER — INSULIN GLARGINE 100 [IU]/ML
10 INJECTION, SOLUTION SUBCUTANEOUS
Status: DISCONTINUED | OUTPATIENT
Start: 2020-06-28 | End: 2020-07-01 | Stop reason: HOSPADM

## 2020-06-28 RX ADMIN — TIMOLOL MALEATE 1 DROP: 5 SOLUTION/ DROPS OPHTHALMIC at 04:30

## 2020-06-28 RX ADMIN — CEFTAROLINE FOSAMIL 600 MG: 600 POWDER, FOR SOLUTION INTRAVENOUS at 17:01

## 2020-06-28 RX ADMIN — CEFTAROLINE FOSAMIL 600 MG: 600 POWDER, FOR SOLUTION INTRAVENOUS at 04:28

## 2020-06-28 RX ADMIN — INSULIN HUMAN 2 UNITS: 100 INJECTION, SOLUTION PARENTERAL at 17:01

## 2020-06-28 RX ADMIN — HYDROCODONE BITARTRATE AND ACETAMINOPHEN 1 TABLET: 5; 325 TABLET ORAL at 21:44

## 2020-06-28 RX ADMIN — HEPARIN SODIUM 5000 UNITS: 5000 INJECTION, SOLUTION INTRAVENOUS; SUBCUTANEOUS at 04:28

## 2020-06-28 RX ADMIN — HEPARIN SODIUM 5000 UNITS: 5000 INJECTION, SOLUTION INTRAVENOUS; SUBCUTANEOUS at 13:28

## 2020-06-28 RX ADMIN — PREDNISONE 10 MG: 10 TABLET ORAL at 04:29

## 2020-06-28 RX ADMIN — SODIUM CHLORIDE: 9 INJECTION, SOLUTION INTRAVENOUS at 03:41

## 2020-06-28 RX ADMIN — METOPROLOL SUCCINATE 25 MG: 25 TABLET, EXTENDED RELEASE ORAL at 04:29

## 2020-06-28 RX ADMIN — FUROSEMIDE 20 MG: 10 INJECTION, SOLUTION INTRAMUSCULAR; INTRAVENOUS at 15:14

## 2020-06-28 RX ADMIN — HYDROCODONE BITARTRATE AND ACETAMINOPHEN 1 TABLET: 5; 325 TABLET ORAL at 02:42

## 2020-06-28 RX ADMIN — HEPARIN SODIUM 5000 UNITS: 5000 INJECTION, SOLUTION INTRAVENOUS; SUBCUTANEOUS at 21:44

## 2020-06-28 RX ADMIN — INSULIN HUMAN 1 UNITS: 100 INJECTION, SOLUTION PARENTERAL at 21:45

## 2020-06-28 RX ADMIN — SODIUM CHLORIDE: 9 INJECTION, SOLUTION INTRAVENOUS at 22:47

## 2020-06-28 RX ADMIN — INSULIN HUMAN 2 UNITS: 100 INJECTION, SOLUTION PARENTERAL at 09:25

## 2020-06-28 RX ADMIN — INSULIN GLARGINE 10 UNITS: 100 INJECTION, SOLUTION SUBCUTANEOUS at 15:11

## 2020-06-28 RX ADMIN — ESCITALOPRAM OXALATE 20 MG: 10 TABLET ORAL at 04:29

## 2020-06-28 RX ADMIN — INSULIN HUMAN 2 UNITS: 100 INJECTION, SOLUTION PARENTERAL at 13:27

## 2020-06-28 RX ADMIN — OMEPRAZOLE 20 MG: 20 CAPSULE, DELAYED RELEASE ORAL at 04:30

## 2020-06-28 RX ADMIN — HYDROCODONE BITARTRATE AND ACETAMINOPHEN 1 TABLET: 5; 325 TABLET ORAL at 13:27

## 2020-06-28 RX ADMIN — LATANOPROST 1 DROP: 50 SOLUTION OPHTHALMIC at 21:45

## 2020-06-28 ASSESSMENT — ENCOUNTER SYMPTOMS
CHILLS: 0
ABDOMINAL PAIN: 0
MYALGIAS: 1
NAUSEA: 0
PALPITATIONS: 0
VOMITING: 0
COUGH: 0
HEMOPTYSIS: 0
HEARTBURN: 0
ORTHOPNEA: 0
WEAKNESS: 1
DEPRESSION: 0
FEVER: 0
WEIGHT LOSS: 0

## 2020-06-28 ASSESSMENT — PATIENT HEALTH QUESTIONNAIRE - PHQ9
2. FEELING DOWN, DEPRESSED, IRRITABLE, OR HOPELESS: NOT AT ALL
1. LITTLE INTEREST OR PLEASURE IN DOING THINGS: NOT AT ALL
SUM OF ALL RESPONSES TO PHQ9 QUESTIONS 1 AND 2: 0

## 2020-06-28 NOTE — PROGRESS NOTES
"Received report on patient and assumed care at 1920. Pt is A&Ox4 and on room air. Midline currently running new antibiotic ordered earlier today. Contact isolation is in place for MRSA in blood stream. Completed assessment and patient stated \"pain is much more tolerable today\". Denied needs for pain interventions at this time. Stated no other needs and is resting in bed now. Bed is locked and in lowest position. Educated patient on use of call light. Water and call light are within reach.   "

## 2020-06-28 NOTE — PROGRESS NOTES
Samina from Lab called with critical result of CPK of 6172 at 0330. Critical lab result read back to Samina.   Dr. Valdivia notified of critical lab result at 0345. Critical lab result read back by Dr. Valdivia. No new orders or interventions were given at this time.

## 2020-06-28 NOTE — PROGRESS NOTES
Hospital Medicine Daily Progress Note    Date of Service  6/28/2020    Chief Complaint   60 y.o. female admitted 6/26/2020 with No chief complaint on file.        Hospital Course    A 60-year-old female with a past medical history significant for type 2 diabetes mellitus, rheumatoid arthritis on chronic prednisone, chronic immunosuppression present to the ER on 6/26/2020 from infectious disease clinic when she was found to have elevated CK.  Patient was taking daptomycin/ rifampin  secondary to MRSA bacteremia      Interval Problem Update  6/27  No acute events overnight.  Patient noted to be hypertensive, will start amlodipine 5 mg p.o. daily.  See has been complaining of generalized weakness.  --CPK elevated, will continue IV fluid, monitor daily CPK    6/28:  No acute events overnight, patient continues to complain of generalized abdominal pain, CPK at 6000, continue sertraline, continue IV fluids with caution considering systolic congestive heart failure with EF of 45% and grade 2 diastolic dysfunction.  --Vital signs stable  --ID following    Consultants/Specialty  Infectious disease    Code Status  full    Disposition   Home once medically cleared     Review of Systems  Review of Systems   Constitutional: Positive for malaise/fatigue. Negative for chills, fever and weight loss.   HENT: Negative for hearing loss and tinnitus.    Respiratory: Negative for cough and hemoptysis.    Cardiovascular: Negative for chest pain, palpitations and orthopnea.   Gastrointestinal: Negative for abdominal pain, heartburn, nausea and vomiting.   Genitourinary: Negative for dysuria.   Musculoskeletal: Positive for myalgias.   Neurological: Positive for weakness (generalized).   Psychiatric/Behavioral: Negative for depression and suicidal ideas.        Physical Exam  Temp:  [36.2 °C (97.1 °F)-36.6 °C (97.9 °F)] 36.6 °C (97.9 °F)  Pulse:  [62-79] 62  Resp:  [17-19] 19  BP: (148-164)/(60-86) 159/60  SpO2:  [93 %-95 %] 93  %    Physical Exam  Vitals signs and nursing note reviewed.   Constitutional:       Appearance: Normal appearance.   HENT:      Head: Normocephalic and atraumatic.   Eyes:      Extraocular Movements: Extraocular movements intact.      Pupils: Pupils are equal, round, and reactive to light.   Cardiovascular:      Rate and Rhythm: Normal rate.      Pulses: Normal pulses.      Heart sounds: Normal heart sounds.   Pulmonary:      Effort: Pulmonary effort is normal. No respiratory distress.   Abdominal:      General: Abdomen is flat. Bowel sounds are normal. There is no distension.      Palpations: Abdomen is soft.   Musculoskeletal: Normal range of motion.         General: No swelling.      Right lower leg: No edema.   Skin:     General: Skin is warm.   Neurological:      Mental Status: She is alert and oriented to person, place, and time. Mental status is at baseline.      Cranial Nerves: No cranial nerve deficit.         Fluids    Intake/Output Summary (Last 24 hours) at 6/28/2020 1300  Last data filed at 6/28/2020 0900  Gross per 24 hour   Intake 1370 ml   Output --   Net 1370 ml       Laboratory  Recent Labs     06/27/20  0210 06/28/20  0235   WBC 8.6 9.1   RBC 3.80* 3.80*   HEMOGLOBIN 9.8* 9.7*   HEMATOCRIT 32.3* 32.4*   MCV 85.0 85.3   MCH 25.8* 25.5*   MCHC 30.3* 29.9*   RDW 45.9 46.0   PLATELETCT 513* 470*   MPV 10.5 10.4     Recent Labs     06/26/20  1650 06/27/20  0210 06/28/20  0235   SODIUM  --  135 138   POTASSIUM  --  4.5 4.4   CHLORIDE  --  98 103   CO2  --  23 21   GLUCOSE  --  227* 156*   BUN  --  24* 21   CREATININE 1.67* 1.45* 1.48*   CALCIUM  --  9.3 9.2                   Imaging  No orders to display        Assessment/Plan  Non-traumatic rhabdomyolysis  Assessment & Plan  She found to significantly elevated CPK secondary to daptomycin use, myalgia  For 4 days   --Daptomycin was stopped, started on ceftaroline by infectious disease on 6/27, will continue IV fluid   -- CPK trending down    Chronic  diastolic heart failure (HCC)  Assessment & Plan  Patient is noted to have systolic heart failure with EF of 45% and grade 2 diastolic dysfunction  --- Continue cardiac and diabetic diet  --- Caution with IV fluid    Hypertension  Assessment & Plan  Patient is noted to be elevated, will continue amlodipine 5 mg p.o. daily.  Hold lisinopril in the setting of RAF    Type 2 diabetes mellitus (HCC)  Assessment & Plan   hemoglobin A1c at 9.3;  diabetic diet, hypoglycemia recall, Accu-Cheks AC at bedtime   -- start lantus 10 at am    -- diabetic education    Stage 3 chronic kidney disease (HCC)  Assessment & Plan  Monitor renal function, continue IV fluid avoid nephrotoxin   -Creatinine at 1.48; monitor     Staphylococcus aureus bacteremia- (present on admission)  Assessment & Plan  Patient had MRSA bacteremia this was on daptomycin plus rifampin.  Daptomycin is been stopped considering rhabdomyolysis   --- Infectious disease consulted, started on ceftaroline on 6/277 , will follow ID recs        VTE prophylaxis: heparin

## 2020-06-28 NOTE — PROGRESS NOTES
Bedside report received, assumed care at 1600.  Assessment complete.  A&O x 4. Patient calls appropriately.  Patient ambulates with no assist. Bed alarm off.   Patient has 4/10 pain. Pain managed with prescribed medications.  Denies N&V. Tolerating ADA diet.  + void, + flatus, + BM.  Patient denies SOB.  SCD's off.  Patient sitting in chair visiting with relative, pleasant and calm.  Review plan with of care with patient. Call light and personal belongings with in reach. Hourly rounding in place. All needs met at this time.

## 2020-06-28 NOTE — CARE PLAN
Problem: Communication  Goal: The ability to communicate needs accurately and effectively will improve  Outcome: PROGRESSING AS EXPECTED     Problem: Safety  Goal: Will remain free from injury  Outcome: PROGRESSING AS EXPECTED  Goal: Will remain free from falls  Outcome: PROGRESSING AS EXPECTED     Problem: Infection  Goal: Will remain free from infection  Outcome: PROGRESSING AS EXPECTED     Problem: Venous Thromboembolism (VTW)/Deep Vein Thrombosis (DVT) Prevention:  Goal: Patient will participate in Venous Thrombosis (VTE)/Deep Vein Thrombosis (DVT)Prevention Measures  Outcome: PROGRESSING AS EXPECTED     Problem: Bowel/Gastric:  Goal: Normal bowel function is maintained or improved  Outcome: PROGRESSING AS EXPECTED  Goal: Will not experience complications related to bowel motility  Outcome: PROGRESSING AS EXPECTED     Problem: Knowledge Deficit  Goal: Knowledge of disease process/condition, treatment plan, diagnostic tests, and medications will improve  Outcome: PROGRESSING AS EXPECTED  Goal: Knowledge of the prescribed therapeutic regimen will improve  Outcome: PROGRESSING AS EXPECTED     Problem: Discharge Barriers/Planning  Goal: Patient's continuum of care needs will be met  Outcome: PROGRESSING AS EXPECTED     Problem: Mobility  Goal: Risk for activity intolerance will decrease  Outcome: PROGRESSING AS EXPECTED     Problem: Pain Management  Goal: Pain level will decrease to patient's comfort goal  Outcome: PROGRESSING AS EXPECTED     Problem: Respiratory:  Goal: Respiratory status will improve  Outcome: PROGRESSING AS EXPECTED

## 2020-06-28 NOTE — PROGRESS NOTES
Brief ID note    Pt continues to tolerate ceftaroline  CK levels started to decline but still in 6000s. She reported her myalgia is slowly better  Creatinine stable in 1.4s    Need to coordinate with CM regarding insurance authorization for ceftaroline  Might be difficult to do twice-a-day dosing at [facility]  Ceftaroline can potentially be continuous infusion at home per Pharmacy  Need to streamline the disposition process on Monday    Lowell Ureña D.O.  Infectious Disease

## 2020-06-28 NOTE — CARE PLAN
Problem: Knowledge Deficit  Goal: Knowledge of disease process/condition, treatment plan, diagnostic tests, and medications will improve  Outcome: PROGRESSING AS EXPECTED  Provided verbal education to patient regarding daptomycin and rhabdo and the signs and symptoms that are related. Stated verbal understanding      Problem: Pain Management  Goal: Pain level will decrease to patient's comfort goal  Outcome: PROGRESSING AS EXPECTED  Patient states being in a lot less pain after new antibiotic was started. I educated her on her pharmacological options per MAR as well as non-pharmacological options if she feels the need

## 2020-06-29 LAB
ALBUMIN SERPL BCP-MCNC: 3.1 G/DL (ref 3.2–4.9)
BASOPHILS # BLD AUTO: 1 % (ref 0–1.8)
BASOPHILS # BLD: 0.08 K/UL (ref 0–0.12)
BUN SERPL-MCNC: 21 MG/DL (ref 8–22)
CALCIUM SERPL-MCNC: 9.3 MG/DL (ref 8.5–10.5)
CHLORIDE SERPL-SCNC: 102 MMOL/L (ref 96–112)
CK SERPL-CCNC: 2991 U/L (ref 0–154)
CO2 SERPL-SCNC: 22 MMOL/L (ref 20–33)
CREAT SERPL-MCNC: 1.4 MG/DL (ref 0.5–1.4)
EOSINOPHIL # BLD AUTO: 0.26 K/UL (ref 0–0.51)
EOSINOPHIL NFR BLD: 3.1 % (ref 0–6.9)
ERYTHROCYTE [DISTWIDTH] IN BLOOD BY AUTOMATED COUNT: 45.5 FL (ref 35.9–50)
GLUCOSE BLD-MCNC: 134 MG/DL (ref 65–99)
GLUCOSE BLD-MCNC: 153 MG/DL (ref 65–99)
GLUCOSE BLD-MCNC: 196 MG/DL (ref 65–99)
GLUCOSE BLD-MCNC: 236 MG/DL (ref 65–99)
GLUCOSE SERPL-MCNC: 128 MG/DL (ref 65–99)
HCT VFR BLD AUTO: 30.9 % (ref 37–47)
HGB BLD-MCNC: 9.3 G/DL (ref 12–16)
IMM GRANULOCYTES # BLD AUTO: 0.06 K/UL (ref 0–0.11)
IMM GRANULOCYTES NFR BLD AUTO: 0.7 % (ref 0–0.9)
LYMPHOCYTES # BLD AUTO: 2.47 K/UL (ref 1–4.8)
LYMPHOCYTES NFR BLD: 29.5 % (ref 22–41)
MCH RBC QN AUTO: 25.4 PG (ref 27–33)
MCHC RBC AUTO-ENTMCNC: 30.1 G/DL (ref 33.6–35)
MCV RBC AUTO: 84.4 FL (ref 81.4–97.8)
MONOCYTES # BLD AUTO: 0.56 K/UL (ref 0–0.85)
MONOCYTES NFR BLD AUTO: 6.7 % (ref 0–13.4)
NEUTROPHILS # BLD AUTO: 4.95 K/UL (ref 2–7.15)
NEUTROPHILS NFR BLD: 59 % (ref 44–72)
NRBC # BLD AUTO: 0 K/UL
NRBC BLD-RTO: 0 /100 WBC
PHOSPHATE SERPL-MCNC: 3.4 MG/DL (ref 2.5–4.5)
PLATELET # BLD AUTO: 427 K/UL (ref 164–446)
PMV BLD AUTO: 10.3 FL (ref 9–12.9)
POTASSIUM SERPL-SCNC: 3.8 MMOL/L (ref 3.6–5.5)
RBC # BLD AUTO: 3.66 M/UL (ref 4.2–5.4)
SODIUM SERPL-SCNC: 136 MMOL/L (ref 135–145)
WBC # BLD AUTO: 8.4 K/UL (ref 4.8–10.8)

## 2020-06-29 PROCEDURE — 82550 ASSAY OF CK (CPK): CPT

## 2020-06-29 PROCEDURE — 99233 SBSQ HOSP IP/OBS HIGH 50: CPT | Performed by: INTERNAL MEDICINE

## 2020-06-29 PROCEDURE — A9270 NON-COVERED ITEM OR SERVICE: HCPCS | Performed by: INTERNAL MEDICINE

## 2020-06-29 PROCEDURE — 99232 SBSQ HOSP IP/OBS MODERATE 35: CPT | Performed by: HOSPITALIST

## 2020-06-29 PROCEDURE — 770001 HCHG ROOM/CARE - MED/SURG/GYN PRIV*

## 2020-06-29 PROCEDURE — 700111 HCHG RX REV CODE 636 W/ 250 OVERRIDE (IP): Performed by: INTERNAL MEDICINE

## 2020-06-29 PROCEDURE — 700111 HCHG RX REV CODE 636 W/ 250 OVERRIDE (IP): Mod: JG | Performed by: INTERNAL MEDICINE

## 2020-06-29 PROCEDURE — 700102 HCHG RX REV CODE 250 W/ 637 OVERRIDE(OP): Performed by: HOSPITALIST

## 2020-06-29 PROCEDURE — 82962 GLUCOSE BLOOD TEST: CPT | Mod: 91

## 2020-06-29 PROCEDURE — 82552 ASSAY OF CPK IN BLOOD: CPT

## 2020-06-29 PROCEDURE — 85025 COMPLETE CBC W/AUTO DIFF WBC: CPT

## 2020-06-29 PROCEDURE — 36415 COLL VENOUS BLD VENIPUNCTURE: CPT

## 2020-06-29 PROCEDURE — 700105 HCHG RX REV CODE 258: Performed by: INTERNAL MEDICINE

## 2020-06-29 PROCEDURE — 80069 RENAL FUNCTION PANEL: CPT

## 2020-06-29 PROCEDURE — 700102 HCHG RX REV CODE 250 W/ 637 OVERRIDE(OP): Performed by: INTERNAL MEDICINE

## 2020-06-29 RX ORDER — LINEZOLID 600 MG/1
600 TABLET, FILM COATED ORAL 2 TIMES DAILY
Qty: 8 TAB | Refills: 0 | Status: SHIPPED | OUTPATIENT
Start: 2020-06-29 | End: 2020-06-29 | Stop reason: SDUPTHER

## 2020-06-29 RX ORDER — LINEZOLID 600 MG/1
600 TABLET, FILM COATED ORAL 2 TIMES DAILY
Qty: 7 TAB | Refills: 0 | Status: SHIPPED | OUTPATIENT
Start: 2020-06-29 | End: 2020-07-01 | Stop reason: SDUPTHER

## 2020-06-29 RX ADMIN — SODIUM CHLORIDE: 9 INJECTION, SOLUTION INTRAVENOUS at 16:40

## 2020-06-29 RX ADMIN — HYDROCODONE BITARTRATE AND ACETAMINOPHEN 1 TABLET: 5; 325 TABLET ORAL at 18:13

## 2020-06-29 RX ADMIN — PREDNISONE 10 MG: 10 TABLET ORAL at 04:57

## 2020-06-29 RX ADMIN — INSULIN HUMAN 1 UNITS: 100 INJECTION, SOLUTION PARENTERAL at 18:11

## 2020-06-29 RX ADMIN — LATANOPROST 1 DROP: 50 SOLUTION OPHTHALMIC at 21:03

## 2020-06-29 RX ADMIN — TIMOLOL MALEATE 1 DROP: 5 SOLUTION/ DROPS OPHTHALMIC at 04:58

## 2020-06-29 RX ADMIN — CEFTAROLINE FOSAMIL 400 MG: 600 POWDER, FOR SOLUTION INTRAVENOUS at 04:55

## 2020-06-29 RX ADMIN — ACETAMINOPHEN 650 MG: 325 TABLET, FILM COATED ORAL at 03:39

## 2020-06-29 RX ADMIN — CEFTAROLINE FOSAMIL 400 MG: 600 POWDER, FOR SOLUTION INTRAVENOUS at 18:33

## 2020-06-29 RX ADMIN — HEPARIN SODIUM 5000 UNITS: 5000 INJECTION, SOLUTION INTRAVENOUS; SUBCUTANEOUS at 04:57

## 2020-06-29 RX ADMIN — INSULIN HUMAN 1 UNITS: 100 INJECTION, SOLUTION PARENTERAL at 08:37

## 2020-06-29 RX ADMIN — HYDROCODONE BITARTRATE AND ACETAMINOPHEN 1 TABLET: 5; 325 TABLET ORAL at 05:28

## 2020-06-29 RX ADMIN — METOPROLOL SUCCINATE 25 MG: 25 TABLET, EXTENDED RELEASE ORAL at 04:57

## 2020-06-29 RX ADMIN — HEPARIN SODIUM 5000 UNITS: 5000 INJECTION, SOLUTION INTRAVENOUS; SUBCUTANEOUS at 12:51

## 2020-06-29 RX ADMIN — INSULIN GLARGINE 10 UNITS: 100 INJECTION, SOLUTION SUBCUTANEOUS at 08:38

## 2020-06-29 RX ADMIN — INSULIN HUMAN 2 UNITS: 100 INJECTION, SOLUTION PARENTERAL at 12:51

## 2020-06-29 RX ADMIN — HEPARIN SODIUM 5000 UNITS: 5000 INJECTION, SOLUTION INTRAVENOUS; SUBCUTANEOUS at 20:56

## 2020-06-29 RX ADMIN — OMEPRAZOLE 20 MG: 20 CAPSULE, DELAYED RELEASE ORAL at 04:57

## 2020-06-29 RX ADMIN — SODIUM CHLORIDE: 9 INJECTION, SOLUTION INTRAVENOUS at 08:36

## 2020-06-29 RX ADMIN — ESCITALOPRAM OXALATE 20 MG: 10 TABLET ORAL at 04:56

## 2020-06-29 ASSESSMENT — ENCOUNTER SYMPTOMS
PALPITATIONS: 0
COUGH: 0
NAUSEA: 0
SPUTUM PRODUCTION: 0
ORTHOPNEA: 0
CHILLS: 0
VOMITING: 0
HEARTBURN: 0
DIZZINESS: 0
WEIGHT LOSS: 0
DOUBLE VISION: 0
FEVER: 0
ABDOMINAL PAIN: 0
DEPRESSION: 0
DIARRHEA: 0
BLURRED VISION: 0
WEAKNESS: 1
MYALGIAS: 1

## 2020-06-29 NOTE — PROGRESS NOTES
Hospital Medicine Daily Progress Note    Date of Service  6/29/2020    Chief Complaint   60 y.o. female admitted 6/26/2020 with No chief complaint on file.        Hospital Course    A 60-year-old female with a past medical history significant for type 2 diabetes mellitus, rheumatoid arthritis on chronic prednisone, chronic immunosuppression present to the ER on 6/26/2020 from infectious disease clinic when she was found to have elevated CK.  Patient was taking daptomycin/ rifampin  secondary to MRSA bacteremia and her last day of Iv abx is 7/3/2020.   ID following, recommend discharging the patient on Zyvox second milligram p.o. twice daily for 3 days 12 tablet being 7.  Patient Zyvox will be sent to her healthcare pharmacy.      Interval Problem Update  6/27  No acute events overnight.  Patient noted to be hypertensive, will start amlodipine 5 mg p.o. daily.  See has been complaining of generalized weakness.  --CPK elevated, will continue IV fluid, monitor daily CPK    6/28:  No acute events overnight, patient continues to complain of generalized abdominal pain, CPK at 6000, continue sertraline, continue IV fluids with caution considering systolic congestive heart failure with EF of 45% and grade 2 diastolic dysfunction.  --Vital signs stable  --ID following    6/29: CPK improving at  2991 with IV,  She does reports that her myalgia has been getting better; continue ceftaroline while her in the hospital, ID plans to start the patient on Zyvox 600 mg p.o. twice daily last day of antibiotics being 7/3/2020.  -- Needs meds to beds    Consultants/Specialty  Infectious disease    Code Status  full    Disposition   Home once medically cleared     Review of Systems  Review of Systems   Constitutional: Positive for malaise/fatigue. Negative for chills, fever and weight loss.   HENT: Negative for hearing loss and tinnitus.    Eyes: Negative for blurred vision and double vision.   Respiratory: Negative for cough and sputum  production.    Cardiovascular: Negative for chest pain, palpitations and orthopnea.   Gastrointestinal: Negative for abdominal pain, heartburn, nausea and vomiting.   Genitourinary: Negative for dysuria.   Musculoskeletal: Positive for myalgias.   Neurological: Positive for weakness (generalized). Negative for dizziness.   Psychiatric/Behavioral: Negative for depression and suicidal ideas.        Physical Exam  Temp:  [36.3 °C (97.4 °F)-36.7 °C (98 °F)] 36.7 °C (98 °F)  Pulse:  [61-77] 61  Resp:  [17-19] 18  BP: (130-156)/(66-85) 156/85  SpO2:  [93 %-98 %] 93 %    Physical Exam  Vitals signs and nursing note reviewed.   Constitutional:       Appearance: Normal appearance.   HENT:      Head: Normocephalic and atraumatic.   Eyes:      Extraocular Movements: Extraocular movements intact.      Pupils: Pupils are equal, round, and reactive to light.   Cardiovascular:      Rate and Rhythm: Normal rate.      Pulses: Normal pulses.      Heart sounds: Normal heart sounds.   Pulmonary:      Effort: Pulmonary effort is normal. No respiratory distress.   Abdominal:      General: Abdomen is flat. Bowel sounds are normal. There is no distension.      Palpations: Abdomen is soft.   Musculoskeletal: Normal range of motion.         General: No swelling.      Right lower leg: No edema.   Skin:     General: Skin is warm.   Neurological:      Mental Status: She is alert and oriented to person, place, and time. Mental status is at baseline.      Cranial Nerves: No cranial nerve deficit.         Fluids    Intake/Output Summary (Last 24 hours) at 6/29/2020 1319  Last data filed at 6/29/2020 0900  Gross per 24 hour   Intake 1150 ml   Output --   Net 1150 ml       Laboratory  Recent Labs     06/27/20  0210 06/28/20  0235 06/29/20  0048   WBC 8.6 9.1 8.4   RBC 3.80* 3.80* 3.66*   HEMOGLOBIN 9.8* 9.7* 9.3*   HEMATOCRIT 32.3* 32.4* 30.9*   MCV 85.0 85.3 84.4   MCH 25.8* 25.5* 25.4*   MCHC 30.3* 29.9* 30.1*   RDW 45.9 46.0 45.5   PLATELETCT  513* 470* 427   MPV 10.5 10.4 10.3     Recent Labs     06/27/20  0210 06/28/20  0235 06/29/20  0048   SODIUM 135 138 136   POTASSIUM 4.5 4.4 3.8   CHLORIDE 98 103 102   CO2 23 21 22   GLUCOSE 227* 156* 128*   BUN 24* 21 21   CREATININE 1.45* 1.48* 1.40   CALCIUM 9.3 9.2 9.3                   Imaging  No orders to display        Assessment/Plan  Non-traumatic rhabdomyolysis  Assessment & Plan  She found to significantly elevated CPK secondary to daptomycin use, myalgia  For 4 days   --Daptomycin was stopped, started on ceftaroline by infectious disease on 6/27, will continue IV fluid   -- CPK trending down 2900 s     --- ID plans to discharge the patient on antibiotics the last day being 7/3/2020.    Chronic diastolic heart failure (HCC)  Assessment & Plan  Patient is noted to have systolic heart failure with EF of 45% and grade 2 diastolic dysfunction  --- Continue cardiac and diabetic diet  --- Caution with IV fluid    Hypertension  Assessment & Plan  Patient is noted to be elevated, will continue amlodipine 5 mg p.o. daily.  Hold lisinopril in the setting of RAF    Type 2 diabetes mellitus (HCC)  Assessment & Plan   hemoglobin A1c at 9.3;  diabetic diet, hypoglycemia recall, Accu-Cheks AC at bedtime   --Fingerstick is still elevated, will increase Lantus to 15   -- diabetic education    Stage 3 chronic kidney disease (HCC)  Assessment & Plan  Monitor renal function, continue IV fluid avoid nephrotoxin   -Creatinine at 1.4, monitor     Staphylococcus aureus bacteremia- (present on admission)  Assessment & Plan  Patient had MRSA bacteremia this was on daptomycin plus rifampin.  Daptomycin is been stopped considering rhabdomyolysis   --- Infectious disease consulted, started on ceftaroline on 6/27 and ID plans to dc on Zyvox  For 3 days, total dose 7       VTE prophylaxis: heparin

## 2020-06-29 NOTE — DISCHARGE PLANNING
Care Transition Team Assessment    Per MD's Notes:  60-year-old female with a past medical history significant for type 2 diabetes mellitus, rheumatoid arthritis on chronic prednisone, chronic immunosuppression present to the ER on 6/26/2020 from infectious disease clinic when she was found to have elevated CK.  Patient was taking daptomycin/ rifampin  secondary to MRSA bacteremia    Anticipated Discharge Disposition: Patient will have outpatient IV infusion, she does not have a [place] PCP to follow her for home health and home antibiotic infusion    Action: This  spoke with patient to complete an inpatient CM interview, assessment, patient will need IV antibiotics bid for elia. 5 days. I TTxd Dr. Adore Moran and ID Doctor Lowell Ureña for the [facility] orders to be put in [facility] to set up outpatient appointment.     Barriers to Discharge: Authorization for outpatient infusion, appointment for outpatient infusion. Patient is medically cleared from the hospitalist.     Plan: Patient to discharge home to her prior living situation, she lives with her aunt in [place], [place]. Per patient, her aunt can transport her twice daily to the outpatient infusion clinic at [facility] [place].     PCP: Dr. Tapia of [facility]  Rx.: [facility], [place], [place]  DME: None  HH: None  SNF: None      Information Source  Orientation : Oriented x 4  Information Given By: Patient  Informant's Name: Savita Mcclain  Who is responsible for making decisions for patient? : Patient    Readmission Evaluation  Is this a readmission?: Yes - unplanned readmission  Why do you think you were readmitted?: (Acute Kidney Injurey, antibiotic needed to be changed)  Was an appointment arranged for you prior to discharge?: Yes, did not attend appointment  Why didn't you go to your appointment?: Other (comment)(came to hospital before appoinment scheduled)  Were there new prescriptions you were supposed to fill after you were discharged?: Yes, prescriptions filled  Did you  understand your discharge instructions?: Yes  Did you have enough support after your last discharge?: Yes    Elopement Risk  Legal Hold: No  Ambulatory or Self Mobile in Wheelchair: Yes  Disoriented: No  Psychiatric Symptoms: None  History of Wandering: No  Elopement this Admit: No  Vocalizing Wanting to Leave: No  Displays Behaviors, Body Language Wanting to Leave: No-Not at Risk for Elopement  Elopement Risk: Not at Risk for Elopement    Interdisciplinary Discharge Planning  Does Admitting Nurse Feel This Could be a Complex Discharge?: (P) No  Lives with - Patient's Self Care Capacity: (P) Parents  Patient or legal guardian wants to designate a caregiver (see row info): (P) No  Support Systems: (P) Family Member(s), Parent  Housing / Facility: (P) [address]  Do You Take your Prescribed Medications Regularly: (P) Yes  Able to Return to Previous ADL's: (P) Future Time w/Therapy  Mobility Issues: (P) No  Prior Services: (P) Home-Independent  Assistance Needed: (P) No  Durable Medical Equipment: (P) Not Applicable    Discharge Preparedness  What is your plan after discharge?: Home with help, Home health care  What are your discharge supports?: Child, Parent, Other (comment)(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, retired RN)  Prior Functional Level: Ambulatory, Drives Self, Independent with Activities of Daily Living, Independent with Medication Management  Difficulity with ADLs: None  Difficulity with IADLs: None    Functional Assesment  Prior Functional Level: Ambulatory, Drives Self, Independent with Activities of Daily Living, Independent with Medication Management    Finances  Financial Barriers to Discharge: No  Prescription Coverage: Yes    Vision / Hearing Impairment  Vision Impairment : (P) Yes  Right Eye Vision: Impaired  Left Eye Vision: Impaired  Hearing Impairment : (P) No       Advance Directive  Advance Directive?: None  Advance Directive offered?: AD Booklet refused    Domestic Abuse  Have you ever been the  victim of abuse or violence?: (P) No  Physical Abuse or Sexual Abuse: (P) No  Verbal Abuse or Emotional Abuse: (P) No  Possible Abuse Reported to:: (P) Not Applicable    Psychological Assessment  History of Substance Abuse: None  History of Psychiatric Problems: No  Non-compliant with Treatment: No  Newly Diagnosed Illness: No    Discharge Risks or Barriers  Discharge risks or barriers?: Other (comment)(Patient's doctor is our of state in [place])  Patient risk factors: Complex medical needs, Other (comment)(PCP out of state)    Anticipated Discharge Information  Anticipated discharge disposition: HHC, Infusion / Enteral - home  Discharge Address: [address], aunt's house  Discharge Contact Phone Number: [phone]    Leanna Puga RN,

## 2020-06-29 NOTE — CARE PLAN
Problem: Knowledge Deficit  Goal: Knowledge of disease process/condition, treatment plan, diagnostic tests, and medications will improve  Outcome: PROGRESSING AS EXPECTED  Reinforced education regarding rhabdo and what her lab results mean     Problem: Discharge Barriers/Planning  Goal: Patient's continuum of care needs will be met  Outcome: PROGRESSING AS EXPECTED   Discussed discharge plans with patient and reinforced education on outpatient infusion services

## 2020-06-29 NOTE — PROGRESS NOTES
Infectious Disease Progress Note    Author: Mehran Razo M.D. Date & Time of service: 2020  12:09 PM    Chief Complaint:  Follow-up for MRSA bacteremia and rhabdomyolysis    Interval History:   patient remains afebrile, white count 8.4, tolerating ceftaroline.  Patient with bilateral shoulder pains much improved, nearly resolved.    Labs Reviewed and Medications Reviewed.    Review of Systems:  Review of Systems   Constitutional: Negative for chills and fever.   Gastrointestinal: Negative for abdominal pain, diarrhea, nausea and vomiting.   Musculoskeletal: Positive for myalgias.   Skin: Negative for itching and rash.   All other systems reviewed and are negative.      Hemodynamics:  Temp (24hrs), Av.6 °C (97.8 °F), Min:36.3 °C (97.4 °F), Max:36.7 °C (98 °F)  Temperature: 36.7 °C (98 °F)  Pulse  Av.9  Min: 61  Max: 109   Blood Pressure: 156/85       Physical Exam:  Physical Exam  Vitals signs and nursing note reviewed.   Constitutional:       General: She is not in acute distress.     Comments: Thin   HENT:      Head: Normocephalic and atraumatic.   Eyes:      General:         Right eye: No discharge.         Left eye: No discharge.      Conjunctiva/sclera: Conjunctivae normal.   Neck:      Musculoskeletal: No neck rigidity.   Cardiovascular:      Rate and Rhythm: Normal rate and regular rhythm.   Pulmonary:      Effort: Pulmonary effort is normal. No respiratory distress.   Abdominal:      General: There is no distension.      Tenderness: There is no abdominal tenderness.   Musculoskeletal:         General: No swelling.   Skin:     Findings: No erythema or rash.   Neurological:      General: No focal deficit present.      Mental Status: She is oriented to person, place, and time.   Psychiatric:         Mood and Affect: Mood normal.         Behavior: Behavior normal.      Comments: Very pleasant         Meds:    Current Facility-Administered Medications:   •  insulin glargine  •  ceftaroline  (TEFLARO) ivpb  •  senna-docusate **AND** polyethylene glycol/lytes **AND** magnesium hydroxide **AND** bisacodyl  •  NS  •  heparin  •  acetaminophen  •  labetalol  •  insulin regular **AND** POC Blood Glucose **AND** NOTIFY MD and PharmD **AND** glucose **AND** dextrose 50%  •  albuterol  •  escitalopram  •  HYDROcodone-acetaminophen  •  latanoprost  •  metoprolol SR  •  omeprazole  •  predniSONE  •  timolol  •  tizanidine    Labs:  Recent Labs     06/27/20 0210 06/28/20 0235 06/29/20 0048   WBC 8.6 9.1 8.4   RBC 3.80* 3.80* 3.66*   HEMOGLOBIN 9.8* 9.7* 9.3*   HEMATOCRIT 32.3* 32.4* 30.9*   MCV 85.0 85.3 84.4   MCH 25.8* 25.5* 25.4*   RDW 45.9 46.0 45.5   PLATELETCT 513* 470* 427   MPV 10.5 10.4 10.3   NEUTSPOLYS 61.00 67.50 59.00   LYMPHOCYTES 27.90 25.40 29.50   MONOCYTES 6.70 5.30 6.70   EOSINOPHILS 2.80 0.90 3.10   BASOPHILS 1.00 0.90 1.00   RBCMORPHOLO  --  Present  --      Recent Labs     06/27/20 0210 06/27/20  0642 06/28/20 0235 06/29/20 0048 06/29/20  0853   SODIUM 135  --  138 136  --    POTASSIUM 4.5  --  4.4 3.8  --    CHLORIDE 98  --  103 102  --    CO2 23  --  21 22  --    GLUCOSE 227*  --  156* 128*  --    BUN 24*  --  21 21  --    CPKTOTAL 8627* 8161* 6172*  --  2991*     Recent Labs     06/27/20 0210 06/28/20 0235 06/29/20 0048   ALBUMIN 3.3 3.0* 3.1*   TBILIRUBIN 0.2  --   --    ALKPHOSPHAT 58  --   --    TOTPROTEIN 7.2  --   --    ALTSGPT 151*  --   --    ASTSGOT 411*  --   --    CREATININE 1.45* 1.48* 1.40       Imaging:  Ct-chest,abdomen,pelvis W/o    Result Date: 6/1/2020 6/1/2020 11:18 AM HISTORY/REASON FOR EXAM:  Sepsis Hypotension, renal injury, nausea vomiting diarrhea TECHNIQUE/EXAM DESCRIPTION: CT scan of the chest, abdomen and pelvis without contrast was performed. Noncontrast helical scanning of the chest, abdomen and pelvis was obtained from the lung apices through the pubic symphysis. Low dose optimization technique was utilized for this CT exam including automated  exposure control and adjustment of the mA and/or kV according to patient size. COMPARISON:  None. FINDINGS: CHEST: Neck Base:  Normal. Lungs/Pleura: Right lower lobe bronchiectasis with peribronchial opacities which likely is atelectasis or scarring. Correlate clinically for infection. Mild dependent changes/atelectasis in the left lower lobe.  3 mm groundglass nodule in the right midlung on image 58 series 301. Calcified granuloma left upper lobe. No pneumothorax or pleural effusion. Cardiovascular Structures:  Heart size is normal. No pericardial effusion. Coronary artery stents and/or prominent calcification. Aorta is normal in caliber without aneurysm or dissection. Central pulmonary arteries are normal in caliber. Mediastinum/ lymph nodes: No lymphadenopathy. Small hiatal hernia. ABDOMEN/PELVIS Liver:  Diffuse hypoattenuation of the liver suggesting fatty infiltration. Liver is enlarged. Gallbladder: Normal Biliary tract: Nondilated. Pancreas: Normal. Spleen: Normal. Adrenals: Normal. Kidneys and Collecting Systems:  Bilateral perinephric fat stranding, asymmetrically more prominent on the right. This is concerning for infection given the history. Correlate clinically and with urinalysis. No significant hydronephrosis. No obstructing stone is seen. Gastrointestinal tract:  Possible rectosigmoid wall thickening, suboptimally evaluated without contrast and due to decompression. The appendix is normal. Peritoneum: No free air or free fluid. Reproductive organs:  Normal. Bladder:  Normal. Vessels:  Moderately dense calcified plaque. Lymph  Nodes:  No lymphadenopathy. Soft tissues/wall: Within normal limits. Bones:  No acute or aggressive abnormality. Ossific density adjacent to the inferior right scapula probably related to old trauma.     1.  Asymmetric right perinephric fat stranding suspicious for infection/pyelonephritis. Correlate with urinalysis. No significant hydronephrosis. No obstructing stone is seen. 2.   Hepatomegaly and hepatic steatosis. 3.  Rectosigmoid colon is decompressed, limiting evaluation for wall thickening. Correlate for evidence of colitis. 4.  Right lower lobe bronchiectasis and peribronchial opacities likely postinflammatory with atelectasis/scarring. Correlate clinically for evidence of infection. 5.  Atherosclerosis.     Ec-echocardiogram Complete W/o Cont    Result Date: 2020  Transthoracic Echo Report Echocardiography Laboratory CONCLUSIONS Mildly reduced left ventricular systolic function. Left ventricular ejection fraction is visually estimated to be 45-50%. Grade II diastolic dysfunction. Dilated inferior vena cava without inspiratory collapse. Estimated right ventricular systolic pressure  is 53 mmHg. Normal right ventricular size. Reduced right ventricular systolic function. Moderate to severe tricuspid regurgitation. CAROLYNE SELLERS Exam Date:         2020                    09:48 Exam Location:     Inpatient Priority:          Routine Ordering Physician:        ISIDRO DELATORRE Referring Physician:       [id number] Pope Sonographer:               Jeramy Paul RD,                            RVT Age:    60     Gender:    F MRN:    [id number] :    1960 BSA:    1.71   Ht (in):    64     Wt (lb):    146 Exam Type:     Complete Indications:     Cardiac Murmurs, Undiagnosed ICD Codes:       785.2 CPT Codes:       17601 BP:   132    /   77     HR:   75 Technical Quality:       Technically difficult study -                          adequate information is obtained MEASUREMENTS  (Male / Female) Normal Values 2D ECHO LV Diastolic Diameter PLAX        3.9 cm                4.2 - 5.9 / 3.9 - 5.3 cm LV Systolic Diameter PLAX         2.8 cm                2.1 - 4.0 cm IVS Diastolic Thickness           1.2 cm                LVPW Diastolic Thickness          1.2 cm                RV Diameter 4C                    3.1 cm                2.5 - 2.9 cm LVOT Diameter                     1.7 cm                 RA Diameter                       4.5 cm                LV Ejection Fraction MOD BP       54.1 %                >= 55  % LV Ejection Fraction MOD 4C       53.9 %                LV Ejection Fraction MOD 2C       58.7 %                LA Volume Index                   38.5 cm3/m2           16 - 28 cm3/m2 DOPPLER AV Peak Velocity                  1.1 m/s               AV Peak Gradient                  4.8 mmHg              AV Mean Gradient                  2.5 mmHg              LVOT Peak Velocity                0.77 m/s              AV Area Cont Eq vti               1.5 cm2               Mitral E Point Velocity           0.73 m/s              Mitral E to A Ratio               1.3                   MV Pressure Half Time             50 ms                 MV Area PHT                       4.4 cm2               MV Deceleration Time              172 ms                MR Flow Convergence Radius        0.36 cm               MR ERO PISA                       0.042 cm2             MR Regurgitant Volume PISA        7.1 cm3               TR Peak Velocity                  257 cm/s              * Indicates values subject to auto-interpretation LV EF:        % FINDINGS Left Ventricle Normal left ventricular chamber size. Mild concentric left ventricular hypertrophy. Mildly reduced left ventricular systolic function. Left ventricular ejection fraction is visually estimated to be 45-50%. Abnormal septal motion consistent with right ventricular (RV) volume overload and/or elevated RV end-diastolic pressure. Grade II diastolic dysfunction. Right Ventricle Normal right ventricular size. Reduced right ventricular systolic function. Right Atrium Dilated right atrium. Dilated inferior vena cava without inspiratory collapse. Left Atrium Mildly dilated left atrium. Left atrial volume index is 39  mL/sq m. Mitral Valve Structurally normal mitral valve. No mitral stenosis. Mild mitral regurgitation. Aortic Valve Aortic sclerosis  without stenosis. No aortic insufficiency. Tricuspid Valve Structurally normal tricuspid valve. No tricuspid stenosis. Moderate to severe tricuspid regurgitation. Right atrial pressure is estimated to be 15 mmHg. Estimated right ventricular systolic pressure  is 53 mmHg. Pulmonic Valve The pulmonic valve is not well visualized.  Mild pulmonic insufficiency. Pericardium Normal pericardium without effusion. Aorta Normal aortic root for body surface area. Ascending aorta diameter is 2.8 cm. Allyson Rivera MD (Electronically Signed) Final Date:     2020                 11:59    Ec-rayna W/o Cont    Result Date: 6/10/2020  Transesophageal Echo Report Echocardiography Laboratory CONCLUSIONS No evidemnce of vegetations or paravalvular abscess. Normal RAYNA. CAROLYNE SELLERS Exam Date:          2020                     15:06 Exam Location:      Inpatient Priority:            Routine Ordering Physician:        ZURDO KRAFT Referring Physician: Sonographer:               Bertha Harman RDCS Age:    60     Gender:    F MRN:    [id number] :    [id number] BSA:           Ht (in):           Wt (lb): Report Type:      Complete Indications:     Endocarditis ICD Codes:       421 CPT Codes:       68582 BP:   142    /   82     HR: Technical Quality: MEASUREMENTS  (Male / Female) Normal Values * Indicates values subject to auto-interpretation LV EF:        % Medications Medications determined by anesthesiologist. Limitations Complications No complications. Proc. Components The probe was inserted and manipulated by Dr. Navarro. Probe # epiq 2 was used for this procedure. 2D, color Doppler and spectral Doppler imaging were used as part of the evaluation as clinically indicated. FINDINGS Left Ventricle Normal left ventricular size, thickness, systolic function, and diastolic function. Right Ventricle Normal right ventricular size and systolic function. Right Atrium Normal right atrial size. Left Atrium  "Normal left atrial size. LA Appendage Normal left atrial appendage. No thrombus detected in the left atrial appendage. IA Septum Normal interatrial septum. IV Septum Mitral Valve Structurally normal mitral valve without significant stenosis or regurgitation. Aortic Valve Structurally normal aortic valve without significant stenosis or regurgitation. Tricuspid Valve Structurally normal tricuspid valve without significant stenosis or regurgitation. Pulmonic Valve Structurally normal pulmonic valve without significant stenosis or regurgitation. Pericardium Aorta Ajit Navarro MD (Electronically Signed) Final Date:     10 Jennie 2020                 16:23    Ir-midline Catheter Insertion Wo Guidance > Age 3    Result Date: 6/11/2020  HISTORY/REASON FOR EXAM:  Midline Placement   TECHNIQUE/EXAM DESCRIPTION AND NUMBER OF VIEWS: Midline insertion with ultrasound guidance.  FINDINGS: Midline insertion with Ultrasound Guidance was performed by qualified nursing staff without the assistance of a Radiologist. Midline positioning as measured by RN or as appropriate length of catheter selected.              Ultrasound-guided midline placement performed by qualified nursing staff as above.       Micro:  Results     Procedure Component Value Units Date/Time    BLOOD CULTURE [[id number]] Collected:  06/27/20 1911    Order Status:  Completed Specimen:  Blood from Peripheral Updated:  06/28/20 0924     Significant Indicator NEG     Source BLD     Site PERIPHERAL     Culture Result No Growth  Note: Blood cultures are incubated for 5 days and  are monitored continuously.Positive blood cultures  are called to the RN and reported as soon as  they are identified.      Narrative:       Contact  Per Hospital Policy: Only change Specimen Src: to \"Line\" if  specified by physician order.  Left AC    BLOOD CULTURE [[id number]] Collected:  06/27/20 1911    Order Status:  Completed Specimen:  Blood from Peripheral Updated:  06/28/20 0924     " "Significant Indicator NEG     Source BLD     Site PERIPHERAL     Culture Result No Growth  Note: Blood cultures are incubated for 5 days and  are monitored continuously.Positive blood cultures  are called to the RN and reported as soon as  they are identified.      Narrative:       Contact  Per Hospital Policy: Only change Specimen Src: to \"Line\" if  specified by physician order.  Left Hand          Assessment:  60-year-old woman with history of RA on chronic prednisone 10 mg daily, poorly controlled type 2 diabetes, recurrent UTIs, recently admitted from 6/1 to 6/15/2020 with MRSA bacteremia and pyelonephritis, discharged on daptomycin, readmitted 6/26 with rhabdomyolysis (CPK 9000) with bilateral shoulder muscle pain.    Patient was admitted and switched to IV ceftaroline.  Reviewed the recent admission.  TTE and VERONICA were negative for infective endocarditis, and there was no persistent bacteremia.  Patient had negative blood cultures x2 from 6/6/2020.  Patient still does need to complete 4 weeks of treatment for complicated MRSA bacteremia rather than 2 weeks duration due to immunosuppression, poorly controlled diabetes, and presence of hardware in right foot and left ankle, albeit no evidence of these being infected.  She has almost completed this course already (stop date will be 7/3/2020).  Her CPK has decreased and her rhabdomyolysis myalgias have nearly resolved.    Pertinent Diagnoses  Recent MRSA bacteremia  Daptomycin induced rhabdomyolysis  Poorly controlled type 2 diabetes  Immunosuppressed status  Rheumatoid arthritis  Presence of hardware, no evidence of infection  CKD-II    Plan:  -See explanation above.  Continue IV ceftaroline while inpatient.    -Stop date 7/3/2020 (will have only 3 days remaining tomorrow) so on discharge can transition to oral antibiotics to officially complete course   -Would avoid Bactrim given CKD.  On discharge, recommend p.o. linezolid 600 mg every 12 hours with stop date of " 7/3/2020. Potential interaction with Lexapro for serotonin syndrome, but this is quite rare and patient only requires a few days.  -Patient educated to watch for symptoms of serotonin syndrome and to discontinue linezolid if any suspicion.  Stopping Lexapro at this point unlikely to decrease risk given long half-life  -Closely monitor for recurrence of infection which patient remains at risk for given poorly controlled diabetes and other factors mentioned above    Discussed with internal medicine, Dr. Moran    ID will sign off.  Please call with questions.

## 2020-06-29 NOTE — PROGRESS NOTES
Received bedside report and assumed care of patient at 1900. Patient is A&Ox4 and on room air. Completed assessment and she stated being at an 8/10 pain in both shoulders and upper arms bilaterally. Pain is managed with medications per MAR as well as heat packs. IV in place and infusing. Denies any other needs at this time. Bed is locked and in lowest position. Water and call light are in reach.

## 2020-06-29 NOTE — DIETARY
"Nutrition services: Day 3 of admit.  Savita Mcclain is a 60 y.o. female with admitting DX of rhabdomyelosis.  Consult received per nutrition admit screen; recent weight loss and poor appetite. Also seen for low BMI < 19.     Per current department guidelines dietary staff not permitted to enter isolation rooms at this time. RD was able to reach pt on phone. Pt states that her appetite is good, saying that she would like more food to be sent. Pt also requesting Boosts to be sent in between meals. We discussed dietary restrictions on renal/diabetic diet, informed pt that we can increase portion size of protein and vegetables.    Regarding weight, pt states that her UBW = 120 lb however says that she got sick in February and lost some weight. She does not seem to have concerns about recent weight loss and reports that it has been stable - she is aware of current weight.     Assessment:  Height: 162.6 cm (5' 4\")  Weight: 49.4 kg (108 lb 14.5 oz)  Body mass index is 18.69 kg/m²., BMI classification: WNL - on lower end  Diet/Intake: Diabetic/Renal; 5 out of 6 meals > 50% consumed    Evaluation:   1. Past medical hx includes T2 diabetes and rheumatoid arthritis for which she is on chronic prednisone  2. Obtained past medical hx in chart review:  · 5/15/20 (office visit): 52.6 kg (116 lb)  · Current weight: 49.4 kg (109 lb)   · 3.2 kg (6%) weight loss in 1.5 months is significant   3. MAR: lantus, SSI, prilosec, prednisone, bowel meds  4. Labs: A1C = 9.3%(6/28); BUN and creat WNL - ? Need for ongoing renal diet restriction     Malnutrition Risk: Pt with significant weight loss of 6% in ~1.5 months, however does not meet additional criteria at this time. Unable to perform nutrition focused physical exam d/t current isolation precautions.     Recommendations/Plan:  1. Continue Boost Glucose Control TID  2. Increase protein and/or vegetable portion sizes per pt's preference for \"more food\"   3. Encourage intake of meals " and supplements  4. Document intake of all meals and supplements as % taken in ADL's to provide interdisciplinary communication across all shifts.   5. Monitor weight.  6. Nutrition rep will continue to see patient for ongoing meal and snack preferences.   7. RD to monitor PO intake, wt trends, and nutrition labs/meds    RD will continue to monitor

## 2020-06-29 NOTE — CARE PLAN
Problem: Nutritional:  Goal: Achieve adequate nutritional intake  Description: Patient will continue to consume >50% of meals  Outcome: PROGRESSING AS EXPECTED   See dietary note. RD will continue to monitor.

## 2020-06-30 ENCOUNTER — APPOINTMENT (OUTPATIENT)
Dept: ONCOLOGY | Facility: MEDICAL CENTER | Age: 60
End: 2020-06-30
Attending: INTERNAL MEDICINE
Payer: MEDICARE

## 2020-06-30 LAB
ALBUMIN SERPL BCP-MCNC: 3 G/DL (ref 3.2–4.9)
BASOPHILS # BLD AUTO: 0.5 % (ref 0–1.8)
BASOPHILS # BLD: 0.04 K/UL (ref 0–0.12)
BUN SERPL-MCNC: 18 MG/DL (ref 8–22)
CALCIUM SERPL-MCNC: 9.1 MG/DL (ref 8.5–10.5)
CHLORIDE SERPL-SCNC: 105 MMOL/L (ref 96–112)
CK SERPL-CCNC: 1072 U/L (ref 0–154)
CO2 SERPL-SCNC: 24 MMOL/L (ref 20–33)
CREAT SERPL-MCNC: 1.58 MG/DL (ref 0.5–1.4)
EOSINOPHIL # BLD AUTO: 0.25 K/UL (ref 0–0.51)
EOSINOPHIL NFR BLD: 3.1 % (ref 0–6.9)
ERYTHROCYTE [DISTWIDTH] IN BLOOD BY AUTOMATED COUNT: 47.5 FL (ref 35.9–50)
GLUCOSE BLD-MCNC: 154 MG/DL (ref 65–99)
GLUCOSE BLD-MCNC: 165 MG/DL (ref 65–99)
GLUCOSE BLD-MCNC: 187 MG/DL (ref 65–99)
GLUCOSE BLD-MCNC: 291 MG/DL (ref 65–99)
GLUCOSE SERPL-MCNC: 125 MG/DL (ref 65–99)
HCT VFR BLD AUTO: 29.1 % (ref 37–47)
HGB BLD-MCNC: 8.5 G/DL (ref 12–16)
IMM GRANULOCYTES # BLD AUTO: 0.06 K/UL (ref 0–0.11)
IMM GRANULOCYTES NFR BLD AUTO: 0.7 % (ref 0–0.9)
LYMPHOCYTES # BLD AUTO: 2.03 K/UL (ref 1–4.8)
LYMPHOCYTES NFR BLD: 25.3 % (ref 22–41)
MCH RBC QN AUTO: 25.4 PG (ref 27–33)
MCHC RBC AUTO-ENTMCNC: 29.2 G/DL (ref 33.6–35)
MCV RBC AUTO: 86.9 FL (ref 81.4–97.8)
MONOCYTES # BLD AUTO: 0.69 K/UL (ref 0–0.85)
MONOCYTES NFR BLD AUTO: 8.6 % (ref 0–13.4)
MORPHOLOGY BLD-IMP: NORMAL
NEUTROPHILS # BLD AUTO: 4.96 K/UL (ref 2–7.15)
NEUTROPHILS NFR BLD: 61.8 % (ref 44–72)
NRBC # BLD AUTO: 0 K/UL
NRBC BLD-RTO: 0 /100 WBC
PHOSPHATE SERPL-MCNC: 3.4 MG/DL (ref 2.5–4.5)
PLATELET # BLD AUTO: 394 K/UL (ref 164–446)
PMV BLD AUTO: 10.4 FL (ref 9–12.9)
POTASSIUM SERPL-SCNC: 3.7 MMOL/L (ref 3.6–5.5)
RBC # BLD AUTO: 3.35 M/UL (ref 4.2–5.4)
SODIUM SERPL-SCNC: 138 MMOL/L (ref 135–145)
WBC # BLD AUTO: 8 K/UL (ref 4.8–10.8)

## 2020-06-30 PROCEDURE — 82962 GLUCOSE BLOOD TEST: CPT | Mod: 91

## 2020-06-30 PROCEDURE — 700111 HCHG RX REV CODE 636 W/ 250 OVERRIDE (IP): Performed by: INTERNAL MEDICINE

## 2020-06-30 PROCEDURE — 700105 HCHG RX REV CODE 258: Performed by: INTERNAL MEDICINE

## 2020-06-30 PROCEDURE — A9270 NON-COVERED ITEM OR SERVICE: HCPCS | Performed by: INTERNAL MEDICINE

## 2020-06-30 PROCEDURE — 82550 ASSAY OF CK (CPK): CPT

## 2020-06-30 PROCEDURE — 700111 HCHG RX REV CODE 636 W/ 250 OVERRIDE (IP): Mod: JG | Performed by: INTERNAL MEDICINE

## 2020-06-30 PROCEDURE — 99232 SBSQ HOSP IP/OBS MODERATE 35: CPT | Performed by: INTERNAL MEDICINE

## 2020-06-30 PROCEDURE — 85025 COMPLETE CBC W/AUTO DIFF WBC: CPT

## 2020-06-30 PROCEDURE — 36415 COLL VENOUS BLD VENIPUNCTURE: CPT

## 2020-06-30 PROCEDURE — 80069 RENAL FUNCTION PANEL: CPT

## 2020-06-30 PROCEDURE — 770021 HCHG ROOM/CARE - ISO PRIVATE

## 2020-06-30 PROCEDURE — 700102 HCHG RX REV CODE 250 W/ 637 OVERRIDE(OP): Performed by: INTERNAL MEDICINE

## 2020-06-30 RX ADMIN — CEFTAROLINE FOSAMIL 400 MG: 600 POWDER, FOR SOLUTION INTRAVENOUS at 06:27

## 2020-06-30 RX ADMIN — ACETAMINOPHEN 650 MG: 325 TABLET, FILM COATED ORAL at 20:11

## 2020-06-30 RX ADMIN — HEPARIN SODIUM 5000 UNITS: 5000 INJECTION, SOLUTION INTRAVENOUS; SUBCUTANEOUS at 20:12

## 2020-06-30 RX ADMIN — INSULIN HUMAN 1 UNITS: 100 INJECTION, SOLUTION PARENTERAL at 08:44

## 2020-06-30 RX ADMIN — SODIUM CHLORIDE: 9 INJECTION, SOLUTION INTRAVENOUS at 02:00

## 2020-06-30 RX ADMIN — SODIUM CHLORIDE: 9 INJECTION, SOLUTION INTRAVENOUS at 10:48

## 2020-06-30 RX ADMIN — INSULIN HUMAN 3 UNITS: 100 INJECTION, SOLUTION PARENTERAL at 13:17

## 2020-06-30 RX ADMIN — ESCITALOPRAM OXALATE 20 MG: 10 TABLET ORAL at 04:44

## 2020-06-30 RX ADMIN — PREDNISONE 10 MG: 10 TABLET ORAL at 04:45

## 2020-06-30 RX ADMIN — CEFTAROLINE FOSAMIL 400 MG: 600 POWDER, FOR SOLUTION INTRAVENOUS at 17:01

## 2020-06-30 RX ADMIN — INSULIN HUMAN 1 UNITS: 100 INJECTION, SOLUTION PARENTERAL at 20:24

## 2020-06-30 RX ADMIN — OMEPRAZOLE 20 MG: 20 CAPSULE, DELAYED RELEASE ORAL at 04:44

## 2020-06-30 RX ADMIN — HYDROCODONE BITARTRATE AND ACETAMINOPHEN 1 TABLET: 5; 325 TABLET ORAL at 04:44

## 2020-06-30 RX ADMIN — HYDROCODONE BITARTRATE AND ACETAMINOPHEN 1 TABLET: 5; 325 TABLET ORAL at 17:01

## 2020-06-30 RX ADMIN — SODIUM CHLORIDE: 9 INJECTION, SOLUTION INTRAVENOUS at 20:11

## 2020-06-30 RX ADMIN — INSULIN GLARGINE 10 UNITS: 100 INJECTION, SOLUTION SUBCUTANEOUS at 08:44

## 2020-06-30 RX ADMIN — INSULIN HUMAN 1 UNITS: 100 INJECTION, SOLUTION PARENTERAL at 18:02

## 2020-06-30 RX ADMIN — HEPARIN SODIUM 5000 UNITS: 5000 INJECTION, SOLUTION INTRAVENOUS; SUBCUTANEOUS at 04:44

## 2020-06-30 RX ADMIN — HEPARIN SODIUM 5000 UNITS: 5000 INJECTION, SOLUTION INTRAVENOUS; SUBCUTANEOUS at 13:19

## 2020-06-30 RX ADMIN — METOPROLOL SUCCINATE 25 MG: 25 TABLET, EXTENDED RELEASE ORAL at 04:45

## 2020-06-30 ASSESSMENT — ENCOUNTER SYMPTOMS
BLURRED VISION: 0
HEARTBURN: 0
MYALGIAS: 1
NAUSEA: 0
CHILLS: 0
SPUTUM PRODUCTION: 0
WEAKNESS: 1
ORTHOPNEA: 0
VOMITING: 0
ABDOMINAL PAIN: 0
PALPITATIONS: 0
DEPRESSION: 0
WEIGHT LOSS: 0
FEVER: 0
DIZZINESS: 0
DOUBLE VISION: 0
COUGH: 0

## 2020-06-30 NOTE — PROGRESS NOTES
Hospital Medicine Daily Progress Note    Date of Service  6/30/2020    Chief Complaint   60 y.o. female admitted 6/26/2020 with No chief complaint on file.        Hospital Course    A 60-year-old female with a past medical history significant for type 2 diabetes mellitus, rheumatoid arthritis on chronic prednisone, chronic immunosuppression present to the ER on 6/26/2020 from infectious disease clinic when she was found to have elevated CK.  Patient was taking daptomycin/ rifampin  secondary to MRSA bacteremia and her last day of Iv abx is 7/3/2020.   ID following, recommend discharging the patient on Zyvox second milligram p.o. twice daily for 3 days 12 tablet being 7.  Patient Zyvox will be sent to her healthcare pharmacy.      Interval Problem Update  6/27  No acute events overnight.  Patient noted to be hypertensive, will start amlodipine 5 mg p.o. daily.  See has been complaining of generalized weakness.  --CPK elevated, will continue IV fluid, monitor daily CPK  6/28:  No acute events overnight, patient continues to complain of generalized abdominal pain, CPK at 6000, continue sertraline, continue IV fluids with caution considering systolic congestive heart failure with EF of 45% and grade 2 diastolic dysfunction.  --Vital signs stable  --ID following  6/29: CPK improving at  2991 with IV,  She does reports that her myalgia has been getting better; continue ceftaroline while her in the hospital, ID plans to start the patient on Zyvox 600 mg p.o. twice daily last day of antibiotics being 7/3/2020.  -- Needs meds to beds    6?30: Pt seen and examined, no overnight events, CPK today 1072, denies any nausea or vomiting, on abx as per ID rec.  Hemoglobin today 8.5 no s/s of bleeding monitor     Consultants/Specialty  Infectious disease    Code Status  full    Disposition   Home once medically cleared     Review of Systems  Review of Systems   Constitutional: Positive for malaise/fatigue. Negative for chills, fever  and weight loss.   HENT: Negative for ear discharge, hearing loss, nosebleeds and tinnitus.    Eyes: Negative for blurred vision and double vision.   Respiratory: Negative for cough and sputum production.    Cardiovascular: Negative for chest pain, palpitations and orthopnea.   Gastrointestinal: Negative for abdominal pain, heartburn, nausea and vomiting.   Genitourinary: Negative for dysuria.   Musculoskeletal: Positive for myalgias.   Neurological: Positive for weakness (generalized). Negative for dizziness.   Psychiatric/Behavioral: Negative for depression and suicidal ideas.   All other systems reviewed and are negative.       Physical Exam  Temp:  [36.1 °C (96.9 °F)-36.6 °C (97.8 °F)] 36.6 °C (97.8 °F)  Pulse:  [61-75] 61  Resp:  [14-18] 17  BP: (129-149)/(64-75) 149/75  SpO2:  [92 %-97 %] 92 %    Physical Exam  Vitals signs and nursing note reviewed.   Constitutional:       Appearance: Normal appearance.   HENT:      Head: Normocephalic and atraumatic.   Eyes:      General: No scleral icterus.     Extraocular Movements: Extraocular movements intact.      Pupils: Pupils are equal, round, and reactive to light.   Cardiovascular:      Rate and Rhythm: Normal rate.      Pulses: Normal pulses.      Heart sounds: Normal heart sounds.   Pulmonary:      Effort: Pulmonary effort is normal. No respiratory distress.   Abdominal:      General: Abdomen is flat. Bowel sounds are normal. There is no distension.      Palpations: Abdomen is soft.      Tenderness: There is no abdominal tenderness. There is no guarding or rebound.   Musculoskeletal: Normal range of motion.         General: No swelling.      Right lower leg: No edema.   Skin:     General: Skin is warm.   Neurological:      Mental Status: She is alert and oriented to person, place, and time. Mental status is at baseline.      Cranial Nerves: No cranial nerve deficit.         Fluids    Intake/Output Summary (Last 24 hours) at 6/30/2020 1407  Last data filed at  6/30/2020 1355  Gross per 24 hour   Intake 700 ml   Output --   Net 700 ml       Laboratory  Recent Labs     06/28/20 0235 06/29/20 0048 06/30/20 0317   WBC 9.1 8.4 8.0   RBC 3.80* 3.66* 3.35*   HEMOGLOBIN 9.7* 9.3* 8.5*   HEMATOCRIT 32.4* 30.9* 29.1*   MCV 85.3 84.4 86.9   MCH 25.5* 25.4* 25.4*   MCHC 29.9* 30.1* 29.2*   RDW 46.0 45.5 47.5   PLATELETCT 470* 427 394   MPV 10.4 10.3 10.4     Recent Labs     06/28/20 0235 06/29/20 0048 06/30/20 0317   SODIUM 138 136 138   POTASSIUM 4.4 3.8 3.7   CHLORIDE 103 102 105   CO2 21 22 24   GLUCOSE 156* 128* 125*   BUN 21 21 18   CREATININE 1.48* 1.40 1.58*   CALCIUM 9.2 9.3 9.1                   Imaging  No orders to display        Assessment/Plan  Non-traumatic rhabdomyolysis  Assessment & Plan  She found to significantly elevated CPK secondary to daptomycin use, myalgia  For 4 days   --Daptomycin was stopped, started on ceftaroline by infectious disease on 6/27, will continue IV fluid   -- CPK trending down 2900 s     --- ID plans to discharge the patient on antibiotics the last day being 7/3/2020.    Chronic diastolic heart failure (HCC)  Assessment & Plan  Patient is noted to have systolic heart failure with EF of 45% and grade 2 diastolic dysfunction  --- Continue cardiac and diabetic diet  --- Caution with IV fluid    Hypertension  Assessment & Plan  Patient is noted to be elevated, will continue amlodipine 5 mg p.o. daily.  Hold lisinopril in the setting of RAF    Type 2 diabetes mellitus (HCC)  Assessment & Plan   hemoglobin A1c at 9.3;  diabetic diet, hypoglycemia recall, Accu-Cheks AC at bedtime   --Fingerstick is still elevated, will increase Lantus to 15   -- diabetic education    Stage 3 chronic kidney disease (HCC)  Assessment & Plan  Monitor renal function, continue IV fluid avoid nephrotoxin   -Creatinine at 1.4, monitor     Staphylococcus aureus bacteremia- (present on admission)  Assessment & Plan  Patient had MRSA bacteremia this was on daptomycin  plus rifampin.  Daptomycin is been stopped considering rhabdomyolysis   --- Infectious disease consulted, started on ceftaroline on 6/27 and ID plans to dc on Zyvox  For 3 days, total dose 7       VTE prophylaxis: heparin

## 2020-06-30 NOTE — PROGRESS NOTES
aox4 c/o pain in bilat shoulders 4/10. However declined pain meds. Up ad alena independent. Explained poc and lab values cpk trending down good sign. patient verbalized understanding. All needs met at this time. Call light and belongings in reach. Safety precautions in place. Wcm.

## 2020-06-30 NOTE — CARE PLAN
Problem: Mobility  Goal: Risk for activity intolerance will decrease  Outcome: PROGRESSING AS EXPECTED  Intervention: Assess and monitor signs of activity intolerance  Note: Pt tolerating mobility well. Pt up to chair, to the bathroom, and back to bed independently tolerating well.      Problem: Pain Management  Goal: Pain level will decrease to patient's comfort goal  Outcome: PROGRESSING AS EXPECTED  Intervention: Follow pain managment plan developed in collaboration with patient and Interdisciplinary Team  Note: Pt comfort level achieved. Pt pain reported 6/10, pt declined any medication for pain, pt reported rest as providing comfort for the pain.

## 2020-07-01 ENCOUNTER — PATIENT OUTREACH (OUTPATIENT)
Dept: HEALTH INFORMATION MANAGEMENT | Facility: OTHER | Age: 60
End: 2020-07-01

## 2020-07-01 ENCOUNTER — APPOINTMENT (OUTPATIENT)
Dept: ONCOLOGY | Facility: MEDICAL CENTER | Age: 60
End: 2020-07-01
Attending: INTERNAL MEDICINE
Payer: MEDICARE

## 2020-07-01 VITALS
TEMPERATURE: 98.7 F | HEIGHT: 64 IN | BODY MASS INDEX: 18.59 KG/M2 | RESPIRATION RATE: 17 BRPM | DIASTOLIC BLOOD PRESSURE: 74 MMHG | HEART RATE: 60 BPM | WEIGHT: 108.91 LBS | SYSTOLIC BLOOD PRESSURE: 150 MMHG | OXYGEN SATURATION: 91 %

## 2020-07-01 LAB
ANION GAP SERPL CALC-SCNC: 14 MMOL/L (ref 7–16)
BUN SERPL-MCNC: 20 MG/DL (ref 8–22)
CALCIUM SERPL-MCNC: 9.2 MG/DL (ref 8.5–10.5)
CHLORIDE SERPL-SCNC: 107 MMOL/L (ref 96–112)
CK BB CFR SERPL ELPH: 0 % (ref 0–0)
CK MACRO1 CFR SERPL: 0 % (ref 0–0)
CK MACRO2 CFR SERPL: 0 % (ref 0–0)
CK MB CFR SERPL ELPH: 0 % (ref 0–4)
CK MM CFR SERPL ELPH: 100 % (ref 96–100)
CK SERPL-CCNC: 380 U/L (ref 0–154)
CK SERPL-CCNC: 3844 U/L (ref 20–180)
CO2 SERPL-SCNC: 21 MMOL/L (ref 20–33)
CREAT SERPL-MCNC: 1.54 MG/DL (ref 0.5–1.4)
ERYTHROCYTE [DISTWIDTH] IN BLOOD BY AUTOMATED COUNT: 47.3 FL (ref 35.9–50)
GLUCOSE BLD-MCNC: 181 MG/DL (ref 65–99)
GLUCOSE SERPL-MCNC: 111 MG/DL (ref 65–99)
HCT VFR BLD AUTO: 29.7 % (ref 37–47)
HGB BLD-MCNC: 8.6 G/DL (ref 12–16)
MCH RBC QN AUTO: 25.8 PG (ref 27–33)
MCHC RBC AUTO-ENTMCNC: 29 G/DL (ref 33.6–35)
MCV RBC AUTO: 89.2 FL (ref 81.4–97.8)
PLATELET # BLD AUTO: 366 K/UL (ref 164–446)
PMV BLD AUTO: 10.7 FL (ref 9–12.9)
POTASSIUM SERPL-SCNC: 3.7 MMOL/L (ref 3.6–5.5)
RBC # BLD AUTO: 3.33 M/UL (ref 4.2–5.4)
SODIUM SERPL-SCNC: 142 MMOL/L (ref 135–145)
WBC # BLD AUTO: 8.8 K/UL (ref 4.8–10.8)

## 2020-07-01 PROCEDURE — 700111 HCHG RX REV CODE 636 W/ 250 OVERRIDE (IP): Mod: JG | Performed by: INTERNAL MEDICINE

## 2020-07-01 PROCEDURE — 82550 ASSAY OF CK (CPK): CPT

## 2020-07-01 PROCEDURE — 80048 BASIC METABOLIC PNL TOTAL CA: CPT

## 2020-07-01 PROCEDURE — 85027 COMPLETE CBC AUTOMATED: CPT

## 2020-07-01 PROCEDURE — 36415 COLL VENOUS BLD VENIPUNCTURE: CPT

## 2020-07-01 PROCEDURE — 700105 HCHG RX REV CODE 258: Performed by: INTERNAL MEDICINE

## 2020-07-01 PROCEDURE — 700102 HCHG RX REV CODE 250 W/ 637 OVERRIDE(OP): Performed by: INTERNAL MEDICINE

## 2020-07-01 PROCEDURE — 82962 GLUCOSE BLOOD TEST: CPT

## 2020-07-01 PROCEDURE — 700102 HCHG RX REV CODE 250 W/ 637 OVERRIDE(OP): Performed by: HOSPITALIST

## 2020-07-01 PROCEDURE — A9270 NON-COVERED ITEM OR SERVICE: HCPCS | Performed by: INTERNAL MEDICINE

## 2020-07-01 PROCEDURE — 700111 HCHG RX REV CODE 636 W/ 250 OVERRIDE (IP): Performed by: INTERNAL MEDICINE

## 2020-07-01 PROCEDURE — 99239 HOSP IP/OBS DSCHRG MGMT >30: CPT | Performed by: INTERNAL MEDICINE

## 2020-07-01 RX ORDER — LINAGLIPTIN 5 MG/1
5 TABLET, FILM COATED ORAL DAILY
Qty: 30 TAB | Refills: 0 | Status: SHIPPED | OUTPATIENT
Start: 2020-07-01 | End: 2024-01-21

## 2020-07-01 RX ORDER — LINEZOLID 600 MG/1
600 TABLET, FILM COATED ORAL 2 TIMES DAILY
Qty: 6 TAB | Refills: 0 | Status: SHIPPED | OUTPATIENT
Start: 2020-07-01 | End: 2020-07-04

## 2020-07-01 RX ADMIN — SODIUM CHLORIDE: 9 INJECTION, SOLUTION INTRAVENOUS at 04:12

## 2020-07-01 RX ADMIN — ACETAMINOPHEN 650 MG: 325 TABLET, FILM COATED ORAL at 08:29

## 2020-07-01 RX ADMIN — HYDROCODONE BITARTRATE AND ACETAMINOPHEN 1 TABLET: 5; 325 TABLET ORAL at 04:11

## 2020-07-01 RX ADMIN — INSULIN HUMAN 1 UNITS: 100 INJECTION, SOLUTION PARENTERAL at 09:28

## 2020-07-01 RX ADMIN — OMEPRAZOLE 20 MG: 20 CAPSULE, DELAYED RELEASE ORAL at 04:11

## 2020-07-01 RX ADMIN — ESCITALOPRAM OXALATE 20 MG: 10 TABLET ORAL at 04:12

## 2020-07-01 RX ADMIN — CEFTAROLINE FOSAMIL 400 MG: 600 POWDER, FOR SOLUTION INTRAVENOUS at 08:28

## 2020-07-01 RX ADMIN — PREDNISONE 10 MG: 10 TABLET ORAL at 04:12

## 2020-07-01 RX ADMIN — METOPROLOL SUCCINATE 25 MG: 25 TABLET, EXTENDED RELEASE ORAL at 04:12

## 2020-07-01 RX ADMIN — INSULIN GLARGINE 10 UNITS: 100 INJECTION, SOLUTION SUBCUTANEOUS at 09:28

## 2020-07-01 RX ADMIN — HEPARIN SODIUM 5000 UNITS: 5000 INJECTION, SOLUTION INTRAVENOUS; SUBCUTANEOUS at 04:11

## 2020-07-01 NOTE — CARE PLAN
Problem: Discharge Barriers/Planning  Goal: Patient's continuum of care needs will be met  Outcome: PROGRESSING AS EXPECTED     Problem: Pain Management  Goal: Pain level will decrease to patient's comfort goal  Outcome: PROGRESSING AS EXPECTED  Intervention: Follow pain managment plan developed in collaboration with patient and Interdisciplinary Team  Note: Pt pain assessed and managed with PRN medications, rest, and distraction.

## 2020-07-01 NOTE — PROGRESS NOTES
Pt dc'd today. IV removed. Pt left unit via car with family. Personal belongings with pt when leaving unit. Pt given discharge instructions prior to leaving unit including prescription and when to visit with physician; verbalizes understanding. Copy of discharge instructions with pt and in the chart.

## 2020-07-01 NOTE — PROGRESS NOTES
Pt is A&Ox4. Pt is resting in bed, no signs of labored breathing or pain. Pt on RA. Call light & personal belongings within reach, bed in lowest position & locked. Pt gait assessed, pt gait is steady, pt able to ambulate independently. Fall precautions in place and education provided on how to use call light. Pt running NS at 125 mL/hr. Pt updated on plan of care for the day. Pt declines any additional needs at this time. Will continue to monitor.

## 2020-07-01 NOTE — DISCHARGE PLANNING
Care Transition Team Discharge Planning    Anticipated Discharge Information  Anticipated discharge disposition: [facility], Infusion / Enteral - home  Discharge Address: [place], aunt's house  Discharge Contact Phone Number: [phone]       Discharge Plan:  Patient will no longer need IV abx. On discharge. She has been transitioned to po Zyvox  600mg. bid for total of 3 days.     No needs from case management on discharge. She will return to her prior living arrangement, lives with aunt in [place].     Leanna Puga RN,

## 2020-07-01 NOTE — CARE PLAN
Problem: Knowledge Deficit  Goal: Knowledge of disease process/condition, treatment plan, diagnostic tests, and medications will improve  Outcome: PROGRESSING AS EXPECTED  Note: Pt educated regarding plan of care and medications. All questions answered.      Problem: Discharge Barriers/Planning  Goal: Patient's continuum of care needs will be met  Outcome: PROGRESSING AS EXPECTED  Note: Pt is discharging today. D/c orders placed this morning. Pt aware. All questions answered.

## 2020-07-01 NOTE — DISCHARGE PLANNING
Meds-to-Beds: Discharge prescription orders listed below delivered to patient's bedside. RN notified. Patient counseled.       Savita Mcclain   Home Medication Instructions MARLIN:[id number]    Printed on:07/01/20 1126   Medication Information           linagliptin (TRADJENTA) 5 MG Tab tablet  Take 1 Tab by mouth every day.             linezolid (ZYVOX) 600 MG Tab  Take 1 Tab by mouth 2 times a day for 3 days.               Sana lFores, PharmD

## 2020-07-01 NOTE — PROGRESS NOTES
Pt is A&Ox4; room air. C/o 6/10  bilateral shoulder pain; gave prn tylenol per pt request to take tylenol rather then norco at this moment. Pt is currently receiving IV antibiotics. Pt is upself and was educated to call for help as needed. Assessment complete. D/c orders placed this morning to d/c pt today. Pt qualifies for meds to bed; meds to beds will deliver medications before d/c. POC discussed with pt; all questions answered at this time. Fall precautions in place. Call light within reach. Pt educated regarding fall prevention and states understanding. Hourly rounding in place.

## 2020-07-01 NOTE — DISCHARGE INSTRUCTIONS
Rhabdomyolysis  Rhabdomyolysis is a condition that happens when muscle cells break down and release substances into the blood that can damage the kidneys. This condition happens because of damage to the muscles that move bones (skeletal muscle). When the skeletal muscles are damaged, substances inside the muscle cells go into the blood. One of these substances is a protein called myoglobin.  Large amounts of myoglobin can cause kidney damage or kidney failure. Other substances that are released by muscle cells may upset the balance of the minerals (electrolytes) in your blood. This imbalance causes your blood to have too much acid (acidosis).  What are the causes?  This condition is caused by muscle damage. Muscle damage often happens because of:  · Using your muscles too much.  · An injury that crushes or squeezes a muscle too tightly.  · Using illegal drugs, mainly cocaine.  · Alcohol abuse.  Other possible causes include:  · Prescription medicines, such as those that:  ? Lower cholesterol (statins).  ? Treat ADHD (attention deficit hyperactivity disorder) or help with weight loss (amphetamines).  ? Treat pain (opiates).  · Infections.  · Muscle diseases that are passed down from parent to child (inherited).  · High fever.  · Heatstroke.  · Not having enough fluids in your body (dehydration).  · Seizures.  · Surgery.  What increases the risk?  This condition is more likely to develop in people who:  · Have a family history of muscle disease.  · Take part in extreme sports, such as running in marathons.  · Have diabetes.  · Are older.  · Abuse drugs or alcohol.  What are the signs or symptoms?  Symptoms of this condition vary. Some people have very few symptoms, and other people have many symptoms. The most common symptoms include:  · Muscle pain and swelling.  · Weak muscles.  · Dark urine.  · Feeling weak and tired.  Other symptoms include:  · Nausea and vomiting.  · Fever.  · Pain in the abdomen.  · Pain in the  joints.  Symptoms of complications from this condition include:  · Heart rhythm that is not normal (arrhythmia).  · Seizures.  · Not urinating enough because of kidney failure.  · Very low blood pressure (shock). Signs of shock include dizziness, blurry vision, and clammy skin.  · Bleeding that is hard to stop or control.  How is this diagnosed?  This condition is diagnosed based on your medical history, your symptoms, and a physical exam. Tests may also be done, including:  · Blood tests.  · Urine tests to check for myoglobin.  You may also have other tests to check for causes of muscle damage and to check for complications.  How is this treated?  Treatment for this condition helps to:  · Make sure you have enough fluids in your body.  · Lower the acid levels in your blood to reverse acidosis.  · Protect your kidneys.  Treatment may include:  · Fluids and medicines given through an IV tube that is inserted into one of your veins.  · Medicines to lower acidosis or to bring back the balance of the minerals in your body.  · Hemodialysis. This treatment uses an artificial kidney machine to filter your blood while you recover. You may have this if other treatments are not helping.  Follow these instructions at home:    · Take over-the-counter and prescription medicines only as told by your health care provider.  · Rest at home until your health care provider says that you can return to your normal activities.  · Drink enough fluid to keep your urine clear or pale yellow.  · Do not do activities that take a lot of effort (are strenuous). Ask your health care provider what level of exercise is safe for you.  · Do not abuse drugs or alcohol. If you are having problems with drug or alcohol use, ask your health care provider for help.  · Keep all follow-up visits as told by your health care provider. This is important.  Contact a health care provider if:  · You start having symptoms of this condition after treatment.  Get  help right away if:  · You have a seizure.  · You bleed easily or cannot control bleeding.  · You cannot urinate.  · You have chest pain.  · You have trouble breathing.  This information is not intended to replace advice given to you by your health care provider. Make sure you discuss any questions you have with your health care provider.  Document Released: 11/30/2005 Document Revised: 11/30/2018 Document Reviewed: 09/29/2017  [employer] Patient Education © 2020 [employer]  Discharge Instructions    Discharged to home by car with relative. Discharged via wheelchair, hospital escort: Yes.  Special equipment needed: Not Applicable    Be sure to schedule a follow-up appointment with your primary care doctor or any specialists as instructed.     Discharge Plan:   Diet Plan: Discussed  Activity Level: Discussed  Confirmed Follow up Appointment: Patient to Call and Schedule Appointment  Confirmed Symptoms Management: Discussed  Medication Reconciliation Updated: Yes    I understand that a diet low in cholesterol, fat, and sodium is recommended for good health. Unless I have been given specific instructions below for another diet, I accept this instruction as my diet prescription.   Other diet: diabetic    Special Instructions: None    · Is patient discharged on Warfarin / Coumadin?   No     Depression / Suicide Risk    As you are discharged from this [facility] facility, it is important to learn how to keep safe from harming yourself.    Recognize the warning signs:  · Abrupt changes in personality, positive or negative- including increase in energy   · Giving away possessions  · Change in eating patterns- significant weight changes-  positive or negative  · Change in sleeping patterns- unable to sleep or sleeping all the time   · Unwillingness or inability to communicate  · Depression  · Unusual sadness, discouragement and loneliness  · Talk of wanting to die  · Neglect of personal appearance   · Rebelliousness-  reckless behavior  · Withdrawal from people/activities they love  · Confusion- inability to concentrate     If you or a loved one observes any of these behaviors or has concerns about self-harm, here's what you can do:  · Talk about it- your feelings and reasons for harming yourself  · Remove any means that you might use to hurt yourself (examples: pills, rope, extension cords, firearm)  · Get professional help from the community (Mental Health, Substance Abuse, psychological counseling)  · Do not be alone:Call your Safe Contact- someone whom you trust who will be there for you.  · Call your local CRISIS HOTLINE 162-5659 or 668-795-7983  · Call your local Children's Mobile Crisis Response Team [place] (502) 805-6046 or www.[employer]  · Call the toll free National Suicide Prevention Hotlines   · National Suicide Prevention Lifeline 326-797-MNKW (1620)  · National Hope Line Network 800-SUICIDE (371-7765)

## 2020-07-01 NOTE — DISCHARGE SUMMARY
Discharge Summary    CHIEF COMPLAINT ON ADMISSION  No chief complaint on file.      Reason for Admission  Rhabdomyelosis     Admission Date  6/26/2020    CODE STATUS  Full Code    HPI & HOSPITAL COURSE  This is a 60 y.o. female with a past medical history significant for type 2 diabetes mellitus, rheumatoid arthritis on chronic prednisone, chronic immunosuppression present to the ER on 6/26/2020 from infectious disease clinic when she was found to have elevated CK.  Patient was taking daptomycin/ rifampin  secondary to MRSA bacteremia and her last day of Iv abx is 7/3/2020. Her abx was changed to ceftaroline, and she was started on IV fluid. Her CPK trended down  ID recommend discharging the patient on Zyvox second milligram p.o. twice daily for 3 days.  Pt now feeling better and will be discharged home today     The patient met 2-midnight criteria for an inpatient stay at the time of discharge.    Discharge Date  7/1/20    FOLLOW UP ITEMS POST DISCHARGE  PCP  ID    DISCHARGE DIAGNOSES  Active Problems:    Non-traumatic rhabdomyolysis POA: Unknown    Staphylococcus aureus bacteremia POA: Yes    Stage 3 chronic kidney disease (HCC) POA: Unknown    Type 2 diabetes mellitus (HCC) POA: Unknown    Hypertension POA: Unknown    Chronic diastolic heart failure (HCC) POA: Unknown  Resolved Problems:    * No resolved hospital problems. *      FOLLOW UP  No future appointments.  No follow-up provider specified.    MEDICATIONS ON DISCHARGE     Medication List      START taking these medications      Instructions   linezolid 600 MG Tabs  Commonly known as:  ZYVOX   Take 1 Tab by mouth 2 times a day for 3 days.  Dose:  600 mg     Tradjenta 5 MG Tabs tablet  Generic drug:  linagliptin   Doctor's comments:  meds to beds; replaces order for januvia due to insurance coverage  Take 1 Tab by mouth every day.  Dose:  5 mg        CONTINUE taking these medications      Instructions   albuterol 108 (90 Base) MCG/ACT Aers inhalation  aerosol   Inhale 2 Puffs by mouth every four hours as needed.  Dose:  2 Puff     fluticasone 50 MCG/ACT nasal spray  Commonly known as:  FLONASE   Spray 1 Spray in nose every day.  Dose:  1 Spray     HYDROcodone-acetaminophen 5-325 MG Tabs per tablet  Commonly known as:  NORCO   Take 1 Tab by mouth 3 times a day as needed.  Dose:  1 Tab     latanoprost 0.005 % Soln  Commonly known as:  XALATAN   Place 1 Drop in both eyes every bedtime.  Dose:  1 Drop     Lexapro 20 MG tablet  Generic drug:  escitalopram   Take 20 mg by mouth every day.  Dose:  20 mg     lisinopril 5 MG Tabs  Commonly known as:  PRINIVIL   Take 5 mg by mouth every day.  Dose:  5 mg     Melatonin 5 MG Tabs   Take 5 mg by mouth every bedtime.  Dose:  5 mg     metoprolol SR 25 MG Tb24  Commonly known as:  TOPROL XL   Take 1 Tab by mouth every day.  Dose:  25 mg     omeprazole 20 MG delayed-release capsule  Commonly known as:  PRILOSEC   Take 1 Cap by mouth every day.  Dose:  20 mg     ondansetron 4 MG Tbdp  Commonly known as:  ZOFRAN ODT   Take 1 Tab by mouth every four hours as needed for Nausea (give PO if no IV route available).  Dose:  4 mg     pantoprazole 40 MG Tbec  Commonly known as:  PROTONIX   Take 40 mg by mouth every day.  Dose:  40 mg     predniSONE 10 MG Tabs  Commonly known as:  DELTASONE   Take 10 mg by mouth every day.  Dose:  10 mg     timolol 0.5 % Soln  Commonly known as:  TIMOPTIC   instill 1 drop into both eyes every morning     tizanidine 4 MG Tabs  Commonly known as:  ZANAFLEX   Take 4 mg by mouth 2 times a day as needed.  Dose:  4 mg        STOP taking these medications    lovastatin 20 MG Tabs  Commonly known as:  MEVACOR     SITagliptin 25 MG Tabs  Commonly known as:  JANUVIA            Allergies  Allergies   Allergen Reactions   • Cephalexin Unspecified and Vomiting     n/v  n/v         DIET  Orders Placed This Encounter   Procedures   • Diet Order Diabetic, Renal     Standing Status:   Standing     Number of Occurrences:   1      Order Specific Question:   Diet:     Answer:   Diabetic [3]     Order Specific Question:   Diet:     Answer:   Renal [8]       ACTIVITY  As tolerated.  Weight bearing as tolerated    CONSULTATIONS  Infection disease     PROCEDURES  None     LABORATORY  Lab Results   Component Value Date    SODIUM 142 07/01/2020    POTASSIUM 3.7 07/01/2020    CHLORIDE 107 07/01/2020    CO2 21 07/01/2020    GLUCOSE 111 (H) 07/01/2020    BUN 20 07/01/2020    CREATININE 1.54 (H) 07/01/2020        Lab Results   Component Value Date    WBC 8.8 07/01/2020    HEMOGLOBIN 8.6 (L) 07/01/2020    HEMATOCRIT 29.7 (L) 07/01/2020    PLATELETCT 366 07/01/2020        Total time of the discharge process exceeds 41 minutes.

## 2020-07-02 ENCOUNTER — APPOINTMENT (OUTPATIENT)
Dept: ONCOLOGY | Facility: MEDICAL CENTER | Age: 60
End: 2020-07-02
Attending: INTERNAL MEDICINE
Payer: MEDICARE

## 2020-07-02 LAB
BACTERIA BLD CULT: NORMAL
BACTERIA BLD CULT: NORMAL
SIGNIFICANT IND 70042: NORMAL
SIGNIFICANT IND 70042: NORMAL
SITE SITE: NORMAL
SITE SITE: NORMAL
SOURCE SOURCE: NORMAL
SOURCE SOURCE: NORMAL

## 2020-07-03 ENCOUNTER — APPOINTMENT (OUTPATIENT)
Dept: ONCOLOGY | Facility: MEDICAL CENTER | Age: 60
End: 2020-07-03
Attending: INTERNAL MEDICINE
Payer: MEDICARE

## 2020-07-04 ENCOUNTER — APPOINTMENT (OUTPATIENT)
Dept: ONCOLOGY | Facility: MEDICAL CENTER | Age: 60
End: 2020-07-04
Attending: INTERNAL MEDICINE
Payer: MEDICARE

## 2020-07-05 ENCOUNTER — APPOINTMENT (OUTPATIENT)
Dept: ONCOLOGY | Facility: MEDICAL CENTER | Age: 60
End: 2020-07-05
Attending: INTERNAL MEDICINE
Payer: MEDICARE

## 2020-07-06 ENCOUNTER — APPOINTMENT (OUTPATIENT)
Dept: ONCOLOGY | Facility: MEDICAL CENTER | Age: 60
End: 2020-07-06
Attending: INTERNAL MEDICINE
Payer: MEDICARE

## 2020-07-07 ENCOUNTER — APPOINTMENT (OUTPATIENT)
Dept: ONCOLOGY | Facility: MEDICAL CENTER | Age: 60
End: 2020-07-07
Attending: INTERNAL MEDICINE
Payer: MEDICARE

## 2020-07-08 ENCOUNTER — APPOINTMENT (OUTPATIENT)
Dept: ONCOLOGY | Facility: MEDICAL CENTER | Age: 60
End: 2020-07-08
Attending: INTERNAL MEDICINE
Payer: MEDICARE

## 2020-07-09 ENCOUNTER — APPOINTMENT (OUTPATIENT)
Dept: ONCOLOGY | Facility: MEDICAL CENTER | Age: 60
End: 2020-07-09
Attending: INTERNAL MEDICINE
Payer: MEDICARE

## 2020-07-10 ENCOUNTER — APPOINTMENT (OUTPATIENT)
Dept: ONCOLOGY | Facility: MEDICAL CENTER | Age: 60
End: 2020-07-10
Attending: INTERNAL MEDICINE
Payer: MEDICARE

## 2020-07-11 ENCOUNTER — APPOINTMENT (OUTPATIENT)
Dept: ONCOLOGY | Facility: MEDICAL CENTER | Age: 60
End: 2020-07-11
Attending: INTERNAL MEDICINE
Payer: MEDICARE

## 2020-07-12 ENCOUNTER — APPOINTMENT (OUTPATIENT)
Dept: ONCOLOGY | Facility: MEDICAL CENTER | Age: 60
End: 2020-07-12
Attending: INTERNAL MEDICINE
Payer: MEDICARE

## 2020-07-13 ENCOUNTER — APPOINTMENT (OUTPATIENT)
Dept: ONCOLOGY | Facility: MEDICAL CENTER | Age: 60
End: 2020-07-13
Attending: INTERNAL MEDICINE
Payer: MEDICARE

## 2020-07-14 ENCOUNTER — APPOINTMENT (OUTPATIENT)
Dept: ONCOLOGY | Facility: MEDICAL CENTER | Age: 60
End: 2020-07-14
Attending: INTERNAL MEDICINE
Payer: MEDICARE

## 2020-07-15 ENCOUNTER — APPOINTMENT (OUTPATIENT)
Dept: ONCOLOGY | Facility: MEDICAL CENTER | Age: 60
End: 2020-07-15
Attending: INTERNAL MEDICINE
Payer: MEDICARE

## 2020-07-16 ENCOUNTER — APPOINTMENT (OUTPATIENT)
Dept: ONCOLOGY | Facility: MEDICAL CENTER | Age: 60
End: 2020-07-16
Attending: INTERNAL MEDICINE
Payer: MEDICARE

## 2020-07-17 ENCOUNTER — APPOINTMENT (OUTPATIENT)
Dept: ONCOLOGY | Facility: MEDICAL CENTER | Age: 60
End: 2020-07-17
Attending: INTERNAL MEDICINE
Payer: MEDICARE

## 2020-07-18 ENCOUNTER — APPOINTMENT (OUTPATIENT)
Dept: ONCOLOGY | Facility: MEDICAL CENTER | Age: 60
End: 2020-07-18
Attending: INTERNAL MEDICINE
Payer: MEDICARE

## 2024-01-21 ENCOUNTER — HOSPITAL ENCOUNTER (OUTPATIENT)
Dept: RADIOLOGY | Facility: MEDICAL CENTER | Age: 64
End: 2024-01-21

## 2024-01-21 ENCOUNTER — APPOINTMENT (OUTPATIENT)
Dept: RADIOLOGY | Facility: MEDICAL CENTER | Age: 64
DRG: 291 | End: 2024-01-21
Attending: STUDENT IN AN ORGANIZED HEALTH CARE EDUCATION/TRAINING PROGRAM
Payer: MEDICARE

## 2024-01-21 ENCOUNTER — HOSPITAL ENCOUNTER (INPATIENT)
Facility: MEDICAL CENTER | Age: 64
LOS: 3 days | DRG: 291 | End: 2024-01-24
Attending: STUDENT IN AN ORGANIZED HEALTH CARE EDUCATION/TRAINING PROGRAM | Admitting: STUDENT IN AN ORGANIZED HEALTH CARE EDUCATION/TRAINING PROGRAM
Payer: MEDICARE

## 2024-01-21 DIAGNOSIS — R55 SYNCOPE, UNSPECIFIED SYNCOPE TYPE: ICD-10-CM

## 2024-01-21 DIAGNOSIS — I21.4 NSTEMI (NON-ST ELEVATED MYOCARDIAL INFARCTION) (HCC): ICD-10-CM

## 2024-01-21 DIAGNOSIS — D64.9 SYMPTOMATIC ANEMIA: ICD-10-CM

## 2024-01-21 DIAGNOSIS — D64.9 ACUTE ANEMIA: ICD-10-CM

## 2024-01-21 DIAGNOSIS — I50.9 ACUTE ON CHRONIC CONGESTIVE HEART FAILURE, UNSPECIFIED HEART FAILURE TYPE (HCC): ICD-10-CM

## 2024-01-21 DIAGNOSIS — G93.9 BRAIN LESION: ICD-10-CM

## 2024-01-21 DIAGNOSIS — I50.31 ACUTE DIASTOLIC HEART FAILURE (HCC): ICD-10-CM

## 2024-01-21 PROBLEM — Z99.2 ESRD (END STAGE RENAL DISEASE) ON DIALYSIS (HCC): Status: ACTIVE | Noted: 2024-01-21

## 2024-01-21 PROBLEM — N18.6 ESRD (END STAGE RENAL DISEASE) ON DIALYSIS (HCC): Status: ACTIVE | Noted: 2024-01-21

## 2024-01-21 PROBLEM — E78.5 HYPERLIPIDEMIA: Status: ACTIVE | Noted: 2024-01-21

## 2024-01-21 PROBLEM — R73.9 HYPERGLYCEMIA: Status: ACTIVE | Noted: 2024-01-21

## 2024-01-21 LAB
ABO + RH BLD: NORMAL
ALBUMIN SERPL BCP-MCNC: 3.2 G/DL (ref 3.2–4.9)
ALBUMIN/GLOB SERPL: 1.3 G/DL
ALP SERPL-CCNC: 58 U/L (ref 30–99)
ALT SERPL-CCNC: 17 U/L (ref 2–50)
ANION GAP SERPL CALC-SCNC: 15 MMOL/L (ref 7–16)
AST SERPL-CCNC: 13 U/L (ref 12–45)
BASOPHILS # BLD AUTO: 0.2 % (ref 0–1.8)
BASOPHILS # BLD: 0.03 K/UL (ref 0–0.12)
BILIRUB SERPL-MCNC: 0.2 MG/DL (ref 0.1–1.5)
BUN SERPL-MCNC: 31 MG/DL (ref 8–22)
CALCIUM ALBUM COR SERPL-MCNC: 8.5 MG/DL (ref 8.5–10.5)
CALCIUM SERPL-MCNC: 7.9 MG/DL (ref 8.5–10.5)
CHLORIDE SERPL-SCNC: 102 MMOL/L (ref 96–112)
CO2 SERPL-SCNC: 22 MMOL/L (ref 20–33)
CREAT SERPL-MCNC: 2.48 MG/DL (ref 0.5–1.4)
EKG IMPRESSION: NORMAL
EOSINOPHIL # BLD AUTO: 0.02 K/UL (ref 0–0.51)
EOSINOPHIL NFR BLD: 0.1 % (ref 0–6.9)
ERYTHROCYTE [DISTWIDTH] IN BLOOD BY AUTOMATED COUNT: 54.6 FL (ref 35.9–50)
GFR SERPLBLD CREATININE-BSD FMLA CKD-EPI: 21 ML/MIN/1.73 M 2
GLOBULIN SER CALC-MCNC: 2.5 G/DL (ref 1.9–3.5)
GLUCOSE SERPL-MCNC: 238 MG/DL (ref 65–99)
HCT VFR BLD AUTO: 21.7 % (ref 37–47)
HGB BLD-MCNC: 6.7 G/DL (ref 12–16)
IMM GRANULOCYTES # BLD AUTO: 0.06 K/UL (ref 0–0.11)
IMM GRANULOCYTES NFR BLD AUTO: 0.4 % (ref 0–0.9)
LYMPHOCYTES # BLD AUTO: 0.4 K/UL (ref 1–4.8)
LYMPHOCYTES NFR BLD: 2.9 % (ref 22–41)
MCH RBC QN AUTO: 28.6 PG (ref 27–33)
MCHC RBC AUTO-ENTMCNC: 30.9 G/DL (ref 32.2–35.5)
MCV RBC AUTO: 92.7 FL (ref 81.4–97.8)
MONOCYTES # BLD AUTO: 0.28 K/UL (ref 0–0.85)
MONOCYTES NFR BLD AUTO: 2.1 % (ref 0–13.4)
NEUTROPHILS # BLD AUTO: 12.84 K/UL (ref 1.82–7.42)
NEUTROPHILS NFR BLD: 94.3 % (ref 44–72)
NRBC # BLD AUTO: 0 K/UL
NRBC BLD-RTO: 0 /100 WBC (ref 0–0.2)
NT-PROBNP SERPL IA-MCNC: ABNORMAL PG/ML (ref 0–125)
PLATELET # BLD AUTO: 263 K/UL (ref 164–446)
PMV BLD AUTO: 10.5 FL (ref 9–12.9)
POTASSIUM SERPL-SCNC: 3.8 MMOL/L (ref 3.6–5.5)
PROT SERPL-MCNC: 5.7 G/DL (ref 6–8.2)
RBC # BLD AUTO: 2.34 M/UL (ref 4.2–5.4)
SODIUM SERPL-SCNC: 139 MMOL/L (ref 135–145)
TROPONIN T SERPL-MCNC: 204 NG/L (ref 6–19)
TROPONIN T SERPL-MCNC: 205 NG/L (ref 6–19)
WBC # BLD AUTO: 13.6 K/UL (ref 4.8–10.8)

## 2024-01-21 PROCEDURE — 83880 ASSAY OF NATRIURETIC PEPTIDE: CPT

## 2024-01-21 PROCEDURE — 93005 ELECTROCARDIOGRAM TRACING: CPT | Performed by: STUDENT IN AN ORGANIZED HEALTH CARE EDUCATION/TRAINING PROGRAM

## 2024-01-21 PROCEDURE — 36415 COLL VENOUS BLD VENIPUNCTURE: CPT

## 2024-01-21 PROCEDURE — 84484 ASSAY OF TROPONIN QUANT: CPT

## 2024-01-21 PROCEDURE — 71045 X-RAY EXAM CHEST 1 VIEW: CPT

## 2024-01-21 PROCEDURE — 86850 RBC ANTIBODY SCREEN: CPT

## 2024-01-21 PROCEDURE — 80053 COMPREHEN METABOLIC PANEL: CPT

## 2024-01-21 PROCEDURE — 99497 ADVNCD CARE PLAN 30 MIN: CPT | Performed by: STUDENT IN AN ORGANIZED HEALTH CARE EDUCATION/TRAINING PROGRAM

## 2024-01-21 PROCEDURE — 86900 BLOOD TYPING SEROLOGIC ABO: CPT

## 2024-01-21 PROCEDURE — G0316 PR PROLONGED IP/OBS E&M EA 15 MIN: HCPCS | Performed by: STUDENT IN AN ORGANIZED HEALTH CARE EDUCATION/TRAINING PROGRAM

## 2024-01-21 PROCEDURE — 85025 COMPLETE CBC W/AUTO DIFF WBC: CPT

## 2024-01-21 PROCEDURE — 770020 HCHG ROOM/CARE - TELE (206)

## 2024-01-21 PROCEDURE — 99223 1ST HOSP IP/OBS HIGH 75: CPT | Mod: 25,AI | Performed by: STUDENT IN AN ORGANIZED HEALTH CARE EDUCATION/TRAINING PROGRAM

## 2024-01-21 PROCEDURE — 93005 ELECTROCARDIOGRAM TRACING: CPT

## 2024-01-21 PROCEDURE — 86901 BLOOD TYPING SEROLOGIC RH(D): CPT

## 2024-01-21 PROCEDURE — 99285 EMERGENCY DEPT VISIT HI MDM: CPT

## 2024-01-21 RX ORDER — INSULIN GLARGINE 100 [IU]/ML
5-20 INJECTION, SOLUTION SUBCUTANEOUS EVERY EVENING
COMMUNITY

## 2024-01-21 RX ORDER — FUROSEMIDE 40 MG/1
40 TABLET ORAL DAILY
COMMUNITY
Start: 2024-01-17

## 2024-01-21 RX ORDER — ACETAMINOPHEN 325 MG/1
650 TABLET ORAL
COMMUNITY

## 2024-01-21 RX ORDER — FUROSEMIDE 10 MG/ML
40 INJECTION INTRAMUSCULAR; INTRAVENOUS
Status: DISCONTINUED | OUTPATIENT
Start: 2024-01-22 | End: 2024-01-22

## 2024-01-21 RX ORDER — INSULIN HUMAN 500 [IU]/ML
3-8 INJECTION, SOLUTION SUBCUTANEOUS
COMMUNITY
Start: 2023-11-21

## 2024-01-21 RX ORDER — POTASSIUM CHLORIDE 750 MG/1
10 TABLET, EXTENDED RELEASE ORAL DAILY
COMMUNITY
Start: 2023-12-20

## 2024-01-21 RX ORDER — ALBUTEROL SULFATE 90 UG/1
2 AEROSOL, METERED RESPIRATORY (INHALATION) EVERY 4 HOURS PRN
COMMUNITY

## 2024-01-21 RX ORDER — LEVOFLOXACIN 250 MG/1
250 TABLET, FILM COATED ORAL EVERY 24 HOURS
Status: ON HOLD | COMMUNITY
Start: 2024-01-19 | End: 2024-01-24

## 2024-01-21 RX ORDER — SODIUM CHLORIDE 9 MG/ML
INJECTION, SOLUTION INTRAVENOUS CONTINUOUS
Status: DISCONTINUED | OUTPATIENT
Start: 2024-01-21 | End: 2024-01-22

## 2024-01-21 RX ORDER — CALCIUM ACETATE 667 MG/1
1 CAPSULE ORAL
COMMUNITY
Start: 2023-12-26

## 2024-01-21 RX ORDER — ASPIRIN 81 MG/1
324 TABLET, CHEWABLE ORAL ONCE
Status: DISCONTINUED | OUTPATIENT
Start: 2024-01-21 | End: 2024-01-21

## 2024-01-21 RX ORDER — ATORVASTATIN CALCIUM 10 MG/1
10 TABLET, FILM COATED ORAL EVERY EVENING
COMMUNITY
Start: 2024-01-17

## 2024-01-21 RX ORDER — COLLAGENASE SANTYL 250 [ARB'U]/G
1 OINTMENT TOPICAL DAILY
COMMUNITY
Start: 2023-12-19

## 2024-01-22 PROBLEM — Z71.89 ACP (ADVANCE CARE PLANNING): Status: ACTIVE | Noted: 2024-01-22

## 2024-01-22 PROBLEM — D72.829 LEUKOCYTOSIS: Status: ACTIVE | Noted: 2024-01-22

## 2024-01-22 PROBLEM — J96.01 ACUTE HYPOXEMIC RESPIRATORY FAILURE (HCC): Status: ACTIVE | Noted: 2024-01-22

## 2024-01-22 PROBLEM — R55 SYNCOPE: Status: ACTIVE | Noted: 2024-01-22

## 2024-01-22 PROBLEM — D32.9 MENINGIOMA (HCC): Status: ACTIVE | Noted: 2024-01-22

## 2024-01-22 LAB
ABO GROUP BLD: NORMAL
BARCODED ABORH UBTYP: 5100
BARCODED PRD CODE UBPRD: NORMAL
BARCODED UNIT NUM UBUNT: NORMAL
BASOPHILS # BLD AUTO: 0.1 % (ref 0–1.8)
BASOPHILS # BLD: 0.01 K/UL (ref 0–0.12)
BLD GP AB SCN SERPL QL: NORMAL
COMPONENT R 8504R: NORMAL
EOSINOPHIL # BLD AUTO: 0.07 K/UL (ref 0–0.51)
EOSINOPHIL NFR BLD: 0.8 % (ref 0–6.9)
ERYTHROCYTE [DISTWIDTH] IN BLOOD BY AUTOMATED COUNT: 54.8 FL (ref 35.9–50)
ERYTHROCYTE [DISTWIDTH] IN BLOOD BY AUTOMATED COUNT: 54.9 FL (ref 35.9–50)
FERRITIN SERPL-MCNC: 928 NG/ML (ref 10–291)
FOLATE SERPL-MCNC: 12.8 NG/ML
GLUCOSE BLD STRIP.AUTO-MCNC: 159 MG/DL (ref 65–99)
GLUCOSE BLD STRIP.AUTO-MCNC: 160 MG/DL (ref 65–99)
GLUCOSE BLD STRIP.AUTO-MCNC: 173 MG/DL (ref 65–99)
GLUCOSE BLD STRIP.AUTO-MCNC: 177 MG/DL (ref 65–99)
GLUCOSE BLD STRIP.AUTO-MCNC: 218 MG/DL (ref 65–99)
HAV IGM SERPL QL IA: NORMAL
HBV CORE IGM SER QL: NORMAL
HBV SURFACE AB SERPL IA-ACNC: 27.6 MIU/ML (ref 0–10)
HBV SURFACE AG SER QL: NORMAL
HCT VFR BLD AUTO: 26.9 % (ref 37–47)
HCT VFR BLD AUTO: 28.5 % (ref 37–47)
HCV AB SER QL: NORMAL
HEMOCCULT STL QL: POSITIVE
HGB BLD-MCNC: 8.3 G/DL (ref 12–16)
HGB BLD-MCNC: 8.8 G/DL (ref 12–16)
HGB RETIC QN AUTO: 25.7 PG/CELL (ref 29–35)
IMM GRANULOCYTES # BLD AUTO: 0.05 K/UL (ref 0–0.11)
IMM GRANULOCYTES NFR BLD AUTO: 0.5 % (ref 0–0.9)
IMM RETICS NFR: 11.8 % (ref 2.6–16.1)
IRON SATN MFR SERPL: 41 % (ref 15–55)
IRON SERPL-MCNC: 83 UG/DL (ref 40–170)
LDH SERPL L TO P-CCNC: 265 U/L (ref 107–266)
LYMPHOCYTES # BLD AUTO: 0.83 K/UL (ref 1–4.8)
LYMPHOCYTES NFR BLD: 9.1 % (ref 22–41)
MCH RBC QN AUTO: 28.2 PG (ref 27–33)
MCH RBC QN AUTO: 28.3 PG (ref 27–33)
MCHC RBC AUTO-ENTMCNC: 30.9 G/DL (ref 32.2–35.5)
MCHC RBC AUTO-ENTMCNC: 30.9 G/DL (ref 32.2–35.5)
MCV RBC AUTO: 91.3 FL (ref 81.4–97.8)
MCV RBC AUTO: 91.8 FL (ref 81.4–97.8)
MONOCYTES # BLD AUTO: 0.54 K/UL (ref 0–0.85)
MONOCYTES NFR BLD AUTO: 5.9 % (ref 0–13.4)
NEUTROPHILS # BLD AUTO: 7.6 K/UL (ref 1.82–7.42)
NEUTROPHILS NFR BLD: 83.6 % (ref 44–72)
NRBC # BLD AUTO: 0 K/UL
NRBC BLD-RTO: 0 /100 WBC (ref 0–0.2)
PLATELET # BLD AUTO: 293 K/UL (ref 164–446)
PLATELET # BLD AUTO: 310 K/UL (ref 164–446)
PMV BLD AUTO: 10.7 FL (ref 9–12.9)
PMV BLD AUTO: 10.8 FL (ref 9–12.9)
PRODUCT TYPE UPROD: NORMAL
RBC # BLD AUTO: 2.93 M/UL (ref 4.2–5.4)
RBC # BLD AUTO: 3.12 M/UL (ref 4.2–5.4)
RETICS # AUTO: 0.08 M/UL (ref 0.04–0.12)
RETICS/RBC NFR: 2.6 % (ref 0.8–2.6)
RH BLD: NORMAL
T4 FREE SERPL-MCNC: 0.87 NG/DL (ref 0.93–1.7)
TIBC SERPL-MCNC: 203 UG/DL (ref 250–450)
TSH SERPL DL<=0.005 MIU/L-ACNC: 0.53 UIU/ML (ref 0.38–5.33)
UIBC SERPL-MCNC: 120 UG/DL (ref 110–370)
UNIT STATUS USTAT: NORMAL
VIT B12 SERPL-MCNC: 1448 PG/ML (ref 211–911)
WBC # BLD AUTO: 9 K/UL (ref 4.8–10.8)
WBC # BLD AUTO: 9.1 K/UL (ref 4.8–10.8)

## 2024-01-22 PROCEDURE — 84439 ASSAY OF FREE THYROXINE: CPT

## 2024-01-22 PROCEDURE — 36415 COLL VENOUS BLD VENIPUNCTURE: CPT

## 2024-01-22 PROCEDURE — A9270 NON-COVERED ITEM OR SERVICE: HCPCS | Performed by: STUDENT IN AN ORGANIZED HEALTH CARE EDUCATION/TRAINING PROGRAM

## 2024-01-22 PROCEDURE — 85027 COMPLETE CBC AUTOMATED: CPT

## 2024-01-22 PROCEDURE — 82962 GLUCOSE BLOOD TEST: CPT | Mod: 91

## 2024-01-22 PROCEDURE — 80074 ACUTE HEPATITIS PANEL: CPT

## 2024-01-22 PROCEDURE — 86923 COMPATIBILITY TEST ELECTRIC: CPT

## 2024-01-22 PROCEDURE — 85046 RETICYTE/HGB CONCENTRATE: CPT

## 2024-01-22 PROCEDURE — 700111 HCHG RX REV CODE 636 W/ 250 OVERRIDE (IP): Mod: JZ

## 2024-01-22 PROCEDURE — 700111 HCHG RX REV CODE 636 W/ 250 OVERRIDE (IP): Performed by: STUDENT IN AN ORGANIZED HEALTH CARE EDUCATION/TRAINING PROGRAM

## 2024-01-22 PROCEDURE — P9016 RBC LEUKOCYTES REDUCED: HCPCS

## 2024-01-22 PROCEDURE — 82272 OCCULT BLD FECES 1-3 TESTS: CPT

## 2024-01-22 PROCEDURE — 82607 VITAMIN B-12: CPT

## 2024-01-22 PROCEDURE — 83550 IRON BINDING TEST: CPT

## 2024-01-22 PROCEDURE — 30233N1 TRANSFUSION OF NONAUTOLOGOUS RED BLOOD CELLS INTO PERIPHERAL VEIN, PERCUTANEOUS APPROACH: ICD-10-PCS

## 2024-01-22 PROCEDURE — 82746 ASSAY OF FOLIC ACID SERUM: CPT

## 2024-01-22 PROCEDURE — 83010 ASSAY OF HAPTOGLOBIN QUANT: CPT

## 2024-01-22 PROCEDURE — 99233 SBSQ HOSP IP/OBS HIGH 50: CPT | Mod: GC | Performed by: INTERNAL MEDICINE

## 2024-01-22 PROCEDURE — 83615 LACTATE (LD) (LDH) ENZYME: CPT

## 2024-01-22 PROCEDURE — 700102 HCHG RX REV CODE 250 W/ 637 OVERRIDE(OP): Performed by: STUDENT IN AN ORGANIZED HEALTH CARE EDUCATION/TRAINING PROGRAM

## 2024-01-22 PROCEDURE — 83540 ASSAY OF IRON: CPT

## 2024-01-22 PROCEDURE — C9113 INJ PANTOPRAZOLE SODIUM, VIA: HCPCS | Performed by: STUDENT IN AN ORGANIZED HEALTH CARE EDUCATION/TRAINING PROGRAM

## 2024-01-22 PROCEDURE — 84443 ASSAY THYROID STIM HORMONE: CPT

## 2024-01-22 PROCEDURE — 700111 HCHG RX REV CODE 636 W/ 250 OVERRIDE (IP)

## 2024-01-22 PROCEDURE — 82728 ASSAY OF FERRITIN: CPT

## 2024-01-22 PROCEDURE — 770020 HCHG ROOM/CARE - TELE (206)

## 2024-01-22 PROCEDURE — 36430 TRANSFUSION BLD/BLD COMPNT: CPT

## 2024-01-22 PROCEDURE — 85025 COMPLETE CBC W/AUTO DIFF WBC: CPT

## 2024-01-22 PROCEDURE — 86706 HEP B SURFACE ANTIBODY: CPT

## 2024-01-22 PROCEDURE — C9113 INJ PANTOPRAZOLE SODIUM, VIA: HCPCS

## 2024-01-22 PROCEDURE — 700102 HCHG RX REV CODE 250 W/ 637 OVERRIDE(OP)

## 2024-01-22 PROCEDURE — 700105 HCHG RX REV CODE 258

## 2024-01-22 RX ORDER — PREDNISONE 5 MG/1
5 TABLET ORAL DAILY
Status: DISCONTINUED | OUTPATIENT
Start: 2024-01-22 | End: 2024-01-24 | Stop reason: HOSPADM

## 2024-01-22 RX ORDER — HYDROCODONE BITARTRATE AND ACETAMINOPHEN 5; 325 MG/1; MG/1
1 TABLET ORAL EVERY 6 HOURS PRN
Status: DISCONTINUED | OUTPATIENT
Start: 2024-01-22 | End: 2024-01-24 | Stop reason: HOSPADM

## 2024-01-22 RX ORDER — PANTOPRAZOLE SODIUM 40 MG/10ML
40 INJECTION, POWDER, LYOPHILIZED, FOR SOLUTION INTRAVENOUS DAILY
Status: DISCONTINUED | OUTPATIENT
Start: 2024-01-22 | End: 2024-01-22

## 2024-01-22 RX ORDER — BISACODYL 10 MG
10 SUPPOSITORY, RECTAL RECTAL
Status: DISCONTINUED | OUTPATIENT
Start: 2024-01-22 | End: 2024-01-22

## 2024-01-22 RX ORDER — POLYETHYLENE GLYCOL 3350 17 G/17G
1 POWDER, FOR SOLUTION ORAL
Status: DISCONTINUED | OUTPATIENT
Start: 2024-01-22 | End: 2024-01-24 | Stop reason: HOSPADM

## 2024-01-22 RX ORDER — INSULIN LISPRO 100 [IU]/ML
1-6 INJECTION, SOLUTION INTRAVENOUS; SUBCUTANEOUS
Status: DISCONTINUED | OUTPATIENT
Start: 2024-01-22 | End: 2024-01-24 | Stop reason: HOSPADM

## 2024-01-22 RX ORDER — LISINOPRIL 5 MG/1
2.5 TABLET ORAL 2 TIMES DAILY
Status: DISCONTINUED | OUTPATIENT
Start: 2024-01-22 | End: 2024-01-24 | Stop reason: HOSPADM

## 2024-01-22 RX ORDER — AMOXICILLIN 250 MG
2 CAPSULE ORAL 2 TIMES DAILY
Status: DISCONTINUED | OUTPATIENT
Start: 2024-01-22 | End: 2024-01-24 | Stop reason: HOSPADM

## 2024-01-22 RX ORDER — PANTOPRAZOLE SODIUM 40 MG/10ML
40 INJECTION, POWDER, LYOPHILIZED, FOR SOLUTION INTRAVENOUS 2 TIMES DAILY
Status: DISCONTINUED | OUTPATIENT
Start: 2024-01-22 | End: 2024-01-24 | Stop reason: HOSPADM

## 2024-01-22 RX ORDER — ESCITALOPRAM OXALATE 10 MG/1
20 TABLET ORAL DAILY
Status: DISCONTINUED | OUTPATIENT
Start: 2024-01-22 | End: 2024-01-24 | Stop reason: HOSPADM

## 2024-01-22 RX ORDER — ALBUTEROL SULFATE 90 UG/1
2 AEROSOL, METERED RESPIRATORY (INHALATION) EVERY 4 HOURS PRN
Status: DISCONTINUED | OUTPATIENT
Start: 2024-01-22 | End: 2024-01-24 | Stop reason: HOSPADM

## 2024-01-22 RX ORDER — CALCIUM ACETATE 667 MG/1
667 TABLET ORAL
Status: DISCONTINUED | OUTPATIENT
Start: 2024-01-22 | End: 2024-01-24 | Stop reason: HOSPADM

## 2024-01-22 RX ORDER — FUROSEMIDE 10 MG/ML
80 INJECTION INTRAMUSCULAR; INTRAVENOUS ONCE
Status: COMPLETED | OUTPATIENT
Start: 2024-01-22 | End: 2024-01-22

## 2024-01-22 RX ORDER — ATORVASTATIN CALCIUM 10 MG/1
10 TABLET, FILM COATED ORAL EVERY EVENING
Status: DISCONTINUED | OUTPATIENT
Start: 2024-01-22 | End: 2024-01-24 | Stop reason: HOSPADM

## 2024-01-22 RX ORDER — INSULIN LISPRO 100 [IU]/ML
0.2 INJECTION, SOLUTION INTRAVENOUS; SUBCUTANEOUS
Status: DISCONTINUED | OUTPATIENT
Start: 2024-01-22 | End: 2024-01-22

## 2024-01-22 RX ORDER — TIZANIDINE 4 MG/1
4 TABLET ORAL EVERY EVENING
Status: DISCONTINUED | OUTPATIENT
Start: 2024-01-22 | End: 2024-01-24 | Stop reason: HOSPADM

## 2024-01-22 RX ADMIN — SODIUM CHLORIDE: 9 INJECTION, SOLUTION INTRAVENOUS at 05:40

## 2024-01-22 RX ADMIN — INSULIN LISPRO 1 UNITS: 100 INJECTION, SOLUTION INTRAVENOUS; SUBCUTANEOUS at 18:31

## 2024-01-22 RX ADMIN — Medication 667 MG: at 05:47

## 2024-01-22 RX ADMIN — ATORVASTATIN CALCIUM 10 MG: 10 TABLET, FILM COATED ORAL at 17:09

## 2024-01-22 RX ADMIN — ESCITALOPRAM OXALATE 20 MG: 10 TABLET ORAL at 05:30

## 2024-01-22 RX ADMIN — TIZANIDINE 4 MG: 4 TABLET ORAL at 17:09

## 2024-01-22 RX ADMIN — PREDNISONE 5 MG: 5 TABLET ORAL at 05:30

## 2024-01-22 RX ADMIN — PANTOPRAZOLE SODIUM 40 MG: 40 INJECTION, POWDER, FOR SOLUTION INTRAVENOUS at 17:46

## 2024-01-22 RX ADMIN — LISINOPRIL 2.5 MG: 5 TABLET ORAL at 05:30

## 2024-01-22 RX ADMIN — INSULIN GLARGINE-YFGN 9 UNITS: 100 INJECTION, SOLUTION SUBCUTANEOUS at 18:16

## 2024-01-22 RX ADMIN — INSULIN LISPRO 1 UNITS: 100 INJECTION, SOLUTION INTRAVENOUS; SUBCUTANEOUS at 12:45

## 2024-01-22 RX ADMIN — FUROSEMIDE 40 MG: 10 INJECTION, SOLUTION INTRAVENOUS at 05:30

## 2024-01-22 RX ADMIN — INSULIN LISPRO 2 UNITS: 100 INJECTION, SOLUTION INTRAVENOUS; SUBCUTANEOUS at 21:15

## 2024-01-22 RX ADMIN — FUROSEMIDE 80 MG: 10 INJECTION, SOLUTION INTRAVENOUS at 10:33

## 2024-01-22 RX ADMIN — LISINOPRIL 2.5 MG: 5 TABLET ORAL at 17:09

## 2024-01-22 RX ADMIN — PANTOPRAZOLE SODIUM 40 MG: 40 INJECTION, POWDER, FOR SOLUTION INTRAVENOUS at 05:47

## 2024-01-22 ASSESSMENT — COGNITIVE AND FUNCTIONAL STATUS - GENERAL
SUGGESTED CMS G CODE MODIFIER MOBILITY: CH
MOBILITY SCORE: 24
DAILY ACTIVITIY SCORE: 24
SUGGESTED CMS G CODE MODIFIER DAILY ACTIVITY: CH

## 2024-01-22 ASSESSMENT — PATIENT HEALTH QUESTIONNAIRE - PHQ9
SUM OF ALL RESPONSES TO PHQ9 QUESTIONS 1 AND 2: 2
8. MOVING OR SPEAKING SO SLOWLY THAT OTHER PEOPLE COULD HAVE NOTICED. OR THE OPPOSITE, BEING SO FIGETY OR RESTLESS THAT YOU HAVE BEEN MOVING AROUND A LOT MORE THAN USUAL: NOT AT ALL
7. TROUBLE CONCENTRATING ON THINGS, SUCH AS READING THE NEWSPAPER OR WATCHING TELEVISION: SEVERAL DAYS
1. LITTLE INTEREST OR PLEASURE IN DOING THINGS: SEVERAL DAYS
SUM OF ALL RESPONSES TO PHQ QUESTIONS 1-9: 6
5. POOR APPETITE OR OVEREATING: SEVERAL DAYS
3. TROUBLE FALLING OR STAYING ASLEEP OR SLEEPING TOO MUCH: NOT AT ALL
6. FEELING BAD ABOUT YOURSELF - OR THAT YOU ARE A FAILURE OR HAVE LET YOURSELF OR YOUR FAMILY DOWN: SEVERAL DAYS
1. LITTLE INTEREST OR PLEASURE IN DOING THINGS: SEVERAL DAYS
4. FEELING TIRED OR HAVING LITTLE ENERGY: SEVERAL DAYS
2. FEELING DOWN, DEPRESSED, IRRITABLE, OR HOPELESS: SEVERAL DAYS
SUM OF ALL RESPONSES TO PHQ QUESTIONS 1-9: 6
7. TROUBLE CONCENTRATING ON THINGS, SUCH AS READING THE NEWSPAPER OR WATCHING TELEVISION: SEVERAL DAYS
5. POOR APPETITE OR OVEREATING: SEVERAL DAYS
9. THOUGHTS THAT YOU WOULD BE BETTER OFF DEAD, OR OF HURTING YOURSELF: NOT AT ALL
2. FEELING DOWN, DEPRESSED, IRRITABLE, OR HOPELESS: SEVERAL DAYS
9. THOUGHTS THAT YOU WOULD BE BETTER OFF DEAD, OR OF HURTING YOURSELF: NOT AT ALL
SUM OF ALL RESPONSES TO PHQ9 QUESTIONS 1 AND 2: 2
6. FEELING BAD ABOUT YOURSELF - OR THAT YOU ARE A FAILURE OR HAVE LET YOURSELF OR YOUR FAMILY DOWN: SEVERAL DAYS
4. FEELING TIRED OR HAVING LITTLE ENERGY: SEVERAL DAYS
8. MOVING OR SPEAKING SO SLOWLY THAT OTHER PEOPLE COULD HAVE NOTICED. OR THE OPPOSITE, BEING SO FIGETY OR RESTLESS THAT YOU HAVE BEEN MOVING AROUND A LOT MORE THAN USUAL: NOT AT ALL
3. TROUBLE FALLING OR STAYING ASLEEP OR SLEEPING TOO MUCH: NOT AT ALL

## 2024-01-22 ASSESSMENT — LIFESTYLE VARIABLES
HAVE YOU EVER FELT YOU SHOULD CUT DOWN ON YOUR DRINKING: NO
HAVE PEOPLE ANNOYED YOU BY CRITICIZING YOUR DRINKING: NO
EVER HAD A DRINK FIRST THING IN THE MORNING TO STEADY YOUR NERVES TO GET RID OF A HANGOVER: NO
AVERAGE NUMBER OF DAYS PER WEEK YOU HAVE A DRINK CONTAINING ALCOHOL: 0
HOW MANY TIMES IN THE PAST YEAR HAVE YOU HAD 5 OR MORE DRINKS IN A DAY: 0
TOTAL SCORE: 0
ON A TYPICAL DAY WHEN YOU DRINK ALCOHOL HOW MANY DRINKS DO YOU HAVE: 0
ALCOHOL_USE: NO
CONSUMPTION TOTAL: NEGATIVE
DOES PATIENT WANT TO STOP DRINKING: NO
TOTAL SCORE: 0
EVER FELT BAD OR GUILTY ABOUT YOUR DRINKING: NO
TOTAL SCORE: 0

## 2024-01-22 ASSESSMENT — FIBROSIS 4 INDEX
FIB4 SCORE: 0.74

## 2024-01-22 ASSESSMENT — ENCOUNTER SYMPTOMS
GASTROINTESTINAL NEGATIVE: 1
MUSCULOSKELETAL NEGATIVE: 1
CARDIOVASCULAR NEGATIVE: 1
WEAKNESS: 1
EYES NEGATIVE: 1
LOSS OF CONSCIOUSNESS: 1
PSYCHIATRIC NEGATIVE: 1
RESPIRATORY NEGATIVE: 1

## 2024-01-22 ASSESSMENT — PAIN DESCRIPTION - PAIN TYPE
TYPE: ACUTE PAIN

## 2024-01-22 NOTE — DIETARY
Nutrition Services: Brief Update    Alerted by Nutrition Representative that pt requested Boost oral nutrition supplement.  Pt is currently on a Renal, Consistent CHO diet with 2000 mL fluid restriction.  Pt will receive one Boost Glucose per day at breakfast.  RD will add supplement order and adjust menu accordingly.    RD to follow per dept guidelines.

## 2024-01-22 NOTE — ASSESSMENT & PLAN NOTE
Patient requiring 1 to 2 L oxygen via nasal cannula to maintain adequate saturation, not on oxygen at home.  Do not appreciate any rales on physical exam despite elevated BNP.  Echocardiogram demonstrating Echocardiogram demonstrated low normal left ventricular systolic function 50.5%, Grade II diastolic dysfunction. Moderately dilated left atrium. Mild/moderate mitral regurgitation. Right heart pressures are consistent with mild pulmonary hypertension.  -Lasix 80 mg IV given 01/22.  Continue home dose of Lasix p.o. 40 Mg daily.  -Continue oxygenation, keep O2 over 90%

## 2024-01-22 NOTE — ASSESSMENT & PLAN NOTE
16 minutes spent discussing goals of care with patient.  She was explained her condition and treatment plan.  She was asked about CODE STATUS, she states that she is okay with medical treatment, however would not be okay with heroic measures, such as chest compressions and intubation.  Patient to be DNR/DNI.

## 2024-01-22 NOTE — ASSESSMENT & PLAN NOTE
Nephrology consulted to establish hemodialysis as an inpatient.  Appreciate nephrology assistance.  Hemodialysis done 01/23.  - Hemodialysis to be done on Tuesday, Thursday and Saturday.

## 2024-01-22 NOTE — H&P
Hospital Medicine History & Physical Note    Date of Service  1/21/2024    Primary Care Physician  Pcp Not In Computer    Consultants  None    Code Status  DNAR/DNI    Chief Complaint  Chief Complaint   Patient presents with    Chest Pain     NSTEMI transfer    Syncope       History of Presenting Illness  Savita Mcclain is a 63 y.o. female who presented 1/21/2024 with syncope.  She states that since she last had COVID a month ago, she has been having intermittent shortness of breath/chest pain. Patient was at home today, she was reporting an overall generalized weakness.  She was ambulating today when she syncopized twice.  She states that her daughter witnessed her syncope and stated that it lasted for a few seconds, no head strike noted.  She was brought to outside facility ER for evaluation.    At outside facility,  Initially was hypotensive, she was given 250 mL bolus, 100 mg of hydrocortisone, and given Levaquin 250 mg IV x 1 for possible pneumonia.  CT head was performed which shows 5 mm round focus of increased attenuation along the tentorium on the right side abutting the superior aspect of the right side of the cerebellum.  Could represent benign meningioma, acute ICH cannot be excluded.  COVID flu RSV negative  Sodium 138 potassium 2.8 chloride 103 bicarb 26 AST 23 ALT 26 BUN 35 glucose 150 creatinine 2.5  Troponin 1.340  BNP 32 900  Procalcitonin 0.272 (N: < 0.500)  INR 1.1   WBC 19.0 Hgb 7.5        In the ER, patient found to have hypoxia.  Pertinent labs include Hgb 6.7, sugar 238, creatinine 2.48, troponin 205 and 204, BNP 30523.  EKG showing normal sinus rhythm with left bundle branch block without any evidence of acute ischemic changes.    I discussed the plan of care with patient.    Review of Systems  Review of Systems   Constitutional:  Positive for malaise/fatigue.   HENT: Negative.     Eyes: Negative.    Respiratory: Negative.     Cardiovascular: Negative.    Gastrointestinal:  Negative.    Genitourinary: Negative.    Musculoskeletal: Negative.    Skin: Negative.    Neurological:  Positive for loss of consciousness and weakness.   Endo/Heme/Allergies: Negative.    Psychiatric/Behavioral: Negative.         Past Medical History   has a past medical history of Diabetes, Psychiatric disorder, and Rheumatoid arthritis(714.0).    Surgical History   has a past surgical history that includes other orthopedic surgery; pr upper gi endoscopy,biopsy (Left, 6/10/2020); pr colonoscopy,diagnostic (Left, 6/10/2020); and toe amputation (Left, 2000).     Family History  family history is not on file.   Family history reviewed with patient. There is no family history that is pertinent to the chief complaint.     Social History   reports that she quit smoking about 8 years ago. Her smoking use included cigarettes. She started smoking about 43 years ago. She has a 80.0 pack-year smoking history. She has never used smokeless tobacco. She reports that she does not currently use alcohol. She reports that she does not use drugs.    Allergies  Allergies   Allergen Reactions    Keflex [Cephalexin] Vomiting, Nausea and Unspecified     Nausea/vomiting, lightheadedness       Medications  Prior to Admission Medications   Prescriptions Last Dose Informant Patient Reported? Taking?   Cholecalciferol (VITAMIN D3 PO) ABOUT 1 WEEK AGO at [facility] Patient Yes Yes   Sig: Take 1 Tablet by mouth every day.   Cyanocobalamin (VITAMIN B-12 PO) 1/19/2024 at [facility] Patient Yes Yes   Sig: Take 1 Tablet by mouth every day.   HUMULIN R U-500 KWIKPEN 500 UNIT/ML Solution Pen-injector 1/16/2024 at [facility] Patient Yes Yes   Sig: Inject 3-8 Units under the skin 3 times a day before meals. Unspecified sliding scale.   HYDROcodone-acetaminophen (NORCO) 5-325 MG Tab per tablet 1/19/2024 at PM Patient Yes No   Sig: Take 1 Tablet by mouth every 6 hours as needed (Severe Pain).   Melatonin 10 MG Tab 1/17/2024 at [facility] Patient Yes No   Sig: Take 10 mg by mouth  at bedtime as needed (Sleep).   SANTYL ointment 1/18/2024 at [facility] Patient Yes Yes   Sig: Apply 1 Application topically every day. Apply to affected area of arm(s).   acetaminophen (TYLENOL) 325 MG Tab 1/19/2024 at [facility] Patient Yes Yes   Sig: Take 650 mg by mouth every 6 hours as needed for Mild Pain. 2 tablets = 650 mg.   albuterol 108 (90 Base) MCG/ACT Aero Soln inhalation aerosol 1/21/2024 at AM Patient Yes Yes   Sig: Inhale 2 Puffs every four hours as needed for Shortness of Breath.   atorvastatin (LIPITOR) 10 MG Tab 1/20/2024 at PM Patient Yes Yes   Sig: Take 10 mg by mouth every evening.   calcium acetate (PHOS-LO) 667 MG Cap 1/20/2024 at 1800 Patient Yes Yes   Sig: Take 1 Capsule by mouth 3 times a day with meals.   escitalopram (LEXAPRO) 20 MG tablet 1/21/2024 at AM Patient Yes No   Sig: Take 20 mg by mouth every day.   fluticasone (FLONASE) 50 MCG/ACT nasal spray 1/20/2024 at PM Patient Yes No   Sig: Administer 2 Sprays into each nostril every evening.   furosemide (LASIX) 40 MG Tab 1/20/2024 at AM Patient Yes Yes   Sig: Take 40 mg by mouth every day.   insulin glargine 100 UNIT/ML Solution Pen-injector injection 1/20/2024 at PM Patient Yes Yes   Sig: Inject 5-20 Units under the skin every evening. Unspecified sliding scale.   latanoprost (XALATAN) 0.005 % Solution 1/19/2024 at PM Patient Yes No   Sig: Place 1 Drop in both eyes every bedtime.   levoFLOXacin (LEVAQUIN) 250 MG Tab 1/20/2024 at PM Patient Yes Yes   Sig: Take 250 mg by mouth every 24 hours. 5 day course prescribed 1/19/2024. Take AFTER dialysis.   lisinopril (PRINIVIL) 5 MG Tab 1/20/2024 at PM Patient Yes No   Sig: Take 2.5 mg by mouth 2 times a day. 0.5 tablet = 2.5 mg.   mupirocin (BACTROBAN) 2 % Ointment ABOUT 1 WEEK AGO at [facility] Patient Yes Yes   Sig: Apply 1 Application topically every 72 hours. Apply to nose.   pantoprazole (PROTONIX) 40 MG Tablet Delayed Response 1/19/2024 at PM Patient Yes No   Sig: Take 40 mg by mouth at bedtime.    potassium chloride SA (K-DUR) 10 MEQ Tab CR 1/18/2024 at [facility] Patient Yes Yes   Sig: Take 10 mEq by mouth 2 times a day.   predniSONE (DELTASONE) 10 MG Tab 1/20/2024 at 1000 Patient Yes No   Sig: Take 10 mg by mouth every day.   timolol (TIMOPTIC) 0.5 % Solution FEW DAYS AGO at [facility] Patient Yes No   Sig: Administer 1 Drop into both eyes every morning.   tizanidine (ZANAFLEX) 4 MG Tab 1/19/2024 at [facility] Patient Yes No   Sig: Take 4 mg by mouth 2 times a day as needed (Muscle Spasms).      Facility-Administered Medications: None       Physical Exam  Temp:  [36.4 °C (97.6 °F)] 36.4 °C (97.6 °F)  Pulse:  [58-68] 58  Resp:  [12-30] 12  BP: (131-156)/(60-99) 135/99  SpO2:  [94 %-97 %] 97 %  Blood Pressure: (!) 135/99   Temperature: 36.4 °C (97.6 °F)   Pulse: (!) 58   Respiration: 12   Pulse Oximetry: 97 %       Physical Exam  Constitutional:       Appearance: Normal appearance. She is normal weight.   HENT:      Head: Normocephalic.      Nose: Nose normal.      Mouth/Throat:      Mouth: Mucous membranes are moist.   Eyes:      Pupils: Pupils are equal, round, and reactive to light.   Cardiovascular:      Rate and Rhythm: Normal rate and regular rhythm.      Pulses: Normal pulses.      Comments: Right-sided tunneled catheter noted  Pulmonary:      Effort: Pulmonary effort is normal.   Abdominal:      General: Abdomen is flat. Bowel sounds are normal.      Palpations: Abdomen is soft.   Musculoskeletal:         General: Swelling present.      Cervical back: Neck supple.   Skin:     General: Skin is warm.   Neurological:      General: No focal deficit present.      Mental Status: She is alert and oriented to person, place, and time. Mental status is at baseline.   Psychiatric:         Mood and Affect: Mood normal.         Behavior: Behavior normal.         Thought Content: Thought content normal.         Judgment: Judgment normal.         Laboratory:  Recent Labs     01/21/24 1920   WBC 13.6*   RBC 2.34*   HEMOGLOBIN 6.7*    HEMATOCRIT 21.7*   MCV 92.7   MCH 28.6   MCHC 30.9*   RDW 54.6*   PLATELETCT 263   MPV 10.5     Recent Labs     01/21/24 1920   SODIUM 139   POTASSIUM 3.8   CHLORIDE 102   CO2 22   GLUCOSE 238*   BUN 31*   CREATININE 2.48*   CALCIUM 7.9*     Recent Labs     01/21/24 1920   ALTSGPT 17   ASTSGOT 13   ALKPHOSPHAT 58   TBILIRUBIN 0.2   GLUCOSE 238*         Recent Labs     01/21/24 1920   NTPROBNP 88490*         Recent Labs     01/21/24 1920 01/21/24 2136   TROPONINT 205* 204*       Imaging:  DX-CHEST-PORTABLE (1 VIEW)   Final Result      1.  Minimal basilar atelectasis or parenchymal scarring      2.  Enlarged cardiac silhouette      3.  Right internal jugular catheter appears appropriately located          X-Ray:  I have personally reviewed the images and compared with prior images.    Assessment/Plan:  Justification for Admission Status  I anticipate this patient will require at least two midnights for appropriate medical management, necessitating inpatient admission because pt has anemia    Patient will need a Telemetry bed on EMERGENCY service .  The need is secondary to anemia.    * Symptomatic anemia- (present on admission)  Assessment & Plan  Possibly syncope from acute on chronic anemia? Patient recently had negative ACS workup at outside facility  Noted to have hemoglobin 7.5 at outside facility, 6.7 here, no clear bleeding episodes.  Possibly anemia from kidney disease?  Transfuse 1 unit of blood  Serial CBC  Protonix  GI consult in a.m.      Acute hypoxemic respiratory failure (HCC)  Assessment & Plan  Patient requiring nasal cannula to maintain adequate saturation, not on oxygen at home  Do not appreciate any rales on physical exam despite elevated BNP  Possibly source of hypoxemia from anemia  Continue oxygenation, keep O2 over 90%    ACP (advance care planning)  Assessment & Plan  16 minutes spent discussing goals of care with patient.  She was explained her condition and treatment plan.  She was  asked about CODE STATUS, she states that she is okay with medical treatment, however would not be okay with heroic measures, such as chest compressions and intubation.  Patient to be DNR/DNI.    Leukocytosis  Assessment & Plan  Noted to have white blood cell count 19 outside facility, 13 here at [facility]  Given 1 dose of Levaquin for possible pneumonia.  Procalcitonin within normal limits.  Likely reactive  Serial CBC      Meningioma (HCC)  Assessment & Plan  CT head at outside facility showing 5 mm round focus of increased attenuation along the tentorium on the right side abutting the superior aspect of the right side of the cerebellum.  Could represent benign meningioma, acute ICH cannot be excluded.  Reports that her daughter denied that patient struck her head while she syncopized.  Consider MRI brain with and without contrast    ESRD (end stage renal disease) on dialysis (HCC)  Assessment & Plan  Nephro consult in am for reinstating dialysis      Hyperlipidemia  Assessment & Plan  Continue lipitor    Hyperglycemia  Assessment & Plan  Sliding scale    Type 2 diabetes mellitus (HCC)- (present on admission)  Assessment & Plan  Sliding scale          VTE prophylaxis: pharmacologic prophylaxis contraindicated due to anemia    Total time spent on visit 105 minutes reviewing records from outside facility, discussing treatment plan, clinical diagnosis, discussing advance care planning, risk and benefits, prognosis

## 2024-01-22 NOTE — ED PROVIDER NOTES
ED Provider Note    CHIEF COMPLAINT  Chief Complaint   Patient presents with    Chest Pain     NSTEMI transfer    Syncope       EXTERNAL RECORDS REVIEWED  Outpatient Notes from [facility].  Patient syncopized x 2 in setting of chest pain and shortness of breath, has elevated troponins, CT head with benign meningioma versus ICH chest x-ray with atelectasis versus fluid overload.  100 mg IV hydrocortisone given.  No aspirin or blood thinners given given CT head read.    HPI/ROS  LIMITATION TO HISTORY   Select: : None  OUTSIDE HISTORIAN(S):  [facility] records    Savita Mcclain is a 63-year-old female with history of ESRD (dialysis 4 x week), rheumatoid arthritis on 5 mg prednisone daily, recent admission for ACS workup-negative, being treated for pneumonia on Levaquin, presents to outside facility for worsening shortness of breath, 2 syncopal episodes, and increasing chest pain that began after she was discharged from the hospital this morning..  The patient reports that chest pain is like a stabbing pain in the middle of her chest that radiates into her back.  Denies any nausea, vomiting, abdominal pain.    PAST MEDICAL HISTORY   has a past medical history of Diabetes, Psychiatric disorder, and Rheumatoid arthritis(714.0).    SURGICAL HISTORY   has a past surgical history that includes other orthopedic surgery; upper gi endoscopy,biopsy (Left, 6/10/2020); colonoscopy,diagnostic (Left, 6/10/2020); and toe amputation (Left, ).    FAMILY HISTORY  No family history on file.    SOCIAL HISTORY  Social History     Tobacco Use    Smoking status: Former     Current packs/day: 0.00     Average packs/day: 2.0 packs/day for 40.0 years (80.0 ttl pk-yrs)     Types: Cigarettes     Start date: 1980     Quit date: 2015     Years since quittin.5    Smokeless tobacco: Never   Substance and Sexual Activity    Alcohol use: Not Currently    Drug use: Never    Sexual activity: Not on file       CURRENT  "MEDICATIONS  Home Medications       Reviewed by Ben Maya (Pharmacy Tech) on 01/21/24 at 2338  Med List Status: Complete     Medication Last Dose Status   acetaminophen (TYLENOL) 325 MG Tab 1/17/2024 Active   albuterol 108 (90 Base) MCG/ACT Aero Soln inhalation aerosol 1/21/2024 Active   atorvastatin (LIPITOR) 10 MG Tab 1/20/2024 Active   calcium acetate (PHOS-LO) 667 MG Cap 1/18/2024 Active   Cholecalciferol (VITAMIN D3 PO) 1/18/2024 Active   Cyanocobalamin (VITAMIN B-12 PO) 1/18/2024 Active   escitalopram (LEXAPRO) 20 MG tablet 1/21/2024 Active   fluticasone (FLONASE) 50 MCG/ACT nasal spray 1/18/2024 Active   furosemide (LASIX) 40 MG Tab 1/20/2024 Active   HUMULIN R U-500 KWIKPEN 500 UNIT/ML Solution Pen-injector last week Active   HYDROcodone-acetaminophen (NORCO) 5-325 MG Tab per tablet 1/18/2024 Active   insulin glargine 100 UNIT/ML Solution Pen-injector injection 1/20/2024 Active   latanoprost (XALATAN) 0.005 % Solution 1/17/2024 Active   levoFLOXacin (LEVAQUIN) 250 MG Tab 1/20/2024 Active   lisinopril (PRINIVIL) 5 MG Tab 1/20/2024 Active   Melatonin 10 MG Tab 1/17/2024 Active   pantoprazole (PROTONIX) 40 MG Tablet Delayed Response 1/18/2024 Active   potassium chloride SA (K-DUR) 10 MEQ Tab CR 1/18/2024 Active   predniSONE (DELTASONE) 5 MG Tab 1/18/2024 Active   SANTYL ointment 1/17/2024 Active   timolol (TIMOPTIC) 0.5 % Solution 1/17/2024 Active   tizanidine (ZANAFLEX) 4 MG Tab 1/17/2024 Active                    ALLERGIES  Allergies   Allergen Reactions    Keflex [Cephalexin] Vomiting and Nausea     Nausea/vomiting, lightheadedness       PHYSICAL EXAM  VITAL SIGNS: /63   Pulse 71   Temp 36.4 °C (97.6 °F) (Temporal)   Resp 14   Ht 1.626 m (5' 4\")   Wt 50.3 kg (111 lb)   SpO2 97%   BMI 19.05 kg/m²    Constitutional: Well developed, Well nourished, No acute distress, Non-toxic appearance.   HENT: Normocephalic, Atraumatic, Bilateral external ears normal, NC in place Nose normal.   Eyes: " PERRL, EOMI, Conjunctiva normal, No discharge.   Neck: Normal range of motion, No tenderness, Supple  Cardiovascular: Normal heart rate, Normal rhythm, No murmurs, No rubs, No gallops.   Thorax & Lungs: Normal breath sounds, No respiratory distress, No wheezing, No chest tenderness.   Abdomen: Bowel sounds normal, Soft, No tenderness  Skin: Warm, Dry, No erythema, No rash.   Back: No tenderness, No CVA tenderness.   Musculoskeletal: Good range of motion in all major joints. No edema. No tenderness to palpation or major deformities noted.   Neurologic: Alert, Normal motor function, Normal sensory function, No focal deficits noted.   Psychiatric: Affect normal      DIAGNOSTIC STUDIES / PROCEDURES  EKG  I have independently interpreted this EKG  Left axis deviation, sinus rhythm, left bundle branch block.  New compared to previous last done in 2020.    LABS  Results for orders placed or performed during the hospital encounter of 01/21/24   CBC with Differential   Result Value Ref Range    WBC 13.6 (H) 4.8 - 10.8 K/uL    RBC 2.34 (L) 4.20 - 5.40 M/uL    Hemoglobin 6.7 (L) 12.0 - 16.0 g/dL    Hematocrit 21.7 (L) 37.0 - 47.0 %    MCV 92.7 81.4 - 97.8 fL    MCH 28.6 27.0 - 33.0 pg    MCHC 30.9 (L) 32.2 - 35.5 g/dL    RDW 54.6 (H) 35.9 - 50.0 fL    Platelet Count 263 164 - 446 K/uL    MPV 10.5 9.0 - 12.9 fL    Neutrophils-Polys 94.30 (H) 44.00 - 72.00 %    Lymphocytes 2.90 (L) 22.00 - 41.00 %    Monocytes 2.10 0.00 - 13.40 %    Eosinophils 0.10 0.00 - 6.90 %    Basophils 0.20 0.00 - 1.80 %    Immature Granulocytes 0.40 0.00 - 0.90 %    Nucleated RBC 0.00 0.00 - 0.20 /100 WBC    Neutrophils (Absolute) 12.84 (H) 1.82 - 7.42 K/uL    Lymphs (Absolute) 0.40 (L) 1.00 - 4.80 K/uL    Monos (Absolute) 0.28 0.00 - 0.85 K/uL    Eos (Absolute) 0.02 0.00 - 0.51 K/uL    Baso (Absolute) 0.03 0.00 - 0.12 K/uL    Immature Granulocytes (abs) 0.06 0.00 - 0.11 K/uL    NRBC (Absolute) 0.00 K/uL   Complete Metabolic Panel (CMP)   Result Value  Ref Range    Sodium 139 135 - 145 mmol/L    Potassium 3.8 3.6 - 5.5 mmol/L    Chloride 102 96 - 112 mmol/L    Co2 22 20 - 33 mmol/L    Anion Gap 15.0 7.0 - 16.0    Glucose 238 (H) 65 - 99 mg/dL    Bun 31 (H) 8 - 22 mg/dL    Creatinine 2.48 (H) 0.50 - 1.40 mg/dL    Calcium 7.9 (L) 8.5 - 10.5 mg/dL    Correct Calcium 8.5 8.5 - 10.5 mg/dL    AST(SGOT) 13 12 - 45 U/L    ALT(SGPT) 17 2 - 50 U/L    Alkaline Phosphatase 58 30 - 99 U/L    Total Bilirubin 0.2 0.1 - 1.5 mg/dL    Albumin 3.2 3.2 - 4.9 g/dL    Total Protein 5.7 (L) 6.0 - 8.2 g/dL    Globulin 2.5 1.9 - 3.5 g/dL    A-G Ratio 1.3 g/dL   Troponins NOW   Result Value Ref Range    Troponin T 205 (H) 6 - 19 ng/L   proBrain Natriuretic Peptide, NT   Result Value Ref Range    NT-proBNP 33911 (H) 0 - 125 pg/mL   ESTIMATED GFR   Result Value Ref Range    GFR (CKD-EPI) 21 (A) >60 mL/min/1.73 m 2   TROPONIN   Result Value Ref Range    Troponin T 204 (H) 6 - 19 ng/L   COD - Adult (Type and Screen)   Result Value Ref Range    ABO Grouping Only O     Rh Grouping Only POS     Antibody Screen-Cod NEG    ABO Rh Confirm   Result Value Ref Range    ABO Rh Confirm O POS    EKG   Result Value Ref Range    Report       [facility] Emergency Dept.    Test Date:  2024  Pt Name:    CAROLYNE SELLERS                Department: ER  MRN:        [id number]                      Room:        17  Gender:     Female                       Technician: [id number]  :        1960                   Requested By:ER TRIAGE PROTOCOL  Order #:    [id number]                    Kae MD: Ajit Rosales MD    Measurements  Intervals                                Axis  Rate:       63                           P:          52  AL:         151                          QRS:        -25  QRSD:       144                          T:          104  QT:         474  QTc:        486    Interpretive Statements  Sinus rhythm  Left bundle branch block  LAD  Electronically Signed On 2024  19:36:33 PST by Ajit Rosales MD           RADIOLOGY  I have independently interpreted the diagnostic imaging associated with this visit and am waiting the final reading from the radiologist.   My preliminary interpretation is as follows: cxr - increased markings  Radiologist interpretation:   DX-CHEST-PORTABLE (1 VIEW)  Narrative: 1/21/2024 7:16 PM    HISTORY/REASON FOR EXAM:  Chest Pain.    TECHNIQUE/EXAM DESCRIPTION AND NUMBER OF VIEWS:  Single portable view of the chest.    COMPARISON: 1 view chest 2/12/2013    FINDINGS:    Right internal jugular catheter tip projects the cavoatrial junction.    LUNGS: There is minimal linear density both lung bases consistent with atelectasis or scar.    HEART and MEDIASTINUM: enlarged.    Pleura: There are no pleural effusion or pneumothoraces.    Osseous structures: No significant bony abnormality.  Impression: 1.  Minimal basilar atelectasis or parenchymal scarring    2.  Enlarged cardiac silhouette    3.  Right internal jugular catheter appears appropriately located        COURSE & MEDICAL DECISION MAKING    ED Observation Status? No; Patient does not meet criteria for ED Observation.     INITIAL ASSESSMENT, COURSE AND PLAN  Care Narrative: This is a pleasant 63-year-old female with multiple significant medical comorbidities.  Syncope, worsening shortness of breath, chest pain, concerning for cardiopulmonary etiology.  Elevated troponin at outside facility with concern for NSTEMI and possible ICH.  Left bundle branch block seen on EKG here unknown if new.  Chest x-ray here with increased hazy opacities.  No CT PE study performed at outside hospital.    Differential includes NSTEMI versus STEMI versus heart failure exacerbation versus pulmonary embolism versus pneumonia.    Outside hospital course:  -CT head without contrast with small 5 mm lesion most consistent with benign meningioma cannot rule out intracranial hemorrhage  -Chest x-ray atelectasis versus pneumonia  versus increased fluid  -CMP with potassium 2.5.  Creatinine of 2  -Troponins elevated.  -100 mg IV hydrocortisone given due to chronic steroid therapy.  -IV Levaquin given  -No CT PE performed.    Repeat CMP, CBC, troponins, BNP, chest x-ray performed here      FINAL DIAGNOSIS  1. Brain lesion    2. NSTEMI (non-ST elevated myocardial infarction) (HCC)    3. Acute on chronic congestive heart failure, unspecified heart failure type (HCC)    4. Acute anemia           Electronically signed by: Mehran Pena M.D., 1/21/2024 8:05 PM    I independently evaluated the patient and repeated the important components of the history and physical.  I discussed the management with the resident. I have reviewed and agree with the pertinent clinical information above including history, exam, study findings, and recommendations.     Encounter Summary: This is a 63 y.o. female with chest pain, syncope, shortness of breath.  Patient had an elevated troponin at an outside hospital and was transferred to this facility for further workup and also a questionable intracranial hemorrhage.   Ke of knowledge he does not believe this to be an intracranial hemorrhage but does recommend MRI imaging inpatient to rule out ICH.  For that reason patient was not started on heparin despite suspicion for NSTEMI.  Patient with highly elevated BNP, new oxygen requirement, high suspicion for CHF exacerbation.  Factious etiology possible, patient already started on Levaquin.  Disposition to the hospital medicine service.    This dictation has been created using voice recognition software. I am continuously working with the software to minimize the number of voice recognition errors and I have made every attempt to manually correct the errors within my dictation. However errors  related to this voice recognition software may still exist and should be interpreted within the appropriate context.     Electronically signed by: Ajit Rosales M.D.,  1/22/2024 1:26 AM    CRITICAL CARE  The very real possibilty of a deterioration of this patient's condition required the highest level of my preparedness for sudden, emergent intervention.  I provided critical care services, which included medication orders, frequent reevaluations of the patient's condition and response to treatment, ordering and reviewing test results, and discussing the case with various consultants.  The critical care time associated with the care of the patient was 41 minutes. Review chart for interventions. This time is exclusive of any other billable procedures.         1. Brain lesion    2. NSTEMI (non-ST elevated myocardial infarction) (HCC)    3. Acute on chronic congestive heart failure, unspecified heart failure type (HCC)    4. Acute anemia          Electronically signed by: Ajit Rosales M.D., 1/22/2024 1:23 AM

## 2024-01-22 NOTE — ED TRIAGE NOTES
Chief Complaint   Patient presents with    Chest Pain     NSTEMI transfer    Syncope     Pt was hospitalized in [place] last week for an ACS workup which was negative. She was discharged and today had multiple witnessed , by her daughter, syncopal episodes and was taken to [place] again, found to have an elevated troponin. She was also hypoxic, CXR showed PNA, was given levaquin. And because of some initial confusion a head CT was done which showed a possible ICH. Pt does home dialysis.     Pt is transferred here for higher level of care, she arrives alert and oriented x 4, VSS on 2L NC, she does not normally wear oxygen.

## 2024-01-22 NOTE — CARE PLAN
The patient is Watcher - Medium risk of patient condition declining or worsening      Progress made toward(s) clinical / shift goals:    Problem: Knowledge Deficit - Standard  Goal: Patient and family/care givers will demonstrate understanding of plan of care, disease process/condition, diagnostic tests and medications  Outcome: Progressing     Problem: Pain - Standard  Goal: Alleviation of pain or a reduction in pain to the patient’s comfort goal  Outcome: Progressing     Problem: Fall Risk  Goal: Patient will remain free from falls  Outcome: Progressing     Problem: Skin Integrity  Goal: Skin integrity is maintained or improved  Outcome: Progressing

## 2024-01-22 NOTE — PROGRESS NOTES
[facility] Internal Medicine Daily Progress Note    Date of Service  1/22/2024    R Team: R IM Blue Team   Attending: Laureen Rouse M.d.  Senior Resident: Dr. Bernstein  Intern:  Dr. Leiva  Contact Number: [phone]    Chief Complaint  Savita Mcclain is a 63 y.o. female admitted 1/21/2024 with complaints of shortness of breath, chest pain and 2 episodes of syncope.    Hospital Course  Savita Mcclain is a 63 y.o. female with past medical history of Diabetes, Psychiatric disorder, end-stage renal disease on hemodialysis 4 times per week, and Rheumatoid arthritis who presented 1/21/2024 with complaints of chest pain, shortness of breath and 2 episodes of syncope.  She stated that since she last had COVID a month ago, she has been having intermittent shortness of breath/chest pain mostly on ambulation and gets better with rest. She was ambulating t a day prior to presentation to the hospital when she experienced 2 episodes of syncope.  She states that her daughter witnessed her syncope and stated that it lasted for a few seconds, no head strike noted.  She was initially taken to an outside facility where she was initially hypotensive, she was given 250 mL bolus, 100 mg of hydrocortisone, and given Levaquin 250 mg IV x 1 for possible pneumonia. CT head was performed which shows 5 mm round focus of increased attenuation along the tentorium on the right side abutting the superior aspect of the right side of the cerebellum. Could represent benign meningioma, acute ICH cannot be excluded.COVID flu RSV negative. Labs significant for potassium 2.8, creatinine 2.5 Troponin 1.340, BNP 32 900  Procalcitonin 0.272, WBC 19.0 Hgb 7.5    EKG demonstrated normal sinus rhythm with left bundle branch block without any evidence of acute ischemic changes.  Hemoglobin found to be at 6.7 with no clear bleeding episodes.  1 PRBC transfused 01/22.  H&H was monitored every 12 hourly.  TSH, FT4, ferritin, folate, vitamin B12, iron  panel, reticulocyte count, LDH, haptoglobin also ordered.  Patient was started on IV Protonix twice daily for possible GI bleed and FOBT was ordered.  MRI brain with and without contrast ordered to further evaluate meningioma/intracranial hemorrhage and syncope.  Nephrology was consulted to establish inpatient dialysis.    Interval Problem Update  Patient in no acute distress this morning.  Hemoglobin was 6.70 therefore 1 PRBC was transfused.  Repeat hemoglobin at 8.8.  N.p.o. status was discontinued and she was started on renal/diabetic diet.  Orthostatics were ordered.  Nephrology consult was placed to establish patient for inpatient dialysis.  emoglobin found to be at 6.7 with no clear bleeding episodes.  1 PRBC transfused 01/22. Monitor H&H every 12 hourly.  TSH, FT4, ferritin, folate, vitamin B12, iron panel, reticulocyte count, LDH, haptoglobin ordered.  Echo and MRI brain with and without contrast also ordered.      I have discussed this patient's plan of care and discharge plan at IDT rounds today with Case Management, Nursing, Nursing leadership, and other members of the IDT team.    Consultants/Specialty  nephrology    Code Status  DNAR/DNI    Disposition  The patient is not medically cleared for discharge to home or a post-acute facility.  Anticipate discharge to: home with close outpatient follow-up    I have placed the appropriate orders for post-discharge needs.    Review of Systems  ROS   Review of Systems   Constitutional:  Positive for malaise/fatigue.   HENT: Negative.     Eyes: Negative.    Respiratory: Negative.     Cardiovascular: Negative.    Gastrointestinal: Negative.    Genitourinary: Negative.    Musculoskeletal: Negative.    Skin: Negative.    Neurological:  Positive for loss of consciousness and weakness.   Endo/Heme/Allergies: Negative.    Psychiatric/Behavioral: Negative.       Physical Exam  Temp:  [36.4 °C (97.5 °F)-36.9 °C (98.4 °F)] 36.9 °C (98.4 °F)  Pulse:  [58-74] 72  Resp:   [12-18] 18  BP: (131-169)/(60-99) 157/73  SpO2:  [93 %-98 %] 93 %    Physical Exam  Constitutional:       Appearance: Normal appearance. She is normal weight.   HENT:      Head: Normocephalic.      Nose: Nose normal.      Mouth/Throat:      Mouth: Mucous membranes are moist.   Eyes:      Pupils: Pupils are equal, round, and reactive to light.   Cardiovascular:      Rate and Rhythm: Normal rate and regular rhythm.      Pulses: Normal pulses.      Comments: Right-sided tunneled catheter noted  Pulmonary:      Effort: Pulmonary effort is normal.   Abdominal:      General: Abdomen is flat. Bowel sounds are normal.      Palpations: Abdomen is soft.   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   Skin:     General: Skin is warm.   Neurological:      General: No focal deficit present.      Mental Status: She is alert and oriented to person, place, and time. Mental status is at baseline.   Psychiatric:         Mood and Affect: Mood normal.         Behavior: Behavior normal.         Thought Content: Thought content normal.         Judgment: Judgment normal.     Fluids    Intake/Output Summary (Last 24 hours) at 1/22/2024 1648  Last data filed at 1/22/2024 1601  Gross per 24 hour   Intake 540 ml   Output 1600 ml   Net -1060 ml       Laboratory  Recent Labs     01/21/24  1920 01/22/24  0706   WBC 13.6* 9.1   RBC 2.34* 3.12*   HEMOGLOBIN 6.7* 8.8*   HEMATOCRIT 21.7* 28.5*   MCV 92.7 91.3   MCH 28.6 28.2   MCHC 30.9* 30.9*   RDW 54.6* 54.8*   PLATELETCT 263 310   MPV 10.5 10.8     Recent Labs     01/21/24 1920   SODIUM 139   POTASSIUM 3.8   CHLORIDE 102   CO2 22   GLUCOSE 238*   BUN 31*   CREATININE 2.48*   CALCIUM 7.9*                   Imaging  DX-CHEST-PORTABLE (1 VIEW)   Final Result      1.  Minimal basilar atelectasis or parenchymal scarring      2.  Enlarged cardiac silhouette      3.  Right internal jugular catheter appears appropriately located      MR-BRAIN-WITH     (Results Pending)   EC-ECHOCARDIOGRAM COMPLETE W/O CONT    (Results Pending)        Assessment/Plan  Problem Representation:    * Symptomatic anemia- (present on admission)  Assessment & Plan  Possibly syncope from acute on chronic anemia? Patient recently had negative ACS workup at outside facility  Hemoglobin found to be at 6.7 with no clear bleeding episodes.  1 PRBC transfused 01/22.  -Monitor H&H every 12 hourly.  -TSH, FT4, ferritin, folate, vitamin B12, iron panel, reticulocyte count, LDH, haptoglobin ordered.  -IV Protonix BP ID  -Consider GI consult if anemia persist.      Syncope  Assessment & Plan  Patient presented to the hospital with complaints of 2 episodes of syncope where she completely passed out.  Etiology unknown at this point.  - Orthostatics ordered.  - Echo ordered for cardiac pathology.  -MRI brain ordered.    Acute hypoxemic respiratory failure (HCC)  Assessment & Plan  Patient requiring 1 to 2 L oxygen via nasal cannula to maintain adequate saturation, not on oxygen at home.  Do not appreciate any rales on physical exam despite elevated BNP.  -Lasix 80 mg IV given 01/22.  -Continue oxygenation, keep O2 over 90%  - Echo ordered.    ACP (advance care planning)  Assessment & Plan  16 minutes spent discussing goals of care with patient.  She was explained her condition and treatment plan.  She was asked about CODE STATUS, she states that she is okay with medical treatment, however would not be okay with heroic measures, such as chest compressions and intubation.  Patient to be DNR/DNI.    Leukocytosis  Assessment & Plan  Improved  Noted to have white blood cell count 19 outside facility, 13 here at [facility] which gradually improved.  Given 1 dose of Levaquin for possible pneumonia on 01/19.  Procalcitonin within normal limits.  Currently on prednisone 5 mg daily.  Likely reactive  Serial CBC      Meningioma (HCC)  Assessment & Plan  CT head at outside facility showing 5 mm round focus of  increased attenuation along the tentorium on the right side abutting the superior aspect of the right side of the cerebellum.  Could represent benign meningioma, acute ICH cannot be excluded.  Reports that her daughter denied that patient struck her head while she syncopized.  -MRI brain with and without contrast ordered.    ESRD (end stage renal disease) on dialysis (HCC)  Assessment & Plan  Nephrology consulted to establish hemodialysis as an inpatient.  Appreciate nephrology assistance.  - Hemodialysis to be done on Tuesday, Thursday and Saturday.      Hyperlipidemia  Assessment & Plan  Continue lipitor    Hyperglycemia  Assessment & Plan  Sliding scale    Type 2 diabetes mellitus (HCC)- (present on admission)  Assessment & Plan  - Start Lantus 9 units daily.  - Continue sliding scale.  - Hypoglycemia protocol.           VTE prophylaxis: SCDs/TEDs    I have performed a physical exam and reviewed and updated ROS and Plan today (1/22/2024). In review of yesterday's note (1/21/2024), there are no changes except as documented above.

## 2024-01-22 NOTE — ASSESSMENT & PLAN NOTE
Improved  Noted to have white blood cell count 19 outside facility, 13 here at [facility] which gradually improved.  Given 1 dose of Levaquin for possible pneumonia on 01/19.  Procalcitonin within normal limits.  Currently on prednisone 5 mg daily.  Likely reactive  Serial CBC

## 2024-01-22 NOTE — PROGRESS NOTES
4 Eyes Skin Assessment Completed by SAJAN Gaytan and SAJAN Lam.    Head WDL  Ears WDL  Nose WDL  Mouth WDL  Neck WDL  Breast/Chest WDL L subclavian catheter in place  Shoulder Blades WDL  Spine WDL  (R) Arm/Elbow/Hand Bruising  (L) Arm/Elbow/Hand Bruising, Scab, and Scar Maturing fistula   Abdomen Bruising  Groin WDL  Scrotum/Coccyx/Buttocks WDL  (R) Leg WDL  (L) Leg WDL  (R) Heel/Foot/Toe Redness and Blanching  (L) Heel/Foot/Toe Redness, Blanching, Scar, and Scab          Devices In Places Tele Box, Blood Pressure Cuff, Pulse Ox, and Central Line      Interventions In Place Gray Ear Foams and Waffle Overlay    Possible Skin Injury No    Pictures Uploaded Into Epic Yes  Wound Consult Placed N/A  RN Wound Prevention Protocol Ordered No

## 2024-01-22 NOTE — ED NOTES
Medication history reviewed with patient at bedside.   Med rec is complete  Allergies reviewed.   Patient has been prescribed levaquin 250mg (1 tab qd x5 days) on 1/19/24, she states she last had a dose yesterday 1/20/24 but can't recall at what time.    Anticoagulants: No      Shamar Talavera PhT

## 2024-01-22 NOTE — ASSESSMENT & PLAN NOTE
CT head at outside facility showing 5 mm round focus of increased attenuation along the tentorium on the right side abutting the superior aspect of the right side of the cerebellum.  Could represent benign meningioma, acute ICH cannot be excluded.  Reports that her daughter denied that patient struck her head while she syncopized.  -MRI brain with and without contrast ordered.

## 2024-01-22 NOTE — CONSULTS
"[facility] -  CONSULTATION NOTE      DATE & TIME:   1/22/2024  11:58 AM               AUTHOR:  Froy Cintron D.O.      REASON FOR CONSULTATION:   - Inpatient hemodialysis management.      CHIEF COMPLAINT:   -  \"weakness \"      HISTORY OF PRESENT ILLNESS:    63Y  F w ESRD on Home dialysis (3-4x a week, 2hr 15min tx), HTN, presented with weakness and syncopal episode. She states it occurred at home. She reports feeling very weak when she needs to move. She went to an outside ER for evaluation and given levaquin for possible PNA. COVID, RSV were NEGATIVE. Pt was found to have hgb of 7.5 there, but repeat was 6.7 at [facility]. Pt is s/p transfusion and feels better. She states she lives in [place], [place] and her home dialysis [facility]. She denies any recent prescription changes for her treatments.     No F/C/N/V/CP. + weakness, + dyspnea on exertion. No melena, hematochezia, hematemesis.  No HA, visual changes, or abdominal pain.      REVIEW OF SYSTEMS:    10 point ROS was performed and is as per HPI or otherwise negative      PAST MEDICAL HISTORY:   - ESRD  - HTN  - Anemia of CKD  - CKD-MBD  Past Medical History:   Diagnosis Date    Diabetes     Psychiatric disorder     depression    Rheumatoid arthritis(714.0)         PAST SURGICAL HISTORY:   - Dialysis Access Surgery  Past Surgical History:   Procedure Laterality Date    MD UPPER GI ENDOSCOPY,BIOPSY Left 6/10/2020    Procedure: GASTROSCOPY, WITH BIOPSY;  Surgeon: Tian Velazquez M.D.;  Location: SURGERY SAME DAY [facility];  Service: Gastroenterology    MD COLONOSCOPY,DIAGNOSTIC Left 6/10/2020    Procedure: COLONOSCOPY;  Surgeon: Tian Velazquez M.D.;  Location: SURGERY SAME DAY [facility];  Service: Gastroenterology    TOE AMPUTATION Left 2000    Amputation, Toe    OTHER ORTHOPEDIC SURGERY      amputation of left toes       FAMILY HISTORY:   No family history on file.       SOCIAL HISTORY:   Social History     Tobacco " Use    Smoking status: Former     Current packs/day: 0.00     Average packs/day: 2.0 packs/day for 40.0 years (80.0 ttl pk-yrs)     Types: Cigarettes     Start date: 1980     Quit date: 2015     Years since quittin.5    Smokeless tobacco: Never   Vaping Use    Vaping Use: Never used   Substance Use Topics    Alcohol use: Not Currently    Drug use: Never         HOME MEDICATIONS:   - Reviewed and documented in chart  Home Medications    Medication Sig Taking? Last Dose Authorizing Provider   atorvastatin (LIPITOR) 10 MG Tab Take 10 mg by mouth every evening. Yes 2024 at [facility]   calcium acetate (PHOS-LO) 667 MG Cap Take 1 Capsule by mouth 3 times a week. Yes 2024 at [facility]   SANTYL ointment Apply 1 Application topically every day. Apply to affected area of arm(s). Yes 2024 at [facility]   furosemide (LASIX) 40 MG Tab Take 40 mg by mouth every day. Yes 2024 at [facility]   HUMULIN R U-500 KWIKPEN 500 UNIT/ML Solution Pen-injector Inject 3-8 Units under the skin 3 times a day before meals. Unspecified sliding scale. Yes last week at [facility]   levoFLOXacin (LEVAQUIN) 250 MG Tab Take 250 mg by mouth every 24 hours. 5 day course prescribed 2024. Take AFTER dialysis. Yes 2024 at [facility]   potassium chloride SA (K-DUR) 10 MEQ Tab CR Take 10 mEq by mouth every day. Yes 2024 at [facility]   albuterol 108 (90 Base) MCG/ACT Aero Soln inhalation aerosol Inhale 2 Puffs every four hours as needed for Shortness of Breath. Yes 2024 at [facility]   acetaminophen (TYLENOL) 325 MG Tab Take 650 mg by mouth 1 time a day as needed for Mild Pain. 2 tablets = 650 mg. Yes 2024 at [facility]   insulin glargine 100 UNIT/ML Solution Pen-injector injection Inject 5-20 Units under the skin every evening. Unspecified sliding scale. Yes 2024 at [facility]   Cyanocobalamin (VITAMIN B-12 PO) Take 1 Tablet by mouth every day. Yes 1/18/2024 at [facility]   Cholecalciferol (VITAMIN D3 PO) Take 1 Capsule by mouth every day. Yes 1/18/2024 at [facility]   fluticasone (FLONASE) 50 MCG/ACT nasal spray Administer 2 Sprays into each nostril every evening.  1/18/2024 at [facility]   HYDROcodone-acetaminophen (NORCO) 5-325 MG Tab per tablet Take 1 Tablet by mouth every 6 hours as needed (Severe Pain).  1/18/2024 at [facility]   latanoprost (XALATAN) 0.005 % Solution Place 1 Drop in both eyes every bedtime.  1/17/2024 at [facility]   lisinopril (PRINIVIL) 5 MG Tab Take 2.5 mg by mouth 2 times a day. 0.5 tablet = 2.5 mg.  1/20/2024 at [facility]   Melatonin 10 MG Tab Take 10 mg by mouth at bedtime as needed (Sleep).  1/17/2024 at [facility]   pantoprazole (PROTONIX) 40 MG Tablet Delayed Response Take 40 mg by mouth at bedtime.  1/18/2024 at [facility]   timolol (TIMOPTIC) 0.5 % Solution Administer 1 Drop into both eyes every morning.  1/17/2024 at [facility]   tizanidine (ZANAFLEX) 4 MG Tab Take 4 mg by mouth every evening.  1/17/2024 at [facility]   predniSONE (DELTASONE) 5 MG Tab Take 5 mg by mouth every day.  1/18/2024 at [facility]   escitalopram (LEXAPRO) 20 MG tablet Take 20 mg by mouth every day.  1/21/2024 at [facility]         ALLERGIES:  Keflex [cephalexin]      VITAL SIGNS:  BP (!) 164/68   Pulse 74   Temp 36.7 °C (98.1 °F) (Temporal)   Resp 16   Ht 1.626 m (5' 4\")   Wt 53.5 kg (117 lb 15.1 oz)   SpO2 94%   BMI 20.25 kg/m²     Exam  General: Awake, no acute distress  HEENT: head normocephalic, atraumatic. Moist mucous membranes  Neck: neck supple, no visible masses  Respiratory: clear to auscultation bilaterally, no rales, no ronchi, or wheezing  Cardiac: normal rate, normal " rhythm,  Abdomen: non tender, non-distended, no guarding  Musculoskelatal: no joint tenderness, moves limbs spontaneously  Extremities: no significant joint abnormalities, no edema  Skin: no lesions, no rashes noted    Access: R chest permcath, clean and wrapped    FLUID BALANCE:  In: 300 [Blood:300]  Out: 0       LABS:  Recent Labs     01/21/24 1920   SODIUM 139   POTASSIUM 3.8   CHLORIDE 102   CO2 22   GLUCOSE 238*   BUN 31*   CREATININE 2.48*   CALCIUM 7.9*      Recent Labs     01/21/24 1920   SODIUM 139   POTASSIUM 3.8   CHLORIDE 102   CO2 22   GLUCOSE 238*   BUN 31*   CREATININE 2.48*          IMAGING:  - All imaging reviewed from admission to present day      ASSESSMENTS:    -ESRD ([facility] in [place], [place])    Does home dialysis 3-4x a week ([id number])    R chest permcath  - Acute on Chronic Anemia    S/p transfusion on 1/22  -Hypertension     Continue current medications  -Hypervolemia     Volume off with dialysis as BP tolerates     Pt reports making robust urine  -CKD-MBD      PLAN:     - will switch pt to TTS  schedule w longer treatments while inpt   - Volume off with dialysis as BP tolerates  - Continue usual anti-hypertensive medications  - agree with anemia workup as anemia of CKD does not cause acute drops  - EPO 3000U with HD to goal Hgb 10-11  - Transfuse PRN hgb < 7  - ordered PTH, Vit D, and Phos level  - No dietary protein restrictions, use renal diet     Goal: 1.5g Protein/kg/day  - Follow labs with further recommendations to follow    Please page Nephrology with any questions or concerns    Thank you

## 2024-01-22 NOTE — H&P
[facility] Internal Medicine Admitting History and Physical    Name Savita Mcclain     1960   Age/Sex 63 y.o. female   MRN [id number]   Code Status Dnr/dni     Chief Complaint:  Ms. Mcclain is a 67yo female w/ PMH T2DM, rheumatoid arthritis, ESRD who presents with 2 episodes of syncope w/ LOC.    HPI:   Savita Mcclain is a 63 y.o. female who presented 2024 with syncope.  She states that since she last had COVID a month ago, she has been having intermittent shortness of breath/chest pain. Patient was at home today, she was reporting an overall generalized weakness.  She was ambulating today when she syncopized twice.  She states that her daughter witnessed her syncope and stated that it lasted for a few seconds, no head strike noted.  She was brought to outside facility ER for evaluation.     At outside facility,  Initially was hypotensive, she was given 250 mL bolus, 100 mg of hydrocortisone, and given Levaquin 250 mg IV x 1 for possible pneumonia.  CT head was performed which shows 5 mm round focus of increased attenuation along the tentorium on the right side abutting the superior aspect of the right side of the cerebellum.  Could represent benign meningioma, acute ICH cannot be excluded.  COVID flu RSV negative  Sodium 138 potassium 2.8 chloride 103 bicarb 26 AST 23 ALT 26 BUN 35 glucose 150 creatinine 2.5  Troponin 1.340  BNP 32 900  Procalcitonin 0.272 (N: < 0.500)  INR 1.1   WBC 19.0 Hgb 7.5       In the ER, patient found to have hypoxia.  Pertinent labs include Hgb 6.7, sugar 238, creatinine 2.48, troponin 205 and 204, BNP 40989.  EKG showing normal sinus rhythm with left bundle branch block without any evidence of acute ischemic changes.     Ms. Mcclain is a 67yo female who presented to the ED via transfer with 2 episodes of syncope. She has been having intermittent chest pain and sob since having Covid19 at the beginning of 2023. She was walking down the hallway at  her home today when she lost consciousness, her daughter witnessed her LOC and said Ms. Madconald did not hit her head. She has a 15 pack year history and stopped smoking in 2016. Overnight she was not able to sleep, has some sob with walking to and from the bathroom in her room and has not had a bowel movement since Thursday.       ROS  Constitutional: Denies anorexia, nausea, vomiting, fever, weight loss  HEENT: Denies sore throat, congestion, changes in hearing or vision, runny nose, swelling of throat  CV: Denies current chest pain, palpitations  Resp: +cough. Denies SOB, hemoptysis, feeling of fluid in lungs  GI: Denies abdominal pain, changes in bowel habits, hematochezia, melena, abdominal cramps, diarrhea  : Denies changes in bladder habits, dysuria, hematuria, incontinence, nocturia, flank pain  Psych: Denies anxiety, depression, suicidal ideation  Neuro: Denies tingling in arms or legs, headache, dizziness, tremors  MSK: Denies myalgias, traumatic injury, weakness, stiffness  Derm: Denies rashes, swelling, erythema or other skin changes    Past Medical History:     Past Medical History:   Diagnosis Date    Diabetes     Psychiatric disorder     depression    Rheumatoid arthritis(714.0)        Medication Allergy/Sensitivities:  Allergies   Allergen Reactions    Keflex [Cephalexin] Vomiting and Nausea     Nausea/vomiting, lightheadedness       Past Surgical History:  Past Surgical History:   Procedure Laterality Date    UT UPPER GI ENDOSCOPY,BIOPSY Left 6/10/2020    Procedure: GASTROSCOPY, WITH BIOPSY;  Surgeon: Tian Velazquez M.D.;  Location: SURGERY SAME DAY [facility];  Service: Gastroenterology    UT COLONOSCOPY,DIAGNOSTIC Left 6/10/2020    Procedure: COLONOSCOPY;  Surgeon: Tian Velazquez M.D.;  Location: SURGERY SAME DAY [facility];  Service: Gastroenterology    TOE AMPUTATION Left 2000    Amputation, Toe    OTHER ORTHOPEDIC SURGERY      amputation of left toes       Current Outpatient  Medications:  Home Medications       Reviewed by Ben Maya (Pharmacy Tech) on 01/21/24 at 2338  Med List Status: Complete     Medication Last Dose Status   acetaminophen (TYLENOL) 325 MG Tab 1/17/2024 Active   albuterol 108 (90 Base) MCG/ACT Aero Soln inhalation aerosol 1/21/2024 Active   atorvastatin (LIPITOR) 10 MG Tab 1/20/2024 Active   calcium acetate (PHOS-LO) 667 MG Cap 1/18/2024 Active   Cholecalciferol (VITAMIN D3 PO) 1/18/2024 Active   Cyanocobalamin (VITAMIN B-12 PO) 1/18/2024 Active   escitalopram (LEXAPRO) 20 MG tablet 1/21/2024 Active   fluticasone (FLONASE) 50 MCG/ACT nasal spray 1/18/2024 Active   furosemide (LASIX) 40 MG Tab 1/20/2024 Active   HUMULIN R U-500 KWIKPEN 500 UNIT/ML Solution Pen-injector last week Active   HYDROcodone-acetaminophen (NORCO) 5-325 MG Tab per tablet 1/18/2024 Active   insulin glargine 100 UNIT/ML Solution Pen-injector injection 1/20/2024 Active   latanoprost (XALATAN) 0.005 % Solution 1/17/2024 Active   levoFLOXacin (LEVAQUIN) 250 MG Tab 1/20/2024 Active   lisinopril (PRINIVIL) 5 MG Tab 1/20/2024 Active   Melatonin 10 MG Tab 1/17/2024 Active   pantoprazole (PROTONIX) 40 MG Tablet Delayed Response 1/18/2024 Active   potassium chloride SA (K-DUR) 10 MEQ Tab CR 1/18/2024 Active   predniSONE (DELTASONE) 5 MG Tab 1/18/2024 Active   SANTYL ointment 1/17/2024 Active   timolol (TIMOPTIC) 0.5 % Solution 1/17/2024 Active   tizanidine (ZANAFLEX) 4 MG Tab 1/17/2024 Active                    Family History:  No family history on file.    Social History:  Social History     Socioeconomic History    Marital status:      Spouse name: Not on file    Number of children: Not on file    Years of education: Not on file    Highest education level: Not on file   Occupational History    Not on file   Tobacco Use    Smoking status: Former     Current packs/day: 0.00     Average packs/day: 2.0 packs/day for 40.0 years (80.0 ttl pk-yrs)     Types: Cigarettes     Start date: 6/26/1980  "    Quit date: 2015     Years since quittin.5    Smokeless tobacco: Never   Vaping Use    Vaping Use: Never used   Substance and Sexual Activity    Alcohol use: Not Currently    Drug use: Never    Sexual activity: Not on file   Other Topics Concern    Not on file   Social History Narrative    Not on file     Social Determinants of Health     Financial Resource Strain: Not on file   Food Insecurity: Unknown (2020)    Hunger Vital Sign     Worried About Running Out of Food in the Last Year: Patient refused     Ran Out of Food in the Last Year: Patient refused   Transportation Needs: No Transportation Needs (2020)    PRAPARE - Transportation     Lack of Transportation (Medical): No     Lack of Transportation (Non-Medical): No   Physical Activity: Not on file   Stress: Not on file   Social Connections: Not on file   Intimate Partner Violence: Not on file   Housing Stability: Not on file     Smoking: 15 pack year history - stopped in 2016  Alcohol: none  Drugs: None        Physical Exam     Vitals:    24 0126 24 0230 24 0245 24 0503   BP: (!) 146/63 (!) 143/65 (!) 150/71 (!) 158/75   Pulse: 62 71 68 69   Resp: 16 16 16 16   Temp: 36.4 °C (97.5 °F) 36.5 °C (97.7 °F) 36.5 °C (97.7 °F) 36.4 °C (97.5 °F)   TempSrc: Temporal Temporal Temporal Temporal   SpO2: 97% 96% 97% 95%   Weight: 53.2 kg (117 lb 4.6 oz)  53.2 kg (117 lb 4.6 oz) 53.5 kg (117 lb 15.1 oz)   Height:         Body mass index is 20.25 kg/m².  BP (!) 158/75   Pulse 69   Temp 36.4 °C (97.5 °F) (Temporal)   Resp 16   Ht 1.626 m (5' 4\")   Wt 53.5 kg (117 lb 15.1 oz)   SpO2 95%   BMI 20.25 kg/m²   O2 therapy: Pulse Oximetry: 95 %, O2 (LPM): 1.5, O2 Delivery Device: Silicone Nasal Cannula      General: Uncomfortable, well developed adult female, appears stated age  HEENT: Normocephalic, atraumatic, pupils equal, round and reactive to light, extra ocular muscles intact,   CV: RRR, no cyanosis or clubbing of fingers, no " murmurs/rubs/gallops, S1/S2 noted, radial and pedal pulses 2+ bilaterally  Resp: Clear to auscultation bilaterally without increased work of breathing, normal AP diameter, no wheezes, rales or rhonchi   ABD: soft, non-distended, non-tender, and normal bowel sounds  MSK: Amputation of toes on L foot. Gait intact, CN2-12 grossly intact, no sensory deficits of legs, 5/5 lower extremity muscle strength, no calf wasting, no paravertebral or spinal tenderness, normal ROM of back and legs  Derm: No rashes, swelling or other skin changes      Data Review       Old Records Request:   Completed  Current Records review and summary: Completed    Lab Data Review:  Recent Results (from the past 24 hour(s))   EKG    Collection Time: 24  7:08 PM   Result Value Ref Range    Report       [facility] Emergency Dept.    Test Date:  2024  Pt Name:    CAROLNYE SELLERS                Department: ER  MRN:        [id number]                      Room:       [facility]  Gender:     Female                       Technician: [id number]  :        1960                   Requested By:ER TRIAGE PROTOCOL  Order #:    [id number]                    Reading MD: Ajit Rosales MD    Measurements  Intervals                                Axis  Rate:       63                           P:          52  CT:         151                          QRS:        -25  QRSD:       144                          T:          104  QT:         474  QTc:        486    Interpretive Statements  Sinus rhythm  Left bundle branch block  LAD  Electronically Signed On 2024 19:36:33 PST by Ajit Rosales MD     CBC with Differential    Collection Time: 24  7:20 PM   Result Value Ref Range    WBC 13.6 (H) 4.8 - 10.8 K/uL    RBC 2.34 (L) 4.20 - 5.40 M/uL    Hemoglobin 6.7 (L) 12.0 - 16.0 g/dL    Hematocrit 21.7 (L) 37.0 - 47.0 %    MCV 92.7 81.4 - 97.8 fL    MCH 28.6 27.0 - 33.0 pg    MCHC 30.9 (L) 32.2 - 35.5 g/dL    RDW 54.6 (H) 35.9 - 50.0  fL    Platelet Count 263 164 - 446 K/uL    MPV 10.5 9.0 - 12.9 fL    Neutrophils-Polys 94.30 (H) 44.00 - 72.00 %    Lymphocytes 2.90 (L) 22.00 - 41.00 %    Monocytes 2.10 0.00 - 13.40 %    Eosinophils 0.10 0.00 - 6.90 %    Basophils 0.20 0.00 - 1.80 %    Immature Granulocytes 0.40 0.00 - 0.90 %    Nucleated RBC 0.00 0.00 - 0.20 /100 WBC    Neutrophils (Absolute) 12.84 (H) 1.82 - 7.42 K/uL    Lymphs (Absolute) 0.40 (L) 1.00 - 4.80 K/uL    Monos (Absolute) 0.28 0.00 - 0.85 K/uL    Eos (Absolute) 0.02 0.00 - 0.51 K/uL    Baso (Absolute) 0.03 0.00 - 0.12 K/uL    Immature Granulocytes (abs) 0.06 0.00 - 0.11 K/uL    NRBC (Absolute) 0.00 K/uL   Complete Metabolic Panel (CMP)    Collection Time: 01/21/24  7:20 PM   Result Value Ref Range    Sodium 139 135 - 145 mmol/L    Potassium 3.8 3.6 - 5.5 mmol/L    Chloride 102 96 - 112 mmol/L    Co2 22 20 - 33 mmol/L    Anion Gap 15.0 7.0 - 16.0    Glucose 238 (H) 65 - 99 mg/dL    Bun 31 (H) 8 - 22 mg/dL    Creatinine 2.48 (H) 0.50 - 1.40 mg/dL    Calcium 7.9 (L) 8.5 - 10.5 mg/dL    Correct Calcium 8.5 8.5 - 10.5 mg/dL    AST(SGOT) 13 12 - 45 U/L    ALT(SGPT) 17 2 - 50 U/L    Alkaline Phosphatase 58 30 - 99 U/L    Total Bilirubin 0.2 0.1 - 1.5 mg/dL    Albumin 3.2 3.2 - 4.9 g/dL    Total Protein 5.7 (L) 6.0 - 8.2 g/dL    Globulin 2.5 1.9 - 3.5 g/dL    A-G Ratio 1.3 g/dL   Troponins NOW    Collection Time: 01/21/24  7:20 PM   Result Value Ref Range    Troponin T 205 (H) 6 - 19 ng/L   proBrain Natriuretic Peptide, NT    Collection Time: 01/21/24  7:20 PM   Result Value Ref Range    NT-proBNP 35363 (H) 0 - 125 pg/mL   ESTIMATED GFR    Collection Time: 01/21/24  7:20 PM   Result Value Ref Range    GFR (CKD-EPI) 21 (A) >60 mL/min/1.73 m 2   ABO Rh Confirm    Collection Time: 01/21/24  7:20 PM   Result Value Ref Range    ABO Rh Confirm O POS    TROPONIN    Collection Time: 01/21/24  9:36 PM   Result Value Ref Range    Troponin T 204 (H) 6 - 19 ng/L   COD - Adult (Type and Screen)     Collection Time: 01/21/24 11:47 PM   Result Value Ref Range    ABO Grouping Only O     Rh Grouping Only POS     Antibody Screen-Cod NEG    COMPONENT CELLULAR    Collection Time: 01/21/24 11:47 PM   Result Value Ref Range    Component R       R99                 Red Cells, LR       [id number]   issued       01/22/24   02:11      Product Type R99     Dispense Status issued     Unit Number (Barcoded) [id number]     Product Code (Barcoded) X1385R21     Blood Type (Barcoded) 5100        Imaging/Procedures Review:    ndependant Imaging Review: Completed  DX-CHEST-PORTABLE (1 VIEW)   Final Result      1.  Minimal basilar atelectasis or parenchymal scarring      2.  Enlarged cardiac silhouette      3.  Right internal jugular catheter appears appropriately located      MR-BRAIN-WITH    (Results Pending)        EKG:   EKG Independant Review: Completed  QTc:486, HR: 63, Normal Sinus Rhythm, LBBB identified               Assessment/Plan     * Symptomatic anemia- (present on admission)  Assessment & Plan  Possibly syncope from acute on chronic anemia? Patient recently had negative ACS workup at outside facility  Noted to have hemoglobin 7.5 at outside facility, 6.7 here, no clear bleeding episodes.  Possibly anemia from kidney disease?  Transfuse 1 unit of blood  Serial CBC  Protonix  GI consult in a.m.      Acute hypoxemic respiratory failure (HCC)  Assessment & Plan  Patient requiring nasal cannula to maintain adequate saturation, not on oxygen at home  Do not appreciate any rales on physical exam despite elevated BNP  Possibly source of hypoxemia from anemia  Continue oxygenation, keep O2 over 90%    ACP (advance care planning)  Assessment & Plan  16 minutes spent discussing goals of care with patient.  She was explained her condition and treatment plan.  She was asked about CODE STATUS, she states that she is okay with medical treatment, however would not be okay with heroic measures, such as chest compressions and  intubation.  Patient to be DNR/DNI.    Leukocytosis  Assessment & Plan  Noted to have white blood cell count 19 outside facility, 13 here at [facility]  Given 1 dose of Levaquin for possible pneumonia.  Procalcitonin within normal limits.  Likely reactive  Serial CBC      Meningioma (HCC)  Assessment & Plan  CT head at outside facility showing 5 mm round focus of increased attenuation along the tentorium on the right side abutting the superior aspect of the right side of the cerebellum.  Could represent benign meningioma, acute ICH cannot be excluded.  Reports that her daughter denied that patient struck her head while she syncopized.  Consider MRI brain with and without contrast    ESRD (end stage renal disease) on dialysis (HCC)  Assessment & Plan  Nephro consult in am for reinstating dialysis      Hyperlipidemia  Assessment & Plan  Continue lipitor    Hyperglycemia  Assessment & Plan  Sliding scale    Type 2 diabetes mellitus (HCC)- (present on admission)  Assessment & Plan  Sliding scale        Anticipated Hospital stay:  >2 midnights

## 2024-01-22 NOTE — ED NOTES
"Med rec completed per patient at bedside.  Allergies reviewed with patient.  Patient's preferred pharmacy: [facility] in [place], [place].    ANTICOAGULATION: NONE.    Outpatient antibiotics within the last 30 days: Patient is on a 5 day course of levofloxacin prescribed 1/19/2024. Patient states that she has had 1 dose so far.    Patient states that she doses her long-acting insulin (Basaglar i.e. insulin glargine) \"based on [her] blood sugar.\"    Roberth Veras PhT    "

## 2024-01-22 NOTE — ED NOTES
.Bedside report received from off going RN/tech: Cesia RN, assumed care of patient.  POC discussed with patient. Call light within reach, all needs addressed at this time.       Fall risk interventions in place: Move the patient closer to the nurse's station, Patient's personal possessions are with in their safe reach, Place socks on patient, Place fall risk sign on patient's door, and Keep floor surfaces clean and dry (all applicable per [place] Fall risk assessment)   Continuous monitoring: Cardiac Leads, Pulse Ox, or Blood Pressure  IVF/IV medications: Not Applicable   Oxygen: Room Air  Bedside sitter: Not Applicable   Isolation: Not Applicable

## 2024-01-22 NOTE — ASSESSMENT & PLAN NOTE
Possibly syncope from acute on chronic anemia? Patient recently had negative ACS workup at outside facility  Hemoglobin found to be at 6.7 with no clear bleeding episodes.  1 PRBC transfused 01/22.  FOBT positive therefore GI consulted pending further recommendations.  -Monitor H&H every 12 hourly.  -TSH, FT4, ferritin, folate, vitamin B12, iron panel, reticulocyte count, LDH, haptoglobin ordered.  -IV Protonix BP ID

## 2024-01-23 ENCOUNTER — APPOINTMENT (OUTPATIENT)
Dept: CARDIOLOGY | Facility: MEDICAL CENTER | Age: 64
DRG: 291 | End: 2024-01-23
Payer: MEDICARE

## 2024-01-23 PROBLEM — I50.30 DIASTOLIC HEART FAILURE (HCC): Status: ACTIVE | Noted: 2020-06-27

## 2024-01-23 LAB
ALBUMIN SERPL BCP-MCNC: 3.3 G/DL (ref 3.2–4.9)
ALBUMIN/GLOB SERPL: 1.2 G/DL
ALP SERPL-CCNC: 61 U/L (ref 30–99)
ALT SERPL-CCNC: 21 U/L (ref 2–50)
ANION GAP SERPL CALC-SCNC: 10 MMOL/L (ref 7–16)
AST SERPL-CCNC: 10 U/L (ref 12–45)
BASOPHILS # BLD AUTO: 0.4 % (ref 0–1.8)
BASOPHILS # BLD: 0.04 K/UL (ref 0–0.12)
BILIRUB SERPL-MCNC: 0.3 MG/DL (ref 0.1–1.5)
BUN SERPL-MCNC: 40 MG/DL (ref 8–22)
CALCIUM ALBUM COR SERPL-MCNC: 9.2 MG/DL (ref 8.5–10.5)
CALCIUM SERPL-MCNC: 8.6 MG/DL (ref 8.5–10.5)
CHLORIDE SERPL-SCNC: 104 MMOL/L (ref 96–112)
CO2 SERPL-SCNC: 26 MMOL/L (ref 20–33)
CREAT SERPL-MCNC: 3.01 MG/DL (ref 0.5–1.4)
EOSINOPHIL # BLD AUTO: 0.2 K/UL (ref 0–0.51)
EOSINOPHIL NFR BLD: 1.9 % (ref 0–6.9)
ERYTHROCYTE [DISTWIDTH] IN BLOOD BY AUTOMATED COUNT: 54.8 FL (ref 35.9–50)
GFR SERPLBLD CREATININE-BSD FMLA CKD-EPI: 17 ML/MIN/1.73 M 2
GLOBULIN SER CALC-MCNC: 2.7 G/DL (ref 1.9–3.5)
GLUCOSE BLD STRIP.AUTO-MCNC: 104 MG/DL (ref 65–99)
GLUCOSE BLD STRIP.AUTO-MCNC: 114 MG/DL (ref 65–99)
GLUCOSE BLD STRIP.AUTO-MCNC: 173 MG/DL (ref 65–99)
GLUCOSE BLD STRIP.AUTO-MCNC: 178 MG/DL (ref 65–99)
GLUCOSE SERPL-MCNC: 143 MG/DL (ref 65–99)
HCT VFR BLD AUTO: 31 % (ref 37–47)
HGB BLD-MCNC: 9.4 G/DL (ref 12–16)
IMM GRANULOCYTES # BLD AUTO: 0.06 K/UL (ref 0–0.11)
IMM GRANULOCYTES NFR BLD AUTO: 0.6 % (ref 0–0.9)
LV EJECT FRACT  99904: 50
LV EJECT FRACT MOD 2C 99903: 52.23
LV EJECT FRACT MOD 4C 99902: 49.02
LV EJECT FRACT MOD BP 99901: 50.45
LYMPHOCYTES # BLD AUTO: 0.81 K/UL (ref 1–4.8)
LYMPHOCYTES NFR BLD: 7.5 % (ref 22–41)
MAGNESIUM SERPL-MCNC: 2 MG/DL (ref 1.5–2.5)
MCH RBC QN AUTO: 28.1 PG (ref 27–33)
MCHC RBC AUTO-ENTMCNC: 30.3 G/DL (ref 32.2–35.5)
MCV RBC AUTO: 92.8 FL (ref 81.4–97.8)
MONOCYTES # BLD AUTO: 0.44 K/UL (ref 0–0.85)
MONOCYTES NFR BLD AUTO: 4.1 % (ref 0–13.4)
NEUTROPHILS # BLD AUTO: 9.22 K/UL (ref 1.82–7.42)
NEUTROPHILS NFR BLD: 85.5 % (ref 44–72)
NRBC # BLD AUTO: 0 K/UL
NRBC BLD-RTO: 0 /100 WBC (ref 0–0.2)
PHOSPHATE SERPL-MCNC: 2.1 MG/DL (ref 2.5–4.5)
PLATELET # BLD AUTO: 326 K/UL (ref 164–446)
PMV BLD AUTO: 10.3 FL (ref 9–12.9)
POTASSIUM SERPL-SCNC: 3.9 MMOL/L (ref 3.6–5.5)
PROT SERPL-MCNC: 6 G/DL (ref 6–8.2)
PTH-INTACT SERPL-MCNC: 230 PG/ML (ref 14–72)
RBC # BLD AUTO: 3.34 M/UL (ref 4.2–5.4)
SODIUM SERPL-SCNC: 140 MMOL/L (ref 135–145)
WBC # BLD AUTO: 10.8 K/UL (ref 4.8–10.8)

## 2024-01-23 PROCEDURE — 99233 SBSQ HOSP IP/OBS HIGH 50: CPT | Mod: GC | Performed by: INTERNAL MEDICINE

## 2024-01-23 PROCEDURE — 700111 HCHG RX REV CODE 636 W/ 250 OVERRIDE (IP): Mod: JZ

## 2024-01-23 PROCEDURE — 700111 HCHG RX REV CODE 636 W/ 250 OVERRIDE (IP): Performed by: STUDENT IN AN ORGANIZED HEALTH CARE EDUCATION/TRAINING PROGRAM

## 2024-01-23 PROCEDURE — 700102 HCHG RX REV CODE 250 W/ 637 OVERRIDE(OP): Performed by: STUDENT IN AN ORGANIZED HEALTH CARE EDUCATION/TRAINING PROGRAM

## 2024-01-23 PROCEDURE — 82652 VIT D 1 25-DIHYDROXY: CPT

## 2024-01-23 PROCEDURE — 84100 ASSAY OF PHOSPHORUS: CPT

## 2024-01-23 PROCEDURE — C9113 INJ PANTOPRAZOLE SODIUM, VIA: HCPCS

## 2024-01-23 PROCEDURE — A9270 NON-COVERED ITEM OR SERVICE: HCPCS | Performed by: STUDENT IN AN ORGANIZED HEALTH CARE EDUCATION/TRAINING PROGRAM

## 2024-01-23 PROCEDURE — 80053 COMPREHEN METABOLIC PANEL: CPT

## 2024-01-23 PROCEDURE — 97162 PT EVAL MOD COMPLEX 30 MIN: CPT

## 2024-01-23 PROCEDURE — 93306 TTE W/DOPPLER COMPLETE: CPT | Mod: 26 | Performed by: INTERNAL MEDICINE

## 2024-01-23 PROCEDURE — 85025 COMPLETE CBC W/AUTO DIFF WBC: CPT

## 2024-01-23 PROCEDURE — 82962 GLUCOSE BLOOD TEST: CPT

## 2024-01-23 PROCEDURE — 99221 1ST HOSP IP/OBS SF/LOW 40: CPT | Performed by: INTERNAL MEDICINE

## 2024-01-23 PROCEDURE — 36415 COLL VENOUS BLD VENIPUNCTURE: CPT

## 2024-01-23 PROCEDURE — 83970 ASSAY OF PARATHORMONE: CPT

## 2024-01-23 PROCEDURE — 93306 TTE W/DOPPLER COMPLETE: CPT

## 2024-01-23 PROCEDURE — A9270 NON-COVERED ITEM OR SERVICE: HCPCS

## 2024-01-23 PROCEDURE — 90935 HEMODIALYSIS ONE EVALUATION: CPT

## 2024-01-23 PROCEDURE — 700102 HCHG RX REV CODE 250 W/ 637 OVERRIDE(OP)

## 2024-01-23 PROCEDURE — 770020 HCHG ROOM/CARE - TELE (206)

## 2024-01-23 PROCEDURE — 700111 HCHG RX REV CODE 636 W/ 250 OVERRIDE (IP)

## 2024-01-23 PROCEDURE — 83735 ASSAY OF MAGNESIUM: CPT

## 2024-01-23 PROCEDURE — 5A1D70Z PERFORMANCE OF URINARY FILTRATION, INTERMITTENT, LESS THAN 6 HOURS PER DAY: ICD-10-PCS | Performed by: INTERNAL MEDICINE

## 2024-01-23 RX ORDER — FUROSEMIDE 40 MG/1
40 TABLET ORAL
Status: DISCONTINUED | OUTPATIENT
Start: 2024-01-23 | End: 2024-01-24 | Stop reason: HOSPADM

## 2024-01-23 RX ORDER — HEPARIN SODIUM 1000 [USP'U]/ML
INJECTION, SOLUTION INTRAVENOUS; SUBCUTANEOUS
Status: COMPLETED
Start: 2024-01-23 | End: 2024-01-23

## 2024-01-23 RX ORDER — HEPARIN SODIUM 1000 [USP'U]/ML
4100 INJECTION, SOLUTION INTRAVENOUS; SUBCUTANEOUS
Status: DISCONTINUED | OUTPATIENT
Start: 2024-01-23 | End: 2024-01-24 | Stop reason: HOSPADM

## 2024-01-23 RX ADMIN — PANTOPRAZOLE SODIUM 40 MG: 40 INJECTION, POWDER, FOR SOLUTION INTRAVENOUS at 17:31

## 2024-01-23 RX ADMIN — TIZANIDINE 4 MG: 4 TABLET ORAL at 17:31

## 2024-01-23 RX ADMIN — INSULIN LISPRO 1 UNITS: 100 INJECTION, SOLUTION INTRAVENOUS; SUBCUTANEOUS at 08:58

## 2024-01-23 RX ADMIN — HEPARIN SODIUM 4100 UNITS: 1000 INJECTION, SOLUTION INTRAVENOUS; SUBCUTANEOUS at 12:37

## 2024-01-23 RX ADMIN — DOCUSATE SODIUM 50 MG AND SENNOSIDES 8.6 MG 2 TABLET: 8.6; 5 TABLET, FILM COATED ORAL at 17:31

## 2024-01-23 RX ADMIN — ATORVASTATIN CALCIUM 10 MG: 10 TABLET, FILM COATED ORAL at 17:31

## 2024-01-23 RX ADMIN — PREDNISONE 5 MG: 5 TABLET ORAL at 04:42

## 2024-01-23 RX ADMIN — EPOETIN ALFA 3000 UNITS: 3000 SOLUTION INTRAVENOUS; SUBCUTANEOUS at 11:59

## 2024-01-23 RX ADMIN — LISINOPRIL 2.5 MG: 5 TABLET ORAL at 17:31

## 2024-01-23 RX ADMIN — LISINOPRIL 2.5 MG: 5 TABLET ORAL at 04:42

## 2024-01-23 RX ADMIN — FUROSEMIDE 40 MG: 40 TABLET ORAL at 17:31

## 2024-01-23 RX ADMIN — INSULIN GLARGINE-YFGN 9 UNITS: 100 INJECTION, SOLUTION SUBCUTANEOUS at 18:27

## 2024-01-23 RX ADMIN — ESCITALOPRAM OXALATE 20 MG: 10 TABLET ORAL at 04:42

## 2024-01-23 RX ADMIN — PANTOPRAZOLE SODIUM 40 MG: 40 INJECTION, POWDER, FOR SOLUTION INTRAVENOUS at 04:42

## 2024-01-23 ASSESSMENT — PAIN DESCRIPTION - PAIN TYPE: TYPE: ACUTE PAIN

## 2024-01-23 ASSESSMENT — COGNITIVE AND FUNCTIONAL STATUS - GENERAL
SUGGESTED CMS G CODE MODIFIER MOBILITY: CH
MOBILITY SCORE: 24

## 2024-01-23 ASSESSMENT — GAIT ASSESSMENTS
GAIT LEVEL OF ASSIST: SUPERVISED
DISTANCE (FEET): 150
DEVIATION: NO DEVIATION

## 2024-01-23 ASSESSMENT — FIBROSIS 4 INDEX: FIB4 SCORE: 0.67

## 2024-01-23 NOTE — ASSESSMENT & PLAN NOTE
Echocardiogram demonstrated low normal left ventricular systolic function 50.5%, Grade II diastolic dysfunction. Moderately dilated left atrium. Mild/moderate mitral regurgitation. Right heart pressures are consistent with mild pulmonary hypertension.

## 2024-01-23 NOTE — PROGRESS NOTES
[facility] Internal Medicine Daily Progress Note    Date of Service  1/23/2024    R Team: R IM Blue Team   Attending: Laureen Rouse M.d.  Senior Resident: Dr. Bernstein  Intern:  Dr. Leiva  Contact Number: [phone]    Chief Complaint  Savita Mcclain is a 63 y.o. female admitted 1/21/2024 with complaints of shortness of breath, chest pain and 2 episodes of syncope.    Hospital Course  Savita Mcclain is a 63 y.o. female with past medical history of Diabetes, Psychiatric disorder, end-stage renal disease on hemodialysis 4 times per week, and Rheumatoid arthritis who presented 1/21/2024 with complaints of chest pain, shortness of breath and 2 episodes of syncope.  She stated that since she last had COVID a month ago, she has been having intermittent shortness of breath/chest pain mostly on ambulation and gets better with rest. She was ambulating t a day prior to presentation to the hospital when she experienced 2 episodes of syncope.  She states that her daughter witnessed her syncope and stated that it lasted for a few seconds, no head strike noted.  She was initially taken to an outside facility where she was initially hypotensive, she was given 250 mL bolus, 100 mg of hydrocortisone, and given Levaquin 250 mg IV x 1 for possible pneumonia. CT head was performed which shows 5 mm round focus of increased attenuation along the tentorium on the right side abutting the superior aspect of the right side of the cerebellum. Could represent benign meningioma, acute ICH cannot be excluded.COVID flu RSV negative. Labs significant for potassium 2.8, creatinine 2.5 Troponin 1.340, BNP 32 900  Procalcitonin 0.272, WBC 19.0 Hgb 7.5    EKG demonstrated normal sinus rhythm with left bundle branch block without any evidence of acute ischemic changes.  Hemoglobin found to be at 6.7 with no clear bleeding episodes.  1 PRBC transfused 01/22.  H&H was initially monitored every 12 hourly that gradually stabilized.  Patient was  started on IV Protonix twice daily for possible GI bleed and FOBT was done which was positive therefore GI was consulted. Echocardiogram demonstrated low normal left ventricular systolic function 50.5%, Grade II diastolic dysfunction. Moderately dilated left atrium. Mild/moderate mitral regurgitation. Right heart pressures consistent with mild pulmonary hypertension.  MRI brain with and without contrast ordered to further evaluate meningioma/intracranial hemorrhage and syncope.  Nephrology was consulted to establish inpatient dialysis.    Interval Problem Update  Patient in no acute distress this morning.  Patient hemodynamically stable however she is still requiring 2 L oxygen to maintain adequate oxygenation.  Hemoglobin 9.4 this morning.  Patient will get hemodialysis today.  Echocardiogram demonstrated low normal left ventricular systolic function 50.5%, Grade II diastolic dysfunction. Moderately dilated left atrium. Mild/moderate mitral regurgitation. Right heart pressures are consistent with mild pulmonary hypertension.  FOBT came out positive therefore GI was consulted pending further recommendations.  Orthostatics negative. MRI brain pending.      I have discussed this patient's plan of care and discharge plan at IDT rounds today with Case Management, Nursing, Nursing leadership, and other members of the IDT team.    Consultants/Specialty  nephrology GI    Code Status  DNAR/DNI    Disposition  The patient is not medically cleared for discharge to home or a post-acute facility.  Anticipate discharge to: home with close outpatient follow-up    I have placed the appropriate orders for post-discharge needs.    Review of Systems  ROS   Review of Systems   Constitutional:  Positive for malaise/fatigue.   HENT: Negative.     Eyes: Negative.    Respiratory: Negative.     Cardiovascular: Negative.    Gastrointestinal: Negative.    Genitourinary: Negative.    Musculoskeletal: Negative.    Skin: Negative.     Neurological:  Positive for loss of consciousness and weakness.   Endo/Heme/Allergies: Negative.    Psychiatric/Behavioral: Negative.       Physical Exam  Temp:  [36.3 °C (97.4 °F)-36.6 °C (97.9 °F)] 36.5 °C (97.7 °F)  Pulse:  [50-76] 64  Resp:  [17-18] 18  BP: ()/(44-88) 154/62  SpO2:  [91 %-99 %] 97 %    Physical Exam  Constitutional:       Appearance: Normal appearance. She is normal weight.   HENT:      Head: Normocephalic.      Nose: Nose normal.      Mouth/Throat:      Mouth: Mucous membranes are moist.   Eyes:      Pupils: Pupils are equal, round, and reactive to light.   Cardiovascular:      Rate and Rhythm: Normal rate and regular rhythm.      Pulses: Normal pulses.      Comments: Right-sided tunneled catheter noted  Pulmonary:      Effort: Pulmonary effort is normal.   Abdominal:      General: Abdomen is flat. Bowel sounds are normal.      Palpations: Abdomen is soft.   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   Skin:     General: Skin is warm.   Neurological:      General: No focal deficit present.      Mental Status: She is alert and oriented to person, place, and time. Mental status is at baseline.   Psychiatric:         Mood and Affect: Mood normal.         Behavior: Behavior normal.         Thought Content: Thought content normal.         Judgment: Judgment normal.     Fluids    Intake/Output Summary (Last 24 hours) at 1/23/2024 1446  Last data filed at 1/23/2024 1234  Gross per 24 hour   Intake 842 ml   Output 2250 ml   Net -1408 ml       Laboratory  Recent Labs     01/22/24  0706 01/22/24  2111 01/23/24  0930   WBC 9.1 9.0 10.8   RBC 3.12* 2.93* 3.34*   HEMOGLOBIN 8.8* 8.3* 9.4*   HEMATOCRIT 28.5* 26.9* 31.0*   MCV 91.3 91.8 92.8   MCH 28.2 28.3 28.1   MCHC 30.9* 30.9* 30.3*   RDW 54.8* 54.9* 54.8*   PLATELETCT 310 293 326   MPV 10.8 10.7 10.3     Recent Labs     01/21/24  1920 01/23/24  0930   SODIUM 139 140   POTASSIUM 3.8 3.9    CHLORIDE 102 104   CO2 22 26   GLUCOSE 238* 143*   BUN 31* 40*   CREATININE 2.48* 3.01*   CALCIUM 7.9* 8.6                   Imaging  EC-ECHOCARDIOGRAM COMPLETE W/O CONT   Final Result      DX-CHEST-PORTABLE (1 VIEW)   Final Result      1.  Minimal basilar atelectasis or parenchymal scarring      2.  Enlarged cardiac silhouette      3.  Right internal jugular catheter appears appropriately located      MR-BRAIN-WITH    (Results Pending)        Assessment/Plan  Problem Representation:    * Symptomatic anemia- (present on admission)  Assessment & Plan  Possibly syncope from acute on chronic anemia? Patient recently had negative ACS workup at outside facility  Hemoglobin found to be at 6.7 with no clear bleeding episodes.  1 PRBC transfused 01/22.  FOBT positive therefore GI consulted pending further recommendations.  -Monitor H&H every 12 hourly.  -TSH, FT4, ferritin, folate, vitamin B12, iron panel, reticulocyte count, LDH, haptoglobin ordered.  -IV Protonix BP ID        Syncope  Assessment & Plan  Patient presented to the hospital with complaints of 2 episodes of syncope where she completely passed out.  Etiology unknown at this point.  - Orthostatics ordered.  - Echo demonstrating systolic heart failure as well as pulmonary hypertension.  -MRI brain ordered.    Acute hypoxemic respiratory failure (HCC)  Assessment & Plan  Patient requiring 1 to 2 L oxygen via nasal cannula to maintain adequate saturation, not on oxygen at home.  Do not appreciate any rales on physical exam despite elevated BNP.  Echocardiogram demonstrating Echocardiogram demonstrated low normal left ventricular systolic function 50.5%, Grade II diastolic dysfunction. Moderately dilated left atrium. Mild/moderate mitral regurgitation. Right heart pressures are consistent with mild pulmonary hypertension.  -Lasix 80 mg IV given 01/22.  Continue home dose of Lasix p.o. 40 Mg daily.  -Continue oxygenation, keep O2 over 90%      ACP (advance care  planning)  Assessment & Plan  16 minutes spent discussing goals of care with patient.  She was explained her condition and treatment plan.  She was asked about CODE STATUS, she states that she is okay with medical treatment, however would not be okay with heroic measures, such as chest compressions and intubation.  Patient to be DNR/DNI.    Leukocytosis  Assessment & Plan  Improved  Noted to have white blood cell count 19 outside facility, 13 here at [facility] which gradually improved.  Given 1 dose of Levaquin for possible pneumonia on 01/19.  Procalcitonin within normal limits.  Currently on prednisone 5 mg daily.  Likely reactive  Serial CBC      Meningioma (HCC)  Assessment & Plan  CT head at outside facility showing 5 mm round focus of increased attenuation along the tentorium on the right side abutting the superior aspect of the right side of the cerebellum.  Could represent benign meningioma, acute ICH cannot be excluded.  Reports that her daughter denied that patient struck her head while she syncopized.  -MRI brain with and without contrast ordered.    ESRD (end stage renal disease) on dialysis ([facility])  Assessment & Plan  Nephrology consulted to establish hemodialysis as an inpatient.  Appreciate nephrology assistance.  Hemodialysis done 01/23.  - Hemodialysis to be done on Tuesday, Thursday and Saturday.      Hyperlipidemia  Assessment & Plan  Continue lipitor    Hyperglycemia  Assessment & Plan  Sliding scale    Diastolic heart failure (HCC)- (present on admission)  Assessment & Plan  Echocardiogram demonstrated low normal left ventricular systolic function 50.5%, Grade II diastolic dysfunction. Moderately dilated left atrium. Mild/moderate mitral regurgitation. Right heart pressures are consistent with mild pulmonary hypertension.      Type 2 diabetes mellitus (HCC)- (present on admission)  Assessment & Plan  - Start Lantus 9 units daily.  - Continue sliding scale.  - Hypoglycemia protocol.      Pulmonary  hypertension (HCC)- (present on admission)  Assessment & Plan  Recent echo 01/2020 demonstrating: Right ventricular systolic pressure is estimated to be  40 mmHg.   Right heart pressures are consistent with mild pulmonary hypertension.  -Continue diuresis.         VTE prophylaxis: SCDs/TEDs    I have performed a physical exam and reviewed and updated ROS and Plan today (1/23/2024). In review of yesterday's note (1/22/2024), there are no changes except as documented above.

## 2024-01-23 NOTE — PROGRESS NOTES
Report received from day shift RN, bedside report completed and eyes on the pt. Lab results and notes have been reviewed. The pt is resting in bed and all needs are met at this time. The bed is locked/lowered, bed alarm is on, and the call light is within reach. Will continue to monitor for any changes.

## 2024-01-23 NOTE — PROGRESS NOTES
[facility] Progress Note      HD today x 3 hours per Dr. Cintron.   Initiated at 0934 and ended at 1234.     Assessment:   Patient stable,not in distress. No complaints. No SOB, denies pain.     Access used: R chest catheter, patent and intact. Dressing changed per protocol.  Patient has GIO AVF with bruit and thrill. Not used for dialysis.    Net Fluid Removed: 1,000 mL      Procedure Events/ Complications:    Patient tolerated treatment. No hypotensive episodes.    Post Access Care:   Blood returned. Flushed with NS.  CVC locked with Heparin 1000 units/ mL   Arterial port:  2 mL   Venous port: 2.1mL  Clamped, capped and secured. Labeled   Dressing change: Yes         Report given to Primary SONJA Newsome RN.

## 2024-01-23 NOTE — PROGRESS NOTES
[facility] Interval Note:   Bertha Sibley, Medical Student    Date & Time:   1/23/2024   8:19 AM        Patient ID:             Name:             Savita Mcclain   YOB: 1960  Age:                 63 y.o.  female   MRN:               [id number]    ________________________________________________________________________    Chief Complaint:  Ms. Mcclain is a 67yo female w/ PMH T2DM, rheumatoid arthritis, ESRD who presents with 2 episodes of syncope w/ LOC.      Hospital Course:  1/22   Savita Mcclain is a 63 y.o. female with past medical history of Diabetes, Psychiatric disorder, end-stage renal disease on hemodialysis 4 times per week, and Rheumatoid arthritis who presented 1/21/2024 with complaints of chest pain, shortness of breath and 2 episodes of syncope.  She stated that since she last had COVID a month ago, she has been having intermittent shortness of breath/chest pain mostly on ambulation and gets better with rest. She was ambulating t a day prior to presentation to the hospital when she experienced 2 episodes of syncope.  She states that her daughter witnessed her syncope and stated that it lasted for a few seconds, no head strike noted.  She was initially taken to an outside facility where she was initially hypotensive, she was given 250 mL bolus, 100 mg of hydrocortisone, and given Levaquin 250 mg IV x 1 for possible pneumonia. CT head was performed which shows 5 mm round focus of increased attenuation along the tentorium on the right side abutting the superior aspect of the right side of the cerebellum. Could represent benign meningioma, acute ICH cannot be excluded.COVID flu RSV negative. Labs significant for potassium 2.8, creatinine 2.5 Troponin 1.340, BNP 32 900  Procalcitonin 0.272, WBC 19.0 Hgb 7.5    EKG demonstrated normal sinus rhythm with left bundle branch block without any evidence of acute ischemic changes.  Hemoglobin found to be at 6.7 with no clear  bleeding episodes.  1 PRBC transfused 01/22.  H&H was monitored every 12 hourly.  TSH, FT4, ferritin, folate, vitamin B12, iron panel, reticulocyte count, LDH, haptoglobin also ordered.  Patient was started on IV Protonix twice daily for possible GI bleed and FOBT was ordered.  MRI brain with and without contrast ordered to further evaluate meningioma/intracranial hemorrhage and syncope.  Nephrology was consulted to establish inpatient dialysis.        Interval problem update (problems under active management) :       1/22  Patient in no acute distress this morning.  Hemoglobin was 6.70 therefore 1 PRBC was transfused.  Repeat hemoglobin at 8.8.  N.p.o. status was discontinued and she was started on renal/diabetic diet.  Orthostatics were ordered.  Nephrology consult was placed to establish patient for inpatient dialysis.  emoglobin found to be at 6.7 with no clear bleeding episodes.  1 PRBC transfused 01/22. Monitor H&H every 12 hourly.  TSH, FT4, ferritin, folate, vitamin B12, iron panel, reticulocyte count, LDH, haptoglobin ordered.  Echo and MRI brain with and without contrast also ordered.    1/23   Ms. Macdonald had no acute changes overnight and slept well. She is urinating and had a bowel movement. She states the sob walking to and from the restroom is much improved. She has some fatigue and an occasional cough. She denies changes in vision/hearing, palpitations, chest pain, sob, abdominal pain, numbness/tingling, myalgias, anxiety or depression. She is afebrile, heart rate 50-70, respiratory rate 16-18, blood pressure 's over 40-70's, and spo2 93-98 on 1L oxygen. On physical exam Ms. Mcclain is ill-appearing and pale, heart is normal rate and rhythm, lungs CTAB, abdomen soft, non-distended, with active bowel sounds and no pain with palpation.  Lab data shows fecal occult blood positive. RBC 2.9, Hb/Hct 8.3/27, ferritin 928, folate 12.8, B12 1400, TSH 0.53, T4 0.87, Glucose 218.  Imaging is pending - echo  of heart, MRI brain    Review of systems/Med Review/Core Measures:      Gen: +fatigue, no fever, chills  HEENT: no changes in vision or hearing, runny nose, epistaxis, sore throat, swelling of throat  CV: no chest pain, palpitations  Resp: no cough, sob, wheezing  GI: no nausea, vomiting, diarrhea, melena, hematochezia  : no dysuria, hematuria  MSK: no arthralgias, myalgias, ostalgias  Neuro: no disorientation, numbness, tingling, weakness, new focal complaints  Derm: no rashes, bruises  Psych: no anxiety, depression, thoughts of suicide    Interval Exam:       Vitals:    01/22/24 2120 01/23/24 0012 01/23/24 0434 01/23/24 0721   BP: 116/58 (!) 142/63 136/88 (!) 160/73   Pulse: (!) 50 66 63 (!) 59   Resp:  18 17 18   Temp:  36.4 °C (97.5 °F) 36.6 °C (97.8 °F) 36.6 °C (97.9 °F)   TempSrc:  Temporal Temporal Temporal   SpO2:  96% 95% 91%   Weight:   53.7 kg (118 lb 6.2 oz)    Height:         Weight/BMI: Body mass index is 20.32 kg/m².  Pulse Oximetry: 91 %, O2 (LPM): 2, O2 Delivery Device: Silicone Nasal Cannula    Gen: ill-appearing, pale, NAD  HEENT: EOMI, PERRL  CV: RRR w/o m/r/g  Pulm: CTAB  Abd: soft, non-tender, non-distended  Skin: no rashes, bruising  Neuro: no numbness, tingling    Labs/Imaging:  CBC with Differential        Component Value Flag Ref Range Units Status    WBC 13.6      4.8 - 10.8 K/uL Final    RBC 2.34      4.20 - 5.40 M/uL Final    Hemoglobin 6.7      12.0 - 16.0 g/dL Final    Hematocrit 21.7      37.0 - 47.0 % Final    MCV 92.7      81.4 - 97.8 fL Final    MCH 28.6      27.0 - 33.0 pg Final    MCHC 30.9      32.2 - 35.5 g/dL Final    Comment:    Please note new reference range effective 05/22/2023.    RDW 54.6      35.9 - 50.0 fL Final    Platelet Count 263      164 - 446 K/uL Final    MPV 10.5      9.0 - 12.9 fL Final    Neutrophils-Polys 94.30      44.00 - 72.00 % Final    Lymphocytes 2.90      22.00 - 41.00 % Final    Monocytes 2.10      0.00 - 13.40 % Final    Eosinophils 0.10      0.00  - 6.90 % Final    Basophils 0.20      0.00 - 1.80 % Final    Immature Granulocytes 0.40      0.00 - 0.90 % Final    Nucleated RBC 0.00      0.00 - 0.20 /100 WBC Final    Comment:    Please note new reference range effective 05/22/2023.    Neutrophils (Absolute) 12.84      1.82 - 7.42 K/uL Final    Comment:    Includes immature neutrophils, if present.  Please note new reference range effective 05/22/2023.      Lymphs (Absolute) 0.40      1.00 - 4.80 K/uL Final    Monos (Absolute) 0.28      0.00 - 0.85 K/uL Final    Eos (Absolute) 0.02      0.00 - 0.51 K/uL Final    Baso (Absolute) 0.03      0.00 - 0.12 K/uL Final    Immature Granulocytes (abs) 0.06      0.00 - 0.11 K/uL Final    NRBC (Absolute) 0.00       K/uL Final                  Complete Metabolic Panel (CMP)        Component Value Flag Ref Range Units Status    Sodium 139      135 - 145 mmol/L Final    Potassium 3.8      3.6 - 5.5 mmol/L Final    Chloride 102      96 - 112 mmol/L Final    Co2 22      20 - 33 mmol/L Final    Anion Gap 15.0      7.0 - 16.0  Final    Glucose 238      65 - 99 mg/dL Final    Bun 31      8 - 22 mg/dL Final    Creatinine 2.48      0.50 - 1.40 mg/dL Final    Calcium 7.9      8.5 - 10.5 mg/dL Final    Correct Calcium 8.5      8.5 - 10.5 mg/dL Final    AST(SGOT) 13      12 - 45 U/L Final    ALT(SGPT) 17      2 - 50 U/L Final    Alkaline Phosphatase 58      30 - 99 U/L Final    Total Bilirubin 0.2      0.1 - 1.5 mg/dL Final    Albumin 3.2      3.2 - 4.9 g/dL Final    Total Protein 5.7      6.0 - 8.2 g/dL Final    Globulin 2.5      1.9 - 3.5 g/dL Final    A-G Ratio 1.3       g/dL Final                  Troponins NOW        Component Value Flag Ref Range Units Status    Troponin T 205      6 - 19 ng/L Final    Comment:    Critical Result. Read Back Performed. Results called to: RN [id number] by:  [id number] on: 2024-01-21 19:58:57  Biotin intake of greater than 5 mg per day may interfere with  troponin levels, causing false low values.    The Ultra  High Sensitivity Troponin T test has a reference range  for positive troponins that follows the recommendation of ACC for  the 99th percentile reference population.                    DX-CHEST-PORTABLE (1 VIEW)            2024 7:16 PM    HISTORY/REASON FOR EXAM:  Chest Pain.      TECHNIQUE/EXAM DESCRIPTION AND NUMBER OF VIEWS:  Single portable view of the chest.    COMPARISON: 1 view chest 2013    FINDINGS:    Right internal jugular catheter tip projects the cavoatrial junction.      LUNGS: There is minimal linear density both lung bases consistent with atelectasis or scar.    HEART and MEDIASTINUM: enlarged.    Pleura: There are no pleural effusion or pneumothoraces.    Osseous structures: No significant bony abnormality.      IMPRESSION:    1.  Minimal basilar atelectasis or parenchymal scarring    2.  Enlarged cardiac silhouette    3.  Right internal jugular catheter appears appropriately located         EKG        Component    Report    [facility] Emergency Dept.    Test Date:  2024  Pt Name:    CAROLYNE SELLERS                Department: ER  MRN:        [id number]                      Room:       [facility]  Gender:     Female                       Technician: [id number]  :        1960                   Requested By:ER TRIAGE PROTOCOL  Order #:    [id number]                    Reading MD: Ajit Rosales MD    Measurements  Intervals                                Axis  Rate:       63                           P:          52  UT:         151                          QRS:        -25  QRSD:       144                          T:          104  QT:         474  QTc:        486    Interpretive Statements  Sinus rhythm  Left bundle branch block  LAD  Electronically Signed On 2024 19:36:33 PST by Ajit Rosales MD                    proBrain Natriuretic Peptide, NT        Component Value Flag Ref Range Units Status    NT-proBNP 64160      0 - 125 pg/mL Final    Comment:     Results obtained by dilution.                  ESTIMATED GFR        Component Value Flag Ref Range Units Status    GFR (CKD-EPI) 21      >60 mL/min/1.73 m 2 Final    Comment:    Estimated Glomerular Filtration Rate is calculated using  race neutral CKD-EPI 2021 equation per NKF-ASN recommendations.                    TROPONIN        Component Value Flag Ref Range Units Status    Troponin T 204      6 - 19 ng/L Final    Comment:    Previous critical result phoned and read back 2024-01-21 19:53:01.  Biotin intake of greater than 5 mg per day may interfere with  troponin levels, causing false low values.    The Ultra High Sensitivity Troponin T test has a reference range  for positive troponins that follows the recommendation of ACC for  the 99th percentile reference population.                    COD - Adult (Type and Screen)        Component    ABO Grouping Only    O      Rh Grouping Only    POS      Antibody Screen-Cod    NEG                    COMPONENT CELLULAR        Component    Component R    R99                 Red Cells, LR       [id number]   transfused   01/22/24   02:11      Product Type    R99      Dispense Status    transfused      Unit Number (Barcoded)    [id number]      Product Code (Barcoded)    [id number]      Blood Type (Barcoded)    5100                    ABO Rh Confirm        Component    ABO Rh Confirm    O POS                    CBC WITH DIFFERENTIAL        Component Value Flag Ref Range Units Status    WBC 9.1      4.8 - 10.8 K/uL Final    RBC 3.12      4.20 - 5.40 M/uL Final    Hemoglobin 8.8      12.0 - 16.0 g/dL Final    Hematocrit 28.5      37.0 - 47.0 % Final    MCV 91.3      81.4 - 97.8 fL Final    MCH 28.2      27.0 - 33.0 pg Final    MCHC 30.9      32.2 - 35.5 g/dL Final    Comment:    Please note new reference range effective 05/22/2023.    RDW 54.8      35.9 - 50.0 fL Final    Platelet Count 310      164 - 446 K/uL Final    MPV 10.8      9.0 - 12.9 fL Final    Neutrophils-Polys  83.60      44.00 - 72.00 % Final    Lymphocytes 9.10      22.00 - 41.00 % Final    Monocytes 5.90      0.00 - 13.40 % Final    Eosinophils 0.80      0.00 - 6.90 % Final    Basophils 0.10      0.00 - 1.80 % Final    Immature Granulocytes 0.50      0.00 - 0.90 % Final    Nucleated RBC 0.00      0.00 - 0.20 /100 WBC Final    Comment:    Please note new reference range effective 05/22/2023.    Neutrophils (Absolute) 7.60      1.82 - 7.42 K/uL Final    Comment:    Includes immature neutrophils, if present.  Please note new reference range effective 05/22/2023.      Lymphs (Absolute) 0.83      1.00 - 4.80 K/uL Final    Monos (Absolute) 0.54      0.00 - 0.85 K/uL Final    Eos (Absolute) 0.07      0.00 - 0.51 K/uL Final    Baso (Absolute) 0.01      0.00 - 0.12 K/uL Final    Immature Granulocytes (abs) 0.05      0.00 - 0.11 K/uL Final    NRBC (Absolute) 0.00       K/uL Final                  POCT glucose device results        Component Value Flag Ref Range Units Status    POC Glucose, Blood 173      65 - 99 mg/dL Final                  CBC WITHOUT DIFFERENTIAL        Component Value Flag Ref Range Units Status    WBC 9.0      4.8 - 10.8 K/uL Final    RBC 2.93      4.20 - 5.40 M/uL Final    Hemoglobin 8.3      12.0 - 16.0 g/dL Final    Hematocrit 26.9      37.0 - 47.0 % Final    MCV 91.8      81.4 - 97.8 fL Final    MCH 28.3      27.0 - 33.0 pg Final    MCHC 30.9      32.2 - 35.5 g/dL Final    Comment:    Please note new reference range effective 05/22/2023.    RDW 54.9      35.9 - 50.0 fL Final    Platelet Count 293      164 - 446 K/uL Final    MPV 10.7      9.0 - 12.9 fL Final                  FERRITIN        Component Value Flag Ref Range Units Status    Ferritin 928.0      10.0 - 291.0 ng/mL Final                  FREE THYROXINE        Component Value Flag Ref Range Units Status    Free T-4 0.87      0.93 - 1.70 ng/dL Final                  HEP B SURFACE AB        Component Value Flag Ref Range Units Status    Hep B  Surface Antibody Quant 27.60      0.00 - 10.00 mIU/mL Final    Comment:    Anti HBs concentration detected at > 10 mIU/mL. Individual is considered to  be immune to infection with HBV.  Reference Interval: anti-HBs  < 8.5 mIU/mL..........Negative.  8.5 - 11.4 mIU/mL..........Indeterminate.  = 11.5 mIU/mL..........Positive.  Assay performance characteristics have not been established when the Elecsys  Anti HBs assay is used in conjunction with other manufacturers' assays for  specific HBV serological markers.  For post-vaccination antibody testing guidelines for the general public,  refer to MMWR December 23, 2005/Vol. 54 (No.16);1-23, and for healthcare  workers, refer to MMWR December 20, 2013/Vol.62(No. 10);1-19.                    HEPATITIS PANEL ACUTE(4 COMPONENTS)        Component Value Flag Ref Range Units Status    Hepatitis B Surface Antigen Non-Reactive      Non-Reactive  Final    Comment:    It has been reported that certain assays will not detect all HBV mutants.  If acute or chronic HBV infection is suspected and the HBsAg result is  negative, it is recommended that other serological markers be tested to  confirm the HBsAg nonreactivity.  HBsAg test results should always be  assessed in conjunction with the patient's medical history, clinical  examination, and other findings.    Note: Assay performance characteristics have not been established when the  Elecsys HBsAg II assay is used in conjunction with other manufacturers'  assays for specific HBV serological markers. Assay performance  characteristics have not been established for testing of newborns.        Hepatitis B Cors Ab,IgM Non-Reactive      Non-Reactive  Final    Comment:    IgM antibodies to HBc were not detected.  The Roche HBc IgM is a diagnostic test for the qualitative determination of  IgM antibodies to the hepatitis B core antigen in human serum and plasma.  The results should be used and interpreted only in the context of the  overall  clinical picture. A negative test result does not exclude the  possibility of exposure to hepatitis B virus.  Note: Assay performance characteristics have not been established in  patients under the age of 21, pregnant women, or in populations of  immunocompromised or immunosuppressed patients.      Hepatitis A Virus Ab, IgM Non-Reactive      Non-Reactive  Final    Comment:    The Roche HAV IgM assay is a diagnostic immunoassay for the qualitative  determination of IgM response to the hepatitis A virus in human serum and  plasma. The results should be used and interpreted only in the context of  the overall clinical picture. A negative test result does not exclude the  possibility of exposure to hepatitis A virus.  Note: Assay performance characteristics have not been established for  immunocompromised or immunosuppressed patients.      Hepatitis C Antibody Non-Reactive      Non-Reactive  Final    Comment:    Antibodies to HCV were not detected.  The Roche anti-HCV is a diagnostic test for the qualitative determination of  antibodies to the hepatitis C virus in human serum and plasma. The results  should be used and interpreted only in the context of the overall clinical  picture. A negative test result does not exclude the possibility of exposure  to hepatitis C virus.  Note: Assay performance characteristics have not been established in  populations of immunocompromised or immunosuppressed patients.                    IRON/TOTAL IRON BIND        Component Value Flag Ref Range Units Status    Iron 83      40 - 170 ug/dL Final    Total Iron Binding 203      250 - 450 ug/dL Final    Unsat Iron Binding 120      110 - 370 ug/dL Final    % Saturation 41      15 - 55 % Final                  LDH        Component Value Flag Ref Range Units Status    LDH Total 265      107 - 266 U/L Final                  OCCULT BLOOD STOOL        Component Value Flag Ref Range Units Status    Occult Blood Feces Positive      Negative   Final                  RETICULOCYTES COUNT        Component Value Flag Ref Range Units Status    Reticulocyte Count 2.6      0.8 - 2.6 % Final    Comment:    Please note new reference range effective 05/22/2023.    Retic, Absolute 0.08      0.04 - 0.12 M/uL Final    Comment:    Please note new reference range effective 05/22/2023.    Imm. Reticulocyte Fraction 11.8      2.6 - 16.1 % Final    Comment:    Please note new reference range effective 05/22/2023.    Retic Hgb Equivalent 25.7      29.0 - 35.0 pg/cell Final                  TSH        Component Value Flag Ref Range Units Status    TSH 0.530      0.380 - 5.330 uIU/mL Final    Comment:    The 2011 American Thyroid Association (FREDRICK) guidelines  recommended that the interpretation of thyroid function in  pregnancy be based on trimester specific reference ranges.    1st Trimester  0.100-2.500 mIU/L  2nd Trimester  0.200-3.000 mIU/L  3rd Trimester  0.300-3.500 mIU/L    These established reference ranges have not been validated  at [employer].                    VITAMIN B12        Component Value Flag Ref Range Units Status    Vitamin B12 -True Cobalamin 1448      211 - 911 pg/mL Final                  POCT glucose device results        Component Value Flag Ref Range Units Status    POC Glucose, Blood 160      65 - 99 mg/dL Final                  FOLATE SERUM/PLASMA        Component Value Flag Ref Range Units Status    Folate -Folic Acid 12.8      >4.0 ng/mL Final                  POCT glucose device results        Component Value Flag Ref Range Units Status    POC Glucose, Blood 218      65 - 99 mg/dL Final                  POCT glucose device results        Component Value Flag Ref Range Units Status    POC Glucose, Blood 177      65 - 99 mg/dL Final                  POCT glucose device results        Component Value Flag Ref Range Units Status    POC Glucose, Blood 159      65 - 99 mg/dL Final                       ________________________________________________________________________     Current Assessment and Plan:    * Symptomatic anemia- (present on admission)  Assessment & Plan  Possibly syncope from acute on chronic anemia? Patient recently had negative ACS workup at outside facility  Hemoglobin found to be at 6.7 with no clear bleeding episodes.  1 PRBC transfused 01/22.  -Monitor H&H every 12 hourly.  -TSH, FT4, ferritin, folate, vitamin B12, iron panel, reticulocyte count, LDH, haptoglobin ordered.  -IV Protonix BP ID  -Consider GI consult if anemia persist.      Syncope  Assessment & Plan  Patient presented to the hospital with complaints of 2 episodes of syncope where she completely passed out.  Etiology unknown at this point.  - Orthostatics ordered.  - Echo ordered for cardiac pathology.  -MRI brain ordered.    Acute hypoxemic respiratory failure (HCC)  Assessment & Plan  Patient requiring 1 to 2 L oxygen via nasal cannula to maintain adequate saturation, not on oxygen at home.  Do not appreciate any rales on physical exam despite elevated BNP.  -Lasix 80 mg IV given 01/22.  -Continue oxygenation, keep O2 over 90%  - Echo ordered.    ACP (advance care planning)  Assessment & Plan  16 minutes spent discussing goals of care with patient.  She was explained her condition and treatment plan.  She was asked about CODE STATUS, she states that she is okay with medical treatment, however would not be okay with heroic measures, such as chest compressions and intubation.  Patient to be DNR/DNI.    Leukocytosis  Assessment & Plan  Improved  Noted to have white blood cell count 19 outside facility, 13 here at [facility] which gradually improved.  Given 1 dose of Levaquin for possible pneumonia on 01/19.  Procalcitonin within normal limits.  Currently on prednisone 5 mg daily.  Likely reactive  Serial CBC      Meningioma (HCC)  Assessment & Plan  CT head at outside facility showing 5 mm round focus of increased  attenuation along the tentorium on the right side abutting the superior aspect of the right side of the cerebellum.  Could represent benign meningioma, acute ICH cannot be excluded.  Reports that her daughter denied that patient struck her head while she syncopized.  -MRI brain with and without contrast ordered.    ESRD (end stage renal disease) on dialysis (HCC)  Assessment & Plan  Nephrology consulted to establish hemodialysis as an inpatient.  Appreciate nephrology assistance.  - Hemodialysis to be done on Tuesday, Thursday and Saturday.      Hyperlipidemia  Assessment & Plan  Continue lipitor    Hyperglycemia  Assessment & Plan  Sliding scale    Type 2 diabetes mellitus (HCC)- (present on admission)  Assessment & Plan  - Start Lantus 9 units daily.  - Continue sliding scale.  - Hypoglycemia protocol.          Code Status: (DNR/DNI)  Disposition:  not cleared for medical discharge.

## 2024-01-23 NOTE — ASSESSMENT & PLAN NOTE
Patient presented to the hospital with complaints of 2 episodes of syncope where she completely passed out.  Etiology unknown at this point.  - Orthostatics ordered.  - Echo demonstrating systolic heart failure as well as pulmonary hypertension.  -MRI brain ordered.

## 2024-01-23 NOTE — CONSULTS
Date of Consultation:  1/23/2024    Patient: : Savita Mcclain  MRN: [id number]    Referring Physician:  Dr. Laureen Rouse      Anemia      GI:Daylin Schmidt M.D.     Reason for Consultation: Anemia, dark stool x 1 yesterday    History of Present Illness:     The patient is a 63 years old female with medical history of arthritis, diabetes, depression, the GI scope and a colonoscopy in 2020 at [facility], presented to inpatient service this time for symptomatic anemia and syncope.    GI team is consulted for symptomatic anemia and possible GI bleeding.    GI team review her last endoscopies in the [facility] from 2020, as below, there is no significant pathology seen.  The patient is scheduled to have a follow-up colonoscopy this week at outpatient office in [place] which was canceled.    Patient denied NSAID, the patient use Tylenol for her ache.    Denied brisk bleeding, denies change of upper GI, denied frequency of bowel movement.      No bowel movement today.  1 bowel movement yesterday, which was dark brown.  Had a 1 unit RBC transfusion, good response based on the hemoglobin level.  Past Medical History:   Diagnosis Date    Diabetes     Psychiatric disorder     depression    Rheumatoid arthritis(714.0)          Past Surgical History:   Procedure Laterality Date    SD UPPER GI ENDOSCOPY,BIOPSY Left 6/10/2020    Procedure: GASTROSCOPY, WITH BIOPSY;  Surgeon: Tian Velazquez M.D.;  Location: SURGERY SAME DAY [facility];  Service: Gastroenterology    SD COLONOSCOPY,DIAGNOSTIC Left 6/10/2020    Procedure: COLONOSCOPY;  Surgeon: Tian Velazquez M.D.;  Location: SURGERY SAME DAY [facility];  Service: Gastroenterology    TOE AMPUTATION Left 2000    Amputation, Toe    OTHER ORTHOPEDIC SURGERY      amputation of left toes       No family history on file.    Social History     Socioeconomic History    Marital status:    Tobacco Use    Smoking status: Former     Current packs/day: 0.00      Average packs/day: 2.0 packs/day for 40.0 years (80.0 ttl pk-yrs)     Types: Cigarettes     Start date: 1980     Quit date: 2015     Years since quittin.5    Smokeless tobacco: Never   Vaping Use    Vaping Use: Never used   Substance and Sexual Activity    Alcohol use: Not Currently    Drug use: Never     Social Determinants of Health     Food Insecurity: Unknown (2020)    Hunger Vital Sign     Worried About Running Out of Food in the Last Year: Patient refused     Ran Out of Food in the Last Year: Patient refused   Transportation Needs: No Transportation Needs (2020)    PRAPARE - Transportation     Lack of Transportation (Medical): No     Lack of Transportation (Non-Medical): No     Constitutional: No fevers.  Eyes: No symptoms reported.   Ears/Nose/Throat/Mouth: No choking reported.  Cardiovascular: No chest pain reported.   Respiratory: Denies shortness of breath.  Gastrointestinal/Hepatic: Per H/P.   Skin/Breast: No new rash reported.   Psychiatric: No complaints    HEENT: grossly normal.  Cardiovascular: Please refer to primary team's daily assessment.   Lungs: Please refer to primary team's daily assessment.   Abdomen: No abd pain.   Skin: No erythema, No rash.  Neurologic: Alert & oriented x 3, Normal motor function, No focal deficits noted.  PSY: stable mood.         Physical Exam:  Vitals:    24 2120 24 0012 24 0434 24 0721   BP: 116/58 (!) 142/63 136/88 (!) 160/73   Pulse: (!) 50 66 63 (!) 59   Resp:  18 17 18   Temp:  36.4 °C (97.5 °F) 36.6 °C (97.8 °F) 36.6 °C (97.9 °F)   TempSrc:  Temporal Temporal Temporal   SpO2:  96% 95% 91%   Weight:   53.7 kg (118 lb 6.2 oz)    Height:                 Labs:  Recent Labs     24  1920 24  0706 24  2111   WBC 13.6* 9.1 9.0   RBC 2.34* 3.12* 2.93*   HEMOGLOBIN 6.7* 8.8* 8.3*   HEMATOCRIT 21.7* 28.5* 26.9*   MCV 92.7 91.3 91.8   MCH 28.6 28.2 28.3   MCHC 30.9* 30.9* 30.9*   RDW 54.6* 54.8* 54.9*    PLATELETCT 263 310 293   MPV 10.5 10.8 10.7     Recent Labs     01/21/24 1920   SODIUM 139   POTASSIUM 3.8   CHLORIDE 102   CO2 22   GLUCOSE 238*   BUN 31*           Recent Labs     01/21/24 1920   ASTSGOT 13   ALTSGPT 17   TBILIRUBIN 0.2   ALKPHOSPHAT 58   GLOBULIN 2.5         Imaging:  DX-CHEST-PORTABLE (1 VIEW)  Narrative: 1/21/2024 7:16 PM    HISTORY/REASON FOR EXAM:  Chest Pain.    TECHNIQUE/EXAM DESCRIPTION AND NUMBER OF VIEWS:  Single portable view of the chest.    COMPARISON: 1 view chest 2/12/2013    FINDINGS:    Right internal jugular catheter tip projects the cavoatrial junction.    LUNGS: There is minimal linear density both lung bases consistent with atelectasis or scar.    HEART and MEDIASTINUM: enlarged.    Pleura: There are no pleural effusion or pneumothoraces.    Osseous structures: No significant bony abnormality.  Impression: 1.  Minimal basilar atelectasis or parenchymal scarring    2.  Enlarged cardiac silhouette    3.  Right internal jugular catheter appears appropriately located    EGD [facility] 2020  PostOp Diagnosis:               EGD                          1/ normal esophagus, GEJ at 36 cm normal Z line                          2/normal stomach,antrum, pylorus, bx of antrum                          3/normal duodenum bulb second and third portion                                      Biopsied              Colonoscopy                          1/ sessile polyp 6 mm cold snare removed and retrieved completely                          2/ rectosigmoid ulcer 1 cm bx                           3/diverticulosis in the sigmoid                          4/otherwise normal colon with retroflexion        Impressions:  The patient is a 63 years old female with medical history of arthritis, diabetes, depression, the GI scope and a colonoscopy in 2020 at [facility], presented to inpatient service this time for symptomatic anemia and syncope.    GI team is consulted for symptomatic anemia and possible  GI bleeding.    Patient has hemoglobin around 7, iron panel was normal, no evidence of acute bleeding except for dark stool.    No evidence of brisk bleeding; slow rate GI bleeding is likely, such as AVM or erosion or hernia/Vaughn lesion.    GI team will suggest PPI twice daily for now; we will wait for cardiology and nephrology evaluation, if more bleeding is noted, we will offer inpatient endoscopies; otherwise, the patient can consider outpatient endoscopy follow-up as well.    We will see the patient again tomorrow morning to decide the endoscopy plan with the patient..       This note was generated using voice recognition software which has a small chance of producing errors of grammar and possibly content. I have made every reasonable attempt to find and correct any obvious errors, but expect that some may not be found prior to finalization of this note.

## 2024-01-23 NOTE — CARE PLAN
The patient is Stable - Low risk of patient condition declining or worsening    Shift Goals  Clinical Goals: Monitor VS, monitor oxygenation  Patient Goals: Get MRI  Family Goals: MARTIN    Progress made toward(s) clinical / shift goals:    Problem: Knowledge Deficit - Standard  Goal: Patient and family/care givers will demonstrate understanding of plan of care, disease process/condition, diagnostic tests and medications  Outcome: Progressing     Problem: Fall Risk  Goal: Patient will remain free from falls  Outcome: Progressing     Problem: Skin Integrity  Goal: Skin integrity is maintained or improved  Outcome: Progressing       Patient is not progressing towards the following goals:

## 2024-01-23 NOTE — HOSPITAL COURSE
Savita Mcclain is a 63 y.o. female with past medical history of Diabetes, Psychiatric disorder, end-stage renal disease on hemodialysis 4 times per week, and Rheumatoid arthritis who presented 1/21/2024 with complaints of chest pain, shortness of breath and 2 episodes of syncope.  She stated that since she last had COVID a month ago, she has been having intermittent shortness of breath/chest pain mostly on ambulation and gets better with rest. She was ambulating  a day prior to presentation to the hospital when she experienced 2 episodes of syncope.  She states that her daughter witnessed her syncope and stated that it lasted for a few seconds, no head strike noted.  She was initially taken to an outside facility where she was initially hypotensive, she was given 250 mL bolus, 100 mg of hydrocortisone, and given Levaquin 250 mg IV x 1 for possible pneumonia. CT head was performed which demonstrated 5 mm round focus of increased attenuation along the tentorium on the right side abutting the superior aspect of the right side of the cerebellum. Could represent benign meningioma, acute ICH cannot be excluded.COVID flu RSV negative. Labs significant for potassium 2.8, creatinine 2.5 Troponin 1.340, BNP 32 900 Procalcitonin 0.272, WBC 19.0 Hgb 7.5    EKG demonstrated normal sinus rhythm with left bundle branch block without any evidence of acute ischemic changes. Her was hemoglobin found to be at 6.7 with no clear bleeding episodes.  1 PRBC transfused 01/22.  H&H was initially monitored every 12 hourly that gradually stabilized.  She was hypoxic and required 2 L of oxygen to maintain adequate oxygenation.  Patient was started on IV Protonix twice daily for possible GI bleed and FOBT was done which was positive therefore GI was consulted.  She was closely monitored for any active GI bleed.  GI recommended outpatient colonoscopy/EGD since there was no episode of active GI bleeding and her hemoglobin levels  improved.  GI advised to continue PPI twice daily.  Echocardiogram demonstrated low normal left ventricular systolic function 50.5%, Grade II diastolic dysfunction. Moderately dilated left atrium. Mild/moderate mitral regurgitation. Right heart pressures consistent with mild pulmonary hypertension.  Oxygen was gradually weaned off and she maintained adequate saturations at rest as well as on ambulation on room air.  MRI brain with and without contrast ordered to further evaluate meningioma/intracranial hemorrhage and syncope however that will be done as an outpatient.  Nephrology was consulted and patient underwent Hemodialysis 01/23/2024.

## 2024-01-23 NOTE — THERAPY
Physical Therapy   Initial Evaluation     Patient Name: Savita Mcclain  Age:  63 y.o., Sex:  female  Medical Record #: [id number]  Today's Date: 1/23/2024          Assessment  Patient is 63 y.o. female that presented to acute with complaints of SOB, chest pain, and syncope. PMHx significant for DM, psych disorder, ESRD on HD, RA. She presented to PT at or near baseline functional mobility and mobilized as detailed below. She reported no concerns regarding mobility or returning home following medical DC. Patient will not be actively followed for physical therapy services at this time, however may be seen if requested by physician for 1 more visit within 30 days to address any discharge or equipment needs.    Plan    Physical Therapy Initial Treatment Plan   Duration: Evaluation only    DC Equipment Recommendations: None  Discharge Recommendations: Anticipate that the patient will have no further physical therapy needs after discharge from the hospital       Subjective    RN cleared patient for therapy, received in bed, agreeable     Objective       01/23/24 0849   Charge Group   PT Evaluation PT Evaluation Mod   Total Time Spent   PT Total Time Yes   PT Evaluation Time Spent (Mins) 14   Initial Contact Note    Initial Contact Note Order Received and Verified, Evaluation Only - Patient Does Not Require Further Acute Physical Therapy at this Time.  However, May Benefit from Post Acute Therapy for Higher Level Functional Deficits.   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 Adult son also lives there, is able to assist if needed. They have several pieces of DME from patient's late mother.   Prior Level of Functional Mobility   Bed Mobility Independent   Transfer Status Independent   Ambulation Independent   Ambulation Distance community   Assistive Devices Used None   Stairs Independent   Cognition    Cognition / Consciousness WDL   Comments pleasant, cooperative   Active ROM Upper Body   Comments patient moved BUE functionally during session   Active ROM Lower Body    Comments WFL for mobility   Strength Lower Body   Comments WFL for mobility   Coordination Upper Body   Coordination WDL   Coordination Lower Body    Coordination Lower Body  WDL   Balance Assessment   Sitting Balance (Static) Good   Sitting Balance (Dynamic) Good   Standing Balance (Static) Good   Standing Balance (Dynamic) Good   Weight Shift Sitting Good   Weight Shift Standing Good   Comments no AD, no LOB   Bed Mobility    Supine to Sit Supervised   Sit to Supine Supervised   Scooting Supervised   Gait Analysis   Gait Level Of Assist Supervised   Assistive Device None   Distance (Feet) 150   # of Times Distance was Traveled 1   Deviation No deviation   # of Stairs Climbed 0  (but appears capable, reported no concerns)   Weight Bearing Status no restrictions   Vision Deficits Impacting Mobility NT   Comments pulled O2 tank   Functional Mobility   Sit to Stand Supervised   Bed, Chair, Wheelchair Transfer Supervised   Toilet Transfers Supervised   Transfer Method Stand Step   How much difficulty does the patient currently have...   Turning over in bed (including adjusting bedclothes, sheets and blankets)? 4   Sitting down on and standing up from a chair with arms (e.g., wheelchair, bedside commode, etc.) 4   Moving from lying on back to sitting on the side of the bed? 4   How much help from another person does the patient currently  need...   Moving to and from a bed to a chair (including a wheelchair)? 4   Need to walk in a hospital room? 4   Climbing 3-5 steps with a railing? 4   6 clicks Mobility Score 24   Education Group   Education Provided Role of Physical Therapist   Role of Physical Therapist Patient Response Patient;Acceptance;Explanation;Verbal Demonstration   Physical Therapy Initial Treatment Plan    Duration Evaluation only   Anticipated Discharge Equipment and Recommendations   DC Equipment Recommendations None   Discharge Recommendations Anticipate that the patient will have no further physical therapy needs after discharge from the hospital   Interdisciplinary Plan of Care Collaboration   IDT Collaboration with  Nursing   Patient Position at End of Therapy In Bed;Call Light within Reach;Phone within Reach  (RN at bedside)   Collaboration Comments RN aware of visit, response, DC recs   Session Information   Date / Session Number  1/23 - eval only

## 2024-01-23 NOTE — CARE PLAN
The patient is Stable - Low risk of patient condition declining or worsening    Shift Goals  Clinical Goals: Dialysis, echo, MRI, monitor labs/VS, safety, PT/OT eval  Patient Goals: To get tests completed  Family Goals: NA    Progress made toward(s) clinical / shift goals:      Problem: Knowledge Deficit - Standard  Goal: Patient and family/care givers will demonstrate understanding of plan of care, disease process/condition, diagnostic tests and medications  Description: Target End Date:  1-3 days or as soon as patient condition allows    Document in Patient Education    1.  Patient and family/caregiver oriented to unit, equipment, visitation policy and means for communicating concern  2.  Complete/review Learning Assessment  3.  Assess knowledge level of disease process/condition, treatment plan, diagnostic tests and medications  4.  Explain disease process/condition, treatment plan, diagnostic tests and medications  Outcome: Progressing     Problem: Pain - Standard  Goal: Alleviation of pain or a reduction in pain to the patient’s comfort goal  Description: Target End Date:  Prior to discharge or change in level of care    Document on Vitals flowsheet    1.  Document pain using the appropriate pain scale per order or unit policy  2.  Educate and implement non-pharmacologic comfort measures (i.e. relaxation, distraction, massage, cold/heat therapy, etc.)  3.  Pain management medications as ordered  4.  Reassess pain after pain med administration per policy  5.  If opiods administered assess patient's response to pain medication is appropriate per POSS sedation scale  6.  Follow pain management plan developed in collaboration with patient and interdisciplinary team (including palliative care or pain specialists if applicable)  Outcome: Progressing     Problem: Fall Risk  Goal: Patient will remain free from falls  Description: Target End Date:  Prior to discharge or change in level of care    Document interventions  on the Liang Nate Fall Risk Assessment    1.  Assess for fall risk factors  2.  Implement fall precautions  Outcome: Progressing     Problem: Skin Integrity  Goal: Skin integrity is maintained or improved  Description: Target End Date:  Prior to discharge or change in level of care    Document interventions on Skin Risk/Miguel A flowsheet groups and corresponding LDA    1.  Assess and monitor skin integrity, appearance and/or temperature  2.  Assess risk factors for impaired skin integrity and/or pressures ulcers  3.  Implement precautions to protect skin integrity in collaboration with interdisciplinary team  4.  Implement pressure ulcer prevention protocol if at risk for skin breakdown  5.  Confirm wound care consult if at risk for skin breakdown  6.  Ensure patient use of pressure relieving devices  (Low air loss bed, waffle overlay, heel protectors, ROHO cushion, etc)  Outcome: Progressing       Patient is not progressing towards the following goals: NA

## 2024-01-23 NOTE — ASSESSMENT & PLAN NOTE
Recent echo 01/2020 demonstrating: Right ventricular systolic pressure is estimated to be  40 mmHg.   Right heart pressures are consistent with mild pulmonary hypertension.  -Continue diuresis.

## 2024-01-23 NOTE — PROGRESS NOTES
"[facility] -  PROGRESS NOTE               Author: Froy Cintron D.O. Date & Time: 1/23/2024  10:35 AM     HPI:  [id number]  F w ESRD on Home dialysis (3-4x a week, 2hr 15min tx), HTN, presented with weakness and syncopal episode. She states it occurred at home. She reports feeling very weak when she needs to move. She went to an outside ER for evaluation and given levaquin for possible PNA. COVID, RSV were NEGATIVE. Pt was found to have hgb of 7.5 there, but repeat was 6.7 at [facility]. Pt is s/p transfusion and feels better. She states she lives in [place], [place] and her home dialysis is managed by [facility]. She denies any recent prescription changes for her treatments.      No F/C/N/V/CP. + weakness, + dyspnea on exertion. No melena, hematochezia, hematemesis.  No HA, visual changes, or abdominal pain.    DAILY NEPHROLOGY SUMMARY:  1/22: consult done  1/23: pt seen on HD and tolerating. GI evaluated pt and is monitoring for now    REVIEW OF SYSTEMS:    10 point ROS reviewed and is as per HPI/daily summary or otherwise negative    PMH/PSH/SH/FH: Reviewed and unchanged since admission note    CURRENT MEDICATIONS:   Scheduled Medications   Medication Dose Frequency    epoetin  3,000 Units TUE+THU+SAT    epoetin mara       atorvastatin  10 mg Q EVENING    calcium acetate  667 mg 3x/week    escitalopram  20 mg DAILY    lisinopril  2.5 mg BID    predniSONE  5 mg DAILY    tizanidine  4 mg Q EVENING    insulin lispro  1-6 Units 4X/DAY ACHS    pantoprazole  40 mg BID    insulin GLARGINE  9 Units Q EVENING    senna-docusate  2 Tablet BID       VS:  BP (!) 160/73   Pulse (!) 59   Temp 36.6 °C (97.9 °F) (Temporal)   Resp 18   Ht 1.626 m (5' 4\")   Wt 53.7 kg (118 lb 6.2 oz)   SpO2 91%   BMI 20.32 kg/m²     Exam  General: Awake, no acute distress  HEENT: head normocephalic, atraumatic. Moist mucous membranes  Neck: neck supple, no visible masses  Respiratory: clear to auscultation bilaterally, no rales, " no ronchi, or wheezing  Cardiac: normal rate, normal rhythm,  Abdomen: non tender, non-distended, no guarding  Musculoskelatal: no joint tenderness, moves limbs spontaneously  Extremities: no significant joint abnormalities, no edema  Skin: no lesions, no rashes noted     Access: R chest permcath    Fluids:  In: 462 [P.O.:462]  Out: 1750     LABS:  Recent Labs     01/21/24  1920 01/23/24  0930   SODIUM 139 140   POTASSIUM 3.8 3.9   CHLORIDE 102 104   CO2 22 26   GLUCOSE 238* 143*   BUN 31* 40*   CREATININE 2.48* 3.01*   CALCIUM 7.9* 8.6       IMAGING:  - Imaging studies reviewed by me    ASSESSMENTS:    -ESRD ([facility], in [place], [place])    Does home dialysis 3-4x a week ([id number])    R chest permcath  - Acute on Chronic Anemia    S/p transfusion on 1/22    Ferritin 928, sat 41%  -Hypertension     Continue current medications  -Hypervolemia     Volume off with dialysis as BP tolerates     Pt reports making robust urine  -CKD-MBD    , phos 2.1     PLAN:                - continue TTS schedule w longer treatments while inpt (pt is home HD)  - Volume off with dialysis as BP tolerates  - Continue usual anti-hypertensive medications  - cont EPO 3000U with HD to goal Hgb 10-11  - Transfuse PRN hgb < 7  - phos level low, will hold calcium acetate/phos binder  - No dietary protein restrictions, use renal diet     Goal: 1.5g Protein/kg/day  - Follow labs with further recommendations to follow     Please page Nephrology with any questions or concerns

## 2024-01-23 NOTE — PROGRESS NOTES
Monitor Summary:  Rhythm: Sinus rhythm/bradycardia  Rate: 56-62 bpm  Ectopy: PVC    .15/.10/.46

## 2024-01-24 ENCOUNTER — PHARMACY VISIT (OUTPATIENT)
Dept: PHARMACY | Facility: MEDICAL CENTER | Age: 64
End: 2024-01-24
Payer: COMMERCIAL

## 2024-01-24 VITALS
BODY MASS INDEX: 20.21 KG/M2 | HEIGHT: 64 IN | SYSTOLIC BLOOD PRESSURE: 150 MMHG | OXYGEN SATURATION: 93 % | RESPIRATION RATE: 16 BRPM | HEART RATE: 71 BPM | TEMPERATURE: 97.9 F | DIASTOLIC BLOOD PRESSURE: 69 MMHG | WEIGHT: 118.39 LBS

## 2024-01-24 LAB
1,25(OH)2D3 SERPL-MCNC: 41.9 PG/ML (ref 19.9–79.3)
ALBUMIN SERPL BCP-MCNC: 3.6 G/DL (ref 3.2–4.9)
BASOPHILS # BLD AUTO: 0.4 % (ref 0–1.8)
BASOPHILS # BLD: 0.04 K/UL (ref 0–0.12)
BUN SERPL-MCNC: 28 MG/DL (ref 8–22)
CALCIUM ALBUM COR SERPL-MCNC: 9 MG/DL (ref 8.5–10.5)
CALCIUM SERPL-MCNC: 8.7 MG/DL (ref 8.5–10.5)
CHLORIDE SERPL-SCNC: 97 MMOL/L (ref 96–112)
CO2 SERPL-SCNC: 28 MMOL/L (ref 20–33)
CREAT SERPL-MCNC: 2.17 MG/DL (ref 0.5–1.4)
EOSINOPHIL # BLD AUTO: 0.26 K/UL (ref 0–0.51)
EOSINOPHIL NFR BLD: 2.8 % (ref 0–6.9)
ERYTHROCYTE [DISTWIDTH] IN BLOOD BY AUTOMATED COUNT: 53.4 FL (ref 35.9–50)
GFR SERPLBLD CREATININE-BSD FMLA CKD-EPI: 25 ML/MIN/1.73 M 2
GLUCOSE BLD STRIP.AUTO-MCNC: 178 MG/DL (ref 65–99)
GLUCOSE SERPL-MCNC: 184 MG/DL (ref 65–99)
HAPTOGLOB SERPL-MCNC: 320 MG/DL (ref 30–200)
HCT VFR BLD AUTO: 35.4 % (ref 37–47)
HGB BLD-MCNC: 10.9 G/DL (ref 12–16)
IMM GRANULOCYTES # BLD AUTO: 0.08 K/UL (ref 0–0.11)
IMM GRANULOCYTES NFR BLD AUTO: 0.9 % (ref 0–0.9)
LYMPHOCYTES # BLD AUTO: 1.49 K/UL (ref 1–4.8)
LYMPHOCYTES NFR BLD: 16.3 % (ref 22–41)
MCH RBC QN AUTO: 28.1 PG (ref 27–33)
MCHC RBC AUTO-ENTMCNC: 30.8 G/DL (ref 32.2–35.5)
MCV RBC AUTO: 91.2 FL (ref 81.4–97.8)
MONOCYTES # BLD AUTO: 0.62 K/UL (ref 0–0.85)
MONOCYTES NFR BLD AUTO: 6.8 % (ref 0–13.4)
NEUTROPHILS # BLD AUTO: 6.67 K/UL (ref 1.82–7.42)
NEUTROPHILS NFR BLD: 72.8 % (ref 44–72)
NRBC # BLD AUTO: 0 K/UL
NRBC BLD-RTO: 0 /100 WBC (ref 0–0.2)
PHOSPHATE SERPL-MCNC: 2 MG/DL (ref 2.5–4.5)
PLATELET # BLD AUTO: 342 K/UL (ref 164–446)
PMV BLD AUTO: 10.6 FL (ref 9–12.9)
POTASSIUM SERPL-SCNC: 3.9 MMOL/L (ref 3.6–5.5)
RBC # BLD AUTO: 3.88 M/UL (ref 4.2–5.4)
SODIUM SERPL-SCNC: 134 MMOL/L (ref 135–145)
WBC # BLD AUTO: 9.2 K/UL (ref 4.8–10.8)

## 2024-01-24 PROCEDURE — 700102 HCHG RX REV CODE 250 W/ 637 OVERRIDE(OP)

## 2024-01-24 PROCEDURE — 85025 COMPLETE CBC W/AUTO DIFF WBC: CPT

## 2024-01-24 PROCEDURE — A9270 NON-COVERED ITEM OR SERVICE: HCPCS | Performed by: STUDENT IN AN ORGANIZED HEALTH CARE EDUCATION/TRAINING PROGRAM

## 2024-01-24 PROCEDURE — 99239 HOSP IP/OBS DSCHRG MGMT >30: CPT | Mod: GC | Performed by: INTERNAL MEDICINE

## 2024-01-24 PROCEDURE — 700111 HCHG RX REV CODE 636 W/ 250 OVERRIDE (IP)

## 2024-01-24 PROCEDURE — C9113 INJ PANTOPRAZOLE SODIUM, VIA: HCPCS

## 2024-01-24 PROCEDURE — 82962 GLUCOSE BLOOD TEST: CPT

## 2024-01-24 PROCEDURE — 700111 HCHG RX REV CODE 636 W/ 250 OVERRIDE (IP): Performed by: STUDENT IN AN ORGANIZED HEALTH CARE EDUCATION/TRAINING PROGRAM

## 2024-01-24 PROCEDURE — RXMED WILLOW AMBULATORY MEDICATION CHARGE

## 2024-01-24 PROCEDURE — 80069 RENAL FUNCTION PANEL: CPT

## 2024-01-24 PROCEDURE — A9270 NON-COVERED ITEM OR SERVICE: HCPCS

## 2024-01-24 PROCEDURE — 99232 SBSQ HOSP IP/OBS MODERATE 35: CPT | Performed by: NURSE PRACTITIONER

## 2024-01-24 PROCEDURE — 36415 COLL VENOUS BLD VENIPUNCTURE: CPT

## 2024-01-24 PROCEDURE — 700102 HCHG RX REV CODE 250 W/ 637 OVERRIDE(OP): Performed by: STUDENT IN AN ORGANIZED HEALTH CARE EDUCATION/TRAINING PROGRAM

## 2024-01-24 RX ORDER — PANTOPRAZOLE SODIUM 40 MG/1
40 TABLET, DELAYED RELEASE ORAL 2 TIMES DAILY
Qty: 60 TABLET | Refills: 0 | Status: SHIPPED | OUTPATIENT
Start: 2024-01-24

## 2024-01-24 RX ADMIN — ESCITALOPRAM OXALATE 20 MG: 10 TABLET ORAL at 04:49

## 2024-01-24 RX ADMIN — FUROSEMIDE 40 MG: 40 TABLET ORAL at 04:55

## 2024-01-24 RX ADMIN — PANTOPRAZOLE SODIUM 40 MG: 40 INJECTION, POWDER, FOR SOLUTION INTRAVENOUS at 04:53

## 2024-01-24 RX ADMIN — PREDNISONE 5 MG: 5 TABLET ORAL at 04:54

## 2024-01-24 RX ADMIN — LISINOPRIL 2.5 MG: 5 TABLET ORAL at 04:54

## 2024-01-24 RX ADMIN — HYDROCODONE BITARTRATE AND ACETAMINOPHEN 1 TABLET: 5; 325 TABLET ORAL at 08:29

## 2024-01-24 ASSESSMENT — ENCOUNTER SYMPTOMS
CONSTIPATION: 0
SORE THROAT: 0
FALLS: 0
DIARRHEA: 0
NAUSEA: 0
CHILLS: 0
SHORTNESS OF BREATH: 0
NERVOUS/ANXIOUS: 0
ABDOMINAL PAIN: 0
FLANK PAIN: 0
INSOMNIA: 0
FEVER: 0
MYALGIAS: 0
WEAKNESS: 0
BLOOD IN STOOL: 0
COUGH: 0
VOMITING: 0
ROS GI COMMENTS: + DARK STOOL
HEADACHES: 0

## 2024-01-24 ASSESSMENT — PAIN DESCRIPTION - PAIN TYPE: TYPE: CHRONIC PAIN

## 2024-01-24 NOTE — PROGRESS NOTES
"[facility] -  PROGRESS NOTE               Author: Kai Schwarz N.P. Date & Time: 1/24/2024  10:41 AM     HPI:  63Y  F w ESRD on Home dialysis (3-4x a week, 2hr 15min tx), HTN, presented with weakness and syncopal episode. She states it occurred at home. She reports feeling very weak when she needs to move. She went to an outside ER for evaluation and given levaquin for possible PNA. COVID, RSV were NEGATIVE. Pt was found to have hgb of 7.5 there, but repeat was 6.7 at [facility]. Pt is s/p transfusion and feels better. She states she lives in [place], [place] and her home dialysis is managed by [facility]. She denies any recent prescription changes for her treatments.      No F/C/N/V/CP. + weakness, + dyspnea on exertion. No melena, hematochezia, hematemesis.  No HA, visual changes, or abdominal pain.    DAILY NEPHROLOGY SUMMARY:  1/22: consult done  1/23: pt seen on HD and tolerating. GI evaluated pt and is monitoring for now  1/24: iHD yesterday 1L net UF tolerated well, denies CP,SOB. States she was told that she was going home today.     REVIEW OF SYSTEMS:    10 point ROS reviewed and is as per HPI/daily summary or otherwise negative    PMH/PSH/SH/FH: Reviewed and unchanged since admission note    CURRENT MEDICATIONS:   Scheduled Medications   Medication Dose Frequency    epoetin  3,000 Units TUE+THU+SAT    influenza vaccine quad  0.5 mL Once    furosemide  40 mg Q DAY    atorvastatin  10 mg Q EVENING    [Held by provider] calcium acetate  667 mg 3x/week    escitalopram  20 mg DAILY    lisinopril  2.5 mg BID    predniSONE  5 mg DAILY    tizanidine  4 mg Q EVENING    insulin lispro  1-6 Units 4X/DAY ACHS    pantoprazole  40 mg BID    insulin GLARGINE  9 Units Q EVENING    senna-docusate  2 Tablet BID       VS:  BP (!) 144/67   Pulse 68   Temp 36.4 °C (97.5 °F) (Temporal)   Resp 16   Ht 1.626 m (5' 4\")   Wt 53.7 kg (118 lb 6.2 oz)   SpO2 90%   BMI 20.32 kg/m²     Exam  General: " Awake, no acute distress  HEENT: head normocephalic, atraumatic. Moist mucous membranes  Neck: neck supple, no visible masses  Respiratory: clear to auscultation bilaterally, no rales, no ronchi, or wheezing  Cardiac: normal rate, normal rhythm,  Abdomen: non tender, non-distended, no guarding  Musculoskelatal: no joint tenderness, moves limbs spontaneously  Extremities: no significant joint abnormalities, no edema  Skin: no lesions, no rashes noted     Access: R chest permcath    Fluids:  In: 1460 [P.O.:960; Dialysis:500]  Out: 1500     LABS:  Recent Labs     01/21/24  1920 01/23/24  0930 01/24/24  0146   SODIUM 139 140 134*   POTASSIUM 3.8 3.9 3.9   CHLORIDE 102 104 97   CO2 22 26 28   GLUCOSE 238* 143* 184*   BUN 31* 40* 28*   CREATININE 2.48* 3.01* 2.17*   CALCIUM 7.9* 8.6 8.7         IMAGING:  - Imaging studies reviewed by me    ASSESSMENTS:    -ESRD ([facility], in [place], [place])    Does home dialysis 3-4x a week ([id number])    R chest permcath  - Acute on Chronic Anemia    S/p transfusion on 1/22    Ferritin 928, sat 41%    Hgb at goal   -Hypertension     Continue current medications  -Hypervolemia     Volume off with dialysis as BP tolerates     Pt reports making robust urine but 0ml in 24hrs.   - Hyponatremia     Fluid restrictio   -CKD-MBD    , phos 2.1     PLAN:                - No iHD today (Weds)   - continue TTS schedule w longer treatments while inpt (pt is home HD)  - Volume off with dialysis as BP tolerates  - Continue usual anti-hypertensive medications  - cont EPO 3000U with HD to goal Hgb 10-11  - Transfuse PRN hgb < 7  - phos level low, will hold calcium acetate/phos binder  - No dietary protein restrictions, use renal diet     Goal: 1.5g Protein/kg/day  - Fluid restriction 1.5L   - Follow labs with further recommendations to follow     Please page Nephrology with any questions or concerns

## 2024-01-24 NOTE — PROGRESS NOTES
Pt remains hemodynamically stable and on 2L NC.  No tele strip collected for this shift.  Pt denies chest pain and palpitations.  H/H continues to improve at this time.  Noted drop in her Na at 134: Pt is ESRD c iHD on T, T, Sat.  Pt refused HS sliding scale insulin c BG of 173mg/dL.

## 2024-01-24 NOTE — PROGRESS NOTES
Gastroenterology Progress Note               Author:  NALDO Fu Date & Time Created: 1/24/2024 10:52 AM       Patient ID:  Name:             Savita Mcclain  YOB: 1960  Age:                 63 y.o.  female  MRN:               [id number]        Medical Decision Making, by Problem:  Active Hospital Problems    Diagnosis     Meningioma (HCC) [D32.9]     Leukocytosis [D72.829]     ACP (advance care planning) [Z71.89]     Acute hypoxemic respiratory failure (HCC) [J96.01]     Syncope [R55]     Hyperglycemia [R73.9]     Hyperlipidemia [E78.5]     Symptomatic anemia [D64.9]     ESRD (end stage renal disease) on dialysis (HCC) [N18.6, Z99.2]     Type 2 diabetes mellitus (HCC) [E11.9]     Diastolic heart failure (HCC) [I50.30]     Pulmonary hypertension (HCC) [I27.20]            Presenting Chief Complaint:  Anemia, dark stool x 1 yesterday     History of Present Illness:      The patient is a 63 years old female with medical history of arthritis, diabetes, depression, the GI scope and a colonoscopy in 2020 at [facility], presented to inpatient service this time for symptomatic anemia and syncope.     GI team is consulted for symptomatic anemia and possible GI bleeding.     GI team review her last endoscopies in the [facility] from 2020, as below, there is no significant pathology seen.  The patient is scheduled to have a follow-up colonoscopy this week at outpatient office in [place] which was canceled.     Patient denied NSAID, the patient use Tylenol for her ache.     Denied brisk bleeding, denies change of upper GI, denied frequency of bowel movement.         Interval History:  1/24/2024: Patient seen at bedside.  She reports having solid/formed dark stool last night/this morning.  She reports receiving iron infusions with dialysis.  Hemoglobin uptrending 8.3-9.4-10.9.  BUN decreasing 40-28, in the setting of CKD.  Patient reports feeling well without nausea, vomiting,  abdominal pain.  Tolerating oral intake without difficulty.        Hospital Medications:  Current Facility-Administered Medications   Medication Dose Frequency Provider Last Rate Last Admin    epoetin mara (Epogen/Procrit) injection 3,000 Units  3,000 Units TUE+THU+SAT Froy Cintron D.O.   3,000 Units at 01/23/24 1159    heparin injection 4,100 Units  4,100 Units ACUTE DIALYSIS PRN Froy Cintron D.O.   4,100 Units at 01/23/24 1237    influenza vaccine quad (Fluzone/Fluarix) injection 0.5 mL  0.5 mL Once Guillaume RUDY [email or web address]        furosemide (Lasix) tablet 40 mg  40 mg Q DAY Mariejoseph Leiva M.D.   40 mg at 01/24/24 0455    albuterol inhaler 2 Puff  2 Puff Q4HRS PRN Ruiz Paul M.D.        atorvastatin (Lipitor) tablet 10 mg  10 mg Q EVENING Ruiz Paul M.D.   10 mg at 01/23/24 1731    [Held by provider] calcium acetate (Phos-Lo) 667 MG tablet 667 mg  667 mg 3x/week Ruiz Paul M.D.   667 mg at 01/22/24 0547    escitalopram (Lexapro) tablet 20 mg  20 mg DAILY Ruiz Paul M.D.   20 mg at 01/24/24 0449    HYDROcodone-acetaminophen (Norco) 5-325 MG per tablet 1 Tablet  1 Tablet Q6HRS PRN Ruiz Paul M.D.   1 Tablet at 01/24/24 0829    lisinopril (Prinivil) tablet 2.5 mg  2.5 mg BID Ruiz Paul M.D.   2.5 mg at 01/24/24 0454    predniSONE (Deltasone) tablet 5 mg  5 mg DAILY Ruiz Paul M.D.   5 mg at 01/24/24 0454    tizanidine (Zanaflex) tablet 4 mg  4 mg Q EVENING Ruiz Paul M.D.   4 mg at 01/23/24 1731    insulin lispro (HumaLOG,AdmeLOG) injection  1-6 Units 4X/DAY ACHS Ruiz Paul M.D.   1 Units at 01/23/24 0858    And    dextrose 10 % BOLUS 25 g  25 g Q15 MIN PRN Ruiz Paul M.D.        pantoprazole (Protonix) injection 40 mg  40 mg BID Guillaume CALIXTO Bernstein D.O.   40 mg at 01/24/24 0453    insulin GLARGINE (Lantus,Semglee) injection  9 Units Q EVENING Frank Leiva M.D.   9 Units at 01/23/24 1827    senna-docusate (Pericolace Or Senokot S) 8.6-50 MG per  "tablet 2 Tablet  2 Tablet BID Frank Leiva M.D.   2 Tablet at 01/23/24 1731    And    polyethylene glycol/lytes (Miralax) Packet 1 Packet  1 Packet QDAY PRN Frank Leiva M.D.        And    magnesium hydroxide (Milk Of Magnesia) suspension 30 mL  30 mL QDAY PRN Frank Leiva M.D.       Last reviewed on 1/21/2024 11:38 PM by Ben Maya       Review of Systems:  Review of Systems   Constitutional:  Negative for chills, fever and malaise/fatigue.   HENT:  Negative for congestion and sore throat.    Respiratory:  Negative for cough and shortness of breath.    Cardiovascular:  Negative for chest pain and leg swelling.   Gastrointestinal:  Negative for abdominal pain, blood in stool, constipation, diarrhea, nausea and vomiting.        + Dark stool   Genitourinary:  Negative for dysuria and flank pain.   Musculoskeletal:  Negative for falls and myalgias.   Neurological:  Negative for weakness and headaches.   Psychiatric/Behavioral:  The patient is not nervous/anxious and does not have insomnia.    All other systems reviewed and are negative.        Vital signs:  Weight/BMI: Body mass index is 20.32 kg/m².  BP (!) 144/67   Pulse 68   Temp 36.4 °C (97.5 °F) (Temporal)   Resp 16   Ht 1.626 m (5' 4\")   Wt 53.7 kg (118 lb 6.2 oz)   SpO2 90%   Vitals:    01/24/24 0400 01/24/24 0812 01/24/24 0826 01/24/24 0832   BP: (!) 148/71   (!) 144/67   Pulse: 66   68   Resp: 17   16   Temp: 36.8 °C (98.2 °F)   36.4 °C (97.5 °F)   TempSrc: Temporal   Temporal   SpO2: 97% 94% 90% 90%   Weight:       Height:         Oxygen Therapy:  Pulse Oximetry: 90 %, O2 (LPM): 0, O2 Delivery Device: None - Room Air    Intake/Output Summary (Last 24 hours) at 1/24/2024 1052  Last data filed at 1/24/2024 0400  Gross per 24 hour   Intake 1340 ml   Output 1500 ml   Net -160 ml         Physical Exam:  Physical Exam  Vitals and nursing note reviewed.   Constitutional:       General: She is awake. She is not in acute distress.     Appearance: " She is ill-appearing (Chronically ill-appearing).   HENT:      Head: Normocephalic.      Nose: Nose normal. No congestion.      Mouth/Throat:      Mouth: Mucous membranes are moist.      Pharynx: Oropharynx is clear.   Eyes:      General: No scleral icterus.     Extraocular Movements: Extraocular movements intact.      Conjunctiva/sclera: Conjunctivae normal.   Cardiovascular:      Rate and Rhythm: Normal rate and regular rhythm.      Pulses: Normal pulses.      Heart sounds: Normal heart sounds. No murmur heard.  Pulmonary:      Effort: Pulmonary effort is normal. No respiratory distress.   Abdominal:      General: Abdomen is flat. Bowel sounds are normal. There is no distension.      Palpations: Abdomen is soft.      Tenderness: There is no abdominal tenderness. There is no guarding.   Musculoskeletal:      Right lower leg: No edema.      Left lower leg: No edema.   Skin:     General: Skin is warm and dry.      Capillary Refill: Capillary refill takes less than 2 seconds.      Coloration: Skin is not jaundiced.   Neurological:      General: No focal deficit present.      Mental Status: She is alert and oriented to person, place, and time. Mental status is at baseline.   Psychiatric:         Mood and Affect: Mood normal.         Behavior: Behavior normal. Behavior is cooperative.             Labs:  Recent Labs     01/21/24 1920 01/23/24  0930 01/24/24  0146   SODIUM 139 140 134*   POTASSIUM 3.8 3.9 3.9   CHLORIDE 102 104 97   CO2 22 26 28   BUN 31* 40* 28*   CREATININE 2.48* 3.01* 2.17*   MAGNESIUM  --  2.0  --    PHOSPHORUS  --  2.1* 2.0*   CALCIUM 7.9* 8.6 8.7     Recent Labs     01/21/24 1920 01/23/24  0930 01/24/24  0146   ALTSGPT 17 21  --    ASTSGOT 13 10*  --    ALKPHOSPHAT 58 61  --    TBILIRUBIN 0.2 0.3  --    GLUCOSE 238* 143* 184*     Recent Labs     01/21/24 1920 01/22/24  0706 01/22/24 2111 01/23/24  0930 01/24/24  0146   WBC 13.6* 9.1 9.0 10.8 9.2   NEUTSPOLYS 94.30* 83.60*  --  85.50* 72.80*    LYMPHOCYTES 2.90* 9.10*  --  7.50* 16.30*   MONOCYTES 2.10 5.90  --  4.10 6.80   EOSINOPHILS 0.10 0.80  --  1.90 2.80   BASOPHILS 0.20 0.10  --  0.40 0.40   ASTSGOT 13  --   --  10*  --    ALTSGPT 17  --   --  21  --    ALKPHOSPHAT 58  --   --  61  --    TBILIRUBIN 0.2  --   --  0.3  --      Recent Labs     01/22/24  0706 01/22/24  2111 01/23/24  0930 01/24/24  0146   RBC 3.12* 2.93* 3.34* 3.88*   HEMOGLOBIN 8.8* 8.3* 9.4* 10.9*   HEMATOCRIT 28.5* 26.9* 31.0* 35.4*   PLATELETCT 310 293 326 342   IRON 83  --   --   --    FERRITIN 928.0*  --   --   --    TOTIRONBC 203*  --   --   --      Recent Results (from the past 24 hour(s))   POCT glucose device results    Collection Time: 01/23/24  2:41 PM   Result Value Ref Range    POC Glucose, Blood 104 (H) 65 - 99 mg/dL   POCT glucose device results    Collection Time: 01/23/24  6:24 PM   Result Value Ref Range    POC Glucose, Blood 114 (H) 65 - 99 mg/dL   POCT glucose device results    Collection Time: 01/23/24 10:10 PM   Result Value Ref Range    POC Glucose, Blood 173 (H) 65 - 99 mg/dL   CBC WITH DIFFERENTIAL    Collection Time: 01/24/24  1:46 AM   Result Value Ref Range    WBC 9.2 4.8 - 10.8 K/uL    RBC 3.88 (L) 4.20 - 5.40 M/uL    Hemoglobin 10.9 (L) 12.0 - 16.0 g/dL    Hematocrit 35.4 (L) 37.0 - 47.0 %    MCV 91.2 81.4 - 97.8 fL    MCH 28.1 27.0 - 33.0 pg    MCHC 30.8 (L) 32.2 - 35.5 g/dL    RDW 53.4 (H) 35.9 - 50.0 fL    Platelet Count 342 164 - 446 K/uL    MPV 10.6 9.0 - 12.9 fL    Neutrophils-Polys 72.80 (H) 44.00 - 72.00 %    Lymphocytes 16.30 (L) 22.00 - 41.00 %    Monocytes 6.80 0.00 - 13.40 %    Eosinophils 2.80 0.00 - 6.90 %    Basophils 0.40 0.00 - 1.80 %    Immature Granulocytes 0.90 0.00 - 0.90 %    Nucleated RBC 0.00 0.00 - 0.20 /100 WBC    Neutrophils (Absolute) 6.67 1.82 - 7.42 K/uL    Lymphs (Absolute) 1.49 1.00 - 4.80 K/uL    Monos (Absolute) 0.62 0.00 - 0.85 K/uL    Eos (Absolute) 0.26 0.00 - 0.51 K/uL    Baso (Absolute) 0.04 0.00 - 0.12 K/uL     Immature Granulocytes (abs) 0.08 0.00 - 0.11 K/uL    NRBC (Absolute) 0.00 K/uL   Renal Function Panel    Collection Time: 01/24/24  1:46 AM   Result Value Ref Range    Sodium 134 (L) 135 - 145 mmol/L    Potassium 3.9 3.6 - 5.5 mmol/L    Chloride 97 96 - 112 mmol/L    Co2 28 20 - 33 mmol/L    Glucose 184 (H) 65 - 99 mg/dL    Creatinine 2.17 (H) 0.50 - 1.40 mg/dL    Bun 28 (H) 8 - 22 mg/dL    Calcium 8.7 8.5 - 10.5 mg/dL    Correct Calcium 9.0 8.5 - 10.5 mg/dL    Phosphorus 2.0 (L) 2.5 - 4.5 mg/dL    Albumin 3.6 3.2 - 4.9 g/dL   ESTIMATED GFR    Collection Time: 01/24/24  1:46 AM   Result Value Ref Range    GFR (CKD-EPI) 25 (A) >60 mL/min/1.73 m 2   POCT glucose device results    Collection Time: 01/24/24  8:15 AM   Result Value Ref Range    POC Glucose, Blood 178 (H) 65 - 99 mg/dL       Radiology Review:  EC-ECHOCARDIOGRAM COMPLETE W/O CONT   Final Result      DX-CHEST-PORTABLE (1 VIEW)   Final Result      1.  Minimal basilar atelectasis or parenchymal scarring      2.  Enlarged cardiac silhouette      3.  Right internal jugular catheter appears appropriately located      MR-BRAIN-WITH    (Results Pending)         MDM (Data Review):   -Records reviewed and summarized in current documentation  -I personally reviewed and interpreted the laboratory results  -I personally reviewed the radiology images    Assessment/Recommendations:  Anemia  Dark stools- ? Melena  Arthritis  Type 2 diabetes mellitus  Depression  ESRD on HD    MDM:  Is a 63-year-old female with past medical history as listed above who presented to [facility]/21/2024 with symptomatic anemia.  GI was consulted regarding the symptomatic anemia and concern for possible GI bleeding.  Patient had dark stool on 1/22/2024.  Hemoglobin near 7.  Iron panel normal.  There is no evidence of acute bleeding except for dark stool.  Hemoglobin uptrending 8.3-9.4-10.9.  BUN decreasing 40-28, in the setting of CKD.  She reports receiving iron infusions  with hemodialysis.  She says that she had a dark/formed stool earlier this morning but otherwise reports feeling well without nausea, vomiting, abdominal pain.  Tolerating oral intake without difficulty.  Although she has had dark stool, this is the first bowel movement in 2 days.  Suspect that dark stool (even if it were blood) may be residual that is passing particular given that her hemoglobin is uptrending, and her BUN is decreasing particularly in the setting of CKD.  Low suspicion for acute bleeding at this time.  She may benefit from outpatient endoscopy, but no plans for inpatient endoscopy.      Plan:  PPI twice daily  Follow-up outpatient GI-GI see or [facility] as [facility] care    GI signed off    Discussed with patient, primary team, Dr. Talavera.        Core Quality Measures   Reviewed items::  Labs, Medications and Radiology reports reviewed

## 2024-01-24 NOTE — DISCHARGE SUMMARY
[facility] Discharge Summary    Attending: Laureen Rouse M.d.  Senior Resident: Dr. Bernstein  Intern:  Dr. Leiva  Contact Number: [phone]    CHIEF COMPLAINT ON ADMISSION  Chief Complaint   Patient presents with    Chest Pain     NSTEMI transfer    Syncope       Reason for Admission  Syncope, symptomatic anemia, shortness of breath.    Admission Date  1/21/2024    CODE STATUS  DNAR/DNI    HPI & HOSPITAL COURSE    Savita Mcclain is a 63 y.o. female with past medical history of Diabetes, Psychiatric disorder, end-stage renal disease on hemodialysis 4 times per week, and Rheumatoid arthritis who presented 1/21/2024 with complaints of chest pain, shortness of breath and 2 episodes of syncope.  She stated that since she last had COVID a month ago, she has been having intermittent shortness of breath/chest pain mostly on ambulation and gets better with rest. She was ambulating  a day prior to presentation to the hospital when she experienced 2 episodes of syncope.  She states that her daughter witnessed her syncope and stated that it lasted for a few seconds, no head strike noted.  She was initially taken to an outside facility where she was initially hypotensive, she was given 250 mL bolus, 100 mg of hydrocortisone, and given Levaquin 250 mg IV x 1 for possible pneumonia. CT head was performed which demonstrated 5 mm round focus of increased attenuation along the tentorium on the right side abutting the superior aspect of the right side of the cerebellum. Could represent benign meningioma, acute ICH cannot be excluded.COVID flu RSV negative. Labs significant for potassium 2.8, creatinine 2.5 Troponin 1.340, BNP 32 900 Procalcitonin 0.272, WBC 19.0 Hgb 7.5    EKG demonstrated normal sinus rhythm with left bundle branch block without any evidence of acute ischemic changes. Her was hemoglobin found to be at 6.7 with no clear bleeding episodes.  1 PRBC transfused 01/22.  H&H was initially monitored every  12 hourly that gradually stabilized.  She was hypoxic and required 2 L of oxygen to maintain adequate oxygenation.  Patient was started on IV Protonix twice daily for possible GI bleed and FOBT was done which was positive therefore GI was consulted.  She was closely monitored for any active GI bleed.  GI recommended outpatient colonoscopy/EGD since there was no episode of active GI bleeding and her hemoglobin levels improved.  GI advised to continue PPI twice daily.  Echocardiogram demonstrated low normal left ventricular systolic function 50.5%, Grade II diastolic dysfunction. Moderately dilated left atrium. Mild/moderate mitral regurgitation. Right heart pressures consistent with mild pulmonary hypertension.  Oxygen was gradually weaned off and she maintained adequate saturations at rest as well as on ambulation on room air.  MRI brain with and without contrast ordered to further evaluate meningioma/intracranial hemorrhage and syncope however that will be done as an outpatient.  Nephrology was consulted and patient underwent Hemodialysis 01/23/2024.    Therefore, she is discharged in fair and stable condition to home with close outpatient follow-up.    The patient met 2-midnight criteria for an inpatient stay at the time of discharge.    Discharge Date  01/20/2024    Physical Exam on Day of Discharge  Physical Exam  Constitutional:       Appearance: Normal appearance. She is normal weight.   HENT:      Head: Normocephalic.      Nose: Nose normal.      Mouth/Throat:      Mouth: Mucous membranes are moist.   Eyes:      Pupils: Pupils are equal, round, and reactive to light.   Cardiovascular:      Rate and Rhythm: Normal rate and regular rhythm.      Pulses: Normal pulses.      Comments: Right-sided tunneled  catheter noted  Pulmonary:      Effort: Pulmonary effort is normal.   Abdominal:      General: Abdomen is flat. Bowel sounds are normal.      Palpations: Abdomen is soft.   Musculoskeletal:         General:  Amputation of toes on L foot      Cervical back: Neck supple.   Skin:     General: Skin is warm.   Neurological:      General: No focal deficit present.      Mental Status: She is alert and oriented to person, place, and time. Mental status is at baseline.   Psychiatric:         Mood and Affect: Mood normal.         Behavior: Behavior normal.         Thought Content: Thought content normal.         Judgment: Judgment normal.    FOLLOW UP ITEMS POST DISCHARGE  Please get outpatient EGD/colonoscopy.  Please get outpatient MRI brain with and without contrast.      DISCHARGE DIAGNOSES  Principal Problem:    Symptomatic anemia (POA: Yes)  Active Problems:    Pulmonary hypertension (HCC) (POA: Yes)    Type 2 diabetes mellitus (HCC) (POA: Yes)    Diastolic heart failure (HCC) (POA: Yes)    Hyperglycemia (POA: Unknown)    Hyperlipidemia (POA: Unknown)    ESRD (end stage renal disease) on dialysis (HCC) (POA: Unknown)    Meningioma (HCC) (POA: Unknown)    Leukocytosis (POA: Unknown)    ACP (advance care planning) (POA: Unknown)    Acute hypoxemic respiratory failure (HCC) (POA: Unknown)    Syncope (POA: Unknown)  Resolved Problems:    * No resolved hospital problems. *      FOLLOW UP  Please follow-up with gastroenterology.  Please follow-up with cardiology.  Please follow-up with primary care physician.  Please continue to get hemodialysis as scheduled.    MEDICATIONS ON DISCHARGE     Medication List        CHANGE how you take these medications        Instructions   pantoprazole 40 MG Tbec  What changed: when to take this  Commonly known as: Protonix   Take 1 Tablet by mouth 2 times a day.  Dose: 40 mg            CONTINUE taking these medications        Instructions   acetaminophen 325 MG Tabs  Commonly known as: Tylenol   Take 650 mg by mouth 1 time a day as needed for Mild Pain. 2 tablets = 650 mg.  Dose: 650 mg     albuterol 108 (90 Base) MCG/ACT Aers inhalation aerosol   Inhale 2 Puffs every four hours as needed for  Shortness of Breath.  Dose: 2 Puff     atorvastatin 10 MG Tabs  Commonly known as: Lipitor   Take 10 mg by mouth every evening.  Dose: 10 mg     calcium acetate 667 MG Caps  Commonly known as: Phos-Lo   Take 1 Capsule by mouth 3 times a week.  Dose: 1 Capsule     fluticasone 50 MCG/ACT nasal spray  Commonly known as: Flonase   Administer 2 Sprays into each nostril every evening.  Dose: 2 Spray     furosemide 40 MG Tabs  Commonly known as: Lasix   Take 40 mg by mouth every day.  Dose: 40 mg     HumuLIN R U-500 KwikPen 500 UNIT/ML Sopn  Generic drug: Insulin Regular Human (Conc)   Inject 3-8 Units under the skin 3 times a day before meals. Unspecified sliding scale.  Dose: 3-8 Units     HYDROcodone-acetaminophen 5-325 MG Tabs per tablet  Commonly known as: Norco   Take 1 Tablet by mouth every 6 hours as needed (Severe Pain).  Dose: 1 Tablet     insulin glargine 100 UNIT/ML Sopn injection  Generic drug: insulin glargine   Inject 5-20 Units under the skin every evening. Unspecified sliding scale.  Dose: 5-20 Units     latanoprost 0.005 % Soln  Commonly known as: Xalatan   Place 1 Drop in both eyes every bedtime.  Dose: 1 Drop     Lexapro 20 MG tablet  Generic drug: escitalopram   Take 20 mg by mouth every day.  Dose: 20 mg     lisinopril 5 MG Tabs  Commonly known as: Prinivil   Take 2.5 mg by mouth 2 times a day. 0.5 tablet = 2.5 mg.  Dose: 2.5 mg     Melatonin 10 MG Tabs   Take 10 mg by mouth at bedtime as needed (Sleep).  Dose: 10 mg     potassium chloride SA 10 MEQ Tbcr  Commonly known as: K-Dur   Take 10 mEq by mouth every day.  Dose: 10 mEq     predniSONE 5 MG Tabs  Commonly known as: Deltasone   Take 5 mg by mouth every day.  Dose: 5 mg     Santyl ointment  Generic drug: collagenase   Apply 1 Application topically every day. Apply to affected area of arm(s).  Dose: 1 Application     timolol 0.5 % Soln  Commonly known as: Timoptic   Administer 1 Drop into both eyes every morning.  Dose: 1 Drop     tizanidine 4 MG  Tabs  Commonly known as: Zanaflex   Take 4 mg by mouth every evening.  Dose: 4 mg     VITAMIN B-12 PO   Take 1 Tablet by mouth every day.  Dose: 1 Tablet     VITAMIN D3 PO   Take 1 Capsule by mouth every day.  Dose: 1 Capsule            STOP taking these medications      levoFLOXacin 250 MG Tabs  Commonly known as: Levaquin              Allergies  Allergies   Allergen Reactions    Keflex [Cephalexin] Vomiting and Nausea     Nausea/vomiting, lightheadedness       DIET  Orders Placed This Encounter   Procedures    Diet Order Diet: Consistent CHO (Diabetic); Second Modifier: (optional): Renal; Fluid modifications: (optional): 2000 ml Fluid Restriction     Standing Status:   Standing     Number of Occurrences:   1     Order Specific Question:   Diet:     Answer:   Consistent CHO (Diabetic) [4]     Order Specific Question:   Second Modifier: (optional)     Answer:   Renal [8]     Order Specific Question:   Fluid modifications: (optional)     Answer:   2000 ml Fluid Restriction [11]       ACTIVITY  As tolerated.  Weight bearing as tolerated    CONSULTATIONS  GI, nephrology    PROCEDURES  Hemodialysis 01/23/2024    LABORATORY  Lab Results   Component Value Date    SODIUM 134 (L) 01/24/2024    POTASSIUM 3.9 01/24/2024    CHLORIDE 97 01/24/2024    CO2 28 01/24/2024    GLUCOSE 184 (H) 01/24/2024    BUN 28 (H) 01/24/2024    CREATININE 2.17 (H) 01/24/2024        Lab Results   Component Value Date    WBC 9.2 01/24/2024    HEMOGLOBIN 10.9 (L) 01/24/2024    HEMATOCRIT 35.4 (L) 01/24/2024    PLATELETCT 342 01/24/2024        Total time of the discharge process exceeds 45 minutes.

## 2024-01-24 NOTE — DISCHARGE PLANNING
Meds-to-Beds: Discharge prescription orders listed below delivered to patient's bedside. SAJAN Bashir notified. Written information regarding the dispensed prescriptions was provided to the patient including the phone number of the pharmacy to call for any questions. Patient elected to have co-payment billed to patient account.       Current Outpatient Medications   Medication Sig Dispense Refill    pantoprazole (PROTONIX) 40 MG Tablet Delayed Response Take 1 Tablet by mouth 2 times a day. 60 Tablet 0      Tequila Craig, PharmD

## 2024-01-24 NOTE — CARE PLAN
The patient is Stable - Low risk of patient condition declining or worsening    Shift Goals  Clinical Goals: Pt will remain hemodynamically stable. Pt will have MRI completed by the end of this shift.  Patient Goals: Complete MRI. Sleep  Family Goals: MARTIN    Progress made toward(s) clinical / shift goals:     Problem: Knowledge Deficit - Standard  Goal: Patient and family/care givers will demonstrate understanding of plan of care, disease process/condition, diagnostic tests and medications  Outcome: Met     Problem: Pain - Standard  Goal: Alleviation of pain or a reduction in pain to the patient’s comfort goal  Outcome: Met     Problem: Fall Risk  Goal: Patient will remain free from falls  Outcome: Met     Problem: Skin Integrity  Goal: Skin integrity is maintained or improved  Outcome: Met       Patient is not progressing towards the following goals:

## 2024-01-24 NOTE — DOCUMENTATION QUERY
[facility]                                                                       Query Response Note      PATIENT:               CAROLYNE SELLERS  ACCT #:                  [id number]  MRN:                     [id number]  :                      1960  ADMIT DATE:       2024 7:04 PM  DISCH DATE:          RESPONDING  PROVIDER #:        [id number]           QUERY TEXT:    Diastolic heart failure is documented in the Medical Record.   Can the acuity of heart failure be further specified based on the clinical indicators and required treatments?    The patient's Clinical Indicators include:   Progress Note:  Diastolic heart failure.   Acute hypoxemic respiratory failure: do not appreciate any rales on exam despite elevated BNP .  Lasix 80 mg IV given , continue home dose of Lasix PO daily  Nephrology Notes: + dyspnea on exertion   NT-proBNP 26480  Risk Factors: pulmonary htn  Treatment: IV Lasix , PO Lasix, lisinopril, echo    Thank you,  Justin Francis RN, BSN, CCDS  Clinical   Connect via [employer]  Options provided:   -- Acute   -- Exacerbation or decompensation   -- New diagnosis of diastolic heart failure, not in exacerbation   -- Other explanation, (please specify other explanation)   -- Unable to determine      Query created by: Justin Francis on 2024 12:39 PM    RESPONSE TEXT:    New diagnosis of diastolic heart failure, not in exacerbation          Electronically signed by:  JD HENDRICKS MD 2024 2:23 PM

## 2024-01-24 NOTE — PROGRESS NOTES
Monitor summary:     Rhythm : SB/SR  Rate : 56-68  Ectopy : PVCs, 7 Beats SVT    MO=.19  QRS=.12  QT=.47        Per telemetry strip summary from monitor room.

## 2024-01-31 ENCOUNTER — TELEPHONE (OUTPATIENT)
Dept: HEALTH INFORMATION MANAGEMENT | Facility: OTHER | Age: 64
End: 2024-01-31
Payer: MEDICARE

## 2025-04-20 ENCOUNTER — HOSPITAL ENCOUNTER (INPATIENT)
Facility: MEDICAL CENTER | Age: 65
LOS: 4 days | DRG: 190 | End: 2025-04-24
Attending: HOSPITALIST | Admitting: HOSPITALIST
Payer: MEDICARE

## 2025-04-20 ENCOUNTER — HOSPITAL ENCOUNTER (OUTPATIENT)
Dept: RADIOLOGY | Facility: MEDICAL CENTER | Age: 65
End: 2025-04-20
Payer: MEDICARE

## 2025-04-20 DIAGNOSIS — Z99.2 ESRD (END STAGE RENAL DISEASE) ON DIALYSIS (HCC): ICD-10-CM

## 2025-04-20 DIAGNOSIS — J44.1 ACUTE EXACERBATION OF CHRONIC OBSTRUCTIVE PULMONARY DISEASE (COPD) (HCC): ICD-10-CM

## 2025-04-20 DIAGNOSIS — R91.1 PULMONARY NODULE: ICD-10-CM

## 2025-04-20 DIAGNOSIS — I16.0 HYPERTENSIVE URGENCY: ICD-10-CM

## 2025-04-20 DIAGNOSIS — N18.6 ESRD (END STAGE RENAL DISEASE) ON DIALYSIS (HCC): ICD-10-CM

## 2025-04-20 DIAGNOSIS — J18.9 PNEUMONIA DUE TO INFECTIOUS ORGANISM, UNSPECIFIED LATERALITY, UNSPECIFIED PART OF LUNG: ICD-10-CM

## 2025-04-20 PROBLEM — Z66 DNR (DO NOT RESUSCITATE): Status: ACTIVE | Noted: 2025-04-20

## 2025-04-20 PROBLEM — S22.31XD CLOSED FRACTURE OF ONE RIB OF RIGHT SIDE WITH ROUTINE HEALING: Status: ACTIVE | Noted: 2025-04-20

## 2025-04-20 LAB
GLUCOSE BLD STRIP.AUTO-MCNC: 401 MG/DL (ref 65–99)
GLUCOSE BLD STRIP.AUTO-MCNC: 430 MG/DL (ref 65–99)
LACTATE SERPL-SCNC: 1.3 MMOL/L (ref 0.5–2)
MAGNESIUM SERPL-MCNC: 1.9 MG/DL (ref 1.5–2.5)
PROCALCITONIN SERPL-MCNC: 0.61 NG/ML

## 2025-04-20 PROCEDURE — 700102 HCHG RX REV CODE 250 W/ 637 OVERRIDE(OP): Performed by: HOSPITALIST

## 2025-04-20 PROCEDURE — 82962 GLUCOSE BLOOD TEST: CPT | Mod: 91

## 2025-04-20 PROCEDURE — 83605 ASSAY OF LACTIC ACID: CPT

## 2025-04-20 PROCEDURE — 94760 N-INVAS EAR/PLS OXIMETRY 1: CPT

## 2025-04-20 PROCEDURE — 94640 AIRWAY INHALATION TREATMENT: CPT

## 2025-04-20 PROCEDURE — 87040 BLOOD CULTURE FOR BACTERIA: CPT

## 2025-04-20 PROCEDURE — 700111 HCHG RX REV CODE 636 W/ 250 OVERRIDE (IP): Mod: JZ | Performed by: HOSPITALIST

## 2025-04-20 PROCEDURE — 83735 ASSAY OF MAGNESIUM: CPT

## 2025-04-20 PROCEDURE — 94669 MECHANICAL CHEST WALL OSCILL: CPT

## 2025-04-20 PROCEDURE — 700105 HCHG RX REV CODE 258: Performed by: HOSPITALIST

## 2025-04-20 PROCEDURE — 36415 COLL VENOUS BLD VENIPUNCTURE: CPT

## 2025-04-20 PROCEDURE — 84145 PROCALCITONIN (PCT): CPT

## 2025-04-20 PROCEDURE — 99223 1ST HOSP IP/OBS HIGH 75: CPT | Performed by: HOSPITALIST

## 2025-04-20 PROCEDURE — 770001 HCHG ROOM/CARE - MED/SURG/GYN PRIV*

## 2025-04-20 PROCEDURE — 700101 HCHG RX REV CODE 250: Performed by: HOSPITALIST

## 2025-04-20 PROCEDURE — A9270 NON-COVERED ITEM OR SERVICE: HCPCS | Performed by: HOSPITALIST

## 2025-04-20 RX ORDER — POTASSIUM CHLORIDE 1500 MG/1
10 TABLET, EXTENDED RELEASE ORAL DAILY
Status: DISCONTINUED | OUTPATIENT
Start: 2025-04-20 | End: 2025-04-24 | Stop reason: HOSPADM

## 2025-04-20 RX ORDER — OXYCODONE HYDROCHLORIDE 5 MG/1
5 TABLET ORAL
Refills: 0 | Status: DISCONTINUED | OUTPATIENT
Start: 2025-04-20 | End: 2025-04-21

## 2025-04-20 RX ORDER — IPRATROPIUM BROMIDE AND ALBUTEROL SULFATE 2.5; .5 MG/3ML; MG/3ML
3 SOLUTION RESPIRATORY (INHALATION)
Status: DISCONTINUED | OUTPATIENT
Start: 2025-04-20 | End: 2025-04-21

## 2025-04-20 RX ORDER — VITAMIN B COMPLEX
1000 TABLET ORAL DAILY
Status: DISCONTINUED | OUTPATIENT
Start: 2025-04-20 | End: 2025-04-24 | Stop reason: HOSPADM

## 2025-04-20 RX ORDER — CALCIUM ACETATE 667 MG/1
667 TABLET ORAL
Status: DISCONTINUED | OUTPATIENT
Start: 2025-04-20 | End: 2025-04-24 | Stop reason: HOSPADM

## 2025-04-20 RX ORDER — OMEPRAZOLE 20 MG/1
20 CAPSULE, DELAYED RELEASE ORAL 2 TIMES DAILY
Status: DISCONTINUED | OUTPATIENT
Start: 2025-04-20 | End: 2025-04-24 | Stop reason: HOSPADM

## 2025-04-20 RX ORDER — ALBUTEROL SULFATE 5 MG/ML
2.5 SOLUTION RESPIRATORY (INHALATION)
Status: DISCONTINUED | OUTPATIENT
Start: 2025-04-20 | End: 2025-04-24 | Stop reason: HOSPADM

## 2025-04-20 RX ORDER — TIMOLOL MALEATE 5 MG/ML
1 SOLUTION/ DROPS OPHTHALMIC EVERY MORNING
Status: DISCONTINUED | OUTPATIENT
Start: 2025-04-20 | End: 2025-04-24 | Stop reason: HOSPADM

## 2025-04-20 RX ORDER — HEPARIN SODIUM 5000 [USP'U]/ML
5000 INJECTION, SOLUTION INTRAVENOUS; SUBCUTANEOUS EVERY 8 HOURS
Status: DISCONTINUED | OUTPATIENT
Start: 2025-04-20 | End: 2025-04-24 | Stop reason: HOSPADM

## 2025-04-20 RX ORDER — ACETAMINOPHEN 325 MG/1
650 TABLET ORAL EVERY 6 HOURS PRN
Status: DISCONTINUED | OUTPATIENT
Start: 2025-04-20 | End: 2025-04-24 | Stop reason: HOSPADM

## 2025-04-20 RX ORDER — ATORVASTATIN CALCIUM 10 MG/1
10 TABLET, FILM COATED ORAL 2 TIMES DAILY
Status: DISCONTINUED | OUTPATIENT
Start: 2025-04-20 | End: 2025-04-24 | Stop reason: HOSPADM

## 2025-04-20 RX ORDER — INSULIN LISPRO 100 [IU]/ML
3-14 INJECTION, SOLUTION INTRAVENOUS; SUBCUTANEOUS
Status: DISCONTINUED | OUTPATIENT
Start: 2025-04-20 | End: 2025-04-21

## 2025-04-20 RX ORDER — AZITHROMYCIN 250 MG/1
500 TABLET, FILM COATED ORAL DAILY
Status: DISCONTINUED | OUTPATIENT
Start: 2025-04-20 | End: 2025-04-22

## 2025-04-20 RX ORDER — LISINOPRIL 2.5 MG/1
2.5 TABLET ORAL 2 TIMES DAILY
Status: DISCONTINUED | OUTPATIENT
Start: 2025-04-20 | End: 2025-04-21

## 2025-04-20 RX ORDER — MORPHINE SULFATE 4 MG/ML
2 INJECTION INTRAVENOUS
Status: DISCONTINUED | OUTPATIENT
Start: 2025-04-20 | End: 2025-04-21

## 2025-04-20 RX ORDER — LABETALOL HYDROCHLORIDE 5 MG/ML
10 INJECTION, SOLUTION INTRAVENOUS EVERY 4 HOURS PRN
Status: DISCONTINUED | OUTPATIENT
Start: 2025-04-20 | End: 2025-04-24 | Stop reason: HOSPADM

## 2025-04-20 RX ORDER — AMOXICILLIN 250 MG
2 CAPSULE ORAL EVERY EVENING
Status: DISCONTINUED | OUTPATIENT
Start: 2025-04-20 | End: 2025-04-24 | Stop reason: HOSPADM

## 2025-04-20 RX ORDER — LATANOPROST 50 UG/ML
1 SOLUTION/ DROPS OPHTHALMIC
Status: DISCONTINUED | OUTPATIENT
Start: 2025-04-20 | End: 2025-04-24 | Stop reason: HOSPADM

## 2025-04-20 RX ORDER — DEXTROSE MONOHYDRATE 25 G/50ML
25 INJECTION, SOLUTION INTRAVENOUS
Status: DISCONTINUED | OUTPATIENT
Start: 2025-04-20 | End: 2025-04-21

## 2025-04-20 RX ORDER — OXYCODONE HYDROCHLORIDE 5 MG/1
2.5 TABLET ORAL
Refills: 0 | Status: DISCONTINUED | OUTPATIENT
Start: 2025-04-20 | End: 2025-04-21

## 2025-04-20 RX ORDER — ESCITALOPRAM OXALATE 10 MG/1
20 TABLET ORAL DAILY
Status: DISCONTINUED | OUTPATIENT
Start: 2025-04-20 | End: 2025-04-24 | Stop reason: HOSPADM

## 2025-04-20 RX ORDER — HYDROCODONE BITARTRATE AND ACETAMINOPHEN 5; 325 MG/1; MG/1
1 TABLET ORAL EVERY 6 HOURS PRN
Refills: 0 | Status: DISCONTINUED | OUTPATIENT
Start: 2025-04-20 | End: 2025-04-24 | Stop reason: HOSPADM

## 2025-04-20 RX ORDER — FUROSEMIDE 40 MG/1
40 TABLET ORAL DAILY
Status: DISCONTINUED | OUTPATIENT
Start: 2025-04-20 | End: 2025-04-22

## 2025-04-20 RX ORDER — POLYETHYLENE GLYCOL 3350 17 G/17G
1 POWDER, FOR SOLUTION ORAL
Status: DISCONTINUED | OUTPATIENT
Start: 2025-04-20 | End: 2025-04-24 | Stop reason: HOSPADM

## 2025-04-20 RX ORDER — METHYLPREDNISOLONE SODIUM SUCCINATE 40 MG/ML
20 INJECTION, POWDER, LYOPHILIZED, FOR SOLUTION INTRAMUSCULAR; INTRAVENOUS EVERY 6 HOURS
Status: DISCONTINUED | OUTPATIENT
Start: 2025-04-20 | End: 2025-04-22

## 2025-04-20 RX ORDER — PREDNISONE 5 MG/1
5 TABLET ORAL DAILY
Status: DISCONTINUED | OUTPATIENT
Start: 2025-04-20 | End: 2025-04-24 | Stop reason: HOSPADM

## 2025-04-20 RX ADMIN — INSULIN LISPRO 14 UNITS: 100 INJECTION, SOLUTION INTRAVENOUS; SUBCUTANEOUS at 22:11

## 2025-04-20 RX ADMIN — HEPARIN SODIUM 5000 UNITS: 5000 INJECTION, SOLUTION INTRAVENOUS; SUBCUTANEOUS at 22:06

## 2025-04-20 RX ADMIN — Medication 10 MG: at 22:18

## 2025-04-20 RX ADMIN — AMPICILLIN AND SULBACTAM 3 G: 1; 2 INJECTION, POWDER, FOR SOLUTION INTRAMUSCULAR; INTRAVENOUS at 18:43

## 2025-04-20 RX ADMIN — ATORVASTATIN CALCIUM 10 MG: 10 TABLET, FILM COATED ORAL at 18:42

## 2025-04-20 RX ADMIN — LATANOPROST 1 DROP: 50 SOLUTION OPHTHALMIC at 22:05

## 2025-04-20 RX ADMIN — METHYLPREDNISOLONE SODIUM SUCCINATE 20 MG: 40 INJECTION, POWDER, FOR SOLUTION INTRAMUSCULAR; INTRAVENOUS at 18:42

## 2025-04-20 RX ADMIN — Medication 1000 UNITS: at 18:41

## 2025-04-20 RX ADMIN — LISINOPRIL 2.5 MG: 2.5 TABLET ORAL at 18:41

## 2025-04-20 RX ADMIN — ESCITALOPRAM OXALATE 20 MG: 10 TABLET ORAL at 18:41

## 2025-04-20 RX ADMIN — SENNOSIDES AND DOCUSATE SODIUM 2 TABLET: 50; 8.6 TABLET ORAL at 18:41

## 2025-04-20 RX ADMIN — OMEPRAZOLE 20 MG: 20 CAPSULE, DELAYED RELEASE ORAL at 18:42

## 2025-04-20 RX ADMIN — POTASSIUM CHLORIDE 10 MEQ: 1500 TABLET, EXTENDED RELEASE ORAL at 18:41

## 2025-04-20 RX ADMIN — IPRATROPIUM BROMIDE AND ALBUTEROL SULFATE 3 ML: .5; 2.5 SOLUTION RESPIRATORY (INHALATION) at 19:34

## 2025-04-20 RX ADMIN — IPRATROPIUM BROMIDE AND ALBUTEROL SULFATE 3 ML: .5; 2.5 SOLUTION RESPIRATORY (INHALATION) at 16:46

## 2025-04-20 RX ADMIN — INSULIN GLARGINE-YFGN 15 UNITS: 100 INJECTION, SOLUTION SUBCUTANEOUS at 18:50

## 2025-04-20 RX ADMIN — TIZANIDINE 4 MG: 4 TABLET ORAL at 22:05

## 2025-04-20 RX ADMIN — HEPARIN SODIUM 5000 UNITS: 5000 INJECTION, SOLUTION INTRAVENOUS; SUBCUTANEOUS at 18:41

## 2025-04-20 RX ADMIN — AZITHROMYCIN DIHYDRATE 500 MG: 250 TABLET ORAL at 22:05

## 2025-04-20 RX ADMIN — IPRATROPIUM BROMIDE AND ALBUTEROL SULFATE 3 ML: .5; 2.5 SOLUTION RESPIRATORY (INHALATION) at 23:34

## 2025-04-20 SDOH — ECONOMIC STABILITY: TRANSPORTATION INSECURITY
IN THE PAST 12 MONTHS, HAS LACK OF RELIABLE TRANSPORTATION KEPT YOU FROM MEDICAL APPOINTMENTS, MEETINGS, WORK OR FROM GETTING THINGS NEEDED FOR DAILY LIVING?: NO

## 2025-04-20 ASSESSMENT — ENCOUNTER SYMPTOMS
CONSTIPATION: 0
VOMITING: 0
HEARTBURN: 0
BRUISES/BLEEDS EASILY: 0
SHORTNESS OF BREATH: 1
EYES NEGATIVE: 1
FEVER: 1
COUGH: 1
MEMORY LOSS: 0
WHEEZING: 0
PALPITATIONS: 0
INSOMNIA: 0
DOUBLE VISION: 0
SEIZURES: 0
HEADACHES: 1
FOCAL WEAKNESS: 0
NERVOUS/ANXIOUS: 0
NAUSEA: 0
DIAPHORESIS: 0
DIZZINESS: 0
HEMOPTYSIS: 0
BLOOD IN STOOL: 0
CHILLS: 0
LOSS OF CONSCIOUSNESS: 0
WEAKNESS: 1
GASTROINTESTINAL NEGATIVE: 1
MUSCULOSKELETAL NEGATIVE: 1
PSYCHIATRIC NEGATIVE: 1
ABDOMINAL PAIN: 0
DIARRHEA: 0

## 2025-04-20 ASSESSMENT — LIFESTYLE VARIABLES
EVER FELT BAD OR GUILTY ABOUT YOUR DRINKING: NO
TOTAL SCORE: 0
TOTAL SCORE: 0
HOW MANY TIMES IN THE PAST YEAR HAVE YOU HAD 5 OR MORE DRINKS IN A DAY: 0
DOES PATIENT WANT TO STOP DRINKING: NO
ON A TYPICAL DAY WHEN YOU DRINK ALCOHOL HOW MANY DRINKS DO YOU HAVE: 0
ALCOHOL_USE: NO
AVERAGE NUMBER OF DAYS PER WEEK YOU HAVE A DRINK CONTAINING ALCOHOL: 0
EVER HAD A DRINK FIRST THING IN THE MORNING TO STEADY YOUR NERVES TO GET RID OF A HANGOVER: NO
HAVE PEOPLE ANNOYED YOU BY CRITICIZING YOUR DRINKING: NO
CONSUMPTION TOTAL: NEGATIVE
TOTAL SCORE: 0
HAVE YOU EVER FELT YOU SHOULD CUT DOWN ON YOUR DRINKING: NO

## 2025-04-20 ASSESSMENT — COPD QUESTIONNAIRES
HAVE YOU SMOKED AT LEAST 100 CIGARETTES IN YOUR ENTIRE LIFE: YES
DO YOU EVER COUGH UP ANY MUCUS OR PHLEGM?: NO/ONLY WITH OCCASIONAL COLDS OR INFECTIONS
DURING THE PAST 4 WEEKS HOW MUCH DID YOU FEEL SHORT OF BREATH: SOME OF THE TIME
COPD SCREENING SCORE: 5

## 2025-04-20 ASSESSMENT — SOCIAL DETERMINANTS OF HEALTH (SDOH)
WITHIN THE LAST YEAR, HAVE YOU BEEN KICKED, HIT, SLAPPED, OR OTHERWISE PHYSICALLY HURT BY YOUR PARTNER OR EX-PARTNER?: NO
WITHIN THE LAST YEAR, HAVE TO BEEN RAPED OR FORCED TO HAVE ANY KIND OF SEXUAL ACTIVITY BY YOUR PARTNER OR EX-PARTNER?: NO
IN THE PAST 12 MONTHS, HAS THE ELECTRIC, GAS, OIL, OR WATER COMPANY THREATENED TO SHUT OFF SERVICE IN YOUR HOME?: NO
WITHIN THE LAST YEAR, HAVE YOU BEEN HUMILIATED OR EMOTIONALLY ABUSED IN OTHER WAYS BY YOUR PARTNER OR EX-PARTNER?: NO
WITHIN THE PAST 12 MONTHS, YOU WORRIED THAT YOUR FOOD WOULD RUN OUT BEFORE YOU GOT THE MONEY TO BUY MORE: NEVER TRUE
WITHIN THE LAST YEAR, HAVE YOU BEEN AFRAID OF YOUR PARTNER OR EX-PARTNER?: NO
WITHIN THE PAST 12 MONTHS, THE FOOD YOU BOUGHT JUST DIDN'T LAST AND YOU DIDN'T HAVE MONEY TO GET MORE: NEVER TRUE

## 2025-04-20 ASSESSMENT — COGNITIVE AND FUNCTIONAL STATUS - GENERAL
MOBILITY SCORE: 24
DAILY ACTIVITIY SCORE: 24
SUGGESTED CMS G CODE MODIFIER DAILY ACTIVITY: CH
SUGGESTED CMS G CODE MODIFIER MOBILITY: CH

## 2025-04-20 ASSESSMENT — PAIN DESCRIPTION - PAIN TYPE: TYPE: ACUTE PAIN;CHRONIC PAIN

## 2025-04-20 ASSESSMENT — FIBROSIS 4 INDEX: FIB4 SCORE: 0.41

## 2025-04-20 NOTE — PROGRESS NOTES
4 Eyes Skin Assessment Completed by Randal RN and SAJAN Gross.    Head WDL  Ears Redness and Blanching  Nose WDL  Mouth WDL  Neck WDL  Breast/Chest WDL R chest port   Shoulder Blades WDL  Spine WDL  (R) Arm/Elbow/Hand WDL  (L) Arm/Elbow/Hand WDL  Abdomen WDL  Groin WDL  Scrotum/Coccyx/Buttocks WDL  (R) Leg WDL  (L) Leg Scab  (R) Heel/Foot/Toe Scab amputated toes scab on anterior portion of foot   (L) Heel/Foot/Toe WDL          Devices In Places Pulse Ox      Interventions In Place Gray Ear Foams    Possible Skin Injury No    Pictures Uploaded Into Epic N/A  Wound Consult Placed N/A  RN Wound Prevention Protocol Ordered No

## 2025-04-20 NOTE — ASSESSMENT & PLAN NOTE
"4/21:  \"Patient is transferred from [facility] for acute COPD exacerbation.  Patient has a longstanding history of smoking 30 to 40 years quit in 2017.  Patient has established COPD  Continue Solu-Medrol, prednisone in a.m. if she continues to improve  Continue IV Unasyn azithromycin  Viral panel negative\"    4/22: I discussed case with pulmonary.  We have transition to prednisone to complete a 5-day course.  We have transition Unasyn to Augmentin.  Continue to monitor closely.    4/23: Complete 5-day course of steroids with prednisone.  Continue antibiotics for concern for pneumonia, currently on Augmentin.  "

## 2025-04-20 NOTE — ASSESSMENT & PLAN NOTE
4/23: Patient today with hypertensive urgency so we have increased lisinopril to 20 mg twice daily.  Continue Lasix.  As needed hydralazine.  Continue to monitor closely.

## 2025-04-20 NOTE — PROGRESS NOTES
[facility] TRIAGE OFFICER CONSULT REQUEST REPORT  Consult requested by: Dr Sheets  Reason for consult: shortness opf breath  Is consult for transition of care: Yes  Pertinent history/hospital Course: Patient reports to the [facility] with SOB, found to have COPD exacerbation requiring oxygen support which she usually does not utilize. Patient on 2 liters, BNP elevated at [address], baseline is [address], has ESRF on HD, missed last two dialysis sessions. Needs HD and oxygen support. Transferring facility does not have capacity to do dialysis and thus will be sent to [facility].   Code Status: Full  Can the hospitalist sign off upon completion of required consult/outcomes requested: No   Assigned hospitalist: please complete MAR, VITALS and release signed and held order than call for admission.      For any question or concerns regarding the care of this patient, please reach out to the assigned hospitalist.

## 2025-04-20 NOTE — ASSESSMENT & PLAN NOTE
Patient has a history of anemia this is chronic from end-stage renal disease  Will continue to monitor H&H if drops low 7 or 21 I will transfuse

## 2025-04-20 NOTE — ASSESSMENT & PLAN NOTE
"4/21:  \"Sugars high, likely worsened by steroids  Lantus insulin 15 units subcutaneously nightly  -diabetic diet  -diabetic education  -A1c 6.3  -continue with oral antihyperglycemics  -monitor for hypoglycemic episodes and adjust control if he should get low\"      4/23: Repeat A1c 7.9.  However the patient developed hypoglycemia this morning.  We will discontinue Lantus.  Continue insulin sliding scale.  Continue to monitor closely and make changes accordingly.  "

## 2025-04-20 NOTE — H&P
Hospital Medicine History & Physical Note    Date of Service  4/20/2025    Primary Care Physician  Pcp Not In Computer    Consultants  nephrology    Specialist Names: Dr Villegas    Code Status  DNAR/DNI    Chief Complaint  No chief complaint on file.      History of Presenting Illness  Savita Mcclain is a 65 y.o. female who presented 4/20/2025 on transfer from [facility] for COPD exacerbation.  The patient for the past 2 days has been complaining of shortness of breath and today decided to going to the emergency room.  She is found to have hypoxia and is requiring oxygen support nebulizer treatments and is found to have COPD exacerbation.  The patient smoked for about 30 to 40 years quit in 2017 and since then does have established COPD.  She was using oxygen at home already for the past 3 days which her father had before her.  She does not have oxygen prescribed.  The patient does have end-stage renal disease secondary to diabetes and is on hemodialysis at home.  The patient about 10 days ago fell and she suffered 1/10 rib fracture on the right side.  Since then she has been kind of short of breath but now has progressively gotten worse.  Initial viral studies are negative.  Procalcitonin is slightly elevated.  I will start the patient IV Unasyn azithromycin after obtaining sputum cultures and blood cultures.  I will run a respiratory PCR..  I will talk with nephrology about hemodialysis.    I discussed the plan of care with patient, bedside RN, and transferring physician and nephrology .    Review of Systems  Review of Systems   Constitutional:  Positive for fever and malaise/fatigue. Negative for chills and diaphoresis.   HENT: Negative.     Eyes: Negative.  Negative for double vision.   Respiratory:  Positive for cough and shortness of breath. Negative for hemoptysis and wheezing.    Cardiovascular:  Positive for chest pain (Secondary to 11th rib fracture on the right). Negative for  palpitations and leg swelling.   Gastrointestinal: Negative.  Negative for abdominal pain, blood in stool, constipation, diarrhea, heartburn, nausea and vomiting.   Genitourinary: Negative.  Negative for frequency, hematuria and urgency.   Musculoskeletal: Negative.  Negative for joint pain.   Skin: Negative.  Negative for itching and rash.   Neurological:  Positive for weakness and headaches. Negative for dizziness, focal weakness, seizures and loss of consciousness.   Endo/Heme/Allergies: Negative.  Does not bruise/bleed easily.   Psychiatric/Behavioral: Negative.  Negative for memory loss and suicidal ideas. The patient is not nervous/anxious and does not have insomnia.    All other systems reviewed and are negative.      Past Medical History   has a past medical history of Diabetes, Psychiatric disorder, and Rheumatoid arthritis(714.0).    Surgical History   has a past surgical history that includes other orthopedic surgery; pr upper gi endoscopy,biopsy (Left, 6/10/2020); pr colonoscopy-flexible (Left, 6/10/2020); and toe amputation (Left, 2000).     Family History  family history is not on file.   Family history reviewed with patient. There is no family history that is pertinent to the chief complaint.     Social History   reports that she quit smoking about 9 years ago. Her smoking use included cigarettes. She started smoking about 44 years ago. She has a 80 pack-year smoking history. She has never used smokeless tobacco. She reports that she does not currently use alcohol. She reports that she does not use drugs.    Allergies  Allergies   Allergen Reactions    Keflex [Cephalexin] Vomiting and Nausea     Nausea/vomiting, lightheadedness       Medications  Prior to Admission Medications   Prescriptions Last Dose Informant Patient Reported? Taking?   Cholecalciferol (VITAMIN D3 PO) 4/18/2025  Yes No   Sig: Take 1 Capsule by mouth every day.   Cyanocobalamin (VITAMIN B-12 PO) 4/18/2025 Patient Yes No   Sig: Take  1 Tablet by mouth every day.   HUMULIN R U-500 KWIKPEN 500 UNIT/ML Solution Pen-injector 4/18/2025 Patient Yes No   Sig: Inject 3-8 Units under the skin 3 times a day before meals. Unspecified sliding scale.   HYDROcodone-acetaminophen (NORCO) 5-325 MG Tab per tablet 4/18/2025 Patient Yes No   Sig: Take 1 Tablet by mouth every 6 hours as needed (Severe Pain).   Melatonin 10 MG Tab 4/18/2025 Patient Yes No   Sig: Take 10 mg by mouth at bedtime as needed (Sleep).   SANTYL ointment 4/18/2025 Patient Yes No   Sig: Apply 1 Application topically every day. Apply to affected area of arm(s).   acetaminophen (TYLENOL) 325 MG Tab 4/18/2025 Patient Yes No   Sig: Take 650 mg by mouth 1 time a day as needed for Mild Pain. 2 tablets = 650 mg.   albuterol 108 (90 Base) MCG/ACT Aero Soln inhalation aerosol 4/18/2025 Patient Yes No   Sig: Inhale 2 Puffs every four hours as needed for Shortness of Breath.   atorvastatin (LIPITOR) 10 MG Tab 4/19/2025 Patient Yes Yes   Sig: Take 10 mg by mouth 2 times a day.   calcium acetate (PHOS-LO) 667 MG Cap 4/18/2025 Patient Yes No   Sig: Take 1 Capsule by mouth 3 times a week.   Patient not taking: Reported on 4/20/2025   escitalopram (LEXAPRO) 20 MG tablet 4/18/2025 Patient Yes No   Sig: Take 20 mg by mouth every day.   fluticasone (FLONASE) 50 MCG/ACT nasal spray 4/18/2025 Patient Yes No   Sig: Administer 2 Sprays into each nostril every evening.   furosemide (LASIX) 40 MG Tab 4/18/2025 Patient Yes No   Sig: Take 40 mg by mouth every day.   insulin glargine 100 UNIT/ML Solution Pen-injector injection 4/18/2025 Patient Yes No   Sig: Inject 5-20 Units under the skin every evening. Unspecified sliding scale.   latanoprost (XALATAN) 0.005 % Solution 4/18/2025 Patient Yes No   Sig: Place 1 Drop in both eyes every bedtime.   lisinopril (PRINIVIL) 5 MG Tab 4/18/2025 Patient Yes No   Sig: Take 2.5 mg by mouth 2 times a day. 0.5 tablet = 2.5 mg.   pantoprazole (PROTONIX) 40 MG Tablet Delayed Response  4/18/2025  No No   Sig: Take 1 Tablet by mouth 2 times a day.   potassium chloride SA (K-DUR) 10 MEQ Tab CR 4/18/2025 Patient Yes No   Sig: Take 10 mEq by mouth every day.   predniSONE (DELTASONE) 5 MG Tab 4/18/2025 Patient Yes No   Sig: Take 5 mg by mouth every day.   timolol (TIMOPTIC) 0.5 % Solution 4/18/2025 Patient Yes No   Sig: Administer 1 Drop into both eyes every morning.   tizanidine (ZANAFLEX) 4 MG Tab 4/18/2025 Patient Yes No   Sig: Take 4 mg by mouth every evening.      Facility-Administered Medications: None       Physical Exam  Temp:  [36.9 °C (98.4 °F)] 36.9 °C (98.4 °F)  Pulse:  [64] 64  Resp:  [18] 18  BP: (159)/(77) 159/77  SpO2:  [96 %] 96 %  Blood Pressure: (!) 159/77 (rn aware)   Temperature: 36.9 °C (98.4 °F)   Pulse: 64   Respiration: 18   Pulse Oximetry: 96 %       Physical Exam  Vitals and nursing note reviewed. Exam conducted with a chaperone present.   Constitutional:       Appearance: She is well-developed and normal weight. She is ill-appearing. She is not diaphoretic.   HENT:      Head: Normocephalic and atraumatic.      Right Ear: External ear normal.      Left Ear: External ear normal.      Nose: Nose normal.      Mouth/Throat:      Mouth: Mucous membranes are dry.   Eyes:      Extraocular Movements: Extraocular movements intact.      Conjunctiva/sclera: Conjunctivae normal.      Pupils: Pupils are equal, round, and reactive to light.   Neck:      Thyroid: No thyromegaly.      Vascular: No carotid bruit or JVD.   Cardiovascular:      Rate and Rhythm: Normal rate and regular rhythm.      Pulses: Normal pulses.      Heart sounds: Normal heart sounds.   Pulmonary:      Effort: Tachypnea and accessory muscle usage present.      Breath sounds: Decreased air movement present. Examination of the right-upper field reveals decreased breath sounds. Examination of the left-upper field reveals decreased breath sounds. Examination of the right-middle field reveals decreased breath sounds.  Examination of the left-middle field reveals decreased breath sounds. Examination of the right-lower field reveals decreased breath sounds and rhonchi. Examination of the left-lower field reveals decreased breath sounds. Decreased breath sounds, rhonchi and rales present.   Chest:      Chest wall: No tenderness.   Abdominal:      General: Abdomen is flat. Bowel sounds are normal. There is no distension.      Palpations: Abdomen is soft. There is no mass.      Tenderness: There is no abdominal tenderness. There is no guarding or rebound.   Musculoskeletal:         General: Normal range of motion.      Cervical back: Normal range of motion and neck supple.      Right lower leg: No edema.      Left lower leg: No edema.   Lymphadenopathy:      Cervical: No cervical adenopathy.   Skin:     General: Skin is warm and dry.      Capillary Refill: Capillary refill takes less than 2 seconds.      Findings: No rash.   Neurological:      General: No focal deficit present.      Mental Status: She is alert and oriented to person, place, and time. Mental status is at baseline.      Cranial Nerves: No cranial nerve deficit.      Deep Tendon Reflexes: Reflexes are normal and symmetric.   Psychiatric:         Mood and Affect: Mood normal.         Behavior: Behavior normal.         Thought Content: Thought content normal.         Judgment: Judgment normal.         Laboratory:  Outpatient labs from [facility] show a WBC count of 11.4, hemoglobin of 11.2, hematocrit of 33.5 and platelets of 206.  Sodium is 135 potassium 2.9 chloride 104 CO2 25, blood sugar 114 BUN is 40 with a creatinine of 2.8 alkaline phosphatase 114 AST 30 ALT 19 with a total bilirubin of 0.7  Her troponin is 25 BNP is 51,300 and CK level is 0.203 lactic acid is 0.8 CRP is 17.56    Imaging:  DX-OUTSIDE IMAGES-DX CHEST   Final Result          X-Ray:  I have personally reviewed the images and compared with prior images.  EKG:  I have personally reviewed the images  and compared with prior images.    Assessment/Plan:  Justification for Admission Status  I anticipate this patient will require at least two midnights for appropriate medical management, necessitating inpatient admission because patient is acute COPD exacerbation and end-stage renal disease on hemodialysis.  Patient required this point management of her COPD and end-stage renal disease and will require at least 48 hours of inpatient management.    Patient will need a Med/Surg bed on MEDICAL service .  The need is secondary to COPD exacerbation.    * Acute exacerbation of chronic obstructive pulmonary disease (COPD) (Formerly [facility])- (present on admission)  Assessment & Plan  Patient is transferred from [facility] for acute COPD exacerbation.  Patient has a longstanding history of smoking 30 to 40 years quit in 2017.  Patient has established COPD  I will initiate the patient on RT protocol  I am initiating patient on Solu-Medrol 20 mg IV every 6 hours  I will initiate the patient on IV Unasyn azithromycin  I will check a respiratory PCR  I have checked viral studies these are negative for COVID, influenza and RSV.  I will get blood cultures and sputum cultures prior to initiating antibiotics.  I will follow procalcitonin level lactic acid level    ESRD (end stage renal disease) on dialysis (Former[facility])- (present on admission)  Assessment & Plan  I will resume hemodialysis by contacting nephrology  Patient gets in home hemodialysis where she lives she has a port in the right upper part of her chest wall    Hypertension- (present on admission)  Assessment & Plan  I will optimize blood pressure management keep systolic blood pressure less than 140 diastolic under 90  Will continue lisinopril 2.5 mg twice daily and labetalol as needed    Type 2 diabetes mellitus (HCC)- (present on admission)  Assessment & Plan  -accus with sliding scale coverage, I will continue her on Lantus insulin 15 units subcutaneously  nightly  -diabetic diet  -diabetic education  -follow glycohemoglobin levels long term, most recent hemoglobin A1c 6.3  -continue with oral antihyperglycemics  -monitor for hypoglycemic episodes and adjust control if he should get low    Chronic combined systolic and diastolic congestive heart failure (HCC)- (present on admission)  Assessment & Plan  I will monitor fluid overload  I will ensure patient has stable BNP levels  I will continue patient on lisinopril and Lasix    DNR (do not resuscitate)- (present on admission)  Assessment & Plan  Discussed advance directives with the patient she wishes to be DNR CODE STATUS.    Closed fracture of one rib of right side with routine healing- (present on admission)  Assessment & Plan  Right 11th rib fracture that happened about 10 days ago    Meningioma (HCC)- (present on admission)  Assessment & Plan  Chronic meningioma which is stable    Symptomatic anemia- (present on admission)  Assessment & Plan  Patient has a history of anemia this is chronic from end-stage renal disease  Will continue to monitor H&H if drops low 7 or 21 I will transfuse    Hyperlipidemia- (present on admission)  Assessment & Plan  Low-fat low-cholesterol diet  I will continue patient on Lipitor 10 mg twice daily          VTE prophylaxis: SCDs/TEDs

## 2025-04-21 ENCOUNTER — APPOINTMENT (OUTPATIENT)
Dept: RADIOLOGY | Facility: MEDICAL CENTER | Age: 65
DRG: 190 | End: 2025-04-21
Attending: INTERNAL MEDICINE
Payer: MEDICARE

## 2025-04-21 LAB
ANION GAP SERPL CALC-SCNC: 14 MMOL/L (ref 7–16)
ANION GAP SERPL CALC-SCNC: 33 MMOL/L (ref 7–16)
B PARAP IS1001 DNA NPH QL NAA+NON-PROBE: NOT DETECTED
B PERT.PT PRMT NPH QL NAA+NON-PROBE: NOT DETECTED
BUN SERPL-MCNC: 42 MG/DL (ref 8–22)
BUN SERPL-MCNC: 53 MG/DL (ref 8–22)
C PNEUM DNA NPH QL NAA+NON-PROBE: NOT DETECTED
CALCIUM SERPL-MCNC: 6.6 MG/DL (ref 8.5–10.5)
CALCIUM SERPL-MCNC: 8.9 MG/DL (ref 8.5–10.5)
CHLORIDE SERPL-SCNC: 103 MMOL/L (ref 96–112)
CHLORIDE SERPL-SCNC: 94 MMOL/L (ref 96–112)
CO2 SERPL-SCNC: 18 MMOL/L (ref 20–33)
CO2 SERPL-SCNC: 24 MMOL/L (ref 20–33)
CREAT SERPL-MCNC: 2.82 MG/DL (ref 0.5–1.4)
CREAT SERPL-MCNC: 3.35 MG/DL (ref 0.5–1.4)
EKG IMPRESSION: NORMAL
ERYTHROCYTE [DISTWIDTH] IN BLOOD BY AUTOMATED COUNT: 53.6 FL (ref 35.9–50)
FLUAV RNA NPH QL NAA+NON-PROBE: NOT DETECTED
FLUBV RNA NPH QL NAA+NON-PROBE: NOT DETECTED
GFR SERPLBLD CREATININE-BSD FMLA CKD-EPI: 15 ML/MIN/1.73 M 2
GFR SERPLBLD CREATININE-BSD FMLA CKD-EPI: 18 ML/MIN/1.73 M 2
GLUCOSE BLD STRIP.AUTO-MCNC: 133 MG/DL (ref 65–99)
GLUCOSE BLD STRIP.AUTO-MCNC: 189 MG/DL (ref 65–99)
GLUCOSE BLD STRIP.AUTO-MCNC: 195 MG/DL (ref 65–99)
GLUCOSE BLD STRIP.AUTO-MCNC: 310 MG/DL (ref 65–99)
GLUCOSE SERPL-MCNC: 323 MG/DL (ref 65–99)
GLUCOSE SERPL-MCNC: 373 MG/DL (ref 65–99)
HADV DNA NPH QL NAA+NON-PROBE: NOT DETECTED
HAV IGM SERPL QL IA: NORMAL
HBV CORE IGM SER QL: NORMAL
HBV SURFACE AB SERPL IA-ACNC: <3.5 MIU/ML (ref 0–10)
HBV SURFACE AG SER QL: NORMAL
HCOV 229E RNA NPH QL NAA+NON-PROBE: NOT DETECTED
HCOV HKU1 RNA NPH QL NAA+NON-PROBE: NOT DETECTED
HCOV NL63 RNA NPH QL NAA+NON-PROBE: NOT DETECTED
HCOV OC43 RNA NPH QL NAA+NON-PROBE: NOT DETECTED
HCT VFR BLD AUTO: 23.6 % (ref 37–47)
HCV AB SER QL: NORMAL
HGB BLD-MCNC: 7.8 G/DL (ref 12–16)
HMPV RNA NPH QL NAA+NON-PROBE: NOT DETECTED
HPIV1 RNA NPH QL NAA+NON-PROBE: NOT DETECTED
HPIV2 RNA NPH QL NAA+NON-PROBE: NOT DETECTED
HPIV3 RNA NPH QL NAA+NON-PROBE: NOT DETECTED
HPIV4 RNA NPH QL NAA+NON-PROBE: NOT DETECTED
LACTATE SERPL-SCNC: 2.1 MMOL/L (ref 0.5–2)
LACTATE SERPL-SCNC: 4.2 MMOL/L (ref 0.5–2)
LACTATE SERPL-SCNC: 4.5 MMOL/L (ref 0.5–2)
M PNEUMO DNA NPH QL NAA+NON-PROBE: NOT DETECTED
MCH RBC QN AUTO: 33.2 PG (ref 27–33)
MCHC RBC AUTO-ENTMCNC: 33.1 G/DL (ref 32.2–35.5)
MCV RBC AUTO: 100.4 FL (ref 81.4–97.8)
PLATELET # BLD AUTO: 169 K/UL (ref 164–446)
PMV BLD AUTO: 11.5 FL (ref 9–12.9)
POTASSIUM SERPL-SCNC: 2.2 MMOL/L (ref 3.6–5.5)
POTASSIUM SERPL-SCNC: 3.1 MMOL/L (ref 3.6–5.5)
RBC # BLD AUTO: 2.35 M/UL (ref 4.2–5.4)
RSV RNA NPH QL NAA+NON-PROBE: NOT DETECTED
RV+EV RNA NPH QL NAA+NON-PROBE: NOT DETECTED
SARS-COV-2 RNA NPH QL NAA+NON-PROBE: NOTDETECTED
SODIUM SERPL-SCNC: 132 MMOL/L (ref 135–145)
SODIUM SERPL-SCNC: 154 MMOL/L (ref 135–145)
TROPONIN T SERPL-MCNC: 82 NG/L (ref 6–19)
TROPONIN T SERPL-MCNC: 88 NG/L (ref 6–19)
WBC # BLD AUTO: 9 K/UL (ref 4.8–10.8)

## 2025-04-21 PROCEDURE — 700102 HCHG RX REV CODE 250 W/ 637 OVERRIDE(OP): Mod: JZ | Performed by: INTERNAL MEDICINE

## 2025-04-21 PROCEDURE — 86706 HEP B SURFACE ANTIBODY: CPT

## 2025-04-21 PROCEDURE — 94669 MECHANICAL CHEST WALL OSCILL: CPT

## 2025-04-21 PROCEDURE — A9270 NON-COVERED ITEM OR SERVICE: HCPCS | Mod: JZ | Performed by: INTERNAL MEDICINE

## 2025-04-21 PROCEDURE — 36415 COLL VENOUS BLD VENIPUNCTURE: CPT

## 2025-04-21 PROCEDURE — 700105 HCHG RX REV CODE 258: Performed by: HOSPITALIST

## 2025-04-21 PROCEDURE — 770020 HCHG ROOM/CARE - TELE (206)

## 2025-04-21 PROCEDURE — 80048 BASIC METABOLIC PNL TOTAL CA: CPT

## 2025-04-21 PROCEDURE — 700111 HCHG RX REV CODE 636 W/ 250 OVERRIDE (IP): Performed by: HOSPITALIST

## 2025-04-21 PROCEDURE — 700111 HCHG RX REV CODE 636 W/ 250 OVERRIDE (IP): Mod: JZ | Performed by: HOSPITALIST

## 2025-04-21 PROCEDURE — 83605 ASSAY OF LACTIC ACID: CPT | Mod: 91

## 2025-04-21 PROCEDURE — 700101 HCHG RX REV CODE 250: Performed by: HOSPITALIST

## 2025-04-21 PROCEDURE — 93005 ELECTROCARDIOGRAM TRACING: CPT | Mod: TC | Performed by: HOSPITALIST

## 2025-04-21 PROCEDURE — 84484 ASSAY OF TROPONIN QUANT: CPT

## 2025-04-21 PROCEDURE — 80074 ACUTE HEPATITIS PANEL: CPT

## 2025-04-21 PROCEDURE — 71250 CT THORAX DX C-: CPT

## 2025-04-21 PROCEDURE — 700102 HCHG RX REV CODE 250 W/ 637 OVERRIDE(OP): Performed by: STUDENT IN AN ORGANIZED HEALTH CARE EDUCATION/TRAINING PROGRAM

## 2025-04-21 PROCEDURE — 94640 AIRWAY INHALATION TREATMENT: CPT

## 2025-04-21 PROCEDURE — 85027 COMPLETE CBC AUTOMATED: CPT

## 2025-04-21 PROCEDURE — 700102 HCHG RX REV CODE 250 W/ 637 OVERRIDE(OP): Performed by: HOSPITALIST

## 2025-04-21 PROCEDURE — 700101 HCHG RX REV CODE 250: Performed by: INTERNAL MEDICINE

## 2025-04-21 PROCEDURE — A9270 NON-COVERED ITEM OR SERVICE: HCPCS | Performed by: HOSPITALIST

## 2025-04-21 PROCEDURE — 82962 GLUCOSE BLOOD TEST: CPT | Mod: 91

## 2025-04-21 PROCEDURE — A9270 NON-COVERED ITEM OR SERVICE: HCPCS | Performed by: STUDENT IN AN ORGANIZED HEALTH CARE EDUCATION/TRAINING PROGRAM

## 2025-04-21 PROCEDURE — 0202U NFCT DS 22 TRGT SARS-COV-2: CPT

## 2025-04-21 PROCEDURE — 94760 N-INVAS EAR/PLS OXIMETRY 1: CPT

## 2025-04-21 PROCEDURE — 93010 ELECTROCARDIOGRAM REPORT: CPT | Performed by: INTERNAL MEDICINE

## 2025-04-21 RX ORDER — SODIUM CHLORIDE 9 MG/ML
100 INJECTION, SOLUTION INTRAVENOUS
Status: DISCONTINUED | OUTPATIENT
Start: 2025-04-21 | End: 2025-04-24 | Stop reason: HOSPADM

## 2025-04-21 RX ORDER — B-COMPLEX WITH VITAMIN C
1 TABLET ORAL DAILY
Status: DISCONTINUED | OUTPATIENT
Start: 2025-04-21 | End: 2025-04-24 | Stop reason: HOSPADM

## 2025-04-21 RX ORDER — DEXTROSE MONOHYDRATE 25 G/50ML
25 INJECTION, SOLUTION INTRAVENOUS
Status: DISCONTINUED | OUTPATIENT
Start: 2025-04-21 | End: 2025-04-24 | Stop reason: HOSPADM

## 2025-04-21 RX ORDER — INSULIN LISPRO 100 [IU]/ML
3-15 INJECTION, SOLUTION INTRAVENOUS; SUBCUTANEOUS
Status: DISCONTINUED | OUTPATIENT
Start: 2025-04-21 | End: 2025-04-24 | Stop reason: HOSPADM

## 2025-04-21 RX ORDER — POTASSIUM CHLORIDE 7.45 MG/ML
10 INJECTION INTRAVENOUS ONCE
Status: COMPLETED | OUTPATIENT
Start: 2025-04-21 | End: 2025-04-21

## 2025-04-21 RX ORDER — ETHYL ALCOHOL 62 %
1 SWAB, MEDICATED TOPICAL 2 TIMES DAILY
Status: DISCONTINUED | OUTPATIENT
Start: 2025-04-21 | End: 2025-04-24 | Stop reason: HOSPADM

## 2025-04-21 RX ORDER — SODIUM CHLORIDE 9 MG/ML
500 INJECTION, SOLUTION INTRAVENOUS ONCE
Status: COMPLETED | OUTPATIENT
Start: 2025-04-21 | End: 2025-04-21

## 2025-04-21 RX ORDER — POTASSIUM CHLORIDE 1500 MG/1
20 TABLET, EXTENDED RELEASE ORAL ONCE
Status: COMPLETED | OUTPATIENT
Start: 2025-04-21 | End: 2025-04-21

## 2025-04-21 RX ORDER — M-VIT,TX,IRON,MINS/CALC/FOLIC 27MG-0.4MG
1 TABLET ORAL DAILY
Status: DISCONTINUED | OUTPATIENT
Start: 2025-04-21 | End: 2025-04-21

## 2025-04-21 RX ORDER — INSULIN LISPRO 100 [IU]/ML
3-15 INJECTION, SOLUTION INTRAVENOUS; SUBCUTANEOUS
Status: DISCONTINUED | OUTPATIENT
Start: 2025-04-21 | End: 2025-04-21

## 2025-04-21 RX ORDER — LISINOPRIL 5 MG/1
10 TABLET ORAL 2 TIMES DAILY
Status: DISCONTINUED | OUTPATIENT
Start: 2025-04-21 | End: 2025-04-23

## 2025-04-21 RX ORDER — MULTIVITAMIN WITH IRON
500 TABLET ORAL DAILY
Status: DISCONTINUED | OUTPATIENT
Start: 2025-04-21 | End: 2025-04-24 | Stop reason: HOSPADM

## 2025-04-21 RX ORDER — IPRATROPIUM BROMIDE AND ALBUTEROL SULFATE 2.5; .5 MG/3ML; MG/3ML
3 SOLUTION RESPIRATORY (INHALATION)
Status: DISCONTINUED | OUTPATIENT
Start: 2025-04-21 | End: 2025-04-24 | Stop reason: HOSPADM

## 2025-04-21 RX ORDER — DEXTROSE MONOHYDRATE 25 G/50ML
25 INJECTION, SOLUTION INTRAVENOUS
Status: DISCONTINUED | OUTPATIENT
Start: 2025-04-21 | End: 2025-04-21

## 2025-04-21 RX ADMIN — POTASSIUM CHLORIDE 10 MEQ: 1500 TABLET, EXTENDED RELEASE ORAL at 06:32

## 2025-04-21 RX ADMIN — AMPICILLIN AND SULBACTAM 3 G: 1; 2 INJECTION, POWDER, FOR SOLUTION INTRAMUSCULAR; INTRAVENOUS at 11:29

## 2025-04-21 RX ADMIN — METHYLPREDNISOLONE SODIUM SUCCINATE 20 MG: 40 INJECTION, POWDER, FOR SOLUTION INTRAMUSCULAR; INTRAVENOUS at 06:34

## 2025-04-21 RX ADMIN — ATORVASTATIN CALCIUM 10 MG: 10 TABLET, FILM COATED ORAL at 06:34

## 2025-04-21 RX ADMIN — INSULIN LISPRO 3 UNITS: 100 INJECTION, SOLUTION INTRAVENOUS; SUBCUTANEOUS at 17:52

## 2025-04-21 RX ADMIN — ATORVASTATIN CALCIUM 10 MG: 10 TABLET, FILM COATED ORAL at 17:51

## 2025-04-21 RX ADMIN — POTASSIUM CHLORIDE 10 MEQ: 7.46 INJECTION, SOLUTION INTRAVENOUS at 04:01

## 2025-04-21 RX ADMIN — AMPICILLIN AND SULBACTAM 3 G: 1; 2 INJECTION, POWDER, FOR SOLUTION INTRAMUSCULAR; INTRAVENOUS at 06:45

## 2025-04-21 RX ADMIN — Medication 1000 UNITS: at 06:32

## 2025-04-21 RX ADMIN — AZITHROMYCIN DIHYDRATE 500 MG: 250 TABLET ORAL at 06:34

## 2025-04-21 RX ADMIN — METHYLPREDNISOLONE SODIUM SUCCINATE 20 MG: 40 INJECTION, POWDER, FOR SOLUTION INTRAMUSCULAR; INTRAVENOUS at 11:28

## 2025-04-21 RX ADMIN — HYDROCODONE BITARTRATE AND ACETAMINOPHEN 1 TABLET: 5; 325 TABLET ORAL at 08:57

## 2025-04-21 RX ADMIN — IPRATROPIUM BROMIDE AND ALBUTEROL SULFATE 3 ML: .5; 2.5 SOLUTION RESPIRATORY (INHALATION) at 14:03

## 2025-04-21 RX ADMIN — HEPARIN SODIUM 5000 UNITS: 5000 INJECTION, SOLUTION INTRAVENOUS; SUBCUTANEOUS at 14:27

## 2025-04-21 RX ADMIN — TIZANIDINE 4 MG: 4 TABLET ORAL at 17:51

## 2025-04-21 RX ADMIN — BENZOCAINE AND MENTHOL 1 LOZENGE: 15; 3.6 LOZENGE ORAL at 01:01

## 2025-04-21 RX ADMIN — OMEPRAZOLE 20 MG: 20 CAPSULE, DELAYED RELEASE ORAL at 03:53

## 2025-04-21 RX ADMIN — PREDNISONE 5 MG: 5 TABLET ORAL at 06:32

## 2025-04-21 RX ADMIN — Medication 10 MG: at 21:41

## 2025-04-21 RX ADMIN — POTASSIUM CHLORIDE 10 MEQ: 7.46 INJECTION, SOLUTION INTRAVENOUS at 02:47

## 2025-04-21 RX ADMIN — INSULIN LISPRO 3 UNITS: 100 INJECTION, SOLUTION INTRAVENOUS; SUBCUTANEOUS at 21:35

## 2025-04-21 RX ADMIN — INSULIN LISPRO 10 UNITS: 100 INJECTION, SOLUTION INTRAVENOUS; SUBCUTANEOUS at 06:47

## 2025-04-21 RX ADMIN — Medication 1 APPLICATOR: at 17:51

## 2025-04-21 RX ADMIN — IPRATROPIUM BROMIDE AND ALBUTEROL SULFATE 3 ML: .5; 2.5 SOLUTION RESPIRATORY (INHALATION) at 03:28

## 2025-04-21 RX ADMIN — SODIUM CHLORIDE 500 ML: 9 INJECTION, SOLUTION INTRAVENOUS at 03:55

## 2025-04-21 RX ADMIN — LISINOPRIL 10 MG: 5 TABLET ORAL at 08:59

## 2025-04-21 RX ADMIN — AMPICILLIN AND SULBACTAM 3 G: 1; 2 INJECTION, POWDER, FOR SOLUTION INTRAMUSCULAR; INTRAVENOUS at 00:24

## 2025-04-21 RX ADMIN — IPRATROPIUM BROMIDE AND ALBUTEROL SULFATE 3 ML: .5; 2.5 SOLUTION RESPIRATORY (INHALATION) at 07:09

## 2025-04-21 RX ADMIN — IPRATROPIUM BROMIDE AND ALBUTEROL SULFATE 3 ML: 2.5; .5 SOLUTION RESPIRATORY (INHALATION) at 18:25

## 2025-04-21 RX ADMIN — LISINOPRIL 10 MG: 5 TABLET ORAL at 18:04

## 2025-04-21 RX ADMIN — ESCITALOPRAM OXALATE 20 MG: 10 TABLET ORAL at 06:33

## 2025-04-21 RX ADMIN — HYDROCODONE BITARTRATE AND ACETAMINOPHEN 1 TABLET: 5; 325 TABLET ORAL at 21:27

## 2025-04-21 RX ADMIN — HEPARIN SODIUM 5000 UNITS: 5000 INJECTION, SOLUTION INTRAVENOUS; SUBCUTANEOUS at 21:27

## 2025-04-21 RX ADMIN — IPRATROPIUM BROMIDE AND ALBUTEROL SULFATE 3 ML: .5; 2.5 SOLUTION RESPIRATORY (INHALATION) at 11:09

## 2025-04-21 RX ADMIN — Medication 1 APPLICATOR: at 06:32

## 2025-04-21 RX ADMIN — HYDROCODONE BITARTRATE AND ACETAMINOPHEN 1 TABLET: 5; 325 TABLET ORAL at 15:07

## 2025-04-21 RX ADMIN — OMEPRAZOLE 20 MG: 20 CAPSULE, DELAYED RELEASE ORAL at 16:50

## 2025-04-21 RX ADMIN — TIMOLOL MALEATE 1 DROP: 5 SOLUTION OPHTHALMIC at 06:33

## 2025-04-21 RX ADMIN — METHYLPREDNISOLONE SODIUM SUCCINATE 20 MG: 40 INJECTION, POWDER, FOR SOLUTION INTRAMUSCULAR; INTRAVENOUS at 17:50

## 2025-04-21 RX ADMIN — Medication 1 TABLET: at 15:07

## 2025-04-21 RX ADMIN — LISINOPRIL 2.5 MG: 2.5 TABLET ORAL at 06:33

## 2025-04-21 RX ADMIN — HEPARIN SODIUM 5000 UNITS: 5000 INJECTION, SOLUTION INTRAVENOUS; SUBCUTANEOUS at 06:37

## 2025-04-21 RX ADMIN — POTASSIUM CHLORIDE 20 MEQ: 1500 TABLET, EXTENDED RELEASE ORAL at 08:59

## 2025-04-21 RX ADMIN — METHYLPREDNISOLONE SODIUM SUCCINATE 20 MG: 40 INJECTION, POWDER, FOR SOLUTION INTRAMUSCULAR; INTRAVENOUS at 00:23

## 2025-04-21 RX ADMIN — AMPICILLIN AND SULBACTAM 3 G: 1; 2 INJECTION, POWDER, FOR SOLUTION INTRAMUSCULAR; INTRAVENOUS at 17:59

## 2025-04-21 RX ADMIN — CYANOCOBALAMIN TAB 500 MCG 500 MCG: 500 TAB at 15:07

## 2025-04-21 RX ADMIN — LATANOPROST 1 DROP: 50 SOLUTION OPHTHALMIC at 21:28

## 2025-04-21 ASSESSMENT — PAIN DESCRIPTION - PAIN TYPE
TYPE: ACUTE PAIN

## 2025-04-21 ASSESSMENT — ENCOUNTER SYMPTOMS
SORE THROAT: 0
COUGH: 1
HALLUCINATIONS: 0
ABDOMINAL PAIN: 0
HEADACHES: 0
DEPRESSION: 0
BLOOD IN STOOL: 0
PALPITATIONS: 0
SPUTUM PRODUCTION: 1
SHORTNESS OF BREATH: 1
CHILLS: 0
MYALGIAS: 0
FOCAL WEAKNESS: 0
NAUSEA: 0
DIZZINESS: 0
WEAKNESS: 0
BACK PAIN: 0
FEVER: 0
HEARTBURN: 0
DIARRHEA: 0
VOMITING: 0

## 2025-04-21 ASSESSMENT — FIBROSIS 4 INDEX: FIB4 SCORE: 0.83

## 2025-04-21 NOTE — PROGRESS NOTES
Medication history reviewed with pt. Med rec is complete.  Allergies reviewed, per pt    Pt does home dialysis from [facility] in [facility], called [facility] at [phone] and [phone], they are having a power outage.  Not able to verify if pt is receiving MIRCERA injection or antibiotic        Pt does her dialysis at home on Mon, Tue, Thur, and Friday.  Last treatment at home was 7 days ago.    Called [facility] ([phone]) verify all medications.     Pt reports that she receive her insulin from [facility], called [facility] at [phone] to verify last picked up.  Per [facility] pt last filled INSULIN GLARGINE 100 units/ML on 11/18/2024 for 84 day supply. Last filled HUMULIN R 500 units/ML on 11/29/2023.    Pt reports that she injected her has been injecting both insulins, last injection was on 4/18/2025.    Pt reports that her doctor took her off her CALCITRIOL 0.25MG about 2 months ago.    Patient has not had any outpatient antibiotics in the last 30 days.    Pt is not on any anticoagulants      Dispense history available in EPIC? YES

## 2025-04-21 NOTE — PROGRESS NOTES
0200 - Lab called this RN d/t lactic 4.5, potassium 2.2, and calcium 6.6. Dr. Delgado notified. New orders received for continuous remote tele monitor, stat EKG, troponin, BMP, lactic, and potassium IVPB.    0230 - Charge RN notified this RN of ST elevation in EKG. Tele Charge RN and Dr. Delgado at bedside to assess patient.

## 2025-04-21 NOTE — HOSPITAL COURSE
Hx of COPD, DM2, ESRD transferred to [facility] for SOB and copd exacerbation.  Started on steroids and RT protocol

## 2025-04-21 NOTE — CONSULTS
"[facility] -  CONSULTATION NOTE               Author: Joleen Rendon M.D. Date & Time: 2025  2:29 PM       REASON FOR CONSULTATION:   Inpatient hemodialysis management.    CHIEF COMPLAINT:   \"sob\"    HISTORY OF PRESENT ILLNESS:    Savita Mcclain is a 65 y.o. female with ESRD on HHD (3-4x a week, 2hr 15min tx) who presented 2025 on transfer from [facility] for COPD exacerbation. Labs  hbg 7.8, na 132, K 3.1, Bun 53, Scr 3.35, Ca 8.9 , lactic acid 2.1. CXR with atelectasis in LL BL. Last HD .   No F/C/N/V/CP/SOB.  No melena, hematochezia, hematemesis.  No HA, visual changes, or abdominal pain.    REVIEW OF SYSTEMS:    10 point ROS was performed and is as per HPI or otherwise negative.    PAST MEDICAL HISTORY:   Past Medical History:   Diagnosis Date    Diabetes     Psychiatric disorder     depression    Rheumatoid arthritis(714.0)        PAST SURGICAL HISTORY:   Past Surgical History:   Procedure Laterality Date    AL UPPER GI ENDOSCOPY,BIOPSY Left 6/10/2020    Procedure: GASTROSCOPY, WITH BIOPSY;  Surgeon: Tian Velazquez M.D.;  Location: SURGERY SAME DAY [facility];  Service: Gastroenterology    AL COLONOSCOPY,DIAGNOSTIC Left 6/10/2020    Procedure: COLONOSCOPY;  Surgeon: Tian Velazquez M.D.;  Location: SURGERY SAME DAY [facility];  Service: Gastroenterology    TOE AMPUTATION Left 2000    Amputation, Toe    OTHER ORTHOPEDIC SURGERY      amputation of left toes       FAMILY HISTORY:   No family history on file.    SOCIAL HISTORY:   Social History     Tobacco Use   Smoking Status Former    Current packs/day: 0.00    Average packs/day: 2.0 packs/day for 40.0 years (80.0 ttl pk-yrs)    Types: Cigarettes    Start date: 1980    Quit date: 2015    Years since quittin.8   Smokeless Tobacco Never     Social History     Substance and Sexual Activity   Alcohol Use Not Currently     Social History     Substance and Sexual Activity   Drug Use Never "       HOME MEDICATIONS:   Reviewed and documented in chart.    LABORATORY STUDIES:   Recent Labs     04/21/25  0042 04/21/25  0208   SODIUM 154* 132*   POTASSIUM 2.2* 3.1*   CHLORIDE 103 94*   CO2 18* 24   GLUCOSE 323* 373*   BUN 42* 53*   CREATININE 2.82* 3.35*   CALCIUM 6.6* 8.9       ALLERGIES:  Keflex [cephalexin]    VS:  BP (!) 168/90 Comment: notifed the nurse  Pulse 80   Temp 36.7 °C (98 °F) (Temporal)   Resp 16   Wt 54 kg (119 lb 0.8 oz)   SpO2 97%   BMI 20.43 kg/m²     Physical Exam  Vitals and nursing note reviewed.   Constitutional:       General: She is not in acute distress.     Appearance: She is not ill-appearing.   HENT:      Head: Normocephalic and atraumatic.   Eyes:      General: No scleral icterus.     Conjunctiva/sclera: Conjunctivae normal.   Cardiovascular:      Rate and Rhythm: Normal rate.      Heart sounds: No murmur heard.     No friction rub. No gallop.   Pulmonary:      Effort: No respiratory distress.      Breath sounds: No wheezing or rales.   Abdominal:      General: Bowel sounds are normal. There is no distension.      Tenderness: There is no abdominal tenderness.   Musculoskeletal:         General: No swelling or deformity.      Right lower leg: No edema.   Skin:     Coloration: Skin is not jaundiced or pale.      Findings: No bruising or lesion.   Neurological:      Mental Status: She is oriented to person, place, and time. Mental status is at baseline.   Psychiatric:         Mood and Affect: Mood normal.         Behavior: Behavior normal.        Access: TDC R IJ     FLUID BALANCE:  In: 240 [P.O.:240]  Out: 300     IMAGING:  All imaging reviewed from admission to present day    IMPRESSION:  # ESRD ([facility], in [place], [place])  - Does home dialysis 3-4x a week ([id number])  -R chest permcath  # acute hypoxic respiratory failure 2/2COPD exacerbation   # Anemia of chronic disease   -  Hgb not at goal   # Hypertension  - Continue current medications  -CKD-MBD  # DM with hyperglycemia   #  Hypokalemia , resolved with supplementation     PLAN:                - No iHD today (Mon) . HD tomorrow  - iron level   - renal vitamins   - continue TTS schedule w longer treatments while inpt   - Volume off with dialysis as BP tolerates  - Continue usual anti-hypertensive medications  - EPO 3000U with HD to goal Hgb 10-11  - Transfuse PRN hgb < 7  - phos level in am , PTH  - No dietary protein restrictions, use renal diet     Goal: 1.5g Protein/kg/day  - Fluid restriction 1.5L   - Follow labs with further recommendations to follow    Discussed with RN    Thank you for the consultation!

## 2025-04-21 NOTE — PROGRESS NOTES
Hospital Medicine Daily Progress Note    Date of Service  4/21/2025    Chief Complaint  Savita Mcclain is a 65 y.o. female admitted 4/20/2025 with shortness of breath    Hospital Course  Hx of COPD, DM2, ESRD transferred to [facility] for SOB and copd exacerbation.  Started on steroids and RT protocol    Interval Problem Update  4/21: SOB improved, sugars high, lantus /SSI adjusted.  Pending nephrology eval    I have discussed this patient's plan of care and discharge plan at IDT rounds today with Case Management, Nursing, Nursing leadership, and other members of the IDT team.    Consultants/Specialty  Nephrology    Code Status  DNAR/DNI    Disposition  The patient is not medically cleared for discharge to home or a post-acute facility.  Anticipate discharge to: home with organized home healthcare and close outpatient follow-up    I have placed the appropriate orders for post-discharge needs.    Review of Systems  Review of Systems   Constitutional:  Negative for chills, fever and malaise/fatigue.   HENT:  Negative for sore throat.    Respiratory:  Positive for cough, sputum production and shortness of breath.         Right chest wall pain   Cardiovascular:  Negative for chest pain and palpitations.   Gastrointestinal:  Negative for abdominal pain, blood in stool, diarrhea, heartburn, nausea and vomiting.   Genitourinary:  Negative for dysuria and frequency.   Musculoskeletal:  Negative for back pain and myalgias.   Neurological:  Negative for dizziness, focal weakness, weakness and headaches.   Psychiatric/Behavioral:  Negative for depression and hallucinations.    All other systems reviewed and are negative.       Physical Exam  Temp:  [36.7 °C (98 °F)-36.9 °C (98.4 °F)] 36.7 °C (98 °F)  Pulse:  [] 80  Resp:  [16-18] 16  BP: (109-172)/() 155/96  SpO2:  [89 %-98 %] 97 %    Physical Exam  Constitutional:       General: She is not in acute distress.  HENT:      Nose: Nose normal.      Mouth/Throat:       Mouth: Mucous membranes are dry.   Cardiovascular:      Rate and Rhythm: Normal rate and regular rhythm.      Pulses: Normal pulses.   Pulmonary:      Effort: Pulmonary effort is normal. No respiratory distress.      Breath sounds: No wheezing.   Abdominal:      General: There is no distension.      Palpations: Abdomen is soft.   Musculoskeletal:         General: No swelling.      Cervical back: No muscular tenderness.   Lymphadenopathy:      Cervical: No cervical adenopathy.   Skin:     General: Skin is warm and dry.      Findings: No rash.   Neurological:      General: No focal deficit present.      Mental Status: She is alert and oriented to person, place, and time.      Motor: Weakness present.   Psychiatric:         Mood and Affect: Mood normal.         Thought Content: Thought content normal.         Fluids    Intake/Output Summary (Last 24 hours) at 4/21/2025 1608  Last data filed at 4/21/2025 1507  Gross per 24 hour   Intake 240 ml   Output 600 ml   Net -360 ml        Laboratory  Recent Labs     04/21/25  0042   WBC 9.0   RBC 2.35*   HEMOGLOBIN 7.8*   HEMATOCRIT 23.6*   .4*   MCH 33.2*   MCHC 33.1   RDW 53.6*   PLATELETCT 169   MPV 11.5     Recent Labs     04/21/25  0042 04/21/25  0208   SODIUM 154* 132*   POTASSIUM 2.2* 3.1*   CHLORIDE 103 94*   CO2 18* 24   GLUCOSE 323* 373*   BUN 42* 53*   CREATININE 2.82* 3.35*   CALCIUM 6.6* 8.9                   Imaging  DX-OUTSIDE IMAGES-DX CHEST   Final Result      CT-CHEST (THORAX) W/O    (Results Pending)        Assessment/Plan  * Acute exacerbation of chronic obstructive pulmonary disease (COPD) ([facility])- (present on admission)  Assessment & Plan  Patient is transferred from [facility] for acute COPD exacerbation.  Patient has a longstanding history of smoking 30 to 40 years quit in 2017.  Patient has established COPD  Continue Solu-Medrol, prednisone in a.m. if she continues to improve  Continue IV Unasyn azithromycin  Viral panel  negative    DNR (do not resuscitate)- (present on admission)  Assessment & Plan  Discussed advance directives with the patient she wishes to be DNR CODE STATUS.    Closed fracture of one rib of right side with routine healing- (present on admission)  Assessment & Plan  Right 11th rib fracture that happened about 10 days ago  Encourage I-S   pain management with oxycodone and Tylenol as needed      Meningioma (HCC)- (present on admission)  Assessment & Plan  Chronic meningioma which is stable    ESRD (end stage renal disease) on dialysis (HCC)- (present on admission)  Assessment & Plan  Nephrology has been contacted  Patient gets in home hemodialysis where she lives she has a port in the right upper part of her chest wall    Symptomatic anemia- (present on admission)  Assessment & Plan  Patient has a history of anemia this is chronic from end-stage renal disease  Will continue to monitor H&H if drops low 7 or 21 I will transfuse    Hyperlipidemia- (present on admission)  Assessment & Plan  Low-fat low-cholesterol diet  I will continue patient on Lipitor 10 mg twice daily      Hypertension- (present on admission)  Assessment & Plan  Pressure is not well-controlled  Increase lisinopril to 10   Monitor    Type 2 diabetes mellitus (HCC)- (present on admission)  Assessment & Plan  Sugars high, likely worsened by steroids  Lantus insulin 15 units subcutaneously nightly  -diabetic diet  -diabetic education  -A1c 6.3  -continue with oral antihyperglycemics  -monitor for hypoglycemic episodes and adjust control if he should get low    Hypokalemia- (present on admission)  Assessment & Plan  Replace PO  Repeat in AM    Chronic combined systolic and diastolic congestive heart failure (HCC)- (present on admission)  Assessment & Plan  Watch fluid status  Hold Lasix in the setting of infection         VTE prophylaxis: Heparin    I have performed a physical exam and reviewed and updated ROS and Plan today (4/21/2025). In review of  yesterday's note (4/20/2025), there are no changes except as documented above.    Total time:  51 minutes.  I spent greater than 50% of the time for patient care, counseling, and coordination on this date, including unit/floor time, and face-to-face time with the patient as per interval events and assessment and plan above

## 2025-04-21 NOTE — PROGRESS NOTES
Assumed care of pt at 0715. Received bedside report from Vicki MOELLER. Pt assisted to bathroom SBA. Pt stated her pain is tolerable at this time. R dialysis chest port in place. Discussed plan for possible dialysis today. No other needs at this time, call light in reach, bed in lowest position.

## 2025-04-21 NOTE — CARE PLAN
Problem: Bronchoconstriction  Goal: Improve in air movement and diminished wheezing  Description: Target End Date:  2 to 3 days1.  Implement inhaled treatments2.  Evaluate and manage medication effects  Outcome: Progressing     Problem: Bronchopulmonary Hygiene  Goal: Increase mobilization of retained secretions  Description: Target End Date:  2 to 3 days1.  Perform bronchopulmonary therapy as indicated by assessment2.  Perform airway suctioning3.  Perform actions to maintain patient airway  Outcome: Progressing

## 2025-04-21 NOTE — PROGRESS NOTES
OVERNIGHT HOSPITALIST:    Paged by nursing Staff regarding significantly abnormal morning labs as below.          Patient was admitted yesterday  as a Direct Admission for acute COPD exacerbation.  History of end-stage renal disease and type 2 diabetes.      I added KELLY erwin, stat EKG and remote cardiac monitor but I am wondering if this could be a lab error, given significant abnormalities.  I order stat repeat a BMP and lactic acid now to verify accuracy of results.  Might need to be transferred to telemetry versus ICU for additional management.  Morning Lasix dose was held for now.    I evaluated patient by the bedside.  She is asymptomatic and not having any chest pain.  Her EKG showing left bundle branch block which is not new for her.  Additionally, she mentions that she has a hemodialysis machine at home and performs her hemodialysis session on Monday, Tuesdays, Wednesdays and Thursdays however did not had her sessions on Wednesday and Thursday this week which I find very unusual.  Patient explains me she has a chart where she calculates what needs to be done.  She has a nephrologist in [place] and states that she has been doing this for few years now.  I did verify that she has a hemodialysis catheter to the right upper chest.  She also tells me that she used to do peritoneal dialysis in the past.  Her repeat blood work is much better showing a potassium of 3.1.  Her potassium prior to transfer was 2.9.  I added additional 10 mEq of potassium chloride replacement but will avoid giving her too much potassium given underlying renal disease.  Will continue to do serial lactic acid and troponin.  I also added 500 mL of normal saline for lactic acidosis of 4.5.  Continue to closely monitor.

## 2025-04-21 NOTE — CONSULTS
Pulmonary Consultation    Date of consult: 4/21/2025    Referring Physician  Kaci Hagen D.O.    Reason for Consultation  COPD [employer]     History of Presenting Illness  65 y.o. female who presented 4/20/2025 with increasing work of breathing and hypoxia. She has a history of ESRD but has not been fluid overloaded. She suffered a rib fracture in early April and has had trouble breathing since then.  She reports that she has had some significant pain over that site and feels like it is difficult to cough.  She says that she has been told by a pulmonologist that she had COPD and that she has had PFTs in the past.  She denies a personal history of asthma but states that her brother does have asthma.  I ordered a CT scan to evaluate for pulmonary contusion.  CT scan showed right lower lobe consolidation, slightly worse than previous with mucous plugging bilaterally.  She also has a new right upper lobe pulmonary nodule.  Patient is on no maintenance inhalers.  She lives in [facility] and has previously received care in [place], but states that she is unable to go back to that pulmonologist.  She does have Medicare and [employer], and therefore is able to see us in our clinic.    Code Status  DNAR/DNI    Review of Systems   Constitutional:  Negative for chills, fever and malaise/fatigue.   Respiratory:  Positive for cough, sputum production and shortness of breath. Negative for wheezing.    Cardiovascular:  Positive for chest pain. Negative for orthopnea and leg swelling.   Gastrointestinal:  Negative for nausea and vomiting.   Musculoskeletal:  Positive for back pain.   Neurological:  Negative for dizziness and headaches.   Psychiatric/Behavioral:  Negative for depression and suicidal ideas.        Past Medical History   has a past medical history of Diabetes, Psychiatric disorder, and Rheumatoid arthritis(714.0).    Surgical History   has a past surgical history that includes other orthopedic surgery; pr  upper gi endoscopy,biopsy (Left, 6/10/2020); pr colonoscopy-flexible (Left, 6/10/2020); and toe amputation (Left, 2000).    Family History  family history is not on file.    Social History   reports that she quit smoking about 9 years ago. Her smoking use included cigarettes. She started smoking about 44 years ago. She has a 80 pack-year smoking history. She has never used smokeless tobacco. She reports that she does not currently use alcohol. She reports that she does not use drugs.    Medications  Home Medications       Reviewed by Uriah Garcia M.D. (Physician) on 04/20/25 at 1533  Med List Status: Complete     Medication Last Dose Status   acetaminophen (TYLENOL) 325 MG Tab 4/18/2025 Active   albuterol 108 (90 Base) MCG/ACT Aero Soln inhalation aerosol 4/18/2025 Active   atorvastatin (LIPITOR) 10 MG Tab 4/19/2025 Active   calcium acetate (PHOS-LO) 667 MG Cap 4/18/2025 Active   Cholecalciferol (VITAMIN D3 PO) 4/18/2025 Active   Cyanocobalamin (VITAMIN B-12 PO) 4/18/2025 Active   escitalopram (LEXAPRO) 20 MG tablet 4/18/2025 Active   fluticasone (FLONASE) 50 MCG/ACT nasal spray 4/18/2025 Active   furosemide (LASIX) 40 MG Tab 4/18/2025 Active   HUMULIN R U-500 KWIKPEN 500 UNIT/ML Solution Pen-injector 4/18/2025 Active   HYDROcodone-acetaminophen (NORCO) 5-325 MG Tab per tablet 4/18/2025 Active   insulin glargine 100 UNIT/ML Solution Pen-injector injection 4/18/2025 Active   latanoprost (XALATAN) 0.005 % Solution 4/18/2025 Active   lisinopril (PRINIVIL) 5 MG Tab 4/18/2025 Active   Melatonin 10 MG Tab 4/18/2025 Active   pantoprazole (PROTONIX) 40 MG Tablet Delayed Response 4/18/2025 Active   potassium chloride SA (K-DUR) 10 MEQ Tab CR 4/18/2025 Active   predniSONE (DELTASONE) 5 MG Tab 4/18/2025 Active   SANTYL ointment 4/18/2025 Active   timolol (TIMOPTIC) 0.5 % Solution 4/18/2025 Active   tizanidine (ZANAFLEX) 4 MG Tab 4/18/2025 Active                  Current Facility-Administered Medications   Medication Dose  Route Frequency Provider Last Rate Last Admin    benzocaine-menthol (Cepacol) lozenge 1 Lozenge  1 Lozenge Mouth/Throat Q2HRS PRN Elaine Mcdermott M.D.   1 Lozenge at 04/21/25 0101    Nozin nasal  swab  1 Applicator Each Nostril BID Uriah Garcia M.D.   1 Applicator at 04/21/25 0632    insulin GLARGINE (Lantus,Semglee) injection  18 Units Subcutaneous Q EVENING REGAN CarreroO.        insulin lispro (HumaLOG,AdmeLOG) subcutaneous injection  3-15 Units Subcutaneous 4X/DAY ACHS Kaci Hagen D.O.        And    dextrose 50 % (D50W) injection 25 g  25 g Intravenous Q15 MIN PRN Kaci Hagen D.O.        lisinopril (Prinivil) tablet 10 mg  10 mg Oral BID Kaci Hagen D.O.   10 mg at 04/21/25 0859    atorvastatin (Lipitor) tablet 10 mg  10 mg Oral BID Uriah Garcia M.D.   10 mg at 04/21/25 0634    calcium acetate (Phos-Lo) 667 MG tablet 667 mg  667 mg Oral 3x/week Uriha Garcia M.D.        vitamin D3 (Cholecalciferol) tablet 1,000 Units  1,000 Units Oral DAILY Uriah Garcia M.D.   1,000 Units at 04/21/25 0632    escitalopram (Lexapro) tablet 20 mg  20 mg Oral DAILY Uriah Garcia M.D.   20 mg at 04/21/25 0633    [Held by provider] furosemide (Lasix) tablet 40 mg  40 mg Oral DAILY Uriah Garcia M.D.        HYDROcodone-acetaminophen (Norco) 5-325 MG per tablet 1 Tablet  1 Tablet Oral Q6HRS PRN Uriah Garcia M.D.   1 Tablet at 04/21/25 0857    latanoprost (Xalatan) 0.005 % ophthalmic solution 1 Drop  1 Drop Both Eyes QHS Uriah Garcia M.D.   1 Drop at 04/20/25 2205    melatonin tablet 10 mg  10 mg Oral QHS PRN Uriah Garcia M.D.   10 mg at 04/20/25 2218    omeprazole (PriLOSEC) capsule 20 mg  20 mg Oral BID Levente Levjessica, M.D.   20 mg at 04/21/25 0353    potassium chloride SA (Kdur) tablet 10 mEq  10 mEq Oral DAILY Levente Levjessica, M.D.   10 mEq at 04/21/25 0632    predniSONE (Deltasone) tablet 5 mg  5 mg Oral DAILY Levente Levai, M.D.   5 mg at 04/21/25 0632    timolol (Timoptic) 0.5  % ophthalmic solution 1 Drop  1 Drop Both Eyes QAM Uriah Garcia M.D.   1 Drop at 04/21/25 0633    tizanidine (Zanaflex) tablet 4 mg  4 mg Oral Q EVENING Uriah Garcia M.D.   4 mg at 04/20/25 2205    Respiratory Therapy Consult   Nebulization Continuous RT Uriah Garcia M.D.        ipratropium-albuterol (DUONEB) nebulizer solution  3 mL Nebulization Q4HRS (RT) Uriah Garcia M.D.   3 mL at 04/21/25 0709    albuterol (Proventil) 2.5mg/0.5ml nebulizer solution 2.5 mg  2.5 mg Nebulization Q2HRS PRN (RT) Uriah Garcia M.D.        Respiratory Therapy Consult   Nebulization Continuous RT Uriah Garcia M.D.        heparin injection 5,000 Units  5,000 Units Subcutaneous Q8HRS Uriah Garcia M.D.   5,000 Units at 04/21/25 0637    acetaminophen (Tylenol) tablet 650 mg  650 mg Oral Q6HRS PRN Uriah Garcia M.D.        Pharmacy Consult Request ...Pain Management Review 1 Each  1 Each Other PHARMACY TO DOSE Uriah Garcia M.D.        oxyCODONE immediate-release (Roxicodone) tablet 2.5 mg  2.5 mg Oral Q3HRS PRN Uriah Garcia M.D.        Or    oxyCODONE immediate-release (Roxicodone) tablet 5 mg  5 mg Oral Q3HRS PRN Uriah Garcia M.D.        Or    morphine 4 MG/ML injection 2 mg  2 mg Intravenous Q3HRS PRN Uriah Garcia M.D.        labetalol (Normodyne/Trandate) injection 10 mg  10 mg Intravenous Q4HRS PRN Uriah Garcia M.D.        senna-docusate (Pericolace Or Senokot S) 8.6-50 MG per tablet 2 Tablet  2 Tablet Oral Q EVENING Uriah Garcia M.D.   2 Tablet at 04/20/25 1841    And    polyethylene glycol/lytes (Miralax) Packet 1 Packet  1 Packet Oral QDAY PRN Uriah Garcia M.D.        methylPREDNISolone (SOLU-MEDROL) 40 MG injection 20 mg  20 mg Intravenous Q6HRS Uriah Garcia M.D.   20 mg at 04/21/25 0634    ampicillin/sulbactam (Unasyn) 3 g in  mL IVPB  3 g Intravenous Q6HRS Uriah Garcia M.D.   Stopped at 04/21/25 0715    azithromycin (Zithromax) tablet 500 mg  500 mg Oral DAILY Uriah Garcia M.D.   500 mg at  04/21/25 0634       Allergies  Allergies   Allergen Reactions    Keflex [Cephalexin] Vomiting and Nausea     Nausea/vomiting, lightheadedness       Vital Signs last 24 hours  Temp:  [36.7 °C (98 °F)-36.9 °C (98.4 °F)] 36.9 °C (98.4 °F)  Pulse:  [] 77  Resp:  [16-18] 17  BP: (109-172)/() 168/90  SpO2:  [89 %-98 %] 95 %    Physical Exam  Vitals and nursing note reviewed.   Constitutional:       Appearance: Normal appearance. She is ill-appearing.   HENT:      Head: Normocephalic.   Eyes:      Conjunctiva/sclera: Conjunctivae normal.   Cardiovascular:      Rate and Rhythm: Normal rate and regular rhythm.      Heart sounds: Normal heart sounds.   Pulmonary:      Effort: Pulmonary effort is normal.      Breath sounds: Rhonchi present.   Skin:     General: Skin is warm and dry.   Neurological:      General: No focal deficit present.      Mental Status: She is alert and oriented to person, place, and time. Mental status is at baseline.   Psychiatric:         Mood and Affect: Mood normal.         Behavior: Behavior normal.       Fluids    Intake/Output Summary (Last 24 hours) at 4/21/2025 0911  Last data filed at 4/20/2025 2200  Gross per 24 hour   Intake 240 ml   Output 300 ml   Net -60 ml       Laboratory  Recent Results (from the past 48 hours)   LACTIC ACID    Collection Time: 04/20/25  4:51 PM   Result Value Ref Range    Lactic Acid 1.3 0.5 - 2.0 mmol/L   BLOOD CULTURE    Collection Time: 04/20/25  4:51 PM    Specimen: Peripheral; Blood   Result Value Ref Range    Significant Indicator NEG     Source BLD     Site PERIPHERAL     Culture Result       No Growth  Note: Blood cultures are incubated for 5 days and  are monitored continuously.Positive blood cultures  are called to the RN and reported as soon as  they are identified.  Blood culture testing and Gram stain, if indicated, are  performed at [facility],  [address] [place] [address].  Positive blood  cultures are sent  to [facility],  [address] [place] [address], for organism identification  and susceptibility testing.     BLOOD CULTURE    Collection Time: 04/20/25  4:51 PM    Specimen: Peripheral; Blood   Result Value Ref Range    Significant Indicator NEG     Source BLD     Site PERIPHERAL     Culture Result       No Growth  Note: Blood cultures are incubated for 5 days and  are monitored continuously.Positive blood cultures  are called to the RN and reported as soon as  they are identified.  Blood culture testing and Gram stain, if indicated, are  performed at [facility],  [address] [place] [address].  Positive blood  cultures are sent to [facility],  [address] [place] [address], for organism identification  and susceptibility testing.     MAGNESIUM    Collection Time: 04/20/25  4:51 PM   Result Value Ref Range    Magnesium 1.9 1.5 - 2.5 mg/dL   PROCALCITONIN    Collection Time: 04/20/25  4:51 PM   Result Value Ref Range    Procalcitonin 0.61 (H) <0.25 ng/mL   POCT glucose device results    Collection Time: 04/20/25  6:50 PM   Result Value Ref Range    POC Glucose, Blood 401 (H) 65 - 99 mg/dL   POCT glucose device results    Collection Time: 04/20/25 10:10 PM   Result Value Ref Range    POC Glucose, Blood 430 (H) 65 - 99 mg/dL   LACTIC ACID    Collection Time: 04/21/25 12:42 AM   Result Value Ref Range    Lactic Acid 4.5 (HH) 0.5 - 2.0 mmol/L   Basic Metabolic Panel    Collection Time: 04/21/25 12:42 AM   Result Value Ref Range    Sodium 154 (H) 135 - 145 mmol/L    Potassium 2.2 (LL) 3.6 - 5.5 mmol/L    Chloride 103 96 - 112 mmol/L    Co2 18 (L) 20 - 33 mmol/L    Glucose 323 (H) 65 - 99 mg/dL    Bun 42 (H) 8 - 22 mg/dL    Creatinine 2.82 (H) 0.50 - 1.40 mg/dL    Calcium 6.6 (LL) 8.5 - 10.5 mg/dL    Anion Gap 33.0 (H) 7.0 - 16.0   CBC WITHOUT DIFFERENTIAL    Collection Time: 04/21/25 12:42 AM   Result Value Ref Range    WBC 9.0 4.8 - 10.8 K/uL    RBC 2.35  (L) 4.20 - 5.40 M/uL    Hemoglobin 7.8 (L) 12.0 - 16.0 g/dL    Hematocrit 23.6 (L) 37.0 - 47.0 %    .4 (H) 81.4 - 97.8 fL    MCH 33.2 (H) 27.0 - 33.0 pg    MCHC 33.1 32.2 - 35.5 g/dL    RDW 53.6 (H) 35.9 - 50.0 fL    Platelet Count 169 164 - 446 K/uL    MPV 11.5 9.0 - 12.9 fL   ESTIMATED GFR    Collection Time: 25 12:42 AM   Result Value Ref Range    GFR (CKD-EPI) 18 (A) >60 mL/min/1.73 m 2   TROPONIN    Collection Time: 25 12:42 AM   Result Value Ref Range    Troponin T 82 (H) 6 - 19 ng/L   Basic Metabolic Panel    Collection Time: 25  2:08 AM   Result Value Ref Range    Sodium 132 (L) 135 - 145 mmol/L    Potassium 3.1 (L) 3.6 - 5.5 mmol/L    Chloride 94 (L) 96 - 112 mmol/L    Co2 24 20 - 33 mmol/L    Glucose 373 (H) 65 - 99 mg/dL    Bun 53 (H) 8 - 22 mg/dL    Creatinine 3.35 (H) 0.50 - 1.40 mg/dL    Calcium 8.9 8.5 - 10.5 mg/dL    Anion Gap 14.0 7.0 - 16.0   LACTIC ACID    Collection Time: 25  2:08 AM   Result Value Ref Range    Lactic Acid 4.2 (HH) 0.5 - 2.0 mmol/L   ESTIMATED GFR    Collection Time: 25  2:08 AM   Result Value Ref Range    GFR (CKD-EPI) 15 (A) >60 mL/min/1.73 m 2   EKG    Collection Time: 25  2:20 AM   Result Value Ref Range    Report       [facility]    Test Date:  2025  Pt Name:    CAROLYNE SELLERS                [place] [facility]  MRN:        [id number]                      Room:       2211  Gender:     Female                       Technician: [id number]  :        1960                   Requested By:ZAID PATEL  Order #:    [id number]                    Kae MD:    Measurements  Intervals                                Axis  Rate:       70                           P:          53  MS:         177                          QRS:        -39  QRSD:       159                          T:          127  QT:         492  QTc:        531    Interpretive Statements  Sinus rhythm  Left bundle branch block  Compared to ECG 2024  19:08:05  No significant changes     LACTIC ACID    Collection Time: 04/21/25  6:16 AM   Result Value Ref Range    Lactic Acid 2.1 (H) 0.5 - 2.0 mmol/L   TROPONIN    Collection Time: 04/21/25  6:16 AM   Result Value Ref Range    Troponin T 88 (H) 6 - 19 ng/L   POCT glucose device results    Collection Time: 04/21/25  6:44 AM   Result Value Ref Range    POC Glucose, Blood 310 (H) 65 - 99 mg/dL       Imaging  CT-CHEST (THORAX) W/O   Final Result      1.  Irregularity of ossification between the inferior aspect of the scapula and ribs which may be secondary to chronic scapular fracture or myositis ossificans. This is unchanged from previous exam.      2.   1 cm irregular nodular density seen right upper lobe.      3.  Wedge-shaped airspace opacity dependently in the right lower lobe consistent with pneumonitis, atelectasis, and/or parenchymal scarring.      4.  Curvilinear opacities in the lingula and dependently in the left lower lobe likely atelectasis.      5.  Hepatocellular disease.      6.  Osteoporosis.      7.  Mild sigmoid scoliosis.      8.  Coronary artery calcification.      Fleischner Society pulmonary nodule recommendations:   Low and High Risk: Consider CT at 3 months, PET/CT, or tissue sampling.      Low Risk - Minimal or absent history of smoking and of other known risk factors.      High Risk - History of smoking or of other known risk factors.      Note: These recommendations do not apply to lung cancer screening, patients with immunosuppression, or patients with known primary cancer.      Fleischner Society 2017 Guidelines for Management of Incidentally Detected Pulmonary Nodules in Adults         DX-OUTSIDE IMAGES-DX CHEST   Final Result          Assessment/Plan  #Acute hypoxemic respiratory failure   #Probable asthma v COPD  #Pulmonary nodule - new since January   #Recent rib fracture  #Mucus plugging on CT   #Pneumonia, likely due to splinting for rib fractures   #ESRD on HD, not currently fluid  overloaded     Titrate O2 to maintain sats 88-92%  Will decrease steroids to prednisone 40 mg daily   Continue with Unasyn   Start PEP for mucus plugging, I do not think that she can tolerate IPV   Pain control for rib fractures   Start dulera with a spacer   Patient needs to be seen outpatient with pulmonary, she can be seen in our office if she wishes - she needs outpatient PFTs and a repeat CT     __________  This note was generated using voice recognition software which has a chance of producing errors of grammar and content.  I have made every reasonable attempt to find and correct any errors, but it should be expected that some may not be found prior to finalization of this note.    Anita Fong, DO   Pulmonary and Critical Care

## 2025-04-21 NOTE — PROGRESS NOTES
Bedside report received from SAJAN Tee. Patient resting in bed. Call bell within reach, bed alarm on and in place. All belongings within reach for patient. No further needs at this time.

## 2025-04-21 NOTE — PROGRESS NOTES
Call received from Sonali that pt has 1 blood culture bottle with gram + cocci. Dr. Hagen notified

## 2025-04-22 PROBLEM — D64.9 ANEMIA: Status: ACTIVE | Noted: 2025-04-22

## 2025-04-22 PROBLEM — Z71.89 ADVANCE CARE PLANNING: Status: ACTIVE | Noted: 2025-04-22

## 2025-04-22 PROBLEM — R91.1 PULMONARY NODULE: Status: ACTIVE | Noted: 2025-04-22

## 2025-04-22 LAB
ALBUMIN SERPL BCP-MCNC: 3.5 G/DL (ref 3.2–4.9)
ALBUMIN/GLOB SERPL: 1.4 G/DL
ALP SERPL-CCNC: 103 U/L (ref 30–99)
ALT SERPL-CCNC: 66 U/L (ref 2–50)
ANION GAP SERPL CALC-SCNC: 18 MMOL/L (ref 7–16)
AST SERPL-CCNC: 67 U/L (ref 12–45)
BILIRUB SERPL-MCNC: <0.2 MG/DL (ref 0.1–1.5)
BUN SERPL-MCNC: 68 MG/DL (ref 8–22)
CALCIUM ALBUM COR SERPL-MCNC: 9.6 MG/DL (ref 8.5–10.5)
CALCIUM SERPL-MCNC: 9.2 MG/DL (ref 8.5–10.5)
CHLORIDE SERPL-SCNC: 95 MMOL/L (ref 96–112)
CO2 SERPL-SCNC: 19 MMOL/L (ref 20–33)
CREAT SERPL-MCNC: 3.5 MG/DL (ref 0.5–1.4)
ERYTHROCYTE [DISTWIDTH] IN BLOOD BY AUTOMATED COUNT: 54.2 FL (ref 35.9–50)
EST. AVERAGE GLUCOSE BLD GHB EST-MCNC: 180 MG/DL
GFR SERPLBLD CREATININE-BSD FMLA CKD-EPI: 14 ML/MIN/1.73 M 2
GLOBULIN SER CALC-MCNC: 2.5 G/DL (ref 1.9–3.5)
GLUCOSE BLD STRIP.AUTO-MCNC: 124 MG/DL (ref 65–99)
GLUCOSE BLD STRIP.AUTO-MCNC: 164 MG/DL (ref 65–99)
GLUCOSE BLD STRIP.AUTO-MCNC: 251 MG/DL (ref 65–99)
GLUCOSE BLD STRIP.AUTO-MCNC: 98 MG/DL (ref 65–99)
GLUCOSE SERPL-MCNC: 143 MG/DL (ref 65–99)
HBA1C MFR BLD: 7.9 % (ref 4–5.6)
HCT VFR BLD AUTO: 26.1 % (ref 37–47)
HGB BLD-MCNC: 8.5 G/DL (ref 12–16)
IRON SATN MFR SERPL: 81 % (ref 15–55)
IRON SERPL-MCNC: 133 UG/DL (ref 40–170)
MCH RBC QN AUTO: 32.9 PG (ref 27–33)
MCHC RBC AUTO-ENTMCNC: 32.6 G/DL (ref 32.2–35.5)
MCV RBC AUTO: 101.2 FL (ref 81.4–97.8)
PHOSPHATE SERPL-MCNC: 3.2 MG/DL (ref 2.5–4.5)
PLATELET # BLD AUTO: 222 K/UL (ref 164–446)
PMV BLD AUTO: 12 FL (ref 9–12.9)
POTASSIUM SERPL-SCNC: 4.2 MMOL/L (ref 3.6–5.5)
PROT SERPL-MCNC: 6 G/DL (ref 6–8.2)
PTH-INTACT SERPL-MCNC: 113 PG/ML (ref 14–72)
RBC # BLD AUTO: 2.58 M/UL (ref 4.2–5.4)
SODIUM SERPL-SCNC: 132 MMOL/L (ref 135–145)
TIBC SERPL-MCNC: 164 UG/DL (ref 250–450)
UIBC SERPL-MCNC: 31 UG/DL (ref 110–370)
WBC # BLD AUTO: 15.2 K/UL (ref 4.8–10.8)

## 2025-04-22 PROCEDURE — 700111 HCHG RX REV CODE 636 W/ 250 OVERRIDE (IP): Performed by: STUDENT IN AN ORGANIZED HEALTH CARE EDUCATION/TRAINING PROGRAM

## 2025-04-22 PROCEDURE — 5A1D70Z PERFORMANCE OF URINARY FILTRATION, INTERMITTENT, LESS THAN 6 HOURS PER DAY: ICD-10-PCS | Performed by: STUDENT IN AN ORGANIZED HEALTH CARE EDUCATION/TRAINING PROGRAM

## 2025-04-22 PROCEDURE — 700102 HCHG RX REV CODE 250 W/ 637 OVERRIDE(OP): Performed by: HOSPITALIST

## 2025-04-22 PROCEDURE — A9270 NON-COVERED ITEM OR SERVICE: HCPCS | Performed by: HOSPITALIST

## 2025-04-22 PROCEDURE — A9270 NON-COVERED ITEM OR SERVICE: HCPCS | Performed by: INTERNAL MEDICINE

## 2025-04-22 PROCEDURE — 700102 HCHG RX REV CODE 250 W/ 637 OVERRIDE(OP): Performed by: INTERNAL MEDICINE

## 2025-04-22 PROCEDURE — 83036 HEMOGLOBIN GLYCOSYLATED A1C: CPT

## 2025-04-22 PROCEDURE — 99222 1ST HOSP IP/OBS MODERATE 55: CPT | Performed by: INTERNAL MEDICINE

## 2025-04-22 PROCEDURE — 80053 COMPREHEN METABOLIC PANEL: CPT

## 2025-04-22 PROCEDURE — 700101 HCHG RX REV CODE 250: Performed by: INTERNAL MEDICINE

## 2025-04-22 PROCEDURE — 94669 MECHANICAL CHEST WALL OSCILL: CPT

## 2025-04-22 PROCEDURE — 700102 HCHG RX REV CODE 250 W/ 637 OVERRIDE(OP): Performed by: STUDENT IN AN ORGANIZED HEALTH CARE EDUCATION/TRAINING PROGRAM

## 2025-04-22 PROCEDURE — 700105 HCHG RX REV CODE 258: Performed by: HOSPITALIST

## 2025-04-22 PROCEDURE — 94640 AIRWAY INHALATION TREATMENT: CPT

## 2025-04-22 PROCEDURE — 85027 COMPLETE CBC AUTOMATED: CPT

## 2025-04-22 PROCEDURE — 82962 GLUCOSE BLOOD TEST: CPT | Mod: 91

## 2025-04-22 PROCEDURE — A9270 NON-COVERED ITEM OR SERVICE: HCPCS | Performed by: STUDENT IN AN ORGANIZED HEALTH CARE EDUCATION/TRAINING PROGRAM

## 2025-04-22 PROCEDURE — 84100 ASSAY OF PHOSPHORUS: CPT

## 2025-04-22 PROCEDURE — 94664 DEMO&/EVAL PT USE INHALER: CPT

## 2025-04-22 PROCEDURE — 700111 HCHG RX REV CODE 636 W/ 250 OVERRIDE (IP): Performed by: HOSPITALIST

## 2025-04-22 PROCEDURE — 83970 ASSAY OF PARATHORMONE: CPT

## 2025-04-22 PROCEDURE — 83550 IRON BINDING TEST: CPT

## 2025-04-22 PROCEDURE — 770020 HCHG ROOM/CARE - TELE (206)

## 2025-04-22 PROCEDURE — 36415 COLL VENOUS BLD VENIPUNCTURE: CPT

## 2025-04-22 PROCEDURE — 82728 ASSAY OF FERRITIN: CPT

## 2025-04-22 PROCEDURE — 83540 ASSAY OF IRON: CPT

## 2025-04-22 PROCEDURE — 94760 N-INVAS EAR/PLS OXIMETRY 1: CPT

## 2025-04-22 RX ORDER — HEPARIN SODIUM 1000 [USP'U]/ML
2200 INJECTION, SOLUTION INTRAVENOUS; SUBCUTANEOUS
Status: DISCONTINUED | OUTPATIENT
Start: 2025-04-22 | End: 2025-04-24 | Stop reason: HOSPADM

## 2025-04-22 RX ORDER — FUROSEMIDE 40 MG/1
40 TABLET ORAL DAILY
Status: DISCONTINUED | OUTPATIENT
Start: 2025-04-22 | End: 2025-04-24 | Stop reason: HOSPADM

## 2025-04-22 RX ORDER — PREDNISONE 20 MG/1
40 TABLET ORAL DAILY
Status: COMPLETED | OUTPATIENT
Start: 2025-04-23 | End: 2025-04-24

## 2025-04-22 RX ORDER — AMOXICILLIN AND CLAVULANATE POTASSIUM 500; 125 MG/1; MG/1
1 TABLET, FILM COATED ORAL EVERY 12 HOURS
Status: DISCONTINUED | OUTPATIENT
Start: 2025-04-23 | End: 2025-04-24 | Stop reason: HOSPADM

## 2025-04-22 RX ORDER — HEPARIN SODIUM 1000 [USP'U]/ML
2300 INJECTION, SOLUTION INTRAVENOUS; SUBCUTANEOUS
Status: DISCONTINUED | OUTPATIENT
Start: 2025-04-22 | End: 2025-04-24 | Stop reason: HOSPADM

## 2025-04-22 RX ADMIN — LISINOPRIL 10 MG: 5 TABLET ORAL at 05:56

## 2025-04-22 RX ADMIN — HYDROCODONE BITARTRATE AND ACETAMINOPHEN 1 TABLET: 5; 325 TABLET ORAL at 21:38

## 2025-04-22 RX ADMIN — PREDNISONE 5 MG: 5 TABLET ORAL at 05:56

## 2025-04-22 RX ADMIN — AZITHROMYCIN DIHYDRATE 500 MG: 250 TABLET ORAL at 05:55

## 2025-04-22 RX ADMIN — ATORVASTATIN CALCIUM 10 MG: 10 TABLET, FILM COATED ORAL at 17:38

## 2025-04-22 RX ADMIN — HEPARIN SODIUM 2200 UNITS: 1000 INJECTION, SOLUTION INTRAVENOUS; SUBCUTANEOUS at 12:29

## 2025-04-22 RX ADMIN — INSULIN LISPRO 3 UNITS: 100 INJECTION, SOLUTION INTRAVENOUS; SUBCUTANEOUS at 06:01

## 2025-04-22 RX ADMIN — Medication 1 TABLET: at 05:55

## 2025-04-22 RX ADMIN — METHYLPREDNISOLONE SODIUM SUCCINATE 20 MG: 40 INJECTION, POWDER, FOR SOLUTION INTRAMUSCULAR; INTRAVENOUS at 06:07

## 2025-04-22 RX ADMIN — LISINOPRIL 10 MG: 5 TABLET ORAL at 17:38

## 2025-04-22 RX ADMIN — HEPARIN SODIUM 5000 UNITS: 5000 INJECTION, SOLUTION INTRAVENOUS; SUBCUTANEOUS at 14:00

## 2025-04-22 RX ADMIN — AMPICILLIN AND SULBACTAM 3 G: 1; 2 INJECTION, POWDER, FOR SOLUTION INTRAMUSCULAR; INTRAVENOUS at 06:11

## 2025-04-22 RX ADMIN — TIZANIDINE 4 MG: 4 TABLET ORAL at 17:38

## 2025-04-22 RX ADMIN — IPRATROPIUM BROMIDE AND ALBUTEROL SULFATE 3 ML: 2.5; .5 SOLUTION RESPIRATORY (INHALATION) at 19:06

## 2025-04-22 RX ADMIN — FUROSEMIDE 40 MG: 40 TABLET ORAL at 12:32

## 2025-04-22 RX ADMIN — HYDROCODONE BITARTRATE AND ACETAMINOPHEN 1 TABLET: 5; 325 TABLET ORAL at 09:24

## 2025-04-22 RX ADMIN — Medication 1 APPLICATOR: at 17:38

## 2025-04-22 RX ADMIN — ATORVASTATIN CALCIUM 10 MG: 10 TABLET, FILM COATED ORAL at 05:56

## 2025-04-22 RX ADMIN — HEPARIN SODIUM 5000 UNITS: 5000 INJECTION, SOLUTION INTRAVENOUS; SUBCUTANEOUS at 05:55

## 2025-04-22 RX ADMIN — POTASSIUM CHLORIDE 10 MEQ: 1500 TABLET, EXTENDED RELEASE ORAL at 05:55

## 2025-04-22 RX ADMIN — ESCITALOPRAM OXALATE 20 MG: 10 TABLET ORAL at 05:56

## 2025-04-22 RX ADMIN — HEPARIN SODIUM 5000 UNITS: 5000 INJECTION, SOLUTION INTRAVENOUS; SUBCUTANEOUS at 21:38

## 2025-04-22 RX ADMIN — EPOETIN ALFA-EPBX 3000 UNITS: 3000 INJECTION, SOLUTION INTRAVENOUS; SUBCUTANEOUS at 09:42

## 2025-04-22 RX ADMIN — Medication 667 MG: at 21:38

## 2025-04-22 RX ADMIN — HYDROCODONE BITARTRATE AND ACETAMINOPHEN 1 TABLET: 5; 325 TABLET ORAL at 15:37

## 2025-04-22 RX ADMIN — LATANOPROST 1 DROP: 50 SOLUTION OPHTHALMIC at 19:57

## 2025-04-22 RX ADMIN — HEPARIN SODIUM 2300 UNITS: 1000 INJECTION, SOLUTION INTRAVENOUS; SUBCUTANEOUS at 12:29

## 2025-04-22 RX ADMIN — ACETAMINOPHEN 650 MG: 325 TABLET, FILM COATED ORAL at 00:28

## 2025-04-22 RX ADMIN — Medication 1000 UNITS: at 05:56

## 2025-04-22 RX ADMIN — OMEPRAZOLE 20 MG: 20 CAPSULE, DELAYED RELEASE ORAL at 04:48

## 2025-04-22 RX ADMIN — TIMOLOL MALEATE 1 DROP: 5 SOLUTION OPHTHALMIC at 05:57

## 2025-04-22 RX ADMIN — METHYLPREDNISOLONE SODIUM SUCCINATE 20 MG: 40 INJECTION, POWDER, FOR SOLUTION INTRAMUSCULAR; INTRAVENOUS at 00:22

## 2025-04-22 RX ADMIN — MOMETASONE FUROATE AND FORMOTEROL FUMARATE DIHYDRATE 2 PUFF: 200; 5 AEROSOL RESPIRATORY (INHALATION) at 19:06

## 2025-04-22 RX ADMIN — Medication 1 APPLICATOR: at 06:07

## 2025-04-22 RX ADMIN — IPRATROPIUM BROMIDE AND ALBUTEROL SULFATE 3 ML: 2.5; .5 SOLUTION RESPIRATORY (INHALATION) at 07:43

## 2025-04-22 RX ADMIN — INSULIN LISPRO 7 UNITS: 100 INJECTION, SOLUTION INTRAVENOUS; SUBCUTANEOUS at 17:39

## 2025-04-22 RX ADMIN — OMEPRAZOLE 20 MG: 20 CAPSULE, DELAYED RELEASE ORAL at 15:22

## 2025-04-22 RX ADMIN — CYANOCOBALAMIN TAB 500 MCG 500 MCG: 500 TAB at 05:56

## 2025-04-22 RX ADMIN — Medication 10 MG: at 19:56

## 2025-04-22 RX ADMIN — AMPICILLIN AND SULBACTAM 3 G: 1; 2 INJECTION, POWDER, FOR SOLUTION INTRAMUSCULAR; INTRAVENOUS at 00:25

## 2025-04-22 ASSESSMENT — FIBROSIS 4 INDEX: FIB4 SCORE: 2.38

## 2025-04-22 ASSESSMENT — PAIN DESCRIPTION - PAIN TYPE
TYPE: CHRONIC PAIN;SURGICAL PAIN
TYPE: ACUTE PAIN;CHRONIC PAIN
TYPE: ACUTE PAIN
TYPE: ACUTE PAIN;CHRONIC PAIN
TYPE: ACUTE PAIN
TYPE: ACUTE PAIN;CHRONIC PAIN
TYPE: ACUTE PAIN
TYPE: ACUTE PAIN

## 2025-04-22 ASSESSMENT — ENCOUNTER SYMPTOMS
BACK PAIN: 0
ORTHOPNEA: 0
DEPRESSION: 0
NAUSEA: 0
WHEEZING: 0
ABDOMINAL PAIN: 0
MYALGIAS: 0
HEARTBURN: 0
COUGH: 1
SHORTNESS OF BREATH: 1
FOCAL WEAKNESS: 0
DIARRHEA: 0
FEVER: 0
VOMITING: 0
HEADACHES: 0
PALPITATIONS: 0
HALLUCINATIONS: 0
DIZZINESS: 0
WEAKNESS: 0
BLOOD IN STOOL: 0
SORE THROAT: 0
SPUTUM PRODUCTION: 1
BACK PAIN: 1
CHILLS: 0

## 2025-04-22 NOTE — PROGRESS NOTES
[facility] Progress Note     Hemodialysis treatment  x 3 hours as ordered per Dr. BANDAR Rendon  Treatment initiated at 0928 and ended at 1228 .      Pt tolerated tx well. No issues with tx or access. Pt alert with vss after tx ended.   See e-flow sheets for further details.     Net UF : 2000 mL     Post tx CVC care: Right chest tunneled HD ports cleansed per protocol, flushed with NS, locked with Heparin 1ml/1000 unit, clamped and capped. CVC dressing changed per protocol, CDI. Aspirate Heparin prior to next CVC use.     Report given to primary RN. Lora

## 2025-04-22 NOTE — CARE PLAN
The patient is Stable - Low risk of patient condition declining or worsening    Shift Goals  Clinical Goals: Patient will maintain SpO2 at or above 88% on room air and manage pain to tolerable level during shift  Patient Goals: Pain control  Family Goals: MARTIN    Progress made toward(s) clinical / shift goals: Patient maintaining SpO2 at or above 88% on room air. Patient managed pain to tolerable level with PRN norco, PRN tylenol, and rest.     Patient is not progressing towards the following goals: N/A

## 2025-04-22 NOTE — PROGRESS NOTES
Received report from night shift nurseVicki. Assumed pt care at 0715. Pt is A&Ox4, resting comfortably in bed. Pt on RA. No signs of SOB/respiratory distress. Labs, VS, medications reviewed.  Fall precautions in place. Bed at lowest position. Call light and personal belongings within reach. Plan of care discussed, no further concerns at this time.

## 2025-04-22 NOTE — PROGRESS NOTES
"[facility] -  PROGRESS NOTE               Author: NORAH Snyder Date & Time: 4/22/2025  2:01 PM     HPI:  Savita Mcclain is a 65 y.o. female with ESRD on HHD (3-4x a week, 2hr 15min tx) who presented 4/20/2025 on transfer from [facility] for COPD exacerbation. Labs  hbg 7.8, na 132, K 3.1, Bun 53, Scr 3.35, Ca 8.9 , lactic acid 2.1. CXR with atelectasis in LL BL. Last HD 4/17.   No F/C/N/V/CP/SOB.  No melena, hematochezia, hematemesis.  No HA, visual changes, or abdominal pain.    DAILY NEPHROLOGY SUMMARY:  4/21 - Consult done  4/22 - HD this am. UF 2L. Pt feeling much better. On RA. C/O being tired, not getting much sleep here. Denies N/V/D.      REVIEW OF SYSTEMS:    10 point ROS reviewed and is as per HPI/daily summary or otherwise negative    PMH/PSH/SH/FH: Reviewed and unchanged since admission note  CURRENT MEDICATIONS: Reviewed from admission to present day    VS:  BP (!) 169/89 Comment: notifed the nurse  Pulse 79   Temp 36.4 °C (97.6 °F) (Oral)   Resp 17   Ht 1.626 m (5' 4\")   Wt 55 kg (121 lb 4.1 oz)   SpO2 92%   BMI 20.81 kg/m²   Physical Exam    FLUID BALANCE:  In: -   Out: 300     LABS:  Recent Labs     04/21/25  0042 04/21/25  0208 04/22/25  0147   SODIUM 154* 132* 132*   POTASSIUM 2.2* 3.1* 4.2   CHLORIDE 103 94* 95*   CO2 18* 24 19*   GLUCOSE 323* 373* 143*   BUN 42* 53* 68*   CREATININE 2.82* 3.35* 3.50*   CALCIUM 6.6* 8.9 9.2     Recent Labs     04/21/25  0042 04/22/25  0147   WBC 9.0 15.2*   RBC 2.35* 2.58*   HEMOGLOBIN 7.8* 8.5*   HEMATOCRIT 23.6* 26.1*   .4* 101.2*   MCH 33.2* 32.9   MCHC 33.1 32.6   RDW 53.6* 54.2*   PLATELETCT 169 222   MPV 11.5 12.0       IMPRESSION:  # ESRD ([facility], in [place], [place])  - Does home dialysis 3-4x a week ([id number])  -R chest permcath  # acute hypoxic respiratory failure 2/2COPD exacerbation   # Anemia of chronic disease   - Hgb not at goal   - Ferritin 928, isat 81%  # Hypertension  - Continue " current medications  # CKD-MBD  - , po4 3.2  # DM with hyperglycemia   # Hypokalemia, resolved   - Manage with dialysis      Recommendations:  - iHD today (Tues), treatment completed   - Avoid iron supplements   - Continue TTS schedule w longer treatments while inpt   - Volume off with dialysis as BP tolerates  - Continue usual anti-hypertensive medications  - EPO 3000U with HD to goal Hgb 10-11  - Transfuse PRN hgb < 7  - No dietary protein restrictions, use renal diet     Goal: 1.5g Protein/kg/day  - Fluid restriction 1.5L   - Follow labs with further recommendations to follow

## 2025-04-22 NOTE — RESPIRATORY CARE
"   COPD EDUCATION by COPD CLINICAL EDUCATOR  4/22/2025 at 9:41 AM by Deborah Cheng, RRT     Patient reviewed by COPD education team. Patient does not have a formal history or diagnosis of COPD and is a former smoker.  A short intervention was completed with this patient covering: What is COPD (how the lungs work), daily medications rescue and maintenance, breathing techniques, infection prevention and oxygen safety were covered in detail.  A comprehensive packet including information about COPD, treatments, and oxygen safety was given.       Provided spacer with instruction for use, care, and cleaning.  A personalized \"COPD Action Plan\" was reviewed with and provided to the patient.  Encourage patient to follow up with Pulmonologist when returns back to [facility].    COPD Screen  COPD Risk Screening  Do you have a history of COPD?: No  COPD Population Screener  During the past 4 weeks, how much did you feel short of breath?: Some of the time  Do you ever cough up any mucus or phlegm?: No/only with occasional colds or infections  In the past 12 months, you do less than you used to because of your breathing problems: Disagree/unsure  Have you smoked at least 100 cigarettes in your entire life?: Yes  How old are you?: 60+  COPD Screening Score: 5    COPD Assessment  COPD Clinical Specialists ONLY  COPD Education Initiated: Yes--Short Intervention (Pt has no fromal dx COPD, NO PFT, NO LABA/LAMA/ICS, hx smoking 1/2pk a day 30-40 yrs, is being tx at [facility] for Esophageal Cancer of some sort, on dialysis end stage came in w/BNP 51,000 missing 2 days, went over COPD, spacer, neb, Action Plan)  Is this a COPD exacerbation patient?: Yes (Yes untreated COPD, volume overload, not taking any LABA/LAMA/ICS will start and pt to follow up in [place] w/PCP)  DME Company: NONE  Physician Name: Yinka Tapia M.D.  Pulmonologist Name: Dr Johnny Grimes MD Pulmonary in [place] - pt to set up appt when back home  Referrals Initiated: " "Yes  Pulmonary Rehab: N/A  Smoking Cessation: N/A  Hospice: Declined  Home Health Care: N/A  Mobile Urgent Care Services: N/A  Geriatric Specialty Group: N/A  Private In-Home Care Agency: N/A  $ Demo/Eval of SVN's, MDI's and Aerosols: Yes  (OP) Pulmonary Function Testing: Yes (Encourage pt to follow up!)  Interdisciplinary Rounds: Attendance at Rounds (30 Min)    PFT Results    No results found for: \"PFT\"    Meds to Beds  [facility] provides bedside medication delivery for all eligible patients at discharge and you have been automatically enrolled in the Meds to Beds Program. Would you like to opt out of this program for any reason?: No - Stay Opted In     MY COPD ACTION PLAN     It is recommended that patients and physicians/healthcare providers complete this action plan together. This plan should be discussed at each physician visit and updated as needed.    The green, yellow and red zones show groups of symptoms of COPD. This list of symptoms is not comprehensive, and you may experience other symptoms. In the \"Actions\" column, your healthcare provider has recommended actions for you to take based on your symptoms.    Patient Name: Savita Mcclain   YOB: 1960   Last Updated on: 4/22/2025  9:41 AM   Green Zone:  I am doing well today Actions     Usual activitiy and exercise level   Take daily medications     Usual amounts of cough and phlegm/mucus   Use oxygen as prescribed     Sleep well at night   Continue regular exercise/diet plan     Appetite is good   At all times avoid cigarette smoke, inhaled irritants     Daily Medications (these medications are taken every day):   Albuterol (Accuneb, Proair, Proventil, Ventolin)  Mometasone/Formoterol (Dulera) 2 Puffs  2 Puffs Every 4 hours PRN  Twice daily     Additional Information:  Use Spacer with Rescue inhaler and Maintenance medications Dulera daily, always rinse mouth after use. Can use Rescue inhaler before activity    Yellow Zone:  I am having a " "bad day or a COPD flare Actions     More breathless than usual   Continue daily medications     I have less energy for my daily activities   Use quick relief inhaler as ordered     Increased or thicker phlegm/mucus   Use oxygen as prescribed     Using quick relief inhaler/nebulizer more often   Get plenty of rest     Swelling of ankles more than usual   Use pursed lip breathing     More coughing than usual   At all times avoid cigarette smoke, inhaled irritants     I feel like I have a \"chest cold\"     Poor sleep and my symptoms woke me up     My appetite is not good     My medicine is not helping      Call provider immediately if symptoms don’t improve     Continue daily medications, add rescue medications:   Albuterol/Ipratropium (Combivent, Duoneb) 3mL via nebulizer Every 4 hours       Medications to be used during a flare up, (as Discussed with Provider):           Additional Information:  Pt states has a nebulizer at home, discussed using nebulizer, Can use 1 up to 4 times daily if needed, if your in the yellow zone use every 4 hrs, call Provider immediately if symptoms do not improve!      Patient instructed on importance of cleaning home nebulizer after every treatment to prevent infection.      Red Zone:  I need urgent medical care Actions     Severe shortness of breath even at rest   Call 911 or seek medical care immediately     Not able to do any activity because of breathing      Fever or shaking chills      Feeling confused or very drowsy       Chest pains      Coughing up blood                  "

## 2025-04-22 NOTE — PROGRESS NOTES
Telemetry Shift Summary     Rhythm: SR with BBB  Rate: 61-92  Measurements: .17/.14/.48  Ectopy (reported by Monitor Tech): r-o PAC, r PVC  *peaky T wave  *up to 127     Normal Values  Rhythm: Sinus  HR:   Measurements: 0.12-0.20/0.06-0.10/0.30-0.52

## 2025-04-22 NOTE — CARE PLAN
The patient is Stable - Low risk of patient condition declining or worsening    Shift Goals  Clinical Goals: Pt without falls and injury; No new onset pain; Electrolytes and Hgb monitored with no decline in results, no symptoms.  Patient Goals: Take a nap, didnt sleep well last night.  Family Goals: MARTIN    Progress made toward(s) clinical / shift goals:  Pt without falls and injury; No new onset pain; Electrolytes and Hgb monitored with no decline in results, no symptoms; Dialysis completed with no complications, BP improved after dialysis.    Patient is not progressing towards the following goals:

## 2025-04-22 NOTE — PROGRESS NOTES
"Hospital Medicine Daily Progress Note    Date of Service  4/22/2025    Chief Complaint  Savita Mcclain is a 65 y.o. female admitted 4/20/2025 with shortness of breath    Hospital Course  As per chart review:  \"Hx of COPD, DM2, ESRD transferred to [facility] for SOB and copd exacerbation.  Started on steroids and RT protocol\"    Interval Problem Update  4/21: SOB improved, sugars high, lantus /SSI adjusted.  Pending nephrology eval    PATIENT SEEN BY PREVIOUS HOSPITALIST UNTIL 4/21 4/22: Patient seen at bedside this morning.  Overall the patient feels better today.  We have transitioned to prednisone.  Continue to monitor closely.  We have transition to Augmentin instead of Unasyn.  -- Patient with multiple comorbidities including end-stage renal disease on dialysis, COPD requiring treatment for COPD exacerbation.  She is at high risk of deterioration.  The patient require close monitoring for any signs of deterioration.    I have discussed this patient's plan of care and discharge plan at IDT rounds today with Case Management, Nursing, Nursing leadership, and other members of the IDT team.    Consultants/Specialty  Nephrology    Code Status  DNAR/DNI    Disposition  The patient is not medically cleared for discharge to home or a post-acute facility.          Review of Systems  Review of Systems   Constitutional:  Positive for malaise/fatigue. Negative for chills and fever.   HENT:  Negative for sore throat.    Respiratory:  Positive for cough, sputum production and shortness of breath.         Right chest wall pain   Cardiovascular:  Negative for chest pain and palpitations.   Gastrointestinal:  Negative for abdominal pain, blood in stool, diarrhea, heartburn, nausea and vomiting.   Genitourinary:  Negative for dysuria and frequency.   Musculoskeletal:  Negative for back pain and myalgias.   Neurological:  Negative for dizziness, focal weakness, weakness and headaches.   Psychiatric/Behavioral:  Negative for " depression and hallucinations.    All other systems reviewed and are negative.       Physical Exam  Temp:  [36.4 °C (97.5 °F)-36.7 °C (98 °F)] 36.6 °C (97.8 °F)  Pulse:  [67-84] 67  Resp:  [16-18] 17  BP: (136-173)/() 173/84  SpO2:  [90 %-97 %] 94 %    Physical Exam  Constitutional:       General: She is not in acute distress.     Appearance: She is ill-appearing.   HENT:      Nose: Nose normal.      Mouth/Throat:      Mouth: Mucous membranes are dry.   Cardiovascular:      Rate and Rhythm: Normal rate and regular rhythm.      Pulses: Normal pulses.   Pulmonary:      Breath sounds: Rhonchi present.   Abdominal:      General: There is no distension.      Palpations: Abdomen is soft.   Musculoskeletal:         General: No swelling.      Cervical back: No muscular tenderness.   Lymphadenopathy:      Cervical: No cervical adenopathy.   Skin:     General: Skin is warm and dry.      Findings: No rash.   Neurological:      General: No focal deficit present.      Mental Status: She is alert and oriented to person, place, and time.   Psychiatric:         Mood and Affect: Mood normal.         Thought Content: Thought content normal.         Fluids    Intake/Output Summary (Last 24 hours) at 4/22/2025 1039  Last data filed at 4/22/2025 0924  Gross per 24 hour   Intake 240 ml   Output 100 ml   Net 140 ml        Laboratory  Recent Labs     04/21/25  0042 04/22/25  0147   WBC 9.0 15.2*   RBC 2.35* 2.58*   HEMOGLOBIN 7.8* 8.5*   HEMATOCRIT 23.6* 26.1*   .4* 101.2*   MCH 33.2* 32.9   MCHC 33.1 32.6   RDW 53.6* 54.2*   PLATELETCT 169 222   MPV 11.5 12.0     Recent Labs     04/21/25  0042 04/21/25  0208 04/22/25  0147   SODIUM 154* 132* 132*   POTASSIUM 2.2* 3.1* 4.2   CHLORIDE 103 94* 95*   CO2 18* 24 19*   GLUCOSE 323* 373* 143*   BUN 42* 53* 68*   CREATININE 2.82* 3.35* 3.50*   CALCIUM 6.6* 8.9 9.2                   Imaging  CT-CHEST (THORAX) W/O   Final Result      1.  Irregularity of ossification between the  "inferior aspect of the scapula and ribs which may be secondary to chronic scapular fracture or myositis ossificans. This is unchanged from previous exam.      2.   1 cm irregular nodular density seen right upper lobe.      3.  Wedge-shaped airspace opacity dependently in the right lower lobe consistent with pneumonitis, atelectasis, and/or parenchymal scarring.      4.  Curvilinear opacities in the lingula and dependently in the left lower lobe likely atelectasis.      5.  Hepatocellular disease.      6.  Osteoporosis.      7.  Mild sigmoid scoliosis.      8.  Coronary artery calcification.      Fleischner Society pulmonary nodule recommendations:   Low and High Risk: Consider CT at 3 months, PET/CT, or tissue sampling.      Low Risk - Minimal or absent history of smoking and of other known risk factors.      High Risk - History of smoking or of other known risk factors.      Note: These recommendations do not apply to lung cancer screening, patients with immunosuppression, or patients with known primary cancer.      Fleischner Society 2017 Guidelines for Management of Incidentally Detected Pulmonary Nodules in Adults         DX-OUTSIDE IMAGES-DX CHEST   Final Result           Assessment/Plan  * Acute exacerbation of chronic obstructive pulmonary disease (COPD) (HCC)- (present on admission)  Assessment & Plan  4/21:  \"Patient is transferred from [facility] for acute COPD exacerbation.  Patient has a longstanding history of smoking 30 to 40 years quit in 2017.  Patient has established COPD  Continue Solu-Medrol, prednisone in a.m. if she continues to improve  Continue IV Unasyn azithromycin  Viral panel negative\"    4/22: I discussed case with pulmonary.  We have transition to prednisone to complete a 5-day course.  We have transition Unasyn to Augmentin.  Continue to monitor closely.    Pulmonary nodule  Assessment & Plan  Outpatient follow up  Referral placed    Anemia  Assessment & Plan  Seems to be " chronic  monitor    Closed fracture of one rib of right side with routine healing- (present on admission)  Assessment & Plan  Right 11th rib fracture that happened about 10 days ago  Encourage I-S   pain management      Leukocytosis  Assessment & Plan  Most likely reactive in the setting of steroids  Monitor  Continue antibiotics  Repeat CBC    Meningioma (HCC)- (present on admission)  Assessment & Plan  Chronic meningioma which is stable    4/22: outpatient follow up    ESRD (end stage renal disease) on dialysis (HCC)- (present on admission)  Assessment & Plan  4/22: continue dialysis as per nephrology    Hyperlipidemia- (present on admission)  Assessment & Plan  4/22: continue atorvastatin      Hypertension- (present on admission)  Assessment & Plan  4/22: continue lasix and lisinopril at 10 mg BID. Monitor.    Type 2 diabetes mellitus (HCC)- (present on admission)  Assessment & Plan  Sugars high, likely worsened by steroids  Lantus insulin 15 units subcutaneously nightly  -diabetic diet  -diabetic education  -A1c 6.3  -continue with oral antihyperglycemics  -monitor for hypoglycemic episodes and adjust control if he should get low    Hypokalemia- (present on admission)  Assessment & Plan  Seems to have improved  monitor    Chronic combined systolic and diastolic congestive heart failure (HCC)- (present on admission)  Assessment & Plan  Monitor volume status  Continue lasix, lisinopril, atorvastatin    Advance care planning  Assessment & Plan  4/22: I discussed with patient for at least 16 minutes further goals of care.  This included CODE STATUS which includes full code and DNR/DNI..  The patient like to be full code.  Also discussed further treatment goals.  For now the patient want to have full treatment options available.  This includes invasive or noninvasive procedures as needed.  In the event that the patient cannot make decisions for herself she would like her daughter Robyn or her son Jose to make  decisions for her.         VTE prophylaxis: Heparin    I have performed a physical exam and reviewed and updated ROS and Plan today (4/22/2025). In review of yesterday's note (4/21/2025), there are no changes except as documented above.        Moreover, I discussed with patient for at least 16 minutes further goals of care.  This included CODE STATUS which includes full code and DNR/DNI..  The patient like to be full code.  Also discussed further treatment goals.  For now the patient want to have full treatment options available.  This includes invasive or noninvasive procedures as needed.  In the event that the patient cannot make decisions for herself she would like her daughter Robyn or her son Jose to make decisions for her.

## 2025-04-22 NOTE — PROGRESS NOTES
Bedside report received from SAJAN Frias. Patient resting in bed. Call bell within reach. All belongings within reach for patient. No further needs at this time.

## 2025-04-22 NOTE — ASSESSMENT & PLAN NOTE
4/23: I discussed with patient for at least 16 minutes further goals of care.  The patient was interested in filling out POLST form.  We filled out a POLST form together at bedside.  The patient would like to have CPR attempted.  The patient like to have full treatment options available including intubation, mechanical ventilation and transferred to the ICU as needed.  The patient does not wish to have artificial nutrition or feeding tube.  The patient has decisional capacity.  The patient signed the POLST form.  I have given the POLST form to nursing to upload to the system.

## 2025-04-22 NOTE — PROGRESS NOTES
Telemetry Shift Summary     Rhythm: SR  Rate: 75-95  Measurements: .14/.14/.42  Ectopy (reported by Monitor Tech): r PVC, r PAC     Normal Values  Rhythm: Sinus  HR:   Measurements: 0.12-0.20/0.06-0.10/0.30-0.52

## 2025-04-22 NOTE — DISCHARGE PLANNING
Care Transition Team Assessment    LSW met with pt at bedside to complete discharge planning assessment.     Pt reported she lives with her dad and son in [place]. Pt reported being independent prior to admission, no DME or home O2 at baseline.   Pt confirmed insurance on file, PCP is Dr. Tapia.   Pt reported either her son or daughter will be picking her up upon discharge, stated they will just need notice.     No needs identified, anticipate discharge home.     Information Source  Orientation Level: Oriented X4, Appropriate for developmental age  Information Given By: Patient  Who is responsible for making decisions for patient? : Patient    Readmission Evaluation  Is this a readmission?: No    Elopement Risk  Legal Hold: No  Ambulatory or Self Mobile in Wheelchair: No-Not an Elopement Risk  Disoriented: No  Psychiatric Symptoms: None  History of Wandering: No  Elopement this Admit: No  Vocalizing Wanting to Leave: No  Displays Behaviors, Body Language Wanting to Leave: No-Not at Risk for Elopement  Elopement Risk: Not at Risk for Elopement    Interdisciplinary Discharge Planning  Lives with - Patient's Self Care Capacity: Parents, Adult Children  Patient or legal guardian wants to designate a caregiver: No  Support Systems: Family Member(s), Children, Parent  Housing / Facility: 1 Story Apartment / Condo    Discharge Preparedness  What is your plan after discharge?: Home with help  What are your discharge supports?: Child  Prior Functional Level: Ambulatory, Independent with Activities of Daily Living  Difficulity with ADLs: None  Difficulity with IADLs: None    Functional Assesment  Prior Functional Level: Ambulatory, Independent with Activities of Daily Living    Finances  Financial Barriers to Discharge: No  Prescription Coverage: Yes    Vision / Hearing Impairment  Vision Impairment : Yes  Right Eye Vision: Impaired, Wears Contacts  Left Eye Vision: Impaired, Wears Contacts  Hearing Impairment : No    Advance  Directive  Advance Directive?: None    Domestic Abuse  Possible Abuse/Neglect Reported to:: Not Applicable    Psychological Assessment  History of Substance Abuse: None  History of Psychiatric Problems: No  Non-compliant with Treatment: No  Newly Diagnosed Illness: No    Discharge Risks or Barriers  Discharge risks or barriers?: No    Anticipated Discharge Information  Discharge Disposition: Discharged to home/self care (01)  Discharge Address: [facility] [address]

## 2025-04-22 NOTE — ASSESSMENT & PLAN NOTE
Most likely reactive in the setting of steroids  Monitor  Continue antibiotics  Repeat CBC    4/23: Seems to be trending down.  At this point suspect is the steroids causing mild elevation in leukocytosis however we will continue antibiotics to treat pneumonia.

## 2025-04-23 DIAGNOSIS — J44.1 ACUTE EXACERBATION OF CHRONIC OBSTRUCTIVE PULMONARY DISEASE (COPD) (HCC): ICD-10-CM

## 2025-04-23 PROBLEM — J18.9 PNEUMONIA: Status: ACTIVE | Noted: 2025-04-23

## 2025-04-23 PROBLEM — I16.0 HYPERTENSIVE URGENCY: Status: ACTIVE | Noted: 2025-04-23

## 2025-04-23 LAB
ERYTHROCYTE [DISTWIDTH] IN BLOOD BY AUTOMATED COUNT: 53.5 FL (ref 35.9–50)
FERRITIN SERPL-MCNC: 7936 NG/ML (ref 10–291)
GFR SERPLBLD CREATININE-BSD FMLA CKD-EPI: 21 ML/MIN/1.73 M 2
GLUCOSE BLD STRIP.AUTO-MCNC: 106 MG/DL (ref 65–99)
GLUCOSE BLD STRIP.AUTO-MCNC: 127 MG/DL (ref 65–99)
GLUCOSE BLD STRIP.AUTO-MCNC: 170 MG/DL (ref 65–99)
GLUCOSE BLD STRIP.AUTO-MCNC: 181 MG/DL (ref 65–99)
GLUCOSE BLD STRIP.AUTO-MCNC: 181 MG/DL (ref 65–99)
GLUCOSE BLD STRIP.AUTO-MCNC: 58 MG/DL (ref 65–99)
HCT VFR BLD AUTO: 27.4 % (ref 37–47)
HGB BLD-MCNC: 8.9 G/DL (ref 12–16)
MCH RBC QN AUTO: 32.7 PG (ref 27–33)
MCHC RBC AUTO-ENTMCNC: 32.5 G/DL (ref 32.2–35.5)
MCV RBC AUTO: 100.7 FL (ref 81.4–97.8)
PHOSPHATE SERPL-MCNC: 2.5 MG/DL (ref 2.5–4.5)
PLATELET # BLD AUTO: 212 K/UL (ref 164–446)
PMV BLD AUTO: 11.5 FL (ref 9–12.9)
RBC # BLD AUTO: 2.72 M/UL (ref 4.2–5.4)
WBC # BLD AUTO: 12.6 K/UL (ref 4.8–10.8)

## 2025-04-23 PROCEDURE — 94760 N-INVAS EAR/PLS OXIMETRY 1: CPT

## 2025-04-23 PROCEDURE — 82962 GLUCOSE BLOOD TEST: CPT | Mod: 91

## 2025-04-23 PROCEDURE — 700102 HCHG RX REV CODE 250 W/ 637 OVERRIDE(OP): Performed by: INTERNAL MEDICINE

## 2025-04-23 PROCEDURE — 94640 AIRWAY INHALATION TREATMENT: CPT

## 2025-04-23 PROCEDURE — 700101 HCHG RX REV CODE 250: Performed by: INTERNAL MEDICINE

## 2025-04-23 PROCEDURE — 770001 HCHG ROOM/CARE - MED/SURG/GYN PRIV*

## 2025-04-23 PROCEDURE — A9270 NON-COVERED ITEM OR SERVICE: HCPCS | Performed by: INTERNAL MEDICINE

## 2025-04-23 PROCEDURE — 84100 ASSAY OF PHOSPHORUS: CPT

## 2025-04-23 PROCEDURE — 700111 HCHG RX REV CODE 636 W/ 250 OVERRIDE (IP): Performed by: INTERNAL MEDICINE

## 2025-04-23 PROCEDURE — 700102 HCHG RX REV CODE 250 W/ 637 OVERRIDE(OP): Performed by: STUDENT IN AN ORGANIZED HEALTH CARE EDUCATION/TRAINING PROGRAM

## 2025-04-23 PROCEDURE — A9270 NON-COVERED ITEM OR SERVICE: HCPCS | Performed by: STUDENT IN AN ORGANIZED HEALTH CARE EDUCATION/TRAINING PROGRAM

## 2025-04-23 PROCEDURE — 80053 COMPREHEN METABOLIC PANEL: CPT

## 2025-04-23 PROCEDURE — 94669 MECHANICAL CHEST WALL OSCILL: CPT

## 2025-04-23 PROCEDURE — 700102 HCHG RX REV CODE 250 W/ 637 OVERRIDE(OP): Performed by: HOSPITALIST

## 2025-04-23 PROCEDURE — 700111 HCHG RX REV CODE 636 W/ 250 OVERRIDE (IP): Performed by: HOSPITALIST

## 2025-04-23 PROCEDURE — 85027 COMPLETE CBC AUTOMATED: CPT

## 2025-04-23 PROCEDURE — A9270 NON-COVERED ITEM OR SERVICE: HCPCS | Performed by: HOSPITALIST

## 2025-04-23 RX ORDER — HYDRALAZINE HYDROCHLORIDE 20 MG/ML
20 INJECTION INTRAMUSCULAR; INTRAVENOUS EVERY 12 HOURS PRN
Status: DISCONTINUED | OUTPATIENT
Start: 2025-04-23 | End: 2025-04-23

## 2025-04-23 RX ORDER — HYDRALAZINE HYDROCHLORIDE 20 MG/ML
10 INJECTION INTRAMUSCULAR; INTRAVENOUS EVERY 12 HOURS PRN
Status: DISCONTINUED | OUTPATIENT
Start: 2025-04-23 | End: 2025-04-24 | Stop reason: HOSPADM

## 2025-04-23 RX ORDER — LISINOPRIL 20 MG/1
20 TABLET ORAL 2 TIMES DAILY
Status: DISCONTINUED | OUTPATIENT
Start: 2025-04-23 | End: 2025-04-24 | Stop reason: HOSPADM

## 2025-04-23 RX ORDER — POTASSIUM CHLORIDE 1500 MG/1
40 TABLET, EXTENDED RELEASE ORAL ONCE
Status: COMPLETED | OUTPATIENT
Start: 2025-04-23 | End: 2025-04-23

## 2025-04-23 RX ADMIN — INSULIN LISPRO 3 UNITS: 100 INJECTION, SOLUTION INTRAVENOUS; SUBCUTANEOUS at 16:57

## 2025-04-23 RX ADMIN — POTASSIUM CHLORIDE 40 MEQ: 1500 TABLET, EXTENDED RELEASE ORAL at 05:46

## 2025-04-23 RX ADMIN — PREDNISONE 40 MG: 20 TABLET ORAL at 05:10

## 2025-04-23 RX ADMIN — HEPARIN SODIUM 5000 UNITS: 5000 INJECTION, SOLUTION INTRAVENOUS; SUBCUTANEOUS at 05:11

## 2025-04-23 RX ADMIN — ATORVASTATIN CALCIUM 10 MG: 10 TABLET, FILM COATED ORAL at 17:40

## 2025-04-23 RX ADMIN — AMOXICILLIN AND CLAVULANATE POTASSIUM 1 TABLET: 500; 125 TABLET, FILM COATED ORAL at 05:10

## 2025-04-23 RX ADMIN — AMOXICILLIN AND CLAVULANATE POTASSIUM 1 TABLET: 500; 125 TABLET, FILM COATED ORAL at 17:40

## 2025-04-23 RX ADMIN — POTASSIUM CHLORIDE 10 MEQ: 1500 TABLET, EXTENDED RELEASE ORAL at 05:10

## 2025-04-23 RX ADMIN — Medication 1 TABLET: at 05:10

## 2025-04-23 RX ADMIN — TIMOLOL MALEATE 1 DROP: 5 SOLUTION OPHTHALMIC at 05:11

## 2025-04-23 RX ADMIN — INSULIN LISPRO 3 UNITS: 100 INJECTION, SOLUTION INTRAVENOUS; SUBCUTANEOUS at 21:51

## 2025-04-23 RX ADMIN — CYANOCOBALAMIN TAB 500 MCG 500 MCG: 500 TAB at 05:11

## 2025-04-23 RX ADMIN — HEPARIN SODIUM 5000 UNITS: 5000 INJECTION, SOLUTION INTRAVENOUS; SUBCUTANEOUS at 14:56

## 2025-04-23 RX ADMIN — OMEPRAZOLE 20 MG: 20 CAPSULE, DELAYED RELEASE ORAL at 05:10

## 2025-04-23 RX ADMIN — LATANOPROST 1 DROP: 50 SOLUTION OPHTHALMIC at 21:57

## 2025-04-23 RX ADMIN — Medication 1 APPLICATOR: at 17:41

## 2025-04-23 RX ADMIN — TIZANIDINE 4 MG: 4 TABLET ORAL at 17:41

## 2025-04-23 RX ADMIN — LISINOPRIL 10 MG: 5 TABLET ORAL at 05:10

## 2025-04-23 RX ADMIN — ESCITALOPRAM OXALATE 20 MG: 10 TABLET ORAL at 05:10

## 2025-04-23 RX ADMIN — LISINOPRIL 20 MG: 20 TABLET ORAL at 17:41

## 2025-04-23 RX ADMIN — INSULIN LISPRO 3 UNITS: 100 INJECTION, SOLUTION INTRAVENOUS; SUBCUTANEOUS at 13:01

## 2025-04-23 RX ADMIN — OMEPRAZOLE 20 MG: 20 CAPSULE, DELAYED RELEASE ORAL at 16:55

## 2025-04-23 RX ADMIN — MOMETASONE FUROATE AND FORMOTEROL FUMARATE DIHYDRATE 2 PUFF: 200; 5 AEROSOL RESPIRATORY (INHALATION) at 07:44

## 2025-04-23 RX ADMIN — IPRATROPIUM BROMIDE AND ALBUTEROL SULFATE 3 ML: 2.5; .5 SOLUTION RESPIRATORY (INHALATION) at 19:58

## 2025-04-23 RX ADMIN — HYDROCODONE BITARTRATE AND ACETAMINOPHEN 1 TABLET: 5; 325 TABLET ORAL at 04:09

## 2025-04-23 RX ADMIN — Medication 10 MG: at 19:34

## 2025-04-23 RX ADMIN — ATORVASTATIN CALCIUM 10 MG: 10 TABLET, FILM COATED ORAL at 05:12

## 2025-04-23 RX ADMIN — FUROSEMIDE 40 MG: 40 TABLET ORAL at 05:11

## 2025-04-23 RX ADMIN — Medication 1000 UNITS: at 05:12

## 2025-04-23 RX ADMIN — IPRATROPIUM BROMIDE AND ALBUTEROL SULFATE 3 ML: 2.5; .5 SOLUTION RESPIRATORY (INHALATION) at 10:59

## 2025-04-23 RX ADMIN — HYDROCODONE BITARTRATE AND ACETAMINOPHEN 1 TABLET: 5; 325 TABLET ORAL at 19:33

## 2025-04-23 RX ADMIN — HYDROCODONE BITARTRATE AND ACETAMINOPHEN 1 TABLET: 5; 325 TABLET ORAL at 12:59

## 2025-04-23 RX ADMIN — IPRATROPIUM BROMIDE AND ALBUTEROL SULFATE 3 ML: 2.5; .5 SOLUTION RESPIRATORY (INHALATION) at 07:44

## 2025-04-23 RX ADMIN — MOMETASONE FUROATE AND FORMOTEROL FUMARATE DIHYDRATE 2 PUFF: 200; 5 AEROSOL RESPIRATORY (INHALATION) at 19:58

## 2025-04-23 RX ADMIN — HEPARIN SODIUM 5000 UNITS: 5000 INJECTION, SOLUTION INTRAVENOUS; SUBCUTANEOUS at 21:57

## 2025-04-23 ASSESSMENT — ENCOUNTER SYMPTOMS
HALLUCINATIONS: 0
BACK PAIN: 0
HEADACHES: 0
SHORTNESS OF BREATH: 1
MYALGIAS: 0
SORE THROAT: 0
COUGH: 1
BLOOD IN STOOL: 0
HEARTBURN: 0
DIZZINESS: 0
SPUTUM PRODUCTION: 1
ABDOMINAL PAIN: 0
DIARRHEA: 0
NAUSEA: 0
FEVER: 0
CHILLS: 0
WEAKNESS: 0
PALPITATIONS: 0
DEPRESSION: 0
FOCAL WEAKNESS: 0
VOMITING: 0

## 2025-04-23 ASSESSMENT — PAIN DESCRIPTION - PAIN TYPE
TYPE: ACUTE PAIN
TYPE: ACUTE PAIN
TYPE: ACUTE PAIN;CHRONIC PAIN
TYPE: CHRONIC PAIN;ACUTE PAIN
TYPE: ACUTE PAIN

## 2025-04-23 NOTE — CARE PLAN
The patient is Stable - Low risk of patient condition declining or worsening    Shift Goals  Clinical Goals: Pt without falls and injury; Pain reported <5/10 after ordered interventions; [place] under control, WBCs continue to trend down; PO abx tolerated without adverse effects.  Patient Goals: Feel better.  Family Goals: MARTIN    Progress made toward(s) clinical / shift goals:  Pt without falls and injury; Pain reported <5/10 after ordered interventions; [place] under control, WBCs continue to trend down; PO abx tolerated without adverse effects.    Patient is not progressing towards the following goals:

## 2025-04-23 NOTE — DISCHARGE PLANNING
Outpatient Dialysis Note     Confirmed patient is active at:     [facility] [address] [address]     Schedule: Home Hemo 3-4 x week    In center 1-2 x month     Spoke with Devika at facility who confirmed patient information.   Forwarded records for review.     Xitlaly Reyes   Dialysis Coordinator / [facility]  Ph: [phone]

## 2025-04-23 NOTE — PROGRESS NOTES
"Hospital Medicine Daily Progress Note    Date of Service  4/23/2025    Chief Complaint  Savita Mcclain is a 65 y.o. female admitted 4/20/2025 with shortness of breath    Hospital Course  As per chart review:  \"Hx of COPD, DM2, ESRD transferred to [facility] for SOB and copd exacerbation.  Started on steroids and RT protocol\"    Interval Problem Update  4/21: SOB improved, sugars high, lantus /SSI adjusted.  Pending nephrology eval    PATIENT SEEN BY PREVIOUS HOSPITALIST UNTIL 4/21 4/22: Patient seen at bedside this morning.  Overall the patient feels better today.  We have transitioned to prednisone.  Continue to monitor closely.  We have transition to Augmentin instead of Unasyn.  -- Patient with multiple comorbidities including end-stage renal disease on dialysis, COPD requiring treatment for COPD exacerbation.  She is at high risk of deterioration.  The patient require close monitoring for any signs of deterioration.    4/23: Patient seen at bedside this morning.  The patient had been started on Lantus 15 units due to the use of steroids however the patient developed hypoglycemia this morning.  We will hold long-acting insulin for now continue with insulin sliding scale.  Continue treatment of COPD exacerbation.  Will continue antibiotics for pneumonia.  Patient also with hypertensive urgency, we have increased lisinopril and we have added hydralazine as needed.  The patient requires close observation, she is at high risk of deterioration.  Continue to monitor closely.    I have discussed this patient's plan of care and discharge plan at IDT rounds today with Case Management, Nursing, Nursing leadership, and other members of the IDT team.    Consultants/Specialty  Nephrology    Code Status  DNAR/DNI    Disposition  The patient is not medically cleared for discharge to home or a post-acute facility.          Review of Systems  Review of Systems   Constitutional:  Positive for malaise/fatigue. Negative for chills " and fever.   HENT:  Negative for sore throat.    Respiratory:  Positive for cough, sputum production and shortness of breath.         Right chest wall pain   Cardiovascular:  Negative for chest pain and palpitations.   Gastrointestinal:  Negative for abdominal pain, blood in stool, diarrhea, heartburn, nausea and vomiting.   Genitourinary:  Negative for dysuria and frequency.   Musculoskeletal:  Negative for back pain and myalgias.   Neurological:  Negative for dizziness, focal weakness, weakness and headaches.   Psychiatric/Behavioral:  Negative for depression and hallucinations.    All other systems reviewed and are negative.       Physical Exam  Temp:  [36 °C (96.8 °F)-36.6 °C (97.9 °F)] 36.1 °C (97 °F)  Pulse:  [59-77] 72  Resp:  [16] 16  BP: (128-180)/(68-97) 176/81  SpO2:  [91 %-96 %] 94 %    Physical Exam  Constitutional:       General: She is not in acute distress.     Appearance: She is ill-appearing.   HENT:      Nose: Nose normal.      Mouth/Throat:      Mouth: Mucous membranes are dry.   Eyes:      General:         Right eye: No discharge.         Left eye: No discharge.   Cardiovascular:      Rate and Rhythm: Normal rate and regular rhythm.      Pulses: Normal pulses.   Pulmonary:      Breath sounds: Rhonchi present.   Abdominal:      General: There is no distension.      Palpations: Abdomen is soft.   Musculoskeletal:         General: No swelling.      Cervical back: No muscular tenderness.   Lymphadenopathy:      Cervical: No cervical adenopathy.   Skin:     General: Skin is warm and dry.      Findings: No rash.   Neurological:      General: No focal deficit present.      Mental Status: She is alert and oriented to person, place, and time.   Psychiatric:         Mood and Affect: Mood normal.         Thought Content: Thought content normal.         Fluids    Intake/Output Summary (Last 24 hours) at 4/23/2025 1326  Last data filed at 4/23/2025 1300  Gross per 24 hour   Intake 660 ml   Output --   Net  660 ml        Laboratory  Recent Labs     04/21/25  0042 04/22/25  0147 04/23/25  0149   WBC 9.0 15.2* 12.6*   RBC 2.35* 2.58* 2.72*   HEMOGLOBIN 7.8* 8.5* 8.9*   HEMATOCRIT 23.6* 26.1* 27.4*   .4* 101.2* 100.7*   MCH 33.2* 32.9 32.7   MCHC 33.1 32.6 32.5   RDW 53.6* 54.2* 53.5*   PLATELETCT 169 222 212   MPV 11.5 12.0 11.5     Recent Labs     04/21/25  0208 04/22/25  0147 04/23/25  0149   SODIUM 132* 132* 134*   POTASSIUM 3.1* 4.2 3.5*   CHLORIDE 94* 95* 97   CO2 24 19* 31   GLUCOSE 373* 143* 76   BUN 53* 68* 41*   CREATININE 3.35* 3.50* 2.46*   CALCIUM 8.9 9.2 8.9                   Imaging  CT-CHEST (THORAX) W/O   Final Result      1.  Irregularity of ossification between the inferior aspect of the scapula and ribs which may be secondary to chronic scapular fracture or myositis ossificans. This is unchanged from previous exam.      2.   1 cm irregular nodular density seen right upper lobe.      3.  Wedge-shaped airspace opacity dependently in the right lower lobe consistent with pneumonitis, atelectasis, and/or parenchymal scarring.      4.  Curvilinear opacities in the lingula and dependently in the left lower lobe likely atelectasis.      5.  Hepatocellular disease.      6.  Osteoporosis.      7.  Mild sigmoid scoliosis.      8.  Coronary artery calcification.      Fleischner Society pulmonary nodule recommendations:   Low and High Risk: Consider CT at 3 months, PET/CT, or tissue sampling.      Low Risk - Minimal or absent history of smoking and of other known risk factors.      High Risk - History of smoking or of other known risk factors.      Note: These recommendations do not apply to lung cancer screening, patients with immunosuppression, or patients with known primary cancer.      Fleischner Society 2017 Guidelines for Management of Incidentally Detected Pulmonary Nodules in Adults         DX-OUTSIDE IMAGES-DX CHEST   Final Result           Assessment/Plan  * Acute exacerbation of chronic  "obstructive pulmonary disease (COPD) (HCC)- (present on admission)  Assessment & Plan  4/21:  \"Patient is transferred from [facility] for acute COPD exacerbation.  Patient has a longstanding history of smoking 30 to 40 years quit in 2017.  Patient has established COPD  Continue Solu-Medrol, prednisone in a.m. if she continues to improve  Continue IV Unasyn azithromycin  Viral panel negative\"    4/22: I discussed case with pulmonary.  We have transition to prednisone to complete a 5-day course.  We have transition Unasyn to Augmentin.  Continue to monitor closely.    4/23: Complete 5-day course of steroids with prednisone.  Continue antibiotics for concern for pneumonia, currently on Augmentin.    Pneumonia  Assessment & Plan  Continue augmentin    Hypertensive urgency  Assessment & Plan  4/23: Patient today with hypertensive urgency so we have increased lisinopril to 20 mg twice daily.  Continue Lasix.  As needed hydralazine.  Continue to monitor closely.    Pulmonary nodule  Assessment & Plan  Outpatient follow up  Referral placed    Anemia  Assessment & Plan  Seems to be chronic  monitor    Closed fracture of one rib of right side with routine healing- (present on admission)  Assessment & Plan  Right 11th rib fracture that happened about 10 days ago  Encourage I-S   pain management      Leukocytosis  Assessment & Plan  Most likely reactive in the setting of steroids  Monitor  Continue antibiotics  Repeat CBC    4/23: Seems to be trending down.  At this point suspect is the steroids causing mild elevation in leukocytosis however we will continue antibiotics to treat pneumonia.    Meningioma (HCC)- (present on admission)  Assessment & Plan  Chronic meningioma which is stable    4/23: outpatient follow up    ESRD (end stage renal disease) on dialysis (HCC)- (present on admission)  Assessment & Plan  4/23: continue dialysis as per nephrology    Hyperlipidemia- (present on admission)  Assessment & " "Plan  4/23: continue atorvastatin      Hypertension- (present on admission)  Assessment & Plan  4/23: Patient today with hypertensive urgency so we have increased lisinopril to 20 mg twice daily.  Continue Lasix.  As needed hydralazine.  Continue to monitor closely.    Type 2 diabetes mellitus (HCC)- (present on admission)  Assessment & Plan  4/21:  \"Sugars high, likely worsened by steroids  Lantus insulin 15 units subcutaneously nightly  -diabetic diet  -diabetic education  -A1c 6.3  -continue with oral antihyperglycemics  -monitor for hypoglycemic episodes and adjust control if he should get low\"      4/23: Repeat A1c 7.9.  However the patient developed hypoglycemia this morning.  We will discontinue Lantus.  Continue insulin sliding scale.  Continue to monitor closely and make changes accordingly.    Hypokalemia- (present on admission)  Assessment & Plan  Replace as needed  monitor    Chronic combined systolic and diastolic congestive heart failure (HCC)- (present on admission)  Assessment & Plan  Monitor volume status  Continue lasix, lisinopril, atorvastatin    Advance care planning  Assessment & Plan  4/23: I discussed with patient for at least 16 minutes further goals of care.  The patient was interested in filling out POLST form.  We filled out a POLST form together at bedside.  The patient would like to have CPR attempted.  The patient like to have full treatment options available including intubation, mechanical ventilation and transferred to the ICU as needed.  The patient does not wish to have artificial nutrition or feeding tube.  The patient has decisional capacity.  The patient signed the POLST form.  I have given the POLST form to nursing to upload to the system.         VTE prophylaxis: Heparin    I have performed a physical exam and reviewed and updated ROS and Plan today (4/23/2025). In review of yesterday's note (4/22/2025), there are no changes except as documented above.        Moreover, I " discussed with patient for at least 16 minutes further goals of care.  The patient was interested in filling out POLST form.  We filled out a POLST form together at bedside.  The patient would like to have CPR attempted.  The patient like to have full treatment options available including intubation, mechanical ventilation and transferred to the ICU as needed.  The patient does not wish to have artificial nutrition or feeding tube.  The patient has decisional capacity.  The patient signed the POLST form.  I have given the POLST form to nursing to upload to the system.

## 2025-04-23 NOTE — PROGRESS NOTES
Received report from night shift nurse, Felipe. Assumed pt care at 0715. Pt is A&Ox4, resting comfortably in bed. Pt on RA. No signs of SOB/respiratory distress. Labs, VS, medications reviewed.  Fall precautions in place. Bed at lowest position. Call light and personal belongings within reach. Plan of care discussed, no further concerns at this time.

## 2025-04-23 NOTE — PROGRESS NOTES
"[facility] -  PROGRESS NOTE               Author: Joleen Rendon M.D. Date & Time: 4/23/2025  2:39 PM     HPI:  Savita Mcclain is a 65 y.o. female with ESRD on HHD (3-4x a week, 2hr 15min tx) who presented 4/20/2025 on transfer from [facility] for COPD exacerbation. Labs  hbg 7.8, na 132, K 3.1, Bun 53, Scr 3.35, Ca 8.9 , lactic acid 2.1. CXR with atelectasis in LL BL. Last HD 4/17.   No F/C/N/V/CP/SOB.  No melena, hematochezia, hematemesis.  No HA, visual changes, or abdominal pain.     DAILY NEPHROLOGY SUMMARY:  4/21 - Consult done  4/22 - HD this am. UF 2L. Pt feeling much better. On RA. C/O being tired, not getting much sleep here. Denies N/V/D.    4/23: NAOE, no complaints , on RA    REVIEW OF SYSTEMS:    10 point ROS reviewed and is as per HPI/daily summary or otherwise negative    PMH/PSH/SH/FH:   Reviewed and unchanged since admission note    CURRENT MEDICATIONS:   Reviewed from admission to present day    VS:  BP (!) 156/77   Pulse 72   Temp 36.1 °C (97 °F) (Temporal)   Resp 16   Ht 1.626 m (5' 4\")   Wt 55 kg (121 lb 4.1 oz)   SpO2 94%   BMI 20.81 kg/m²     Physical Exam  Vitals and nursing note reviewed.   Constitutional:       General: She is not in acute distress.     Appearance: She is not ill-appearing.   HENT:      Head: Normocephalic and atraumatic.   Eyes:      General: No scleral icterus.     Conjunctiva/sclera: Conjunctivae normal.   Cardiovascular:      Rate and Rhythm: Normal rate.      Heart sounds: No murmur heard.     No friction rub. No gallop.   Pulmonary:      Effort: No respiratory distress.      Breath sounds: No wheezing or rales.   Abdominal:      General: Bowel sounds are normal. There is no distension.      Tenderness: There is no abdominal tenderness.   Musculoskeletal:         General: No swelling or deformity.      Right lower leg: No edema.   Skin:     Coloration: Skin is not jaundiced or pale.      Findings: No bruising or lesion. "   Neurological:      Mental Status: She is oriented to person, place, and time. Mental status is at baseline.   Psychiatric:         Mood and Affect: Mood normal.         Behavior: Behavior normal.         Access: TDC R IJ   Fluids:  In: 1200 [P.O.:700; Dialysis:500]  Out: 2500     LABS:  Recent Labs     04/21/25  0208 04/22/25  0147 04/23/25  0149   SODIUM 132* 132* 134*   POTASSIUM 3.1* 4.2 3.5*   CHLORIDE 94* 95* 97   CO2 24 19* 31   GLUCOSE 373* 143* 76   BUN 53* 68* 41*   CREATININE 3.35* 3.50* 2.46*   CALCIUM 8.9 9.2 8.9       IMAGING:   All imaging reviewed from admission to present day    IMPRESSION:  # ESRD ([facility], in [place], [place])  - Does home dialysis 3-4x a week ([id number])  -R chest permcath  # acute hypoxic respiratory failure 2/2COPD exacerbation   # Anemia of chronic disease   - Hgb not at goal   - Ferritin 928, isat 81%  # Hypertension  - Continue current medications  # CKD-MBD  - , po4 3.2  # DM with hyperglycemia   # Hypokalemia, resolved   - Manage with dialysis      Recommendations:  - no hd today   - Avoid iron supplements   - Continue TTS schedule w longer treatments while inpt   - Volume off with dialysis as BP tolerates  - Continue usual anti-hypertensive medications  - EPO 3000U with HD to goal Hgb 10-11  - Transfuse PRN hgb < 7  - No dietary protein restrictions, use renal diet     Goal: 1.5g Protein/kg/day  - Fluid restriction 1.5L   - Follow labs with further recommendations to follow

## 2025-04-23 NOTE — PROGRESS NOTES
Telemetry Shift Summary     Rhythm: SR/SB w/ BBB  Rate: 50s-80s  Measurements: 0.14/0.14/0.46  Ectopy (reported by Monitor Tech): rare PVC/PAC/Trigeminy/Bigeminy     Normal Values  Rhythm: Sinus  HR:   Measurements: 0.12-0.20/0.06-0.10/0.30-0.52

## 2025-04-23 NOTE — PROGRESS NOTES
Hypoglycemia Intervention    Hypoglycemia protocol intervention:  Blood glucose 58 at 0516.  Intervention: 8 oz of fruit juice   Repeat blood glucose 127 at 0550.  Dr. Felicia Paniagua notified of the above at 0552.

## 2025-04-23 NOTE — PROGRESS NOTES
Received bedside patient report from SAJAN Paulino. Patient resting comfortably in bed, no complaints at this time. Safety precautions in place. Will continue to monitor.

## 2025-04-24 ENCOUNTER — PATIENT OUTREACH (OUTPATIENT)
Dept: SCHEDULING | Facility: IMAGING CENTER | Age: 65
End: 2025-04-24
Payer: MEDICARE

## 2025-04-24 ENCOUNTER — PHARMACY VISIT (OUTPATIENT)
Dept: PHARMACY | Facility: MEDICAL CENTER | Age: 65
End: 2025-04-24
Payer: COMMERCIAL

## 2025-04-24 VITALS
OXYGEN SATURATION: 93 % | BODY MASS INDEX: 20.7 KG/M2 | WEIGHT: 121.25 LBS | TEMPERATURE: 98.1 F | RESPIRATION RATE: 16 BRPM | SYSTOLIC BLOOD PRESSURE: 160 MMHG | HEIGHT: 64 IN | HEART RATE: 66 BPM | DIASTOLIC BLOOD PRESSURE: 75 MMHG

## 2025-04-24 LAB
ALBUMIN SERPL BCP-MCNC: 3.3 G/DL (ref 3.2–4.9)
ALBUMIN SERPL BCP-MCNC: 3.4 G/DL (ref 3.2–4.9)
ALBUMIN/GLOB SERPL: 1.3 G/DL
ALBUMIN/GLOB SERPL: 1.4 G/DL
ALP SERPL-CCNC: 100 U/L (ref 30–99)
ALP SERPL-CCNC: 97 U/L (ref 30–99)
ALT SERPL-CCNC: 56 U/L (ref 2–50)
ALT SERPL-CCNC: 68 U/L (ref 2–50)
ANION GAP SERPL CALC-SCNC: 17 MMOL/L (ref 7–16)
ANION GAP SERPL CALC-SCNC: 17 MMOL/L (ref 7–16)
AST SERPL-CCNC: 31 U/L (ref 12–45)
AST SERPL-CCNC: 52 U/L (ref 12–45)
BILIRUB SERPL-MCNC: 0.2 MG/DL (ref 0.1–1.5)
BILIRUB SERPL-MCNC: <0.2 MG/DL (ref 0.1–1.5)
BUN SERPL-MCNC: 41 MG/DL (ref 8–22)
BUN SERPL-MCNC: 51 MG/DL (ref 8–22)
CALCIUM ALBUM COR SERPL-MCNC: 9.4 MG/DL (ref 8.5–10.5)
CALCIUM ALBUM COR SERPL-MCNC: 9.6 MG/DL (ref 8.5–10.5)
CALCIUM SERPL-MCNC: 8.9 MG/DL (ref 8.5–10.5)
CALCIUM SERPL-MCNC: 9 MG/DL (ref 8.5–10.5)
CHLORIDE SERPL-SCNC: 100 MMOL/L (ref 96–112)
CHLORIDE SERPL-SCNC: 97 MMOL/L (ref 96–112)
CO2 SERPL-SCNC: 20 MMOL/L (ref 20–33)
CO2 SERPL-SCNC: 21 MMOL/L (ref 20–33)
CREAT SERPL-MCNC: 2.46 MG/DL (ref 0.5–1.4)
CREAT SERPL-MCNC: 3.2 MG/DL (ref 0.5–1.4)
ERYTHROCYTE [DISTWIDTH] IN BLOOD BY AUTOMATED COUNT: 56.5 FL (ref 35.9–50)
GFR SERPLBLD CREATININE-BSD FMLA CKD-EPI: 16 ML/MIN/1.73 M 2
GLOBULIN SER CALC-MCNC: 2.5 G/DL (ref 1.9–3.5)
GLOBULIN SER CALC-MCNC: 2.5 G/DL (ref 1.9–3.5)
GLUCOSE BLD STRIP.AUTO-MCNC: 192 MG/DL (ref 65–99)
GLUCOSE BLD STRIP.AUTO-MCNC: 97 MG/DL (ref 65–99)
GLUCOSE SERPL-MCNC: 112 MG/DL (ref 65–99)
GLUCOSE SERPL-MCNC: 76 MG/DL (ref 65–99)
HCT VFR BLD AUTO: 28.9 % (ref 37–47)
HGB BLD-MCNC: 9 G/DL (ref 12–16)
LACTATE SERPL-SCNC: 1 MMOL/L (ref 0.5–2)
MCH RBC QN AUTO: 32.5 PG (ref 27–33)
MCHC RBC AUTO-ENTMCNC: 31.1 G/DL (ref 32.2–35.5)
MCV RBC AUTO: 104.3 FL (ref 81.4–97.8)
PHOSPHATE SERPL-MCNC: 2.3 MG/DL (ref 2.5–4.5)
PLATELET # BLD AUTO: 209 K/UL (ref 164–446)
PMV BLD AUTO: 11.7 FL (ref 9–12.9)
POTASSIUM SERPL-SCNC: 3.5 MMOL/L (ref 3.6–5.5)
POTASSIUM SERPL-SCNC: 4.3 MMOL/L (ref 3.6–5.5)
PROT SERPL-MCNC: 5.8 G/DL (ref 6–8.2)
PROT SERPL-MCNC: 5.9 G/DL (ref 6–8.2)
RBC # BLD AUTO: 2.77 M/UL (ref 4.2–5.4)
SODIUM SERPL-SCNC: 134 MMOL/L (ref 135–145)
SODIUM SERPL-SCNC: 138 MMOL/L (ref 135–145)
WBC # BLD AUTO: 11.7 K/UL (ref 4.8–10.8)

## 2025-04-24 PROCEDURE — 94669 MECHANICAL CHEST WALL OSCILL: CPT

## 2025-04-24 PROCEDURE — 700111 HCHG RX REV CODE 636 W/ 250 OVERRIDE (IP): Performed by: HOSPITALIST

## 2025-04-24 PROCEDURE — 700102 HCHG RX REV CODE 250 W/ 637 OVERRIDE(OP): Performed by: STUDENT IN AN ORGANIZED HEALTH CARE EDUCATION/TRAINING PROGRAM

## 2025-04-24 PROCEDURE — 94760 N-INVAS EAR/PLS OXIMETRY 1: CPT

## 2025-04-24 PROCEDURE — A9270 NON-COVERED ITEM OR SERVICE: HCPCS | Performed by: INTERNAL MEDICINE

## 2025-04-24 PROCEDURE — 83605 ASSAY OF LACTIC ACID: CPT

## 2025-04-24 PROCEDURE — 82962 GLUCOSE BLOOD TEST: CPT

## 2025-04-24 PROCEDURE — 80053 COMPREHEN METABOLIC PANEL: CPT

## 2025-04-24 PROCEDURE — RXMED WILLOW AMBULATORY MEDICATION CHARGE: Performed by: INTERNAL MEDICINE

## 2025-04-24 PROCEDURE — 84100 ASSAY OF PHOSPHORUS: CPT

## 2025-04-24 PROCEDURE — 700102 HCHG RX REV CODE 250 W/ 637 OVERRIDE(OP): Performed by: HOSPITALIST

## 2025-04-24 PROCEDURE — 85027 COMPLETE CBC AUTOMATED: CPT

## 2025-04-24 PROCEDURE — 700111 HCHG RX REV CODE 636 W/ 250 OVERRIDE (IP): Performed by: INTERNAL MEDICINE

## 2025-04-24 PROCEDURE — A9270 NON-COVERED ITEM OR SERVICE: HCPCS | Performed by: HOSPITALIST

## 2025-04-24 PROCEDURE — 700101 HCHG RX REV CODE 250: Performed by: INTERNAL MEDICINE

## 2025-04-24 PROCEDURE — A9270 NON-COVERED ITEM OR SERVICE: HCPCS | Performed by: STUDENT IN AN ORGANIZED HEALTH CARE EDUCATION/TRAINING PROGRAM

## 2025-04-24 PROCEDURE — 36415 COLL VENOUS BLD VENIPUNCTURE: CPT

## 2025-04-24 PROCEDURE — 94640 AIRWAY INHALATION TREATMENT: CPT

## 2025-04-24 PROCEDURE — 700102 HCHG RX REV CODE 250 W/ 637 OVERRIDE(OP): Performed by: INTERNAL MEDICINE

## 2025-04-24 RX ORDER — B-COMPLEX WITH VITAMIN C
1 TABLET ORAL DAILY
Qty: 30 TABLET | Refills: 0 | Status: SHIPPED | OUTPATIENT
Start: 2025-04-25

## 2025-04-24 RX ORDER — LISINOPRIL 20 MG/1
20 TABLET ORAL 2 TIMES DAILY
Qty: 180 TABLET | Refills: 0 | Status: SHIPPED | OUTPATIENT
Start: 2025-04-24

## 2025-04-24 RX ORDER — ACETAMINOPHEN 325 MG/1
650 TABLET ORAL EVERY 6 HOURS PRN
Status: SHIPPED
Start: 2025-04-24

## 2025-04-24 RX ORDER — AMOXICILLIN AND CLAVULANATE POTASSIUM 500; 125 MG/1; MG/1
1 TABLET, FILM COATED ORAL EVERY 12 HOURS
Qty: 6 TABLET | Refills: 0 | Status: ACTIVE | OUTPATIENT
Start: 2025-04-24 | End: 2025-04-27

## 2025-04-24 RX ADMIN — Medication 1 APPLICATOR: at 06:00

## 2025-04-24 RX ADMIN — TIMOLOL MALEATE 1 DROP: 5 SOLUTION OPHTHALMIC at 06:40

## 2025-04-24 RX ADMIN — MOMETASONE FUROATE AND FORMOTEROL FUMARATE DIHYDRATE 2 PUFF: 200; 5 AEROSOL RESPIRATORY (INHALATION) at 06:49

## 2025-04-24 RX ADMIN — Medication 1 TABLET: at 06:34

## 2025-04-24 RX ADMIN — ESCITALOPRAM OXALATE 20 MG: 10 TABLET ORAL at 06:33

## 2025-04-24 RX ADMIN — PREDNISONE 40 MG: 20 TABLET ORAL at 06:33

## 2025-04-24 RX ADMIN — Medication 1000 UNITS: at 06:38

## 2025-04-24 RX ADMIN — HYDROCODONE BITARTRATE AND ACETAMINOPHEN 1 TABLET: 5; 325 TABLET ORAL at 08:55

## 2025-04-24 RX ADMIN — LISINOPRIL 20 MG: 20 TABLET ORAL at 06:33

## 2025-04-24 RX ADMIN — OMEPRAZOLE 20 MG: 20 CAPSULE, DELAYED RELEASE ORAL at 06:33

## 2025-04-24 RX ADMIN — AMOXICILLIN AND CLAVULANATE POTASSIUM 1 TABLET: 500; 125 TABLET, FILM COATED ORAL at 06:33

## 2025-04-24 RX ADMIN — FUROSEMIDE 40 MG: 40 TABLET ORAL at 06:33

## 2025-04-24 RX ADMIN — INSULIN LISPRO 3 UNITS: 100 INJECTION, SOLUTION INTRAVENOUS; SUBCUTANEOUS at 11:28

## 2025-04-24 RX ADMIN — IPRATROPIUM BROMIDE AND ALBUTEROL SULFATE 3 ML: 2.5; .5 SOLUTION RESPIRATORY (INHALATION) at 06:49

## 2025-04-24 RX ADMIN — POTASSIUM CHLORIDE 10 MEQ: 1500 TABLET, EXTENDED RELEASE ORAL at 06:32

## 2025-04-24 RX ADMIN — HEPARIN SODIUM 5000 UNITS: 5000 INJECTION, SOLUTION INTRAVENOUS; SUBCUTANEOUS at 06:34

## 2025-04-24 RX ADMIN — ATORVASTATIN CALCIUM 10 MG: 10 TABLET, FILM COATED ORAL at 06:33

## 2025-04-24 RX ADMIN — IPRATROPIUM BROMIDE AND ALBUTEROL SULFATE 3 ML: 2.5; .5 SOLUTION RESPIRATORY (INHALATION) at 10:59

## 2025-04-24 RX ADMIN — CYANOCOBALAMIN TAB 500 MCG 500 MCG: 500 TAB at 06:33

## 2025-04-24 ASSESSMENT — PAIN DESCRIPTION - PAIN TYPE: TYPE: ACUTE PAIN

## 2025-04-24 NOTE — PROGRESS NOTES
Received report from nightshift RN and assumed care of patient at 0700. Patient resting in bed and denies needs at this time. Call light in reach. Bed in lowest locked position. Hourly rounding to continue.

## 2025-04-24 NOTE — PROGRESS NOTES
"[facility] -  PROGRESS NOTE               Author: Chaitanya Gutierrez D.O. Date & Time: 4/24/2025  2:21 PM     HPI:  Savita Mcclain is a 65 y.o. female with ESRD on HHD (3-4x a week, 2hr 15min tx) who presented 4/20/2025 on transfer from [facility] for COPD exacerbation. Labs  hbg 7.8, na 132, K 3.1, Bun 53, Scr 3.35, Ca 8.9 , lactic acid 2.1. CXR with atelectasis in LL BL. Last HD 4/17.   No F/C/N/V/CP/SOB.  No melena, hematochezia, hematemesis.  No HA, visual changes, or abdominal pain.     DAILY NEPHROLOGY SUMMARY:  4/21 - Consult done  4/22 - HD this am. UF 2L. Pt feeling much better. On RA. C/O being tired, not getting much sleep here. Denies N/V/D.    4/23: NAOE, no complaints , on RA  4/24: Feeling \"good\".  Anxious to go home.  Breathing better.  No new complaints.     REVIEW OF SYSTEMS:    10 point ROS reviewed and is as per HPI/daily summary or otherwise negative    PMH/PSH/SH/FH:   Reviewed and unchanged since admission note    CURRENT MEDICATIONS:   Reviewed from admission to present day    VS:  BP (!) 160/75   Pulse 66   Temp 36.7 °C (98.1 °F) (Temporal)   Resp 16   Ht 1.626 m (5' 4\")   Wt 55 kg (121 lb 4.1 oz)   SpO2 93%   BMI 20.81 kg/m²     Physical Exam  Vitals and nursing note reviewed.   Constitutional:       General: She is not in acute distress.     Appearance: She is not ill-appearing.   HENT:      Head: Normocephalic and atraumatic.   Eyes:      General: No scleral icterus.     Conjunctiva/sclera: Conjunctivae normal.   Cardiovascular:      Rate and Rhythm: Normal rate.      Heart sounds: No murmur heard.     No friction rub. No gallop.   Pulmonary:      Effort: No respiratory distress.      Breath sounds: No wheezing or rales.   Abdominal:      General: Bowel sounds are normal. There is no distension.      Tenderness: There is no abdominal tenderness.   Musculoskeletal:         General: No swelling or deformity.      Right lower leg: No edema. "   Skin:     Coloration: Skin is not jaundiced or pale.      Findings: No bruising or lesion.   Neurological:      Mental Status: She is oriented to person, place, and time. Mental status is at baseline.   Psychiatric:         Mood and Affect: Mood normal.         Behavior: Behavior normal.             Access: TDC R IJ   Fluids:  In: 780 [P.O.:780]  Out: -     LABS:  Recent Labs     04/22/25  0147 04/23/25  0149 04/24/25  0227   SODIUM 132* 134* 138   POTASSIUM 4.2 3.5* 4.3   CHLORIDE 95* 97 100   CO2 19* 31 32   GLUCOSE 143* 76 112*   BUN 68* 41* 51*   CREATININE 3.50* 2.46* 3.20*   CALCIUM 9.2 8.9 9.0       IMAGING:   All imaging reviewed from admission to present day    IMPRESSION:  # ESRD ([facility], in [place], [place])  - Does home dialysis 3-4x a week ([id number])  -R chest permcath  # Acute hypoxic respiratory failure   - 2/2COPD exacerbation   # Anemia of chronic disease   - Hgb not at goal   - Ferritin 928, isat 81%  # Hypertension  - Continue current medications  # CKD-MBD  - , po4 3.2  # DM with hyperglycemia   # Hypokalemia, resolved   - Manage with dialysis      Recommendations:  - Anticipate discharge today  - No need for HD today in the hospital  She will do a home hemo treatment tonight when she gets home   - Avoid iron supplements   - Volume off with dialysis as BP tolerates  - Continue usual anti-hypertensive medications  - EPO 3000U with HD to goal Hgb 10-11  - Transfuse PRN hgb < 7  - No dietary protein restrictions, use renal diet     Goal: 1.5g Protein/kg/day  - Fluid restriction 1.5L   - Follow labs with further recommendations to follow    Okay to discharge from renal standpoint.  She will do a home hemodialysis treatment tonight when she gets home.     Thank you

## 2025-04-24 NOTE — PROGRESS NOTES
Discharging patient home per MD order.  Pt verbalized understanding of discharge instructions and educational handouts and all questions answered. Outpatient follow up  called. Pt discharged off unit with hospital escort.

## 2025-04-24 NOTE — DISCHARGE INSTRUCTIONS
Chronic Obstructive Pulmonary Disease (COPD) is a long-term, progressive lung disease that makes it harder to breathe. It includes chronic bronchitis, emphysema, and refractory (non-reversible) asthma. With COPD, the airways in your lungs become inflamed and thicken, and the tissue where oxygen is exchanged is destroyed. The flow of air in and out of your lungs decreases. When that happens, less oxygen gets into your body tissues, and it becomes harder to get rid of the waste gas carbon dioxide. As the disease gets worse, shortness of breath makes it harder to remain active. There is no cure for COPD, but it is preventable and treatable.    COPD Patient Discharge Instructions    Diet  Follow a low fat, low cholesterol, low salt diet unless instructed otherwise by your Doctor. Read the labels on the back of food products and track your intake of fat, cholesterol and salt.  No smoking  Discontinuing smoking will have the biggest impact on preventing progression of disease.  To participate in [employer] Quit [employer] Program, call [phone] or visit [email or web address]/QuitTobacco  Oxygen  If your doctor has order that you wear oxygen at home, it is important to wear it as ordered.  Do not smoke, vape, or use e-cigarettes with oxygen on.  Store in an appropriate location: upright in its rod or laid flat down, away from open flames and stoves.   Do not use oil-based creams and moisturizers (ie: petroleum products, oil-based lip moisturizers) or aerosol sprays (ie: hair sprays or deodorants) when using your oxygen equipment.  Be careful with tubing placement to prevent yourself and others from tripping.  Medications  Refer to your personalized Action Plan to manage your symptoms.  Warning signs of an exacerbation  Breathing fast and shallow, worsening shortness of breath (like you just finished exercising)  Chest tightness  Increases in sputum production  Changes in sputum color  Lower oxygen levels than baseline  When  to call your doctor  If the warning signs of an exacerbation do not improve  Refer to your personalized Action Plan   Pulmonary Rehab  Your doctor has ordered you a referral to Pulmonary Rehab. Call [phone] to schedule an appointment  Attend your follow-up appointment with your PCP and/or Pulmonologist  Remote Monitoring: At the direction of the remote monitoring on-call provider, you may increase your oxygen by 2 liters above your baseline.     See the educational handout provided by your COPD Navigator for more information. This also explains more about COPD, symptoms of an exacerbation, and some of the tests that you have undergone.

## 2025-04-24 NOTE — DISCHARGE PLANNING
Per Jose at the [facility], Dulera has been transferred to [facility].

## 2025-04-24 NOTE — DISCHARGE SUMMARY
"Discharge Summary    CHIEF COMPLAINT ON ADMISSION  No chief complaint on file.      Reason for Admission  Medical (COPD exacerbation, volume*     Admission Date  4/20/2025    CODE STATUS  DNAR/DNI    HPI & HOSPITAL COURSE  As per chart review:  \"65 y.o. female who presented 4/20/2025 on transfer from [facility] for COPD exacerbation.  The patient for the past 2 days has been complaining of shortness of breath and today decided to going to the emergency room.  She is found to have hypoxia and is requiring oxygen support nebulizer treatments and is found to have COPD exacerbation.  The patient smoked for about 30 to 40 years quit in 2017 and since then does have established COPD.  She was using oxygen at home already for the past 3 days which her father had before her.  She does not have oxygen prescribed.  The patient does have end-stage renal disease secondary to diabetes and is on hemodialysis at home.  The patient about 10 days ago fell and she suffered 1/10 rib fracture on the right side.  Since then she has been kind of short of breath but now has progressively gotten worse.  Initial viral studies are negative.  Procalcitonin is slightly elevated.  I will start the patient IV Unasyn\"    Patient was admitted for further management and care.  Pulmonary was consulted and the patient was evaluated by pulmonary who recommended to complete a 5-day course of steroids and transition to prednisone 40 mg daily.  Continue with antibiotics to treat for pneumonia for 7 days and the patient has been started on Dulera.  As per patient the patient will require close follow-up with pulmonary as an outpatient, and as per Dr. Fong they have already scheduled a follow-up appointment with her.  Also of note pulmonary nodule was found on CT scan so we have placed referral to pulmonary nodule clinic.    Patient also here with a history of heart failure, taking Lasix, lisinopril and atorvastatin.  Patient will " require close follow-up with PCP as an outpatient.    Also of note admitting hospitalist had documented the patient has a history of meningioma.  However today on the day of discharge with talking to the patient she is unaware that she has a meningioma and currently does not have any neurological symptoms.  I do not see any imaging corroborating this diagnosis.  I will defer to PCP as an outpatient for further management and evaluation if needed.    The patient did have elevated blood pressure and we have increased her dose of lisinopril to 20 mg twice a day.  The patient was advised if she continue to have excessive elevated blood pressure readings to come back to the hospital.  She understands however it seems that it is improving.  I spoke to nephrologist and as per nephrology the patient can be discharged home.    Also the patient requiring dialysis Tuesdays, Thursdays and Saturdays but she does her own dialysis at home.  As per nephrology no need to wait for dialysis today and she can go home.    Patient seen at bedside this morning.  Overall the patient feels much better.  Will discharge patient on antibiotics, we have increased the patient's dose of lisinopril.  The patient will require close follow-up with PCP, pulmonary, lung nodule clinic and nephrology as an outpatient.    Of note, the patient is on room air.  As per nursing they did a home O2 evaluation and the patient does not require further oxygen at this time.    Also of note the patient initially by the previous hospitalist had increased the Lantus to 15 units when she normally takes 5 units due to steroids however she developed an episode of hypoglycemia so we discontinue long-acting insulin continue with only insulin sliding scale.  Upon discharge she will resume her own medications at home.    Therefore, she is discharged in fair and stable condition to home with close outpatient follow-up.    The patient met 2-midnight criteria for an inpatient  stay at the time of discharge.    Discharge Date  04/24/2025    FOLLOW UP ITEMS POST DISCHARGE  The patient will require close follow-up with PCP, nephrology and pulmonary as an outpatient.    DISCHARGE DIAGNOSES  Principal Problem:    Acute exacerbation of chronic obstructive pulmonary disease (COPD) (HCC) (POA: Yes)  Active Problems:    Hypertensive urgency (POA: Unknown)    Pneumonia (POA: Unknown)    Chronic combined systolic and diastolic congestive heart failure (HCC) (POA: Yes)    Hypokalemia (POA: Yes)    Type 2 diabetes mellitus (HCC) (POA: Yes)    Hypertension (POA: Yes)    Hyperlipidemia (POA: Yes)    ESRD (end stage renal disease) on dialysis (HCC) (POA: Yes)    Meningioma (HCC) (POA: Yes)    Leukocytosis (POA: No)    Closed fracture of one rib of right side with routine healing (POA: Yes)    Anemia (POA: Unknown)    Pulmonary nodule (POA: Unknown)    Advance care planning (POA: Unknown)  Resolved Problems:    * No resolved hospital problems. *      FOLLOW UP  Future Appointments   Date Time Provider Department Center   5/20/2025  3:00 PM PFT-RM3 [facility] None   5/20/2025  4:20 PM Derek Faulkner M.D. [facility] None     Yinka Tapia M.D.  [address] [address]  [phone]    Schedule an appointment as soon as possible for a visit      Anita Fong D.O.  [address]  [facility] [address]  [facility] [id number] [phone]    Schedule an appointment as soon as possible for a visit      Joleen Rendon M.D.  [address] [facility] [id number] [phone]    Schedule an appointment as soon as possible for a visit        MEDICATIONS ON DISCHARGE     Medication List        START taking these medications        Instructions   amoxicillin-clavulanate 500-125 MG Tabs  Commonly known as: Augmentin   Take 1 Tablet by mouth every 12 hours for 3 days.  Dose: 1 Tablet     mometasone-formoterol 200-5 MCG/ACT Aero  Commonly known as: Dulera   Inhale 2 Puffs by mouth 2 times a day.  Dose: 2 Puff     vitamin B + C  complex Tabs  Start taking on: April 25, 2025   Take 1 Tablet by mouth every day.  Dose: 1 Tablet            CHANGE how you take these medications        Instructions   acetaminophen 325 MG Tabs  What changed:   medication strength  how much to take  reasons to take this  additional instructions  Commonly known as: Tylenol   Take 2 Tablets by mouth every 6 hours as needed for Mild Pain.  Dose: 650 mg     lisinopril 20 MG Tabs  What changed:   medication strength  how much to take  Commonly known as: Prinivil   Take 1 Tablet by mouth 2 times a day.  Dose: 20 mg            CONTINUE taking these medications        Instructions   albuterol 108 (90 Base) MCG/ACT Aers inhalation aerosol   Inhale 2 Puffs every four hours as needed for Shortness of Breath.  Dose: 2 Puff     atorvastatin 10 MG Tabs  Commonly known as: Lipitor   Take 10 mg by mouth every evening.  Dose: 10 mg     calcium acetate 667 MG Caps  Commonly known as: Phos-Lo   Take 1 Capsule by mouth 3 times a week.  Dose: 1 Capsule     fluticasone 50 MCG/ACT nasal spray  Commonly known as: Flonase   Administer 1 Spray into each nostril every day.  Dose: 1 Spray     furosemide 40 MG Tabs  Commonly known as: Lasix   Take 40 mg by mouth every day.  Dose: 40 mg     HumuLIN R U-500 KwikPen 500 UNIT/ML Sopn  Generic drug: Insulin Regular Human (Conc)   Inject 3-8 Units under the skin 3 times a day before meals. Unspecified sliding scale, per [facility] pharmacy ([phone]) last filled on 11/29/2023  Dose: 3-8 Units     HYDROcodone-acetaminophen 5-325 MG Tabs per tablet  Commonly known as: Norco   Take 1 Tablet by mouth every 6 hours as needed (Severe Pain).  Dose: 1 Tablet     insulin glargine 100 UNIT/ML Sopn injection  Commonly known as: Lantus   Inject 5 Units under the skin every evening. Unspecified sliding scale, per [facility] ([phone]) last filled on 11/18/2024 for 84 day supply  Dose: 5 Units     latanoprost 0.005 % Soln  Commonly known as:  Xalatan   Place 1 Drop in both eyes every bedtime.  Dose: 1 Drop     Lexapro 20 MG tablet  Generic drug: escitalopram   Take 20 mg by mouth every evening.  Dose: 20 mg     Melatonin 10 MG Tabs   Take 10 mg by mouth at bedtime.  Dose: 10 mg     pantoprazole 40 MG Tbec  Commonly known as: Protonix   Take 1 Tablet by mouth 2 times a day.  Dose: 40 mg     potassium chloride SA 10 MEQ Tbcr  Commonly known as: K-Dur   Take 10 mEq by mouth 2 times a day.  Dose: 10 mEq     predniSONE 5 MG Tabs  Commonly known as: Deltasone   Take 5 mg by mouth every day.  Dose: 5 mg     POLLY-RICHELLE RX PO   Take 1 Tablet by mouth every day.  Dose: 1 Tablet     timolol 0.5 % Soln  Commonly known as: Timoptic   Administer 1 Drop into both eyes every morning.  Dose: 1 Drop     tizanidine 4 MG Tabs  Commonly known as: Zanaflex   Take 4 mg by mouth every evening.  Dose: 4 mg     VITAMIN B-12 PO   Take 1 Tablet by mouth every day.  Dose: 1 Tablet     VITAMIN D3 PO   Take 1 Capsule by mouth every day.  Dose: 1 Capsule              Allergies  Allergies   Allergen Reactions    Keflex [Cephalexin] Vomiting and Nausea     Nausea/vomiting, lightheadedness       DIET  Orders Placed This Encounter   Procedures    Diet Order Diet: Consistent CHO (Diabetic); Second Modifier: (optional): Renal; Fluid modifications: (optional): 1500 ml Fluid Restriction     Standing Status:   Standing     Number of Occurrences:   1     Diet::   Consistent CHO (Diabetic) [4]     Second Modifier: (optional):   Renal [8]     Fluid modifications: (optional):   1500 ml Fluid Restriction [9]       ACTIVITY  As tolerated.  Weight bearing as tolerated    CONSULTATIONS  Pulmonary  Nephrology    PROCEDURES  Dialysis      CT-CHEST (THORAX) W/O   Final Result      1.  Irregularity of ossification between the inferior aspect of the scapula and ribs which may be secondary to chronic scapular fracture or myositis ossificans. This is unchanged from previous exam.      2.   1 cm irregular  nodular density seen right upper lobe.      3.  Wedge-shaped airspace opacity dependently in the right lower lobe consistent with pneumonitis, atelectasis, and/or parenchymal scarring.      4.  Curvilinear opacities in the lingula and dependently in the left lower lobe likely atelectasis.      5.  Hepatocellular disease.      6.  Osteoporosis.      7.  Mild sigmoid scoliosis.      8.  Coronary artery calcification.      Fleischner Society pulmonary nodule recommendations:   Low and High Risk: Consider CT at 3 months, PET/CT, or tissue sampling.      Low Risk - Minimal or absent history of smoking and of other known risk factors.      High Risk - History of smoking or of other known risk factors.      Note: These recommendations do not apply to lung cancer screening, patients with immunosuppression, or patients with known primary cancer.      Fleischner Society 2017 Guidelines for Management of Incidentally Detected Pulmonary Nodules in Adults         DX-OUTSIDE IMAGES-DX CHEST   Final Result           LABORATORY  Lab Results   Component Value Date    SODIUM 138 04/24/2025    POTASSIUM 4.3 04/24/2025    CHLORIDE 100 04/24/2025    CO2 32 04/24/2025    GLUCOSE 112 (H) 04/24/2025    BUN 51 (H) 04/24/2025    CREATININE 3.20 (H) 04/24/2025        Lab Results   Component Value Date    WBC 11.7 (H) 04/24/2025    HEMOGLOBIN 9.0 (L) 04/24/2025    HEMATOCRIT 28.9 (L) 04/24/2025    PLATELETCT 209 04/24/2025        Total time of the discharge process exceeds 31 minutes.

## 2025-04-24 NOTE — CARE PLAN
The patient is Stable - Low risk of patient condition declining or worsening    Shift Goals  Clinical Goals: safety  Patient Goals: Comfort  Family Goals: MARTIN    Progress made toward(s) clinical / shift goals:    Problem: Knowledge Deficit - Standard  Goal: Patient and family/care givers will demonstrate understanding of plan of care, disease process/condition, diagnostic tests and medications  Outcome: Progressing     Problem: Self Care  Goal: Patient will have the ability to perform ADLs independently or with assistance (bathe, groom, dress, toilet and feed)  Outcome: Progressing     Problem: Fall Risk  Goal: Patient will remain free from falls  Outcome: Progressing     Problem: Pain - Standard  Goal: Alleviation of pain or a reduction in pain to the patient’s comfort goal  Outcome: Progressing       Patient is not progressing towards the following goals:

## 2025-05-13 ENCOUNTER — HOSPITAL ENCOUNTER (INPATIENT)
Facility: MEDICAL CENTER | Age: 65
LOS: 2 days | DRG: 377 | End: 2025-05-15
Attending: INTERNAL MEDICINE | Admitting: INTERNAL MEDICINE
Payer: MEDICARE

## 2025-05-13 DIAGNOSIS — K22.710 BARRETT'S ESOPHAGUS WITH LOW GRADE DYSPLASIA: Primary | ICD-10-CM

## 2025-05-13 PROBLEM — D62 ACUTE BLOOD LOSS ANEMIA: Status: ACTIVE | Noted: 2025-05-13

## 2025-05-13 LAB
ABO GROUP BLD: NORMAL
APTT PPP: 27.1 SEC (ref 24.7–36)
BLD GP AB SCN SERPL QL: NORMAL
GLUCOSE BLD STRIP.AUTO-MCNC: 179 MG/DL (ref 65–99)
HGB BLD-MCNC: 8.5 G/DL (ref 12–16)
INR PPP: 1.12 (ref 0.87–1.13)
PROTHROMBIN TIME: 14.4 SEC (ref 12–14.6)
RH BLD: NORMAL

## 2025-05-13 PROCEDURE — 85730 THROMBOPLASTIN TIME PARTIAL: CPT

## 2025-05-13 PROCEDURE — 82962 GLUCOSE BLOOD TEST: CPT

## 2025-05-13 PROCEDURE — 86900 BLOOD TYPING SEROLOGIC ABO: CPT

## 2025-05-13 PROCEDURE — 700105 HCHG RX REV CODE 258: Performed by: INTERNAL MEDICINE

## 2025-05-13 PROCEDURE — 770006 HCHG ROOM/CARE - MED/SURG/GYN SEMI*

## 2025-05-13 PROCEDURE — 700102 HCHG RX REV CODE 250 W/ 637 OVERRIDE(OP): Performed by: INTERNAL MEDICINE

## 2025-05-13 PROCEDURE — 700111 HCHG RX REV CODE 636 W/ 250 OVERRIDE (IP): Performed by: INTERNAL MEDICINE

## 2025-05-13 PROCEDURE — 85610 PROTHROMBIN TIME: CPT

## 2025-05-13 PROCEDURE — 85018 HEMOGLOBIN: CPT

## 2025-05-13 PROCEDURE — 86901 BLOOD TYPING SEROLOGIC RH(D): CPT

## 2025-05-13 PROCEDURE — 36415 COLL VENOUS BLD VENIPUNCTURE: CPT

## 2025-05-13 PROCEDURE — 99223 1ST HOSP IP/OBS HIGH 75: CPT | Mod: AI | Performed by: INTERNAL MEDICINE

## 2025-05-13 PROCEDURE — A9270 NON-COVERED ITEM OR SERVICE: HCPCS | Performed by: INTERNAL MEDICINE

## 2025-05-13 PROCEDURE — 86850 RBC ANTIBODY SCREEN: CPT

## 2025-05-13 RX ORDER — CALCIUM ACETATE 667 MG/1
1 CAPSULE ORAL
Status: DISCONTINUED | OUTPATIENT
Start: 2025-05-13 | End: 2025-05-13

## 2025-05-13 RX ORDER — TIMOLOL MALEATE 5 MG/ML
1 SOLUTION/ DROPS OPHTHALMIC EVERY MORNING
Status: DISCONTINUED | OUTPATIENT
Start: 2025-05-14 | End: 2025-05-15 | Stop reason: HOSPADM

## 2025-05-13 RX ORDER — MORPHINE SULFATE 4 MG/ML
2 INJECTION INTRAVENOUS
Status: DISCONTINUED | OUTPATIENT
Start: 2025-05-13 | End: 2025-05-15 | Stop reason: HOSPADM

## 2025-05-13 RX ORDER — DEXTROSE MONOHYDRATE 25 G/50ML
25 INJECTION, SOLUTION INTRAVENOUS
Status: DISCONTINUED | OUTPATIENT
Start: 2025-05-13 | End: 2025-05-15 | Stop reason: HOSPADM

## 2025-05-13 RX ORDER — POLYETHYLENE GLYCOL 3350 17 G/17G
1 POWDER, FOR SOLUTION ORAL
Status: DISCONTINUED | OUTPATIENT
Start: 2025-05-13 | End: 2025-05-15 | Stop reason: HOSPADM

## 2025-05-13 RX ORDER — PREDNISONE 5 MG/1
5 TABLET ORAL DAILY
Status: DISCONTINUED | OUTPATIENT
Start: 2025-05-14 | End: 2025-05-15 | Stop reason: HOSPADM

## 2025-05-13 RX ORDER — OXYCODONE HYDROCHLORIDE 5 MG/1
5 TABLET ORAL
Refills: 0 | Status: DISCONTINUED | OUTPATIENT
Start: 2025-05-13 | End: 2025-05-15 | Stop reason: HOSPADM

## 2025-05-13 RX ORDER — FLUTICASONE PROPIONATE 50 MCG
1 SPRAY, SUSPENSION (ML) NASAL DAILY
Status: DISCONTINUED | OUTPATIENT
Start: 2025-05-14 | End: 2025-05-13

## 2025-05-13 RX ORDER — PROMETHAZINE HYDROCHLORIDE 25 MG/1
12.5-25 TABLET ORAL EVERY 4 HOURS PRN
Status: DISCONTINUED | OUTPATIENT
Start: 2025-05-13 | End: 2025-05-15 | Stop reason: HOSPADM

## 2025-05-13 RX ORDER — ESCITALOPRAM OXALATE 10 MG/1
20 TABLET ORAL EVERY MORNING
Status: DISCONTINUED | OUTPATIENT
Start: 2025-05-14 | End: 2025-05-15 | Stop reason: HOSPADM

## 2025-05-13 RX ORDER — VITAMIN B COMPLEX
1000 TABLET ORAL DAILY
Status: DISCONTINUED | OUTPATIENT
Start: 2025-05-14 | End: 2025-05-15 | Stop reason: HOSPADM

## 2025-05-13 RX ORDER — INSULIN LISPRO 100 [IU]/ML
2-9 INJECTION, SOLUTION INTRAVENOUS; SUBCUTANEOUS EVERY 6 HOURS
Status: DISCONTINUED | OUTPATIENT
Start: 2025-05-13 | End: 2025-05-15 | Stop reason: HOSPADM

## 2025-05-13 RX ORDER — LATANOPROST 50 UG/ML
1 SOLUTION/ DROPS OPHTHALMIC
Status: DISCONTINUED | OUTPATIENT
Start: 2025-05-13 | End: 2025-05-15 | Stop reason: HOSPADM

## 2025-05-13 RX ORDER — FUROSEMIDE 40 MG/1
40 TABLET ORAL DAILY
Status: DISCONTINUED | OUTPATIENT
Start: 2025-05-14 | End: 2025-05-15 | Stop reason: HOSPADM

## 2025-05-13 RX ORDER — HYDROCODONE BITARTRATE AND ACETAMINOPHEN 5; 325 MG/1; MG/1
1 TABLET ORAL EVERY 6 HOURS PRN
Refills: 0 | Status: DISCONTINUED | OUTPATIENT
Start: 2025-05-13 | End: 2025-05-15 | Stop reason: HOSPADM

## 2025-05-13 RX ORDER — PANTOPRAZOLE SODIUM 40 MG/10ML
40 INJECTION, POWDER, LYOPHILIZED, FOR SOLUTION INTRAVENOUS 2 TIMES DAILY
Status: DISCONTINUED | OUTPATIENT
Start: 2025-05-13 | End: 2025-05-15 | Stop reason: HOSPADM

## 2025-05-13 RX ORDER — AMOXICILLIN 250 MG
2 CAPSULE ORAL EVERY EVENING
Status: DISCONTINUED | OUTPATIENT
Start: 2025-05-13 | End: 2025-05-15 | Stop reason: HOSPADM

## 2025-05-13 RX ORDER — ONDANSETRON 2 MG/ML
4 INJECTION INTRAMUSCULAR; INTRAVENOUS EVERY 4 HOURS PRN
Status: DISCONTINUED | OUTPATIENT
Start: 2025-05-13 | End: 2025-05-15 | Stop reason: HOSPADM

## 2025-05-13 RX ORDER — SODIUM CHLORIDE 9 MG/ML
INJECTION, SOLUTION INTRAVENOUS CONTINUOUS
Status: DISCONTINUED | OUTPATIENT
Start: 2025-05-13 | End: 2025-05-14

## 2025-05-13 RX ORDER — PANTOPRAZOLE SODIUM 40 MG/10ML
40 INJECTION, POWDER, LYOPHILIZED, FOR SOLUTION INTRAVENOUS 2 TIMES DAILY
Status: DISCONTINUED | OUTPATIENT
Start: 2025-05-13 | End: 2025-05-13

## 2025-05-13 RX ORDER — ONDANSETRON 2 MG/ML
4 INJECTION INTRAMUSCULAR; INTRAVENOUS EVERY 4 HOURS PRN
Status: DISCONTINUED | OUTPATIENT
Start: 2025-05-13 | End: 2025-05-13

## 2025-05-13 RX ORDER — ACETAMINOPHEN 325 MG/1
650 TABLET ORAL EVERY 6 HOURS PRN
Status: DISCONTINUED | OUTPATIENT
Start: 2025-05-13 | End: 2025-05-15 | Stop reason: HOSPADM

## 2025-05-13 RX ORDER — ONDANSETRON 4 MG/1
4 TABLET, ORALLY DISINTEGRATING ORAL EVERY 4 HOURS PRN
Status: DISCONTINUED | OUTPATIENT
Start: 2025-05-13 | End: 2025-05-15 | Stop reason: HOSPADM

## 2025-05-13 RX ORDER — OXYCODONE HYDROCHLORIDE 5 MG/1
2.5 TABLET ORAL
Refills: 0 | Status: DISCONTINUED | OUTPATIENT
Start: 2025-05-13 | End: 2025-05-15 | Stop reason: HOSPADM

## 2025-05-13 RX ORDER — ALBUTEROL SULFATE 90 UG/1
2 INHALANT RESPIRATORY (INHALATION) EVERY 4 HOURS PRN
Status: DISCONTINUED | OUTPATIENT
Start: 2025-05-13 | End: 2025-05-15 | Stop reason: HOSPADM

## 2025-05-13 RX ORDER — PROCHLORPERAZINE EDISYLATE 5 MG/ML
5-10 INJECTION INTRAMUSCULAR; INTRAVENOUS EVERY 4 HOURS PRN
Status: DISCONTINUED | OUTPATIENT
Start: 2025-05-13 | End: 2025-05-15 | Stop reason: HOSPADM

## 2025-05-13 RX ORDER — PROMETHAZINE HYDROCHLORIDE 12.5 MG/1
12.5-25 SUPPOSITORY RECTAL EVERY 4 HOURS PRN
Status: DISCONTINUED | OUTPATIENT
Start: 2025-05-13 | End: 2025-05-15 | Stop reason: HOSPADM

## 2025-05-13 RX ORDER — HYDRALAZINE HYDROCHLORIDE 20 MG/ML
10 INJECTION INTRAMUSCULAR; INTRAVENOUS EVERY 4 HOURS PRN
Status: DISCONTINUED | OUTPATIENT
Start: 2025-05-13 | End: 2025-05-15 | Stop reason: HOSPADM

## 2025-05-13 RX ORDER — ATORVASTATIN CALCIUM 10 MG/1
10 TABLET, FILM COATED ORAL EVERY EVENING
Status: DISCONTINUED | OUTPATIENT
Start: 2025-05-14 | End: 2025-05-15 | Stop reason: HOSPADM

## 2025-05-13 RX ADMIN — OXYCODONE 2.5 MG: 5 TABLET ORAL at 21:24

## 2025-05-13 RX ADMIN — SODIUM CHLORIDE: 9 INJECTION, SOLUTION INTRAVENOUS at 18:16

## 2025-05-13 RX ADMIN — INSULIN LISPRO 2 UNITS: 100 INJECTION, SOLUTION INTRAVENOUS; SUBCUTANEOUS at 21:15

## 2025-05-13 RX ADMIN — Medication 10 MG: at 21:25

## 2025-05-13 RX ADMIN — LATANOPROST 1 DROP: 50 SOLUTION OPHTHALMIC at 21:28

## 2025-05-13 RX ADMIN — SENNOSIDES AND DOCUSATE SODIUM 2 TABLET: 50; 8.6 TABLET ORAL at 18:13

## 2025-05-13 RX ADMIN — PANTOPRAZOLE SODIUM 40 MG: 40 INJECTION, POWDER, FOR SOLUTION INTRAVENOUS at 23:47

## 2025-05-13 ASSESSMENT — COGNITIVE AND FUNCTIONAL STATUS - GENERAL
MOBILITY SCORE: 23
SUGGESTED CMS G CODE MODIFIER MOBILITY: CI
MOVING TO AND FROM BED TO CHAIR: A LITTLE
MOVING TO AND FROM BED TO CHAIR: A LITTLE
SUGGESTED CMS G CODE MODIFIER DAILY ACTIVITY: CH
DAILY ACTIVITIY SCORE: 24

## 2025-05-13 ASSESSMENT — LIFESTYLE VARIABLES
HAVE YOU EVER FELT YOU SHOULD CUT DOWN ON YOUR DRINKING: NO
AVERAGE NUMBER OF DAYS PER WEEK YOU HAVE A DRINK CONTAINING ALCOHOL: 0
EVER FELT BAD OR GUILTY ABOUT YOUR DRINKING: NO
ALCOHOL_USE: NO
EVER HAD A DRINK FIRST THING IN THE MORNING TO STEADY YOUR NERVES TO GET RID OF A HANGOVER: NO
SUBSTANCE_ABUSE: 0
TOTAL SCORE: 0
ON A TYPICAL DAY WHEN YOU DRINK ALCOHOL HOW MANY DRINKS DO YOU HAVE: 0
TOTAL SCORE: 0
HAVE PEOPLE ANNOYED YOU BY CRITICIZING YOUR DRINKING: NO
TOTAL SCORE: 0
CONSUMPTION TOTAL: NEGATIVE
HOW MANY TIMES IN THE PAST YEAR HAVE YOU HAD 5 OR MORE DRINKS IN A DAY: 0

## 2025-05-13 ASSESSMENT — FIBROSIS 4 INDEX
FIB4 SCORE: 1.27
FIB4 SCORE: 1.27

## 2025-05-13 ASSESSMENT — PAIN DESCRIPTION - PAIN TYPE: TYPE: ACUTE PAIN

## 2025-05-13 ASSESSMENT — ENCOUNTER SYMPTOMS
VOMITING: 0
NAUSEA: 0
BRUISES/BLEEDS EASILY: 0
BLURRED VISION: 0
PALPITATIONS: 0
HEARTBURN: 0
FOCAL WEAKNESS: 0
NERVOUS/ANXIOUS: 0
WEIGHT LOSS: 0
NECK PAIN: 0
POLYDIPSIA: 0
FEVER: 0
PHOTOPHOBIA: 0
ORTHOPNEA: 0
BACK PAIN: 0
SPUTUM PRODUCTION: 0
HEMOPTYSIS: 0
TREMORS: 0
DIZZINESS: 1
HEADACHES: 0
COUGH: 0
CHILLS: 0
SPEECH CHANGE: 0
FLANK PAIN: 0
WEAKNESS: 1
DOUBLE VISION: 0
HALLUCINATIONS: 0

## 2025-05-13 ASSESSMENT — SOCIAL DETERMINANTS OF HEALTH (SDOH)
WITHIN THE PAST 12 MONTHS, THE FOOD YOU BOUGHT JUST DIDN'T LAST AND YOU DIDN'T HAVE MONEY TO GET MORE: NEVER TRUE
WITHIN THE LAST YEAR, HAVE TO BEEN RAPED OR FORCED TO HAVE ANY KIND OF SEXUAL ACTIVITY BY YOUR PARTNER OR EX-PARTNER?: NO
IN THE PAST 12 MONTHS, HAS THE ELECTRIC, GAS, OIL, OR WATER COMPANY THREATENED TO SHUT OFF SERVICE IN YOUR HOME?: NO
WITHIN THE LAST YEAR, HAVE YOU BEEN AFRAID OF YOUR PARTNER OR EX-PARTNER?: NO
WITHIN THE PAST 12 MONTHS, YOU WORRIED THAT YOUR FOOD WOULD RUN OUT BEFORE YOU GOT THE MONEY TO BUY MORE: NEVER TRUE
WITHIN THE LAST YEAR, HAVE YOU BEEN HUMILIATED OR EMOTIONALLY ABUSED IN OTHER WAYS BY YOUR PARTNER OR EX-PARTNER?: NO
WITHIN THE LAST YEAR, HAVE YOU BEEN KICKED, HIT, SLAPPED, OR OTHERWISE PHYSICALLY HURT BY YOUR PARTNER OR EX-PARTNER?: NO

## 2025-05-13 ASSESSMENT — PATIENT HEALTH QUESTIONNAIRE - PHQ9
1. LITTLE INTEREST OR PLEASURE IN DOING THINGS: NOT AT ALL
2. FEELING DOWN, DEPRESSED, IRRITABLE, OR HOPELESS: NOT AT ALL
SUM OF ALL RESPONSES TO PHQ9 QUESTIONS 1 AND 2: 0

## 2025-05-13 NOTE — PROGRESS NOTES
[facility] DIRECT ADMISSION REPORT  Transferring facility: [facility]  Transferring physician: Luis    Chief complaint: Melena  Pertinent history & patient course:   65-year-old female history of ESRD on home HD, COPD, esophageal cancer status post resection.  Outside facility with melena with onset on 5/08. Patient now plaint of fatigue and generalized weakness.  Hemoglobin noted to be 8.3 which is down from 11.2 on 04/20.  Patient has dynamically stable with baseline sinus bradycardia in the 40s, /58.  Patient does take a PPI daily unknown NSAID use.  Patient is being transferred for GI and nephrology services.    Consultants called prior to transfer and pertinent input from consultants:   Code Status:  per transferring provider, I personally verified with the transferring provider patient's code status and the transferring provider has confirmed this with the patient.  Reason for Transfer: GI evaluation and continuation of maintenance HD per nephrology  Further work up or recommendations requested prior to transfer:     Patient accepted for transfer: Yes  Accepting [facility]: [facility] - Nursing to notify the Triage Coordinator in the RTOC via Voalte or Phone ext. [phone] when patient arrives to the unit. The Triage Coordinator will assign the admitting provider.    Consultants to be called upon arrival: GI, Nephrology  Admission status: Inpatient.   Floor requested: Medical, patient has sinus bradycardia at baseline and no telemetry beds available    If ICU transfer, name of intensivist case discussed with and pertinent input from critical care:     The admitting provider is the point of contact for questions or concerns regarding patient's care.

## 2025-05-14 LAB
ALBUMIN SERPL BCP-MCNC: 3.5 G/DL (ref 3.2–4.9)
ALBUMIN/GLOB SERPL: 1.5 G/DL
ALP SERPL-CCNC: 91 U/L (ref 30–99)
ALT SERPL-CCNC: 55 U/L (ref 2–50)
ANION GAP SERPL CALC-SCNC: 10 MMOL/L (ref 7–16)
APPEARANCE UR: CLEAR
AST SERPL-CCNC: 24 U/L (ref 12–45)
BACTERIA #/AREA URNS HPF: ABNORMAL /HPF
BASOPHILS # BLD AUTO: 0.4 % (ref 0–1.8)
BASOPHILS # BLD: 0.02 K/UL (ref 0–0.12)
BILIRUB SERPL-MCNC: 0.2 MG/DL (ref 0.1–1.5)
BILIRUB UR QL STRIP.AUTO: NEGATIVE
BUN SERPL-MCNC: 50 MG/DL (ref 8–22)
CALCIUM ALBUM COR SERPL-MCNC: 9.3 MG/DL (ref 8.5–10.5)
CALCIUM SERPL-MCNC: 8.9 MG/DL (ref 8.5–10.5)
CASTS URNS QL MICRO: ABNORMAL /LPF (ref 0–2)
CHLORIDE SERPL-SCNC: 108 MMOL/L (ref 96–112)
CO2 SERPL-SCNC: 20 MMOL/L (ref 20–33)
COLOR UR: YELLOW
CORTIS SERPL-MCNC: 1.2 UG/DL (ref 0–23)
CREAT SERPL-MCNC: 2.94 MG/DL (ref 0.5–1.4)
EOSINOPHIL # BLD AUTO: 0.19 K/UL (ref 0–0.51)
EOSINOPHIL NFR BLD: 4.2 % (ref 0–6.9)
EPITHELIAL CELLS 1715: ABNORMAL /HPF (ref 0–5)
ERYTHROCYTE [DISTWIDTH] IN BLOOD BY AUTOMATED COUNT: 58.7 FL (ref 35.9–50)
GFR SERPLBLD CREATININE-BSD FMLA CKD-EPI: 17 ML/MIN/1.73 M 2
GLOBULIN SER CALC-MCNC: 2.3 G/DL (ref 1.9–3.5)
GLUCOSE BLD STRIP.AUTO-MCNC: 116 MG/DL (ref 65–99)
GLUCOSE BLD STRIP.AUTO-MCNC: 124 MG/DL (ref 65–99)
GLUCOSE BLD STRIP.AUTO-MCNC: 137 MG/DL (ref 65–99)
GLUCOSE BLD STRIP.AUTO-MCNC: 62 MG/DL (ref 65–99)
GLUCOSE BLD STRIP.AUTO-MCNC: 62 MG/DL (ref 65–99)
GLUCOSE BLD STRIP.AUTO-MCNC: 72 MG/DL (ref 65–99)
GLUCOSE BLD STRIP.AUTO-MCNC: 90 MG/DL (ref 65–99)
GLUCOSE SERPL-MCNC: 107 MG/DL (ref 65–99)
GLUCOSE UR STRIP.AUTO-MCNC: NEGATIVE MG/DL
HCT VFR BLD AUTO: 28.7 % (ref 37–47)
HGB BLD-MCNC: 11.6 G/DL (ref 12–16)
HGB BLD-MCNC: 9.1 G/DL (ref 12–16)
IMM GRANULOCYTES # BLD AUTO: 0.04 K/UL (ref 0–0.11)
IMM GRANULOCYTES NFR BLD AUTO: 0.9 % (ref 0–0.9)
KETONES UR STRIP.AUTO-MCNC: NEGATIVE MG/DL
LEUKOCYTE ESTERASE UR QL STRIP.AUTO: NEGATIVE
LYMPHOCYTES # BLD AUTO: 0.78 K/UL (ref 1–4.8)
LYMPHOCYTES NFR BLD: 17.1 % (ref 22–41)
MCH RBC QN AUTO: 33.5 PG (ref 27–33)
MCHC RBC AUTO-ENTMCNC: 31.7 G/DL (ref 32.2–35.5)
MCV RBC AUTO: 105.5 FL (ref 81.4–97.8)
MICRO URNS: ABNORMAL
MONOCYTES # BLD AUTO: 0.38 K/UL (ref 0–0.85)
MONOCYTES NFR BLD AUTO: 8.3 % (ref 0–13.4)
NEUTROPHILS # BLD AUTO: 3.16 K/UL (ref 1.82–7.42)
NEUTROPHILS NFR BLD: 69.1 % (ref 44–72)
NITRITE UR QL STRIP.AUTO: NEGATIVE
NRBC # BLD AUTO: 0 K/UL
NRBC BLD-RTO: 0 /100 WBC (ref 0–0.2)
PH UR STRIP.AUTO: 6 [PH] (ref 5–8)
PLATELET # BLD AUTO: 149 K/UL (ref 164–446)
PMV BLD AUTO: 11.3 FL (ref 9–12.9)
POTASSIUM SERPL-SCNC: 4 MMOL/L (ref 3.6–5.5)
PROT SERPL-MCNC: 5.8 G/DL (ref 6–8.2)
PROT UR QL STRIP: 30 MG/DL
RBC # BLD AUTO: 2.72 M/UL (ref 4.2–5.4)
RBC # URNS HPF: ABNORMAL /HPF (ref 0–2)
RBC UR QL AUTO: ABNORMAL
SODIUM SERPL-SCNC: 138 MMOL/L (ref 135–145)
SP GR UR STRIP.AUTO: 1.01
UROBILINOGEN UR STRIP.AUTO-MCNC: 0.2 EU/DL
WBC # BLD AUTO: 4.6 K/UL (ref 4.8–10.8)
WBC #/AREA URNS HPF: ABNORMAL /HPF

## 2025-05-14 PROCEDURE — 90935 HEMODIALYSIS ONE EVALUATION: CPT

## 2025-05-14 PROCEDURE — 5A1D70Z PERFORMANCE OF URINARY FILTRATION, INTERMITTENT, LESS THAN 6 HOURS PER DAY: ICD-10-PCS | Performed by: INTERNAL MEDICINE

## 2025-05-14 PROCEDURE — 99233 SBSQ HOSP IP/OBS HIGH 50: CPT | Performed by: STUDENT IN AN ORGANIZED HEALTH CARE EDUCATION/TRAINING PROGRAM

## 2025-05-14 PROCEDURE — 700111 HCHG RX REV CODE 636 W/ 250 OVERRIDE (IP)

## 2025-05-14 PROCEDURE — 700111 HCHG RX REV CODE 636 W/ 250 OVERRIDE (IP): Performed by: INTERNAL MEDICINE

## 2025-05-14 PROCEDURE — 700105 HCHG RX REV CODE 258: Performed by: NURSE PRACTITIONER

## 2025-05-14 PROCEDURE — A9270 NON-COVERED ITEM OR SERVICE: HCPCS | Performed by: INTERNAL MEDICINE

## 2025-05-14 PROCEDURE — 82962 GLUCOSE BLOOD TEST: CPT

## 2025-05-14 PROCEDURE — 85018 HEMOGLOBIN: CPT

## 2025-05-14 PROCEDURE — 700102 HCHG RX REV CODE 250 W/ 637 OVERRIDE(OP): Performed by: INTERNAL MEDICINE

## 2025-05-14 PROCEDURE — 80053 COMPREHEN METABOLIC PANEL: CPT

## 2025-05-14 PROCEDURE — 85025 COMPLETE CBC W/AUTO DIFF WBC: CPT

## 2025-05-14 PROCEDURE — 81001 URINALYSIS AUTO W/SCOPE: CPT

## 2025-05-14 PROCEDURE — 700101 HCHG RX REV CODE 250: Performed by: INTERNAL MEDICINE

## 2025-05-14 PROCEDURE — 770006 HCHG ROOM/CARE - MED/SURG/GYN SEMI*

## 2025-05-14 PROCEDURE — 82533 TOTAL CORTISOL: CPT

## 2025-05-14 RX ORDER — HEPARIN SODIUM 1000 [USP'U]/ML
2200 INJECTION, SOLUTION INTRAVENOUS; SUBCUTANEOUS
Status: DISCONTINUED | OUTPATIENT
Start: 2025-05-14 | End: 2025-05-15 | Stop reason: HOSPADM

## 2025-05-14 RX ORDER — HEPARIN SODIUM 1000 [USP'U]/ML
INJECTION, SOLUTION INTRAVENOUS; SUBCUTANEOUS
Status: COMPLETED
Start: 2025-05-14 | End: 2025-05-14

## 2025-05-14 RX ORDER — SODIUM CHLORIDE 9 MG/ML
100 INJECTION, SOLUTION INTRAVENOUS
Status: DISCONTINUED | OUTPATIENT
Start: 2025-05-14 | End: 2025-05-15 | Stop reason: HOSPADM

## 2025-05-14 RX ORDER — HEPARIN SODIUM 1000 [USP'U]/ML
2300 INJECTION, SOLUTION INTRAVENOUS; SUBCUTANEOUS
Status: DISCONTINUED | OUTPATIENT
Start: 2025-05-14 | End: 2025-05-15 | Stop reason: HOSPADM

## 2025-05-14 RX ORDER — DEXTROSE MONOHYDRATE AND SODIUM CHLORIDE 5; .9 G/100ML; G/100ML
INJECTION, SOLUTION INTRAVENOUS CONTINUOUS
Status: DISCONTINUED | OUTPATIENT
Start: 2025-05-14 | End: 2025-05-14

## 2025-05-14 RX ADMIN — SENNOSIDES AND DOCUSATE SODIUM 2 TABLET: 50; 8.6 TABLET ORAL at 18:36

## 2025-05-14 RX ADMIN — PREDNISONE 5 MG: 5 TABLET ORAL at 05:38

## 2025-05-14 RX ADMIN — HEPARIN SODIUM 2300 UNITS: 1000 INJECTION, SOLUTION INTRAVENOUS; SUBCUTANEOUS at 18:09

## 2025-05-14 RX ADMIN — Medication 1000 UNITS: at 05:38

## 2025-05-14 RX ADMIN — HYDRALAZINE HYDROCHLORIDE 10 MG: 20 INJECTION INTRAMUSCULAR; INTRAVENOUS at 06:13

## 2025-05-14 RX ADMIN — POLYETHYLENE GLYCOL 3350 1 PACKET: 17 POWDER, FOR SOLUTION ORAL at 12:42

## 2025-05-14 RX ADMIN — DEXTROSE AND SODIUM CHLORIDE: 5; 900 INJECTION, SOLUTION INTRAVENOUS at 06:33

## 2025-05-14 RX ADMIN — HYDROCODONE BITARTRATE AND ACETAMINOPHEN 1 TABLET: 5; 325 TABLET ORAL at 19:35

## 2025-05-14 RX ADMIN — TIMOLOL MALEATE 1 DROP: 5 SOLUTION OPHTHALMIC at 05:35

## 2025-05-14 RX ADMIN — HEPARIN SODIUM 2200 UNITS: 1000 INJECTION, SOLUTION INTRAVENOUS; SUBCUTANEOUS at 18:09

## 2025-05-14 RX ADMIN — PANTOPRAZOLE SODIUM 40 MG: 40 INJECTION, POWDER, FOR SOLUTION INTRAVENOUS at 18:36

## 2025-05-14 RX ADMIN — TIZANIDINE 4 MG: 4 TABLET ORAL at 18:36

## 2025-05-14 RX ADMIN — OXYCODONE 5 MG: 5 TABLET ORAL at 05:38

## 2025-05-14 RX ADMIN — MOMETASONE FUROATE AND FORMOTEROL FUMARATE DIHYDRATE 2 PUFF: 200; 5 AEROSOL RESPIRATORY (INHALATION) at 05:35

## 2025-05-14 RX ADMIN — PANTOPRAZOLE SODIUM 40 MG: 40 INJECTION, POWDER, FOR SOLUTION INTRAVENOUS at 06:13

## 2025-05-14 RX ADMIN — ESCITALOPRAM OXALATE 20 MG: 10 TABLET ORAL at 05:38

## 2025-05-14 RX ADMIN — FUROSEMIDE 40 MG: 40 TABLET ORAL at 05:38

## 2025-05-14 RX ADMIN — ATORVASTATIN CALCIUM 10 MG: 10 TABLET, FILM COATED ORAL at 18:36

## 2025-05-14 RX ADMIN — MOMETASONE FUROATE AND FORMOTEROL FUMARATE DIHYDRATE 2 PUFF: 200; 5 AEROSOL RESPIRATORY (INHALATION) at 20:58

## 2025-05-14 RX ADMIN — LATANOPROST 1 DROP: 50 SOLUTION OPHTHALMIC at 21:00

## 2025-05-14 RX ADMIN — Medication 10 MG: at 21:00

## 2025-05-14 ASSESSMENT — ENCOUNTER SYMPTOMS
VOMITING: 0
FEVER: 0
NAUSEA: 0
WEAKNESS: 1
SHORTNESS OF BREATH: 0
ABDOMINAL PAIN: 0
CONSTIPATION: 1

## 2025-05-14 ASSESSMENT — PAIN DESCRIPTION - PAIN TYPE
TYPE: ACUTE PAIN

## 2025-05-14 NOTE — FLOWSHEET NOTE
COPD EDUCATION by COPD CLINICAL EDUCATOR  5/14/2025  at  10:01 AM by Esperanza Ferris, RRT     Patient interviewed by education team.  Patient declined or is unable to participate in the full program.  Therefore, a short intervention has been conducted.  A comprehensive packet including information about COPD, types of treatments to manage their disease and safe home Oxygen usage was provided to patient on last admit April 2025 and she declines additional book or education.  Patient feels educated on disease.

## 2025-05-14 NOTE — HOSPITAL COURSE
Savita Mcclain is a 65-year-old female with PMHx ESRD on HD at home, esophageal cancer s/p resection in 24, bronchiectasis, COPD, chronic steroid therapy for COPD, hypothyroidism, HTN, chronic pain, depression, DMT2, NSTEMI, HLD, bradycardia.  Admitted 5/13 for melena.    Prior history: Patient initially presented to [facility] for generalized weakness and black stools since 5/8.  Intermittent right lower chest pain is present.  Patient is on home HD.  Last HD was 5/11.    Last EGD performed 4/15/2025 at [facility].  Ansari's esophagus present and duodenal polyp removed.  Pathology was negative for malignancy.  Patient states melena present 8/8 through 8/10.  Since then she has not had a bowel movement.    In the ED: Hb 8.3; baseline 8-9.  Creatinine 3.2.  Normal LFTs.  Occult blood positive in the ED.  Patient was started on IV Protonix.  Nephrology was consulted for inpatient HD.    Briefly, the case was discussed with GI.  However given lack of melena and stable hemoglobin-no indication for EGD at this time.  Trend H&H.

## 2025-05-14 NOTE — ASSESSMENT & PLAN NOTE
History of EGD 4/2025.  Removal of polyps at that time.  Diagnosed with Ansari's.  No malignancy identified  Presenting with melena 5/8 through 5/10; no bowel movement since 5/10.  Hb stable at 8.9; baseline between 8 and 10.  - Trend H&H  - Transfuse for Hb less than 7  - GI consult should melena persist or Hb drop  - Continue IV Protonix

## 2025-05-14 NOTE — CARE PLAN
The patient is Stable - Low risk of patient condition declining or worsening    Shift Goals  Clinical Goals: Pain will be managed to patient goal of 2 or below during shift.  BS mgt, Monitor Hgb.  Patient Goals: rest  Family Goals: yaya    Patient A & O x 4, utilizes call light appropriately.  Patient had low BS during midnight check, treated per BS mgt orders, Hostpitalist  notified. Medicated per MAR.  Bed in low and locked position, call light in reach, rounded on hourly.     Progress made toward(s) clinical / shift goals:    Problem: Knowledge Deficit - Standard  Goal: Patient and family/care givers will demonstrate understanding of plan of care, disease process/condition, diagnostic tests and medications  Outcome: Progressing     Problem: Pain - Standard  Goal: Alleviation of pain or a reduction in pain to the patient’s comfort goal  Outcome: Progressing       Patient is not progressing towards the following goals:

## 2025-05-14 NOTE — ASSESSMENT & PLAN NOTE
through 184  - Resume home lisinopril  - Consider adding hydralazine if needed  - IV antihypertensives for SBP greater than 180

## 2025-05-14 NOTE — ASSESSMENT & PLAN NOTE
Echocardiogram 1/2025: EF 50%; RVSP 40  Appears euvolemic  - Continue daily Lasix  - Monitor volume status

## 2025-05-14 NOTE — DISCHARGE PLANNING
Care Transition Team Assessment    Primary emergency Contact is pt's Son at [phone] or Daughter Robyn at [phone]     LMSW met with pt at bedside to complete discharge assessment and chart review was completed to obtain the information used in this assessment. Pt is A/Ox4 and agreeable to assessment. Pt verified information on face sheet.      - Prior to admission, pt lived with her father and Son Jose at [address] [facility] [address] with a couple of steps to enter  - Per pt, family is good support for discharge back into community.   - Pt stated to be independent with ADL/IADLs and drives self-prior to admission.   - Per pt, no DME or O2 was used prior to admission, but she does have access to DME as her dad uses it all   - Pt is retired and insured through Medicare and [employer]  - Preferred pharmacy is the [facility] at [id number] [place] [phone]  -PCP is Dr. Yinka Tapia  -Patient does not report any history of BOOKER or MH    LMSW met with the patient at bedside to discuss DC planning. Patient is not medically cleared to DC at this time. Patient's family will provide a DC ride upon medical clearance.     Information Source  Orientation Level: Oriented X4  Information Given By: Patient  Who is responsible for making decisions for patient? : Patient    Readmission Evaluation  Is this a readmission?: Yes - unplanned readmission  Was an appointment arranged for you prior to discharge?: Yes, attended appointment  Were there new prescriptions you were supposed to fill after you were discharged?: Yes, prescriptions filled  Did you understand your discharge instructions?: Yes  Did you have enough support after your last discharge?: Yes    Elopement Risk  Legal Hold: No  Ambulatory or Self Mobile in Wheelchair: Yes  Disoriented: No  Psychiatric Symptoms: None  History of Wandering: No  Elopement this Admit: No  Vocalizing Wanting to Leave: No  Displays Behaviors, Body  Language Wanting to Leave: No-Not at Risk for Elopement  Elopement Risk: Not at Risk for Elopement    Interdisciplinary Discharge Planning  Lives with - Patient's Self Care Capacity: Adult Children, Parents (son and father)  Patient or legal guardian wants to designate a caregiver: No  Support Systems: Children, Parent  Housing / Facility: [address]    Discharge Preparedness  What is your plan after discharge?: Home with help  What are your discharge supports?: Child  Prior Functional Level: Independent with Activities of Daily Living, Independent with Medication Management, Ambulatory, Drives Self  Difficulity with ADLs: None  Difficulity with IADLs: None    Functional Assesment  Prior Functional Level: Independent with Activities of Daily Living, Independent with Medication Management, Ambulatory, Drives Self    Finances  Financial Barriers to Discharge: No  Prescription Coverage: Yes    Vision / Hearing Impairment  Vision Impairment : Yes  Right Eye Vision: Impaired, Wears Glasses  Left Eye Vision: Impaired, Wears Glasses  Hearing Impairment : No    Advance Directive  Advance Directive?: None    Domestic Abuse  Possible Abuse/Neglect Reported to:: Not Applicable    Psychological Assessment  History of Substance Abuse: None  History of Psychiatric Problems: No  Non-compliant with Treatment: No  Newly Diagnosed Illness: No    Discharge Risks or Barriers  Discharge risks or barriers?: No  Patient risk factors: Complex medical needs, Readmission, Vulnerable adult    Anticipated Discharge Information  Discharge Disposition: Discharged to home/self care (01)

## 2025-05-14 NOTE — H&P
Hospital Medicine History & Physical Note    Date of Service  5/13/2025    Primary Care Physician  Yinka Tapia M.D.        Code Status  Full Code    Chief Complaint  No chief complaint on file.      History of Presenting Illness  Savita Mcclain is a 65 y.o. female with past medical history of end-stage renal disease on home hemodialysis, esophageal cancer status post submucosal resection 2024 , bronchiectasis, COPD, chronic steroid therapy for COPD, hypothyroidism, hypertension, hiatal hernia, chronic pain, and depression, type 2 diabetes, NSTEMI, LBBB, dyslipidemia, bradycardia, who presented 5/13/2025 from [facility] with concern for GI bleed  She was seen at outside facility ED earlier today with complaints of generalized weakness, orthostatic lightheadedness and black stools since Thursday, May 8.  Reported right lower side of the chest discomfort on and off.  Patient has been on home hemodialysis, last dialysis session was on Sunday.  Patient noted to have hemoglobin 8.3, down from 11.2 on April 20, platelets 149, down from 206 on April 20.  CHEM panel showed sodium 132, potassium 4.1, creatinine 3.2, glucose 271, LFTs and bilirubin are not elevated.  INR 1.2  EKG: Sinus bradycardia with heart rate 45, LBBB, which is not new.  Patient has chronic sinus bradycardia in 40s to 60s at baseline.  Guaiac was checked and it was positive.  Treatment included IV Protonix 80 mg at around 4 PM and started on Protonix drip.  Afterwards patient was transferred.  Last EGD on 4/15/2025 at [facility], for Ansari's esophagus and duodenal polyp removal.  Per pathology report, there was no malignancy identified..   I discussed the plan of care with patient, bedside RN, and charge nurse .    Review of Systems  Review of Systems   Constitutional:  Negative for chills, fever and weight loss.   HENT:  Negative for ear pain, hearing loss and tinnitus.    Eyes:  Negative for blurred vision, double vision and  photophobia.   Respiratory:  Negative for cough, hemoptysis and sputum production.    Cardiovascular:  Negative for chest pain, palpitations and orthopnea.   Gastrointestinal:  Positive for melena. Negative for heartburn, nausea and vomiting.   Genitourinary:  Negative for dysuria, flank pain, frequency and hematuria.   Musculoskeletal:  Positive for joint pain. Negative for back pain and neck pain.   Skin:  Negative for itching and rash.   Neurological:  Positive for dizziness and weakness. Negative for tremors, speech change, focal weakness and headaches.   Endo/Heme/Allergies:  Negative for environmental allergies and polydipsia. Does not bruise/bleed easily.   Psychiatric/Behavioral:  Negative for hallucinations and substance abuse. The patient is not nervous/anxious.        Past Medical History   has a past medical history of Diabetes, Psychiatric disorder, and Rheumatoid arthritis(714.0).    Surgical History   has a past surgical history that includes other orthopedic surgery; pr upper gi endoscopy,biopsy (Left, 6/10/2020); pr colonoscopy-flexible (Left, 6/10/2020); and toe amputation (Left, 2000).     Family History  family history is not on file.   Family history reviewed with patient. There is no family history that is pertinent to the chief complaint.     Social History   reports that she quit smoking about 9 years ago. Her smoking use included cigarettes. She started smoking about 44 years ago. She has a 80 pack-year smoking history. She has never used smokeless tobacco. She reports that she does not currently use alcohol. She reports that she does not use drugs.    Allergies  Allergies   Allergen Reactions    Keflex [Cephalexin] Vomiting and Nausea     Nausea/vomiting, lightheadedness       Medications  Prior to Admission Medications   Prescriptions Last Dose Informant Patient Reported? Taking?   B Complex-C (VITAMIN B + C COMPLEX) Tab   No No   Sig: Take 1 Tablet by mouth every day.   B Complex-C-Folic  Acid (POLLY-RICHELLE RX PO)  Patient's Home Pharmacy Yes No   Sig: Take 1 Tablet by mouth every day.   Cholecalciferol (VITAMIN D3 PO)  Patient Yes No   Sig: Take 1 Capsule by mouth every day.   Cyanocobalamin (VITAMIN B-12 PO)  Patient Yes No   Sig: Take 1 Tablet by mouth every day.   HUMULIN R U-500 KWIKPEN 500 UNIT/ML Solution Pen-injector  Patient's Home Pharmacy Yes No   Sig: Inject 3-8 Units under the skin 3 times a day before meals. Unspecified sliding scale, per [facility] pharmacy ([phone]) last filled on 11/29/2023   HYDROcodone-acetaminophen (NORCO) 5-325 MG Tab per tablet  Patient's Home Pharmacy Yes No   Sig: Take 1 Tablet by mouth every 6 hours as needed (Severe Pain).   Melatonin 10 MG Tab  Patient Yes No   Sig: Take 10 mg by mouth at bedtime.   acetaminophen (TYLENOL) 325 MG Tab   No No   Sig: Take 2 Tablets by mouth every 6 hours as needed for Mild Pain.   albuterol 108 (90 Base) MCG/ACT Aero Soln inhalation aerosol  Patient's Home Pharmacy Yes No   Sig: Inhale 2 Puffs every four hours as needed for Shortness of Breath.   atorvastatin (LIPITOR) 10 MG Tab  Patient's Home Pharmacy Yes No   Sig: Take 10 mg by mouth every evening.   calcium acetate (PHOS-LO) 667 MG Cap  Patient's Home Pharmacy Yes No   Sig: Take 1 Capsule by mouth 3 times a week.   escitalopram (LEXAPRO) 20 MG tablet  Patient's Home Pharmacy Yes No   Sig: Take 20 mg by mouth every evening.   fluticasone (FLONASE) 50 MCG/ACT nasal spray  Patient's Home Pharmacy Yes No   Sig: Administer 1 Spray into each nostril every day.   furosemide (LASIX) 40 MG Tab  Patient's Home Pharmacy Yes No   Sig: Take 40 mg by mouth every day.   insulin glargine 100 UNIT/ML SC SOPN injection  Patient's Home Pharmacy Yes No   Sig: Inject 5 Units under the skin every evening. Unspecified sliding scale, per [facility] pharmacy ([phone]) last filled on 11/18/2024 for 84 day supply   latanoprost (XALATAN) 0.005 % Solution  Patient's Home Pharmacy Yes No   Sig:  Place 1 Drop in both eyes every bedtime.   lisinopril (PRINIVIL) 20 MG Tab   No No   Sig: Take 1 Tablet by mouth 2 times a day.   mometasone-formoterol (DULERA) 200-5 MCG/ACT Aerosol   No No   Sig: Inhale 2 Puffs by mouth 2 times a day.   pantoprazole (PROTONIX) 40 MG Tablet Delayed Response  Patient's Home Pharmacy No No   Sig: Take 1 Tablet by mouth 2 times a day.   potassium chloride SA (K-DUR) 10 MEQ Tab CR  Patient's Home Pharmacy Yes No   Sig: Take 10 mEq by mouth 2 times a day.   predniSONE (DELTASONE) 5 MG Tab  Patient's Home Pharmacy Yes No   Sig: Take 5 mg by mouth every day.   timolol (TIMOPTIC) 0.5 % Solution  Patient's Home Pharmacy Yes No   Sig: Administer 1 Drop into both eyes every morning.   tizanidine (ZANAFLEX) 4 MG Tab  Patient's Home Pharmacy Yes No   Sig: Take 4 mg by mouth every evening.      Facility-Administered Medications: None       Physical Exam  Temp:  [36.4 °C (97.6 °F)] 36.4 °C (97.6 °F)  Pulse:  [62] 62  Resp:  [18] 18  BP: (159)/(65) 159/65  SpO2:  [96 %] 96 %  Blood Pressure: (!) 159/65   Temperature: 36.4 °C (97.6 °F)   Pulse: 62   Respiration: 18   Pulse Oximetry: 96 %       Physical Exam  Vitals and nursing note reviewed.   Constitutional:       General: She is not in acute distress.     Appearance: Normal appearance. She is ill-appearing.   HENT:      Head: Normocephalic and atraumatic.      Nose: Nose normal.      Mouth/Throat:      Mouth: Mucous membranes are moist.   Eyes:      Extraocular Movements: Extraocular movements intact.      Pupils: Pupils are equal, round, and reactive to light.   Cardiovascular:      Rate and Rhythm: Normal rate and regular rhythm.   Pulmonary:      Effort: Pulmonary effort is normal.      Breath sounds: Normal breath sounds.   Abdominal:      General: Abdomen is flat. There is no distension.      Tenderness: There is no abdominal tenderness.   Musculoskeletal:         General: No swelling or deformity. Normal range of motion.      Cervical back:  "Normal range of motion and neck supple.   Skin:     General: Skin is warm and dry.      Coloration: Skin is pale.   Neurological:      General: No focal deficit present.      Mental Status: She is alert and oriented to person, place, and time.   Psychiatric:         Mood and Affect: Mood normal.         Behavior: Behavior normal.         Laboratory:          No results for input(s): \"ALTSGPT\", \"ASTSGOT\", \"ALKPHOSPHAT\", \"TBILIRUBIN\", \"DBILIRUBIN\", \"GAMMAGT\", \"AMYLASE\", \"LIPASE\", \"ALB\", \"PREALBUMIN\", \"GLUCOSE\" in the last 72 hours.      No results for input(s): \"NTPROBNP\" in the last 72 hours.      No results for input(s): \"TROPONINT\" in the last 72 hours.    Imaging:  No orders to display       X-Ray:  I have personally reviewed the images and compared with prior images.    Assessment/Plan:  Justification for Admission Status  I anticipate this patient will require at least two midnights for appropriate medical management, necessitating inpatient admission because acute blood loss anemia    Patient will need a Med/Surg bed on MEDICAL service .  The need is secondary to acute blood loss anemia.    * Melena- (present on admission)  Assessment & Plan  65-year-old's female with history of Ansari's esophagus, esophageal cancer, recent endoscopy 4/15/2025 with removal of esophageal and duodenal polyps, who presented to his melena, orthostatic lightheadedness and hemoglobin 8.5  Plan: Trend H&H, transfuse for hemoglobin below 8  IV Protonix 40 mg twice daily  Gastroenterology consult tomorrow, n.p.o. at midnight for anticipated endoscopy    ESRD (end stage renal disease) on dialysis (HCC)- (present on admission)  Assessment & Plan  Patient has been on home hemodialysis.  Last hemodialysis was on Sunday.  There is no urgent need for hemodialysis tonight   plan to consult with nephrology tomorrow    Hypertension- (present on admission)  Assessment & Plan  Continue Lasix, hold lisinopril  Monitor blood pressure    Type 2 " diabetes mellitus (HCC)- (present on admission)  Assessment & Plan  Will hold Lantus, continue insulin sliding scale    Chronic combined systolic and diastolic congestive heart failure (HCC)- (present on admission)  Assessment & Plan  Not in exacerbation  Continue Lasix  Monitor volume status        VTE prophylaxis: SCDs/TEDs    I spent a total of 129 minutes during this clinical encounter of which > 50% was devoted to counseling and coordinating care including review of records, pertinent lab data and studies, as well as discussing diagnostic evaluation and work up, planned therapeutic interventions and future disposition of care. Where indicated, the assessment and plan reflect discussion of patient with consultants, other healthcare providers, family members, and additional research needed to obtain further information in formulating the plan of care of this patient.

## 2025-05-14 NOTE — PROGRESS NOTES
4 Eyes Skin Assessment Completed by Subha RN and SAJAN Alicea.    Head WDL  Ears WDL  Nose Scab- scab left side of nostril, scabbed right   Mouth WDL  Neck - dialysis catheter in place right chest  Breast/Chest WDL  Shoulder Blades WDL  Spine WDL  (R) Arm/Elbow/Hand WDL  (L) Arm/Elbow/Hand WDL  Abdomen Scar  Groin WDL  Scrotum/Coccyx/Buttocks WDL  (R) Leg WDL  (L) Leg WDL  (R) Heel/Foot/Toe - redness/scab outer ankle / wound scab to second toe   (L) Heel/Foot/Toe - all toes ampuated           Devices In Places - piv      Interventions In Place Pillows    Possible Skin Injury Yes    Pictures Uploaded Into Epic Yes  Wound Consult Placed N/A  RN Wound Prevention Protocol Ordered Yes

## 2025-05-14 NOTE — CONSULTS
"[facility] -  CONSULTATION NOTE      DATE & TIME:   5/14/2025  1:27 PM               AUTHOR:  Froy Cintron D.O.      REASON FOR CONSULTATION:   -  Evaluation and management of acute and chronic conditions related to Nephrology      CHIEF COMPLAINT:   -  \" Melena\"      HISTORY OF PRESENT ILLNESS:    65Y F w ESRD on Home HD (3-4x a week, [id number]), COPD, HTN, DM2 is transferred from [facility] for weakness and black stools. Pt also reports some intermittent abd pain/chest pain. She reports her last HD was 5/11.   HGB appears to be stable since admission, GI notified by primary team, but no plan for EGD due to stable HGB.      No Fever, chills. No /N/V, CP or SOB.  No melena, hematochezia, hematemesis.  No HA, visual changes, or abdominal pain.      REVIEW OF SYSTEMS:    10 point ROS was performed and is as per HPI or otherwise negative      PAST MEDICAL HISTORY:   - ESRD  - CKD-MBD  Past Medical History[1]     PAST SURGICAL HISTORY:   - Dialysis Access Surgery  Past Surgical History[2]    FAMILY HISTORY:   No family history on file.       SOCIAL HISTORY:   Social History[3]      HOME MEDICATIONS:   - Reviewed and documented in chart  Home Medications   Medication Sig Taking? Last Dose Authorizing Provider   lisinopril (PRINIVIL) 20 MG Tab Take 1 Tablet by mouth 2 times a day. Yes 5/13/2025 at 10:00 AM Andrea Lepe M.D.   B Complex-C (VITAMIN B + C COMPLEX) Tab Take 1 Tablet by mouth every day. Yes 5/13/2025 at 10:00 AM Andrea Lepe M.D.   mometasone-formoterol (DULERA) 200-5 MCG/ACT Aerosol Inhale 2 Puffs by mouth 2 times a day. Yes 5/12/2025 Morning Andrea Lepe M.D.   B Complex-C-Folic Acid (POLLY-RICHELLE RX PO) Take 1 Tablet by mouth every day. Yes 5/13/2025 at 10:00 AM Physician Outpatient   pantoprazole (PROTONIX) 40 MG Tablet Delayed Response Take 1 Tablet by mouth 2 times a day. Yes 5/13/2025 Marie H Abbie, M.D.   atorvastatin (LIPITOR) 10 MG " Tab Take 10 mg by mouth every evening. Yes 5/13/2025 at 10:00 AM Physician Outpatient   furosemide (LASIX) 40 MG Tab Take 40 mg by mouth every day. Yes 5/13/2025 at 10:00 AM Physician Outpatient   HUMULIN R U-500 KWIKPEN 500 UNIT/ML Solution Pen-injector Inject 3-8 Units under the skin 3 times a day before meals. Unspecified sliding scale, per [facility] ([phone]) last filled on 11/29/2023 Yes 5/12/2025 Evening Physician Outpatient   potassium chloride SA (K-DUR) 10 MEQ Tab CR Take 10 mEq by mouth 2 times a day. Yes 5/13/2025 at 10:00 AM Physician Outpatient   albuterol 108 (90 Base) MCG/ACT Aero Soln inhalation aerosol Inhale 2 Puffs every four hours as needed for Shortness of Breath. Yes 5/13/2025 at 10:00 AM Physician Outpatient   insulin glargine 100 UNIT/ML SC SOPN injection Inject 5 Units under the skin every evening. Unspecified sliding scale, per [facility] ([phone]) last filled on 11/18/2024 for 84 day supply Yes 5/12/2025 Evening Physician Outpatient   Cyanocobalamin (VITAMIN B-12 PO) Take 1 Tablet by mouth every day. Yes 5/13/2025 at 10:00 AM Physician Outpatient   Cholecalciferol (VITAMIN D3 PO) Take 1 Capsule by mouth every day. Yes 5/13/2025 at 10:00 AM Physician Outpatient   fluticasone (FLONASE) 50 MCG/ACT nasal spray Administer 1 Spray into each nostril every day. Yes 5/13/2025 at 10:00 AM Physician Outpatient   latanoprost (XALATAN) 0.005 % Solution Place 1 Drop in both eyes every bedtime. Yes 5/12/2025 Morning Physician Outpatient   Melatonin 10 MG Tab Take 10 mg by mouth at bedtime. Yes 5/12/2025 Evening Physician Outpatient   timolol (TIMOPTIC) 0.5 % Solution Administer 1 Drop into both eyes every morning. Yes 5/12/2025 Evening Physician Outpatient   tizanidine (ZANAFLEX) 4 MG Tab Take 4 mg by mouth every evening. Yes 5/13/2025 at 10:00 AM Physician Outpatient   predniSONE (DELTASONE) 5 MG Tab Take 5 mg by mouth every day. Yes 5/13/2025 at 10:00 AM Physician Outpatient  "  escitalopram (LEXAPRO) 20 MG tablet Take 20 mg by mouth every evening. Yes 5/13/2025 at 10:00 AM Physician Outpatient   acetaminophen (TYLENOL) 325 MG Tab Take 2 Tablets by mouth every 6 hours as needed for Mild Pain.   Andrea Lepe M.D.   calcium acetate (PHOS-LO) 667 MG Cap Take 1 Capsule by mouth 3 times a week.   Physician Outpatient   HYDROcodone-acetaminophen (NORCO) 5-325 MG Tab per tablet Take 1 Tablet by mouth every 6 hours as needed (Severe Pain).  5/11/2025 Physician Outpatient         ALLERGIES:  Keflex [cephalexin]      VITAL SIGNS:  BP (!) 165/73   Pulse 65   Temp 36.1 °C (97 °F) (Temporal)   Resp 16   Ht 1.626 m (5' 4\")   Wt 51 kg (112 lb 7 oz) Comment: Simultaneous filing. User may not have seen previous data.  SpO2 95%   BMI 19.30 kg/m²     Physical Exam  Constitutional:       Appearance: Normal appearance.   HENT:      Head: Normocephalic and atraumatic.      Mouth/Throat:      Mouth: Mucous membranes are moist.   Cardiovascular:      Rate and Rhythm: Normal rate and regular rhythm.   Pulmonary:      Effort: Pulmonary effort is normal.      Breath sounds: Normal breath sounds.   Skin:     General: Skin is warm.   Neurological:      General: No focal deficit present.      Mental Status: She is alert. Mental status is at baseline.         Access: R IJ permcath      FLUID BALANCE:  No intake/output data recorded.      LABS:  Recent Labs     05/14/25  0201   SODIUM 138   POTASSIUM 4.0   CHLORIDE 108   CO2 20   GLUCOSE 107*   BUN 50*   CREATININE 2.94*   CALCIUM 8.9      Recent Labs     05/14/25  0201   SODIUM 138   POTASSIUM 4.0   CHLORIDE 108   CO2 20   GLUCOSE 107*   BUN 50*   CREATININE 2.94*          IMAGING:  - All imaging reviewed from admission to present day      ASSESSMENTS:    -ESRD     On HOME hemodialysis 3-4x a week ([id number])     Will perform longer, maintenance dialysis qMWF while inpt  -Hypertension     Continue current medications  -Hypervolemia     Volume off " with dialysis as BP tolerates  - Anemia     goal Hgb 10-11   -CKD-MBD  -Melena    Monitoring hbg      PLAN:     - will transition pt to longer intermittent qMWF HD while inpt  - will assess daily for additional/PRN needs  - Volume off with dialysis as BP tolerates  - Continue usual anti-hypertensive medications  - TERRY with HD to keep hgb 10-11  - Transfuse PRN hgb < 7  - No dietary protein restrictions, use renal diet     Goal: 1.5g Protein/kg/day  - Please dose all meds for ESRD  - Follow labs with further recommendations to follow    Please page nephrology with any questions or concerns    Froy Cintron DO  Nephrologist   [facility]  [phone]         [1]   Past Medical History:  Diagnosis Date    Diabetes     Psychiatric disorder     depression    Rheumatoid arthritis(714.0)    [2]   Past Surgical History:  Procedure Laterality Date    AR UPPER GI ENDOSCOPY,BIOPSY Left 6/10/2020    Procedure: GASTROSCOPY, WITH BIOPSY;  Surgeon: Tian Velazquez M.D.;  Location: SURGERY SAME DAY [facility] ORS;  Service: Gastroenterology    AR COLONOSCOPY,DIAGNOSTIC Left 6/10/2020    Procedure: COLONOSCOPY;  Surgeon: Tian Velazquez M.D.;  Location: SURGERY SAME DAY [facility] ORS;  Service: Gastroenterology    TOE AMPUTATION Left     Amputation, Toe    OTHER ORTHOPEDIC SURGERY      amputation of left toes   [3]   Social History  Tobacco Use    Smoking status: Former     Current packs/day: 0.00     Average packs/day: 2.0 packs/day for 40.0 years (80.0 ttl pk-yrs)     Types: Cigarettes     Start date: 1980     Quit date: 2015     Years since quittin.8    Smokeless tobacco: Never   Vaping Use    Vaping status: Never Used   Substance Use Topics    Alcohol use: Not Currently    Drug use: Never

## 2025-05-14 NOTE — ASSESSMENT & PLAN NOTE
A1C 7.9  - Restart home Lantus 5 units in the evening.    - Continue sliding scale insulin  - Monitor BG trend.   - Accu-Cheks before meals and at bedtime.   - Goal to keep BG between 140-180 per 2019 ADA guidelines

## 2025-05-14 NOTE — ASSESSMENT & PLAN NOTE
Does home HD  Followed by nephrology in [place]  - Nephrology consult today; discussed with Dr. Henderson  - Daily BMP  - HD per nephrology recommendations

## 2025-05-14 NOTE — PROGRESS NOTES
Patient admitted to room 604/1. Patient alert and oriented, room air. Oriented to unit, call bell within reach.

## 2025-05-14 NOTE — PROGRESS NOTES
Hospital Medicine Daily Progress Note    Date of Service  5/14/2025    Chief Complaint  Savita Mcclain is a 65 y.o. female admitted 5/13/2025 with melena    Hospital Course  Savita Mcclain is a 65-year-old female with PMHx ESRD on HD at home, esophageal cancer s/p resection in 24, bronchiectasis, COPD, chronic steroid therapy for COPD, hypothyroidism, HTN, chronic pain, depression, DMT2, NSTEMI, HLD, bradycardia.  Admitted 5/13 for melena.    Prior history: Patient initially presented to [facility] for generalized weakness and black stools since 5/8.  Intermittent right lower chest pain is present.  Patient is on home HD.  Last HD was 5/11.    Last EGD performed 4/15/2025 at [facility].  Ansari's esophagus present and duodenal polyp removed.  Pathology was negative for malignancy.  Patient states melena present 8/8 through 8/10.  Since then she has not had a bowel movement.    In the ED: Hb 8.3; baseline 8-9.  Creatinine 3.2.  Normal LFTs.  Occult blood positive in the ED.  Patient was started on IV Protonix.  Nephrology was consulted for inpatient HD.    Briefly, the case was discussed with GI.  However given lack of melena and stable hemoglobin-no indication for EGD at this time.  Trend H&H.    Interval Problem Update  5/14: Vitals notable for SBP ranging 159 through 184.  Hb 9.1 from 8.5.  Creatinine 2.94.  Nephrology consult placed. Plan for HD while inpatient.  Continue H&H every 8 hours.  Plan to consult GI if melena is present or H&H drops.    I have discussed this patient's plan of care and discharge plan at IDT rounds today with Case Management, Nursing, Nursing leadership, and other members of the IDT team.    Consultants/Specialty  nephrology    Code Status  Full Code    Disposition  The patient is not medically cleared for discharge to home or a post-acute facility.  Anticipate discharge to: home with close outpatient follow-up    I have placed the appropriate orders for post-discharge  needs.    Review of Systems  Review of Systems   Constitutional:  Positive for malaise/fatigue. Negative for fever.   Respiratory:  Negative for shortness of breath.    Cardiovascular:  Negative for chest pain and leg swelling.   Gastrointestinal:  Positive for constipation. Negative for abdominal pain, nausea and vomiting.   Neurological:  Positive for weakness.        Physical Exam  Temp:  [35.9 °C (96.7 °F)-37.1 °C (98.7 °F)] 36.1 °C (97 °F)  Pulse:  [52-73] 65  Resp:  [16-19] 16  BP: (159-184)/(62-95) 165/73  SpO2:  [89 %-96 %] 95 %    Physical Exam  Vitals and nursing note reviewed.   Constitutional:       General: She is not in acute distress.     Appearance: Normal appearance. She is ill-appearing.   Cardiovascular:      Rate and Rhythm: Normal rate and regular rhythm.   Pulmonary:      Effort: Pulmonary effort is normal. No respiratory distress.      Breath sounds: Normal breath sounds. No wheezing.   Abdominal:      General: Bowel sounds are normal. There is no distension.      Palpations: Abdomen is soft.      Tenderness: There is no abdominal tenderness.   Skin:     General: Skin is warm and dry.      Coloration: Skin is pale.   Neurological:      Mental Status: She is alert and oriented to person, place, and time.      Motor: Weakness present.   Psychiatric:         Mood and Affect: Mood normal.         Behavior: Behavior normal.         Fluids  No intake or output data in the 24 hours ending 05/14/25 1045     Laboratory  Recent Labs     05/13/25 1825 05/14/25  0201   WBC  --  4.6*   RBC  --  2.72*   HEMOGLOBIN 8.5* 9.1*   HEMATOCRIT  --  28.7*   MCV  --  105.5*   MCH  --  33.5*   MCHC  --  31.7*   RDW  --  58.7*   PLATELETCT  --  149*   MPV  --  11.3     Recent Labs     05/14/25  0201   SODIUM 138   POTASSIUM 4.0   CHLORIDE 108   CO2 20   GLUCOSE 107*   BUN 50*   CREATININE 2.94*   CALCIUM 8.9     Recent Labs     05/13/25 1825   APTT 27.1   INR 1.12               Imaging  No orders to display         Assessment/Plan  * Melena- (present on admission)  Assessment & Plan  History of EGD 4/2025.  Removal of polyps at that time.  Diagnosed with Ansari's.  No malignancy identified  Presenting with melena 5/8 through 5/10; no bowel movement since 5/10.  Hb stable at 8.9; baseline between 8 and 10.  - Trend H&H  - Transfuse for Hb less than 7  - GI consult should melena persist or Hb drop  - Continue IV Protonix    ESRD (end stage renal disease) on dialysis (HCC)- (present on admission)  Assessment & Plan  Does home HD  Followed by nephrology in [place]  - Nephrology consult today; discussed with Dr. Henderson  - Daily BMP  - HD per nephrology recommendations    Hypertension- (present on admission)  Assessment & Plan   through 184  - Resume home lisinopril  - Consider adding hydralazine if needed  - IV antihypertensives for SBP greater than 180    Type 2 diabetes mellitus (HCC)- (present on admission)  Assessment & Plan  A1C 7.9  - Restart home Lantus 5 units in the evening.    - Continue sliding scale insulin  - Monitor BG trend.   - Accu-Cheks before meals and at bedtime.   - Goal to keep BG between 140-180 per 2019 ADA guidelines       Chronic combined systolic and diastolic congestive heart failure (HCC)- (present on admission)  Assessment & Plan  Echocardiogram 1/2025: EF 50%; RVSP 40  Appears euvolemic  - Continue daily Lasix  - Monitor volume status         VTE prophylaxis:   SCDs/TEDs   pharmacologic prophylaxis contraindicated due to rule out GIB      I have performed a physical exam and reviewed and updated ROS and Plan today (5/14/2025). In review of yesterday's note (5/13/2025), there are no changes except as documented above.

## 2025-05-15 ENCOUNTER — PHARMACY VISIT (OUTPATIENT)
Dept: PHARMACY | Facility: MEDICAL CENTER | Age: 65
End: 2025-05-15
Payer: COMMERCIAL

## 2025-05-15 VITALS
BODY MASS INDEX: 19.2 KG/M2 | HEART RATE: 64 BPM | RESPIRATION RATE: 16 BRPM | OXYGEN SATURATION: 96 % | SYSTOLIC BLOOD PRESSURE: 154 MMHG | TEMPERATURE: 97.9 F | DIASTOLIC BLOOD PRESSURE: 73 MMHG | WEIGHT: 112.43 LBS | HEIGHT: 64 IN

## 2025-05-15 PROBLEM — E11.22 TYPE 2 DIABETES MELLITUS WITH CHRONIC KIDNEY DISEASE ON CHRONIC DIALYSIS, WITH LONG-TERM CURRENT USE OF INSULIN (HCC): Status: ACTIVE | Noted: 2020-06-27

## 2025-05-15 PROBLEM — D62 ACUTE BLOOD LOSS ANEMIA: Status: RESOLVED | Noted: 2025-05-13 | Resolved: 2025-05-15

## 2025-05-15 PROBLEM — Z99.2 TYPE 2 DIABETES MELLITUS WITH CHRONIC KIDNEY DISEASE ON CHRONIC DIALYSIS, WITH LONG-TERM CURRENT USE OF INSULIN (HCC): Status: ACTIVE | Noted: 2020-06-27

## 2025-05-15 PROBLEM — N18.6 TYPE 2 DIABETES MELLITUS WITH CHRONIC KIDNEY DISEASE ON CHRONIC DIALYSIS, WITH LONG-TERM CURRENT USE OF INSULIN (HCC): Status: ACTIVE | Noted: 2020-06-27

## 2025-05-15 PROBLEM — Z79.4 TYPE 2 DIABETES MELLITUS WITH CHRONIC KIDNEY DISEASE ON CHRONIC DIALYSIS, WITH LONG-TERM CURRENT USE OF INSULIN (HCC): Status: ACTIVE | Noted: 2020-06-27

## 2025-05-15 LAB
ANION GAP SERPL CALC-SCNC: 10 MMOL/L (ref 7–16)
BUN SERPL-MCNC: 23 MG/DL (ref 8–22)
CALCIUM SERPL-MCNC: 9.1 MG/DL (ref 8.5–10.5)
CHLORIDE SERPL-SCNC: 101 MMOL/L (ref 96–112)
CO2 SERPL-SCNC: 27 MMOL/L (ref 20–33)
CREAT SERPL-MCNC: 2.27 MG/DL (ref 0.5–1.4)
GFR SERPLBLD CREATININE-BSD FMLA CKD-EPI: 23 ML/MIN/1.73 M 2
GLUCOSE BLD STRIP.AUTO-MCNC: 202 MG/DL (ref 65–99)
GLUCOSE BLD STRIP.AUTO-MCNC: 301 MG/DL (ref 65–99)
GLUCOSE BLD STRIP.AUTO-MCNC: 95 MG/DL (ref 65–99)
GLUCOSE SERPL-MCNC: 141 MG/DL (ref 65–99)
HGB BLD-MCNC: 10.2 G/DL (ref 12–16)
HGB BLD-MCNC: 10.7 G/DL (ref 12–16)
MAGNESIUM SERPL-MCNC: 2 MG/DL (ref 1.5–2.5)
PHOSPHATE SERPL-MCNC: 1.8 MG/DL (ref 2.5–4.5)
POTASSIUM SERPL-SCNC: 4 MMOL/L (ref 3.6–5.5)
SODIUM SERPL-SCNC: 138 MMOL/L (ref 135–145)

## 2025-05-15 PROCEDURE — 700111 HCHG RX REV CODE 636 W/ 250 OVERRIDE (IP): Performed by: INTERNAL MEDICINE

## 2025-05-15 PROCEDURE — 85018 HEMOGLOBIN: CPT | Mod: 91

## 2025-05-15 PROCEDURE — 700102 HCHG RX REV CODE 250 W/ 637 OVERRIDE(OP): Performed by: INTERNAL MEDICINE

## 2025-05-15 PROCEDURE — 80048 BASIC METABOLIC PNL TOTAL CA: CPT

## 2025-05-15 PROCEDURE — 83735 ASSAY OF MAGNESIUM: CPT

## 2025-05-15 PROCEDURE — 82962 GLUCOSE BLOOD TEST: CPT | Mod: 91

## 2025-05-15 PROCEDURE — 99239 HOSP IP/OBS DSCHRG MGMT >30: CPT | Performed by: STUDENT IN AN ORGANIZED HEALTH CARE EDUCATION/TRAINING PROGRAM

## 2025-05-15 PROCEDURE — A9270 NON-COVERED ITEM OR SERVICE: HCPCS | Performed by: INTERNAL MEDICINE

## 2025-05-15 PROCEDURE — 84100 ASSAY OF PHOSPHORUS: CPT

## 2025-05-15 PROCEDURE — RXMED WILLOW AMBULATORY MEDICATION CHARGE: Performed by: STUDENT IN AN ORGANIZED HEALTH CARE EDUCATION/TRAINING PROGRAM

## 2025-05-15 RX ORDER — PANTOPRAZOLE SODIUM 40 MG/1
40 TABLET, DELAYED RELEASE ORAL 2 TIMES DAILY
Qty: 100 TABLET | Refills: 0 | Status: SHIPPED | OUTPATIENT
Start: 2025-05-15

## 2025-05-15 RX ADMIN — Medication 1000 UNITS: at 06:06

## 2025-05-15 RX ADMIN — PREDNISONE 5 MG: 5 TABLET ORAL at 06:06

## 2025-05-15 RX ADMIN — INSULIN LISPRO 6 UNITS: 100 INJECTION, SOLUTION INTRAVENOUS; SUBCUTANEOUS at 12:23

## 2025-05-15 RX ADMIN — INSULIN LISPRO 3 UNITS: 100 INJECTION, SOLUTION INTRAVENOUS; SUBCUTANEOUS at 00:21

## 2025-05-15 RX ADMIN — FUROSEMIDE 40 MG: 40 TABLET ORAL at 06:06

## 2025-05-15 RX ADMIN — MOMETASONE FUROATE AND FORMOTEROL FUMARATE DIHYDRATE 2 PUFF: 200; 5 AEROSOL RESPIRATORY (INHALATION) at 06:12

## 2025-05-15 RX ADMIN — TIMOLOL MALEATE 1 DROP: 5 SOLUTION OPHTHALMIC at 06:05

## 2025-05-15 RX ADMIN — PANTOPRAZOLE SODIUM 40 MG: 40 INJECTION, POWDER, FOR SOLUTION INTRAVENOUS at 06:06

## 2025-05-15 RX ADMIN — ESCITALOPRAM OXALATE 20 MG: 10 TABLET ORAL at 06:06

## 2025-05-15 NOTE — DISCHARGE SUMMARY
Discharge Summary    CHIEF COMPLAINT ON ADMISSION  No chief complaint on file.      Reason for Admission  Melena    Admission Date  5/13/2025    CODE STATUS  Full Code    HPI & HOSPITAL COURSE    Savita Mcclain is a 65-year-old female with PMHx ESRD on HD at home, esophageal cancer s/p resection in 24, bronchiectasis, COPD, chronic steroid therapy for COPD, hypothyroidism, HTN, chronic pain, depression, DMT2, NSTEMI, HLD, bradycardia.  Admitted 5/13 for melena.    Prior history: Patient initially presented to [facility] for generalized weakness and black stools since 5/8.  Intermittent right lower chest pain is present.  Patient is on home HD.  Last HD was 5/11.    Last EGD performed 4/15/2025 at [facility].  Ansari's esophagus present and duodenal polyp removed.  Pathology was negative for malignancy.  Patient states melena present 8/8 through 8/10.  Since then she has not had a bowel movement.    In the ED: Hb 8.3; baseline 8-9.  Creatinine 3.2.  Normal LFTs.  Occult blood positive in the ED.  Patient was started on IV Protonix.  Nephrology was consulted for inpatient HD.    Briefly, the case was discussed with GI.  However given lack of melena and stable hemoglobin-no indication for EGD at this time.  Hgb remained stable without recurrent melena, for which she will continue PPI for GERD and follow up with [facility] for surveillance endoscopy.    Therefore, she is discharged in fair and stable condition to home with close outpatient follow-up.    The patient met 2-midnight criteria for an inpatient stay at the time of discharge.    Discharge Date  5/15/25    FOLLOW UP ITEMS POST DISCHARGE    Melena and Ansari's Esophagus - follow up with [facility]    DISCHARGE DIAGNOSES  Principal Problem (Resolved):    Melena (POA: Yes)  Active Problems:    Chronic combined systolic and diastolic congestive heart failure (HCC) (POA: Yes)    Type 2 diabetes mellitus with chronic kidney disease on chronic  dialysis, with long-term current use of insulin (HCC) (POA: Yes)    Primary hypertension (POA: Yes)    ESRD (end stage renal disease) on dialysis (HCC) (POA: Yes)      FOLLOW UP  Future Appointments   Date Time Provider Department Center   6/4/2025 10:30 AM [id number] [facility] None   6/4/2025  1:00 PM NALDO [facility] None     No follow-up provider specified.    MEDICATIONS ON DISCHARGE     Medication List        CONTINUE taking these medications        Instructions   acetaminophen 325 MG Tabs  Commonly known as: Tylenol   Take 2 Tablets by mouth every 6 hours as needed for Mild Pain.  Dose: 650 mg     albuterol 108 (90 Base) MCG/ACT Aers inhalation aerosol   Inhale 2 Puffs every four hours as needed for Shortness of Breath.  Dose: 2 Puff     atorvastatin 10 MG Tabs  Commonly known as: Lipitor   Take 10 mg by mouth every evening.  Dose: 10 mg     fluticasone 50 MCG/ACT nasal spray  Commonly known as: Flonase   Administer 1 Spray into each nostril every day.  Dose: 1 Spray     furosemide 40 MG Tabs  Commonly known as: Lasix   Take 40 mg by mouth every day.  Dose: 40 mg     HumuLIN R U-500 KwikPen 500 UNIT/ML Sopn  Generic drug: Insulin Regular Human (Conc)   Inject 3-8 Units under the skin 3 times a day before meals. Unspecified sliding scale, per [facility] ([phone]) last filled on 11/29/2023  Dose: 3-8 Units     HYDROcodone-acetaminophen 5-325 MG Tabs per tablet  Commonly known as: Norco   Take 1 Tablet by mouth every 6 hours as needed (Severe Pain).  Dose: 1 Tablet     insulin glargine 100 UNIT/ML Sopn injection  Commonly known as: Lantus   Inject 5 Units under the skin every evening. Unspecified sliding scale, per [facility] ([phone]) last filled on 11/18/2024 for 84 day supply  Dose: 5 Units     latanoprost 0.005 % Soln  Commonly known as: Xalatan   Place 1 Drop in both eyes every bedtime.  Dose: 1 Drop     Lexapro 20 MG tablet  Generic drug: escitalopram   Take 20 mg by mouth  every evening.  Dose: 20 mg     lisinopril 20 MG Tabs  Commonly known as: Prinivil   Take 1 Tablet by mouth 2 times a day.  Dose: 20 mg     Melatonin 10 MG Tabs   Take 10 mg by mouth at bedtime.  Dose: 10 mg     mometasone-formoterol 200-5 MCG/ACT Aero  Commonly known as: Dulera   Inhale 2 Puffs by mouth 2 times a day.  Dose: 2 Puff     pantoprazole 40 MG Tbec  Commonly known as: Protonix   Take 1 Tablet by mouth 2 times a day.  Dose: 40 mg     potassium chloride SA 10 MEQ Tbcr  Commonly known as: K-Dur   Take 10 mEq by mouth 2 times a day.  Dose: 10 mEq     predniSONE 5 MG Tabs  Commonly known as: Deltasone   Take 5 mg by mouth every day.  Dose: 5 mg     POLLY-RICHELLE RX PO   Take 1 Tablet by mouth every day.  Dose: 1 Tablet     timolol 0.5 % Soln  Commonly known as: Timoptic   Administer 1 Drop into both eyes every morning.  Dose: 1 Drop     tizanidine 4 MG Tabs  Commonly known as: Zanaflex   Take 4 mg by mouth every evening.  Dose: 4 mg     vitamin B + C complex Tabs   Take 1 Tablet by mouth every day.  Dose: 1 Tablet     VITAMIN B-12 PO   Take 1 Tablet by mouth every day.  Dose: 1 Tablet     VITAMIN D3 PO   Take 1 Capsule by mouth every day.  Dose: 1 Capsule            STOP taking these medications      calcium acetate 667 MG Caps  Commonly known as: Phos-Lo              Allergies  Allergies[1]    DIET  Orders Placed This Encounter   Procedures    Diet Order Diet: Renal     Standing Status:   Standing     Number of Occurrences:   1     Diet::   Renal [8]       ACTIVITY  As tolerated.  Weight bearing as tolerated    CONSULTATIONS  Nephrology    PROCEDURES  None    LABORATORY  Lab Results   Component Value Date    SODIUM 138 05/15/2025    POTASSIUM 4.0 05/15/2025    CHLORIDE 101 05/15/2025    CO2 27 05/15/2025    GLUCOSE 141 (H) 05/15/2025    BUN 23 (H) 05/15/2025    CREATININE 2.27 (H) 05/15/2025        Lab Results   Component Value Date    WBC 4.6 (L) 05/14/2025    HEMOGLOBIN 10.2 (L) 05/15/2025    HEMATOCRIT 28.7  (L) 05/14/2025    PLATELETCT 149 (L) 05/14/2025        Total time of the discharge process exceeds 33 minutes.         [1]   Allergies  Allergen Reactions    Keflex [Cephalexin] Vomiting and Nausea     Nausea/vomiting, lightheadedness

## 2025-05-15 NOTE — CARE PLAN
The patient is Stable - Low risk of patient condition declining or worsening    Shift Goals  Clinical Goals: pain will remain less than 6 this shift  Patient Goals: rest, plan for the day  Family Goals: n/a    Progress made toward(s) clinical / shift goals:    Problem: Knowledge Deficit - Standard  Goal: Patient and family/care givers will demonstrate understanding of plan of care, disease process/condition, diagnostic tests and medications  Description: Target End Date:  1-3 days or as soon as patient condition allowsDocument in Patient Education1.  Patient and family/caregiver oriented to unit, equipment, visitation policy and means for communicating concern2.  Complete/review Learning Assessment3.  Assess knowledge level of disease process/condition, treatment plan, diagnostic tests and medications4.  Explain disease process/condition, treatment plan, diagnostic tests and medications  Outcome: Progressing       Had dialysis this evening. Miralax given x1.

## 2025-05-15 NOTE — DISCHARGE INSTRUCTIONS
Discharge Instructions per Joel Pearce M.D.    You were hospitalized for black bowel movement (melena). You have undergone endoscopy recently. Your blood counts and vitals are stable, for which this is not a severe bleed. It was determined that the risk of further endoscopy outweighed the benefit, for which you do not require any additional testing or monitoring.    DIET: Dialysis-friendly    ACTIVITY: Regular    DIAGNOSIS: Melena    Return to ER if you faint for any reason, develop sudden chest pain or shortness of breath, or have a large black or red bowel movement.      Please take your antacid (pantoprazole) twice daily and follow up with your [facility] gastroenterologist.

## 2025-05-15 NOTE — DISCHARGE PLANNING
Case Management Discharge Planning    Admission Date: 5/13/2025  GMLOS:    ALOS: 2    6-Clicks ADL Score: 24  6-Clicks Mobility Score: 23      Anticipated Discharge Dispo: Discharge Disposition: Discharged to home/self care (01)    DME Needed: No    Action(s) Taken: LMSW met with the patient at bedside to complete an IMM. IMM charted and scanned to the DPA. Family will be the DC ride home if they answer the patient's phone calls.     Escalations Completed: None    Medically Clear: Yes    Next Steps: Patient to discharge.     Barriers to Discharge: None    Is the patient up for discharge tomorrow: No

## 2025-05-15 NOTE — PROGRESS NOTES
"[facility] -  PROGRESS NOTE               Author: NORAH Snyder Date & Time: 5/15/2025  12:08 PM     HPI:  65Y F w ESRD on Home HD (3-4x a week, 5to36tak), COPD, HTN, DM2 is transferred from [facility] for weakness and black stools. Pt also reports some intermittent abd pain/chest pain. She reports her last HD was 5/11.   HGB appears to be stable since admission, GI notified by primary team, but no plan for EGD due to stable HGB.     No Fever, chills. No /N/V, CP or SOB.  No melena, hematochezia, hematemesis.  No HA, visual changes, or abdominal pain.    DAILY NEPHROLOGY SUMMARY:  5/14 - Consult  5/15 - HD yest, UF 1.5. Hgb stable. Pt feeling much better. Dressed, sitting on bedside eating lunch. Denies SOB/CP/N/V. No BM since she's been here.       REVIEW OF SYSTEMS:    10 point ROS reviewed and is as per HPI/daily summary or otherwise negative    PMH/PSH/SH/FH: Reviewed and unchanged since admission note  CURRENT MEDICATIONS: Reviewed from admission to present day    VS:  BP (!) 154/73   Pulse 64   Temp 36.6 °C (97.9 °F) (Temporal)   Resp 16   Ht 1.626 m (5' 4\")   Wt 51 kg (112 lb 7 oz) Comment: Simultaneous filing. User may not have seen previous data.  SpO2 96%   BMI 19.30 kg/m²   Physical Exam    FLUID BALANCE:  In: 740 [P.O.:240; Dialysis:500]  Out: 2000     LABS:  Recent Labs     05/14/25  0201 05/15/25  0154   SODIUM 138 138   POTASSIUM 4.0 4.0   CHLORIDE 108 101   CO2 20 27   GLUCOSE 107* 141*   BUN 50* 23*   CREATININE 2.94* 2.27*   CALCIUM 8.9 9.1     Recent Labs     05/14/25  0201 05/14/25  1853 05/15/25  0154 05/15/25  1019   WBC 4.6*  --   --   --    RBC 2.72*  --   --   --    HEMOGLOBIN 9.1* 11.6* 10.2* 10.7*   HEMATOCRIT 28.7*  --   --   --    .5*  --   --   --    MCH 33.5*  --   --   --    MCHC 31.7*  --   --   --    RDW 58.7*  --   --   --    PLATELETCT 149*  --   --   --    MPV 11.3  --   --   --        IMPRESSION:  # ESRD     On HOME " hemodialysis 3-4x a week ([id number])     Will perform longer, maintenance dialysis qMWF while inpt  # Hypertension     Continue current medications  # Hypervolemia     Volume off with dialysis as BP tolerates  #  Anemia     goal Hgb 10-11   # CKD-MBD  # Melena    Monitoring hbg        Recommendations:  - No need for dialysis today  - Continue maintenance HD qMWF while inpt  - Daily assessment for additional/PRN dialysis needs  - Volume off with dialysis as BP tolerates  - Continue usual anti-hypertensive medications  - TERRY with HD to keep hgb 10-11  - Transfuse PRN hgb < 7  - Renal diet with no dietary protein restrictions      Goal: 1.5g Protein/kg/day  - Please dose all meds for ESRD on HD  - Follow labs with further recommendations to follow  - OK for discharge from nephrology standpoint when medically cleared   Thank you,

## 2025-05-15 NOTE — CARE PLAN
The patient is Stable - Low risk of patient condition declining or worsening    Shift Goals  Clinical Goals: BS mgt, Hgb monitoring.  Patient Goals: rest  Family Goals: n/a    Patient A&Ox4, utilizes call light appropriately.  BS managed per orders.  Medicated per MAR.  Bed in low and locked position, call light in reach.    Progress made toward(s) clinical / shift goals:    Problem: Knowledge Deficit - Standard  Goal: Patient and family/care givers will demonstrate understanding of plan of care, disease process/condition, diagnostic tests and medications  Outcome: Progressing     Problem: Pain - Standard  Goal: Alleviation of pain or a reduction in pain to the patient’s comfort goal  Outcome: Progressing       Patient is not progressing towards the following goals:

## 2025-05-15 NOTE — PROGRESS NOTES
3hr HD ordered by Dr Cintron,started @ 1504 and ended @ 1806. Tx well tolerated,VSS,net UF = 1500ml.RIJ TDC dressing changed,CDI,both ports locked with heparin 1000 units/ml,12.2ml red port and 2.3ml blue port per protocol. Home hemo patient,she was educated to aspirate heparin @ least 3-4ml from both ports prior to initiating dialysis @ home,she verbalized understanding. Post tx V/S: 152/53,60,16,96.7F,96% RA,report given to YUDITH Bond RN.

## 2025-06-04 ENCOUNTER — OFFICE VISIT (OUTPATIENT)
Dept: SLEEP MEDICINE | Facility: MEDICAL CENTER | Age: 65
End: 2025-06-04
Payer: MEDICARE

## 2025-06-04 ENCOUNTER — NON-PROVIDER VISIT (OUTPATIENT)
Dept: SLEEP MEDICINE | Facility: MEDICAL CENTER | Age: 65
End: 2025-06-04
Attending: INTERNAL MEDICINE
Payer: MEDICARE

## 2025-06-04 VITALS — BODY MASS INDEX: 19.29 KG/M2 | WEIGHT: 113 LBS | HEIGHT: 64 IN

## 2025-06-04 VITALS
RESPIRATION RATE: 12 BRPM | HEART RATE: 60 BPM | BODY MASS INDEX: 19.29 KG/M2 | OXYGEN SATURATION: 92 % | SYSTOLIC BLOOD PRESSURE: 164 MMHG | WEIGHT: 113 LBS | HEIGHT: 64 IN | DIASTOLIC BLOOD PRESSURE: 78 MMHG

## 2025-06-04 DIAGNOSIS — J44.1 ACUTE EXACERBATION OF CHRONIC OBSTRUCTIVE PULMONARY DISEASE (COPD) (HCC): ICD-10-CM

## 2025-06-04 DIAGNOSIS — J44.9 CHRONIC OBSTRUCTIVE PULMONARY DISEASE, UNSPECIFIED COPD TYPE (HCC): Primary | ICD-10-CM

## 2025-06-04 DIAGNOSIS — I50.42 CHRONIC COMBINED SYSTOLIC AND DIASTOLIC CONGESTIVE HEART FAILURE (HCC): ICD-10-CM

## 2025-06-04 PROCEDURE — 94726 PLETHYSMOGRAPHY LUNG VOLUMES: CPT | Mod: 26 | Performed by: INTERNAL MEDICINE

## 2025-06-04 PROCEDURE — 99214 OFFICE O/P EST MOD 30 MIN: CPT

## 2025-06-04 PROCEDURE — 94060 EVALUATION OF WHEEZING: CPT | Mod: 26 | Performed by: INTERNAL MEDICINE

## 2025-06-04 PROCEDURE — 94726 PLETHYSMOGRAPHY LUNG VOLUMES: CPT | Performed by: INTERNAL MEDICINE

## 2025-06-04 PROCEDURE — 94729 DIFFUSING CAPACITY: CPT | Mod: 26 | Performed by: INTERNAL MEDICINE

## 2025-06-04 PROCEDURE — 3078F DIAST BP <80 MM HG: CPT

## 2025-06-04 PROCEDURE — 99214 OFFICE O/P EST MOD 30 MIN: CPT | Mod: 25

## 2025-06-04 PROCEDURE — 3077F SYST BP >= 140 MM HG: CPT

## 2025-06-04 PROCEDURE — 94729 DIFFUSING CAPACITY: CPT | Performed by: INTERNAL MEDICINE

## 2025-06-04 PROCEDURE — 94060 EVALUATION OF WHEEZING: CPT | Performed by: INTERNAL MEDICINE

## 2025-06-04 RX ORDER — DOXYCYCLINE 100 MG/1
100 CAPSULE ORAL 2 TIMES DAILY
Qty: 14 CAPSULE | Refills: 1 | Status: SHIPPED | OUTPATIENT
Start: 2025-06-04

## 2025-06-04 RX ORDER — TIOTROPIUM BROMIDE AND OLODATEROL 3.124; 2.736 UG/1; UG/1
2 SPRAY, METERED RESPIRATORY (INHALATION) DAILY
Qty: 2 G | Refills: 0 | COMMUNITY
Start: 2025-06-04

## 2025-06-04 RX ORDER — PREDNISONE 10 MG/1
30 TABLET ORAL DAILY
Qty: 15 TABLET | Refills: 1 | Status: SHIPPED | OUTPATIENT
Start: 2025-06-04

## 2025-06-04 RX ORDER — IPRATROPIUM BROMIDE AND ALBUTEROL SULFATE 2.5; .5 MG/3ML; MG/3ML
3 SOLUTION RESPIRATORY (INHALATION) EVERY 4 HOURS PRN
Qty: 360 ML | Refills: 11 | Status: SHIPPED | OUTPATIENT
Start: 2025-06-04

## 2025-06-04 ASSESSMENT — ENCOUNTER SYMPTOMS
STRIDOR: 0
PALPITATIONS: 0
HEMOPTYSIS: 0
CHILLS: 0
HEARTBURN: 0
SHORTNESS OF BREATH: 1
SPUTUM PRODUCTION: 0
COUGH: 1
WHEEZING: 0
DIZZINESS: 0
FEVER: 0
WEIGHT LOSS: 0
DEPRESSION: 0

## 2025-06-04 ASSESSMENT — FIBROSIS 4 INDEX
FIB4 SCORE: 1.39
FIB4 SCORE: 1.39

## 2025-06-04 NOTE — PROGRESS NOTES
Pulmonary Clinic follow up    Date of Service: 6/4/2025     Reason for follow up:  Hospital Follow-up (Dates of hospitalization: 04/20/25 - 04/24/25/Pulmonary consult: 04/22/25 w/ Dr. Fong ) and Results (PFT 06/04/25, CT CHEST 04/21/25, DX-CHEST 04/20/25 )    History of Present Illness:     Savita Mcclain is a 65 y.o. female being evaluated in clinic today for COPD. PMH includes ESRD on HD 4 days per week at home, HTN, HLD, Ansari's esophagus, CHF, RA on Prednisone 5mg daily, DM II on insulin, Esophageal cancer s/p treatment followed by [facility]. Patient denies any childhood illnesses. Smoking history of 35 pack years, quit in 2015. Patient's family history includes: maternal grandmother - colon cancer, mother - ovarian cancer. Patient has had 1 exacerbations in the previous 12 months. Patient has never been intubated or hospitalized for their breathing. Patient is currently managed with Dulera 200. Patient has used their rescue inhaler daily in the past week. Patient worked at [employer] for many years, but is now disabled. No notable exposures. She reports a COPD exacerbation about once per year. She was hospitalized 4/20/2025 through 4/24/2025 for pneumonia and COPD exacerbations, she was discharged home on prednisone and augmentin. Patient had CT chest on 4/21/2025 with 1cm irregular irregular nodular density of the RUL and RLL opacity c/w pneumonitis which was done while she was acutely ill, although would benefit from a repeat. She had a PFT done 6/4/2025 with mild obstruction and moderately reduced DLCO although this was done very closely after episode of pneumonia and is likely falsely reduced.     MMRC Grade:   0- Breathless only during strenuous exercise  1- Short of breath when hurrying or going up a small hill  2- Walks slower than friends due to breathlessness, has to stop at own pace  3- Stops to catch breath on level ground after 100m  4- Breathless with ambulating around house or ADLs  "    Review of Systems   Constitutional:  Negative for chills, fever and weight loss.   Respiratory:  Positive for cough and shortness of breath. Negative for hemoptysis, sputum production, wheezing and stridor.    Cardiovascular:  Negative for chest pain, palpitations and leg swelling.   Gastrointestinal:  Negative for heartburn.   Neurological:  Negative for dizziness.   Endo/Heme/Allergies:  Positive for environmental allergies.   Psychiatric/Behavioral:  Negative for depression.        Medications Ordered Prior to Encounter[1]    Social History[2]     Past Medical History[3]    Past Surgical History[4]    Keflex [cephalexin]    History reviewed. No pertinent family history.    BP (!) 164/78 (BP Location: Right arm, Patient Position: Sitting, BP Cuff Size: Small adult)   Pulse 60   Resp 12   Ht 1.613 m (5' 3.5\")   Wt 51.3 kg (113 lb)   SpO2 92%      Physical Exam  Constitutional:       General: She is not in acute distress.  HENT:      Head: Normocephalic.      Nose: Nose normal.      Mouth/Throat:      Mouth: Mucous membranes are moist.   Eyes:      Conjunctiva/sclera: Conjunctivae normal.   Cardiovascular:      Rate and Rhythm: Normal rate and regular rhythm.      Heart sounds: No murmur heard.  Pulmonary:      Effort: Prolonged expiration present. No respiratory distress.      Breath sounds: Decreased breath sounds present. No wheezing.   Abdominal:      General: There is no distension.   Musculoskeletal:      Right lower leg: No edema.      Left lower leg: No edema.   Skin:     General: Skin is warm and dry.      Capillary Refill: Capillary refill takes less than 2 seconds.      Nails: There is no clubbing.   Neurological:      General: No focal deficit present.      Mental Status: She is alert and oriented to person, place, and time.   Psychiatric:         Mood and Affect: Mood normal.       Results:    PFT 6/4/2025:    Mild obstruction with moderately reduced DLCO but was done soon after episode of " pneumonia.     CT chest 4/21/2025:    Lungs: 1 cm irregular nodular density in the right upper lobe. Moderate-sized wedge-shaped airspace opacity dependently in the right lower lobe with evidence of air bronchograms. Curvilinear airspace opacities in the lingula and left lower lobe   dependently.     Mediastinum/Rosana: No significant adenopathy.     Pleura: No pleural effusion.    Echocardiogram 1/22/2024:    CONCLUSIONS  Low normal left ventricular systolic function.  The ejection fraction is measured to be  50.5 % by Sommer's biplane.  Grade II diastolic dysfunction.  The right ventricle is normal in size and systolic function.  Grossly normal regional wall motion.  Moderately dilated left atrium.  Mild/moderate mitral regurgitation.  Normal right atrial pressure.   Right heart pressures are consistent with mild pulmonary hypertension       Vaccinations:    Covid: complete 2024  Flu: complete 2024  Pneumonia: complete   RSV: complete      Assessment & Plan  Chronic obstructive pulmonary disease, unspecified COPD type (HCC)    Mild obstruction on PFT but chronic. Patient was on Breo 200 but was given a sample of Stiolto today, she will notify us if she prefers this inhaler and we can send a permanent script.     Plan:   Airway clearance  Stiolto trial, may stop Breo  Nebulizer + Flutter valve for airway clearance d/t chronic congestion    Orders:    CT-CHEST (THORAX) W/O; Future    Tiotropium Bromide-Olodaterol (STIOLTO RESPIMAT) 2.5-2.5 MCG/ACT Aero Soln; Inhale 2 Puffs every day.    ipratropium-albuterol (DUONEB) 0.5-2.5 (3) MG/3ML nebulizer solution; Take 3 mL by nebulization every four hours as needed for Shortness of Breath.    DME Nebulizer    DME Flutter Valve    doxycycline (VIBRAMYCIN) 100 MG Cap; Take 1 Capsule by mouth 2 times a day. Take as prescribed until gone. - for COPD exacerbation - emergency use only    predniSONE (DELTASONE) 10 MG Tab; Take 3 Tablets by mouth every day. For COPD exacerbation -  emergency use only    Chronic combined systolic and diastolic congestive heart failure (HCC)    Appears euvolemic on exam, continue current treatment         Return in about 3 months (around 9/4/2025), or if symptoms worsen or fail to improve, for f/ on COPD and CT Chest .     This note was generated using voice recognition software which has a chance of producing errors of grammar and possibly content.  I have made every reasonable attempt to find and correct any obvious errors, but it should be expected that some may not be found prior to finalization of this note.    Time spent in record review prior to patient arrival, reviewing results, and in face-to-face encounter totaled 39 min, excluding any procedures if performed.    Reyes MELISSA  [facility]       [1]   Current Outpatient Medications on File Prior to Visit   Medication Sig Dispense Refill    pantoprazole (PROTONIX) 40 MG Tablet Delayed Response Take 1 Tablet by mouth 2 times a day. 100 Tablet 0    acetaminophen (TYLENOL) 325 MG Tab Take 2 Tablets by mouth every 6 hours as needed for Mild Pain.      lisinopril (PRINIVIL) 20 MG Tab Take 1 Tablet by mouth 2 times a day. 180 Tablet 0    B Complex-C (VITAMIN B + C COMPLEX) Tab Take 1 Tablet by mouth every day. 30 Tablet 0    mometasone-formoterol (DULERA) 200-5 MCG/ACT Aerosol Inhale 2 Puffs by mouth 2 times a day. 13 g 0    B Complex-C-Folic Acid (POLLY-RICHELLE RX PO) Take 1 Tablet by mouth every day.      atorvastatin (LIPITOR) 10 MG Tab Take 10 mg by mouth every evening.      furosemide (LASIX) 40 MG Tab Take 40 mg by mouth every day.      HUMULIN R U-500 KWIKPEN 500 UNIT/ML Solution Pen-injector Inject 3-8 Units under the skin 3 times a day before meals. Unspecified sliding scale, per [facility] pharmacy ([phone]) last filled on 11/29/2023      potassium chloride SA (K-DUR) 10 MEQ Tab CR Take 10 mEq by mouth 2 times a day.      albuterol 108 (90 Base) MCG/ACT Aero Soln inhalation  aerosol Inhale 2 Puffs every four hours as needed for Shortness of Breath.      insulin glargine 100 UNIT/ML SC SOPN injection Inject 5 Units under the skin every evening. Unspecified sliding scale, per [facility] ([phone]) last filled on 2024 for 84 day supply      Cyanocobalamin (VITAMIN B-12 PO) Take 1 Tablet by mouth every day.      Cholecalciferol (VITAMIN D3 PO) Take 1 Capsule by mouth every day.      fluticasone (FLONASE) 50 MCG/ACT nasal spray Administer 1 Spray into each nostril every day.      HYDROcodone-acetaminophen (NORCO) 5-325 MG Tab per tablet Take 1 Tablet by mouth every 6 hours as needed (Severe Pain).      latanoprost (XALATAN) 0.005 % Solution Place 1 Drop in both eyes every bedtime.      Melatonin 10 MG Tab Take 10 mg by mouth at bedtime.      timolol (TIMOPTIC) 0.5 % Solution Administer 1 Drop into both eyes every morning.      tizanidine (ZANAFLEX) 4 MG Tab Take 4 mg by mouth every evening.      predniSONE (DELTASONE) 5 MG Tab Take 5 mg by mouth every day.      escitalopram (LEXAPRO) 20 MG tablet Take 20 mg by mouth every evening.       No current facility-administered medications on file prior to visit.   [2]   Social History  Tobacco Use    Smoking status: Former     Current packs/day: 0.00     Average packs/day: 1 pack/day for 35.0 years (35.0 ttl pk-yrs)     Types: Cigarettes     Start date: 1980     Quit date: 2015     Years since quittin.9    Smokeless tobacco: Never   Vaping Use    Vaping status: Never Used   Substance Use Topics    Alcohol use: Not Currently    Drug use: Never   [3]   Past Medical History:  Diagnosis Date    Diabetes     Psychiatric disorder     depression    Rheumatoid arthritis(714.0)    [4]   Past Surgical History:  Procedure Laterality Date    LA UPPER GI ENDOSCOPY,BIOPSY Left 6/10/2020    Procedure: GASTROSCOPY, WITH BIOPSY;  Surgeon: Tian Velazquez M.D.;  Location: SURGERY SAME DAY [facility];  Service: Gastroenterology     WA COLONOSCOPY-FLEXIBLE Left 6/10/2020    Procedure: COLONOSCOPY;  Surgeon: Tian Velazquez M.D.;  Location: SURGERY SAME DAY [facility];  Service: Gastroenterology    TOE AMPUTATION Left 2000    Amputation, Toe    OTHER ORTHOPEDIC SURGERY      amputation of left toes

## 2025-06-04 NOTE — PATIENT INSTRUCTIONS
Please do the following every morning and evening -    Duoneb nebulizer treatment (Ipratropium + Albuterol)  Flutter Valve   Stiolto inhaler (this part in the morning only)

## 2025-06-04 NOTE — ASSESSMENT & PLAN NOTE
Mild obstruction on PFT but chronic. Patient was on Breo 200 but was given a sample of Stiolto today, she will notify us if she prefers this inhaler and we can send a permanent script.     Plan:   Airway clearance  Stiolto trial, may stop Breo  Nebulizer + Flutter valve for airway clearance d/t chronic congestion    Orders:    CT-CHEST (THORAX) W/O; Future    Tiotropium Bromide-Olodaterol (STIOLTO RESPIMAT) 2.5-2.5 MCG/ACT Aero Soln; Inhale 2 Puffs every day.    ipratropium-albuterol (DUONEB) 0.5-2.5 (3) MG/3ML nebulizer solution; Take 3 mL by nebulization every four hours as needed for Shortness of Breath.    DME Nebulizer    DME Flutter Valve    doxycycline (VIBRAMYCIN) 100 MG Cap; Take 1 Capsule by mouth 2 times a day. Take as prescribed until gone. - for COPD exacerbation - emergency use only    predniSONE (DELTASONE) 10 MG Tab; Take 3 Tablets by mouth every day. For COPD exacerbation - emergency use only

## 2025-06-04 NOTE — PROCEDURES
Technician: Mary Castle, RRT   Technician Comment:  Good patient effort & cooperation.  The results of this test meet the ATS/ERS standards for acceptability & reproducibility.  Test was performed on the [employer]-Elite DX system.  Predicted values were GLI-2012 for spirometry, GLI-2020 for DLCO, ITS for Lung Volumes.  The DLCO was uncorrected for Hgb.  A bronchodilator of Albuterol HFA -2puffs via spacer administered.  DLCO performed during dilation period.    Interpretation:  Baseline spirometry shows airflow obstruction with an FEV1/FVC ratio 56 which is a Z-score of -2.77.  FEV1 is reduced at 1.43 L which is a Z-score of -2.48.  No significant bronchodilator response.  Lung volumes are within normal limits.  Diffusion capacity is reduced at 10.36 mL/min pulmonary artery which is a Z-score of -3.56.  Pulmonary function testing shows airflow obstruction with reduction in diffusion capacity consistent with emphysematous obstructive lung disease.

## 2025-08-12 ENCOUNTER — TELEPHONE (OUTPATIENT)
Dept: SLEEP MEDICINE | Facility: MEDICAL CENTER | Age: 65
End: 2025-08-12
Payer: MEDICARE

## (undated) DEVICE — CONTAINER, SPECIMEN, STERILE

## (undated) DEVICE — BITE BLOCK ADULT 60FR (100EA/CA)

## (undated) DEVICE — FILM CASSETTE ENDO

## (undated) DEVICE — FORCEP RADIAL JAW 4 STANDARD CAPACITY W/NEEDLE 240CM (40EA/BX)

## (undated) DEVICE — TRAP POLYP E-TRAP (25EA/BX)

## (undated) DEVICE — KIT PROCEDURE DOUBLE ENDO ONLY (5/CA)

## (undated) DEVICE — CAPTIVATOR II-15MM ROUND STIFF (40/BX)